# Patient Record
Sex: MALE | Race: WHITE | NOT HISPANIC OR LATINO | Employment: OTHER | URBAN - METROPOLITAN AREA
[De-identification: names, ages, dates, MRNs, and addresses within clinical notes are randomized per-mention and may not be internally consistent; named-entity substitution may affect disease eponyms.]

---

## 2017-01-15 ENCOUNTER — HOSPITAL ENCOUNTER (EMERGENCY)
Facility: HOSPITAL | Age: 82
Discharge: HOME | End: 2017-01-15
Attending: EMERGENCY MEDICINE
Payer: MEDICARE

## 2017-01-15 VITALS
WEIGHT: 190 LBS | DIASTOLIC BLOOD PRESSURE: 83 MMHG | TEMPERATURE: 97 F | SYSTOLIC BLOOD PRESSURE: 150 MMHG | HEART RATE: 64 BPM | BODY MASS INDEX: 28.89 KG/M2 | RESPIRATION RATE: 18 BRPM

## 2017-01-15 DIAGNOSIS — R52 PAIN: ICD-10-CM

## 2017-01-15 DIAGNOSIS — M19.031 OSTEOARTHRITIS OF RIGHT WRIST, UNSPECIFIED OSTEOARTHRITIS TYPE: Primary | ICD-10-CM

## 2017-01-15 DIAGNOSIS — S63.501A WRIST SPRAIN, RIGHT, INITIAL ENCOUNTER: ICD-10-CM

## 2017-01-15 PROCEDURE — 63600175 PHARM REV CODE 636 W HCPCS: Performed by: EMERGENCY MEDICINE

## 2017-01-15 PROCEDURE — 99283 EMERGENCY DEPT VISIT LOW MDM: CPT | Mod: 25

## 2017-01-15 PROCEDURE — 25000003 PHARM REV CODE 250: Performed by: EMERGENCY MEDICINE

## 2017-01-15 PROCEDURE — 96372 THER/PROPH/DIAG INJ SC/IM: CPT

## 2017-01-15 RX ORDER — TRAMADOL HYDROCHLORIDE AND ACETAMINOPHEN 37.5; 325 MG/1; MG/1
1 TABLET, FILM COATED ORAL EVERY 4 HOURS PRN
COMMUNITY
End: 2017-01-31

## 2017-01-15 RX ORDER — ONDANSETRON 2 MG/ML
4 INJECTION INTRAMUSCULAR; INTRAVENOUS
Status: COMPLETED | OUTPATIENT
Start: 2017-01-15 | End: 2017-01-15

## 2017-01-15 RX ORDER — CYCLOBENZAPRINE HCL 10 MG
10 TABLET ORAL
Status: COMPLETED | OUTPATIENT
Start: 2017-01-15 | End: 2017-01-15

## 2017-01-15 RX ORDER — HYDROMORPHONE HYDROCHLORIDE 1 MG/ML
0.5 INJECTION, SOLUTION INTRAMUSCULAR; INTRAVENOUS; SUBCUTANEOUS
Status: COMPLETED | OUTPATIENT
Start: 2017-01-15 | End: 2017-01-15

## 2017-01-15 RX ORDER — CYCLOBENZAPRINE HCL 10 MG
10 TABLET ORAL 3 TIMES DAILY PRN
Qty: 15 TABLET | Refills: 0 | Status: SHIPPED | OUTPATIENT
Start: 2017-01-15 | End: 2017-01-20

## 2017-01-15 RX ADMIN — ONDANSETRON 4 MG: 2 INJECTION INTRAMUSCULAR; INTRAVENOUS at 10:01

## 2017-01-15 RX ADMIN — CYCLOBENZAPRINE HYDROCHLORIDE 10 MG: 10 TABLET, FILM COATED ORAL at 10:01

## 2017-01-15 RX ADMIN — HYDROMORPHONE HYDROCHLORIDE 0.5 MG: 1 INJECTION, SOLUTION INTRAMUSCULAR; INTRAVENOUS; SUBCUTANEOUS at 10:01

## 2017-01-15 NOTE — ED AVS SNAPSHOT
[facility]  [address] [facility]  [place] [address]               [facility] Lacho Farooq   1/15/2017 10:12 PM   ED    Description:  Male : 1935   Department:  [facility]           Your Care was Coordinated By:     Provider Role From To    Mike Euceda MD Attending Provider 01/15/17 7952 --      Reason for Visit     Wrist Pain           Diagnoses this Visit        Comments    Osteoarthritis of right wrist, unspecified osteoarthritis type    -  Primary     Pain         Wrist sprain, right, initial encounter           ED Disposition     ED Disposition Condition Comment    Discharge             To Do List           Follow-up Information     Schedule an appointment as soon as possible for a visit with Dylan Adam MD.    Specialty:  Family Medicine    Contact information:    [address]  [place] [address]  [phone]         These Medications        Disp Refills Start End    cyclobenzaprine (FLEXERIL) 10 MG tablet 15 tablet 0 1/15/2017 2017    Take 1 tablet (10 mg total) by mouth 3 (three) times daily as needed for Muscle spasms. - Oral    Pharmacy: [facility] [address] [place] [address] #: [phone]         [facility]     [facility] Care [facility] -  Assistance  Registered nurses in the [facility] provide clinical advisement, health education, appointment booking, and other advisory services.  Call for this free service at [phone].             Medications           Message regarding Medications     Verify the changes and/or additions to your medication regime listed below are the same as discussed with your clinician today.  If any of these changes or additions are incorrect, please notify your healthcare provider.        START taking these NEW medications        Refills    cyclobenzaprine (FLEXERIL) 10 MG tablet 0    Sig: Take 1 tablet (10 mg total) by mouth 3 (three) times  daily as needed for Muscle spasms.    Class: Print    Route: Oral      These medications were administered today        Dose Freq    cyclobenzaprine tablet 10 mg 10 mg ED 1 Time    Sig: Take 1 tablet (10 mg total) by mouth ED 1 Time.    Class: Normal    Route: Oral           Verify that the below list of medications is an accurate representation of the medications you are currently taking.  If none reported, the list may be blank. If incorrect, please contact your healthcare provider. Carry this list with you in case of emergency.           Current Medications     aspirin (ECOTRIN) 81 MG EC tablet Take 81 mg by mouth once daily.    atorvastatin (LIPITOR) 10 MG tablet Take 10 mg by mouth every evening.    carvedilol (COREG) 25 MG tablet Take 25 mg by mouth 2 (two) times daily with meals.     ezetimibe (ZETIA) 10 mg tablet Take 10 mg by mouth once daily.    ferrous sulfate 325 mg (65 mg iron) Tab tablet Take 1 tablet (325 mg total) by mouth 2 (two) times daily.    iron sucrose (VENOFER) 100 mg iron/5 mL injection Inject 5 mLs (100 mg total) into the vein every 7 days.    lisinopril 10 MG tablet Take 10 mg by mouth once daily.    nitroGLYCERIN (NITROSTAT) 0.4 MG SL tablet Place 0.4 mg under the tongue every 5 (five) minutes as needed for Chest pain.    omeprazole (PRILOSEC) 40 MG capsule Take 40 mg by mouth once daily.    tramadol-acetaminophen 37.5-325 mg (ULTRACET) 37.5-325 mg Tab Take 1 tablet by mouth every 4 (four) hours as needed for Pain.    cyclobenzaprine (FLEXERIL) 10 MG tablet Take 1 tablet (10 mg total) by mouth 3 (three) times daily as needed for Muscle spasms.           Clinical Reference Information           Your Vitals Were     BP Pulse Temp Resp Weight BMI    174/81 (BP Location: Left arm, Patient Position: Sitting) 68 98 °F (36.7 °C) (Oral) 18 86.2 kg (190 lb) 28.89 kg/m2      Allergies as of 1/15/2017        Reactions    Bactrim [Sulfamethoxazole-trimethoprim] Rash    Codeine Rash      Immunizations  Administered on Date of Encounter - 1/15/2017     None      ED Micro, Lab, POCT     None      ED Imaging Orders     Start Ordered       Status Ordering Provider    01/15/17 2218 01/15/17 2218  X-Ray Wrist Complete Right  1 time imaging      In process         Discharge Instructions         What is Arthritis?  Arthritis is a disease that affects the joints (the parts where bones meet and move). It can affect any joint in your body. There are many types of arthritis, including osteoarthritis and rheumatoid arthrtitis. If your symptoms are mild, medications may be enough to reduce pain and swelling. For more severe arthritis, surgery may be needed to improve the condition of the joint or replace the joint entirely.                  What causes arthritis?  Cartilage is a smooth substance that protects the ends of your bones and provides cushioning. When you have arthritis, this cartilage breaks down and can no longer protect your bones. The bones rub against each other, causing pain and swelling. Over time, bone spurs (small pieces of rough or splintered bone) may develop, and the joint's range of motion can become limited.  Symptoms  Some of the more common symptoms of arthritis include:  · Joint pain and stiffness. Pain and stiffness get worse with long periods of rest or using a joint too long or too hard.  · Joints that have lost normal shape and motion.  · Tender, inflamed joints. They may look red and feel warm.  · Grinding or popping noise with joint movement.   · Feeling tired all the time.  Reducing symptoms  Following a healthy lifestyle by losing weight and exercising can help reduce symptoms of osteoarthritis. Medicines can be very helpful for arthritis.     © 6926-5371 [employer]. [address] [place] [address], [place] [address]. All rights reserved. This information is not intended as a substitute for professional medical care. Always follow your healthcare professional's  instructions.          Muscle Strain in the Extremities  A muscle strain is a stretching and tearing of muscle fibers. This causes pain, especially when you move that muscle. There may also be some swelling and bruising.  Home care  · Keep the hurt area raised to reduce pain and swelling. This is especially important during the first 48 hours.  · Apply an ice pack over the injured area for 15 to 20 minutes every 3 to 6 hours. You should do this for the first 24 to 48 hours. You can make an ice pack by filling a plastic bag that seals at the top with ice cubes and then wrapping it with a thin towel. Be careful not to injure your skin with the ice treatments. Ice should never be applied directly to skin. Continue the use of ice packs for relief of pain and swelling as needed. After 48 hours, apply heat (warm shower or warm bath) for 15 to 20 minutes several times a day, or alternate ice and heat.  · You may use over-the-counter pain medicine to control pain, unless another medicine was prescribed. If you have chronic liver or kidney disease or ever had a stomach ulcer or GI bleeding, talk with your healthcare provider before using these medicines.  · For leg strains: If crutches have been recommended, dont put full weight on the hurt leg until you can do so without pain. You can return to sports when you are able to hop and run on the injured leg without pain.  Follow-up care  Follow up with your healthcare provider, or as advised.  When to seek medical advice  Call your healthcare provider right away if any of these occur:  · The toes of the injured leg become swollen, cold, blue, numb, or tingly  · Pain or swelling increases  © 5554-5795 [employer]. [address] [place] [address], [place] [address]. All rights reserved. This information is not intended as a substitute for professional medical care. Always follow your healthcare professional's instructions.          Your Scheduled Appointments     Jan 31,  2017  9:00 AM CST   Established Patient Visit with Mitchel Fernandez MD   [facility] ([facility]    [address] [facility] [address] [phone]              Leticias[facility] Sign-Up     Activating your [facility] account is as easy as 1-2-3!     1) Visit [email or web address], select Sign Up Now, enter this activation code and your date of birth, then select Next.  [email or web address]  Expires: 3/1/2017 10:48 PM      2) Create a username and password to use when you visit [facility] in the future and select a security question in case you lose your password and select Next.    3) Enter your e-mail address and click Sign Up!    Additional Information  If you have questions, please e-mail [email or web address] or call [phone] to talk to our [facility] staff. Remember, [facility] is NOT to be used for urgent needs. For medical emergencies, dial 911.         Smoking Cessation     If you would like to quit smoking:   You may be eligible for free services if you are a [place] resident and started smoking cigarettes before September 1, 1988.  Call the Smoking Cessation Trust (SCT) toll free at (333) 763-0812 or (481) 410-4448.   Call 5-591-QUIT-NOW if you do not meet the above criteria.             [facility] complies with applicable Federal civil rights laws and does not discriminate on the basis of race, color, national origin, age, disability, or sex.        Language Assistance Services     ATTENTION: Language assistance services are available, free of charge. Please call [phone].      ATENCIÓN: Si habla español, tiene a albarran disposición servicios gratuitos de asistencia lingüística. Llame al [phone].     CHÚ Ý: N?u b?n nói Ti?ng Vi?t, có các d?ch v? h? tr? ngôn ng? mi?n phí dành cho b?n. G?i s? [phone].

## 2017-01-16 NOTE — ED TRIAGE NOTES
"pt c/o right wrist pain, denies trauma. Pt reports onset x a "couple days, but really bad tonight."  "

## 2017-01-16 NOTE — ED PROVIDER NOTES
"Encounter Date: 1/15/2017       History     Chief Complaint   Patient presents with    Wrist Pain     pt c/o right wrist pain, denies trauma. Pt reports onset x a "couple days, but really bad tonight."     Review of patient's allergies indicates:   Allergen Reactions    Bactrim [sulfamethoxazole-trimethoprim] Rash    Codeine Rash     Patient is a 81 y.o. male presenting with the following complaint: wrist injury. The history is provided by the patient.   Wrist Injury    The incident occurred several days ago. The incident occurred at home. There was no injury mechanism. The pain is present in the right wrist. The quality of the pain is described as aching. The pain is at a severity of 3/10. The pain has been fluctuating since the incident. Pertinent negatives include no fever and no malaise/fatigue. He reports no foreign bodies present. The symptoms are aggravated by movement. He has tried nothing for the symptoms.     Past Medical History   Diagnosis Date    Anemia     Coronary artery disease     Hyperlipemia     Hypertension      No past medical history pertinent negatives.  Past Surgical History   Procedure Laterality Date    Cardiac surgery      Hernia repair      Carpal tunnel release       History reviewed. No pertinent family history.  Social History   Substance Use Topics    Smoking status: Former Smoker    Smokeless tobacco: Never Used    Alcohol use Yes      Comment: rarely     Review of Systems   Constitutional: Negative for fever and malaise/fatigue.   HENT: Negative for ear discharge, ear pain and sore throat.    Eyes: Negative for pain, discharge, redness and itching.   Respiratory: Negative for cough, shortness of breath, wheezing and stridor.    Cardiovascular: Negative for chest pain, palpitations and leg swelling.   Gastrointestinal: Negative for abdominal pain, nausea and vomiting.   Genitourinary: Negative for enuresis and flank pain.   Musculoskeletal: Positive for joint swelling. " Negative for back pain, gait problem, neck pain and neck stiffness.   Skin: Negative for color change, pallor, rash and wound.   Neurological: Negative for tremors, syncope, speech difficulty and weakness.       Physical Exam   Initial Vitals   BP Pulse Resp Temp SpO2   01/15/17 2210 01/15/17 2210 01/15/17 2210 01/15/17 2210 --   174/81 68 18 98 °F (36.7 °C)      Physical Exam    Nursing note and vitals reviewed.  Constitutional: He appears well-developed and well-nourished. He is not diaphoretic. No distress.   HENT:   Head: Normocephalic and atraumatic.   Mouth/Throat: No oropharyngeal exudate.   Eyes: Conjunctivae and EOM are normal. Pupils are equal, round, and reactive to light. Right eye exhibits no discharge. Left eye exhibits no discharge. No scleral icterus.   Neck: Normal range of motion. Neck supple.   Cardiovascular: Normal rate, regular rhythm and normal heart sounds. Exam reveals no friction rub.    Pulmonary/Chest: Breath sounds normal. No stridor. No respiratory distress. He has no wheezes. He has no rhonchi. He has no rales.   Abdominal: Bowel sounds are normal. He exhibits no distension. There is no tenderness. There is no rebound and no guarding.   Musculoskeletal: Normal range of motion. He exhibits edema and tenderness.        Arms:  Neurological: He is alert and oriented to person, place, and time. He has normal strength and normal reflexes. No sensory deficit.   Skin: No rash noted.         ED Course   Procedures  Labs Reviewed - No data to display                            ED Course     Clinical Impression:   The primary encounter diagnosis was Osteoarthritis of right wrist, unspecified osteoarthritis type. Diagnoses of Pain and Wrist sprain, right, initial encounter were also pertinent to this visit.    Disposition:   Disposition: Discharged  Condition: Stable       Mike Euceda MD  01/15/17 2198

## 2017-01-16 NOTE — DISCHARGE INSTRUCTIONS
What is Arthritis?  Arthritis is a disease that affects the joints (the parts where bones meet and move). It can affect any joint in your body. There are many types of arthritis, including osteoarthritis and rheumatoid arthrtitis. If your symptoms are mild, medications may be enough to reduce pain and swelling. For more severe arthritis, surgery may be needed to improve the condition of the joint or replace the joint entirely.                  What causes arthritis?  Cartilage is a smooth substance that protects the ends of your bones and provides cushioning. When you have arthritis, this cartilage breaks down and can no longer protect your bones. The bones rub against each other, causing pain and swelling. Over time, bone spurs (small pieces of rough or splintered bone) may develop, and the joint's range of motion can become limited.  Symptoms  Some of the more common symptoms of arthritis include:  · Joint pain and stiffness. Pain and stiffness get worse with long periods of rest or using a joint too long or too hard.  · Joints that have lost normal shape and motion.  · Tender, inflamed joints. They may look red and feel warm.  · Grinding or popping noise with joint movement.   · Feeling tired all the time.  Reducing symptoms  Following a healthy lifestyle by losing weight and exercising can help reduce symptoms of osteoarthritis. Medicines can be very helpful for arthritis.     © 2334-7677 The "[employer]. [address] [place] [address], [place] [address]. All rights reserved. This information is not intended as a substitute for professional medical care. Always follow your healthcare professional's instructions.          Muscle Strain in the Extremities  A muscle strain is a stretching and tearing of muscle fibers. This causes pain, especially when you move that muscle. There may also be some swelling and bruising.  Home care  · Keep the hurt area raised to reduce pain and swelling. This is especially  important during the first 48 hours.  · Apply an ice pack over the injured area for 15 to 20 minutes every 3 to 6 hours. You should do this for the first 24 to 48 hours. You can make an ice pack by filling a plastic bag that seals at the top with ice cubes and then wrapping it with a thin towel. Be careful not to injure your skin with the ice treatments. Ice should never be applied directly to skin. Continue the use of ice packs for relief of pain and swelling as needed. After 48 hours, apply heat (warm shower or warm bath) for 15 to 20 minutes several times a day, or alternate ice and heat.  · You may use over-the-counter pain medicine to control pain, unless another medicine was prescribed. If you have chronic liver or kidney disease or ever had a stomach ulcer or GI bleeding, talk with your healthcare provider before using these medicines.  · For leg strains: If crutches have been recommended, dont put full weight on the hurt leg until you can do so without pain. You can return to sports when you are able to hop and run on the injured leg without pain.  Follow-up care  Follow up with your healthcare provider, or as advised.  When to seek medical advice  Call your healthcare provider right away if any of these occur:  · The toes of the injured leg become swollen, cold, blue, numb, or tingly  · Pain or swelling increases  © 6840-7963 [employer]. [address] [place] [address], [place] [address]. All rights reserved. This information is not intended as a substitute for professional medical care. Always follow your healthcare professional's instructions.

## 2017-01-31 ENCOUNTER — OFFICE VISIT (OUTPATIENT)
Dept: NEUROLOGY | Facility: CLINIC | Age: 82
End: 2017-01-31
Payer: MEDICARE

## 2017-01-31 VITALS
SYSTOLIC BLOOD PRESSURE: 108 MMHG | HEIGHT: 68 IN | DIASTOLIC BLOOD PRESSURE: 62 MMHG | HEART RATE: 60 BPM | WEIGHT: 192 LBS | RESPIRATION RATE: 18 BRPM | BODY MASS INDEX: 29.1 KG/M2

## 2017-01-31 DIAGNOSIS — I65.23 BILATERAL CAROTID ARTERY STENOSIS: ICD-10-CM

## 2017-01-31 DIAGNOSIS — I69.90 LATE EFFECTS OF CVA (CEREBROVASCULAR ACCIDENT): Primary | ICD-10-CM

## 2017-01-31 DIAGNOSIS — I67.1 CEREBRAL ANEURYSM: ICD-10-CM

## 2017-01-31 DIAGNOSIS — I48.0 PAROXYSMAL ATRIAL FIBRILLATION: ICD-10-CM

## 2017-01-31 DIAGNOSIS — I73.9 PVD (PERIPHERAL VASCULAR DISEASE) WITH CLAUDICATION: ICD-10-CM

## 2017-01-31 PROCEDURE — 99999 PR PBB SHADOW E&M-EST. PATIENT-LVL III: CPT | Mod: PBBFAC,,, | Performed by: PSYCHIATRY & NEUROLOGY

## 2017-01-31 PROCEDURE — 99213 OFFICE O/P EST LOW 20 MIN: CPT | Mod: PBBFAC | Performed by: PSYCHIATRY & NEUROLOGY

## 2017-01-31 PROCEDURE — 99214 OFFICE O/P EST MOD 30 MIN: CPT | Mod: S$PBB,,, | Performed by: PSYCHIATRY & NEUROLOGY

## 2017-01-31 RX ORDER — DABIGATRAN ETEXILATE 150 MG/1
150 CAPSULE ORAL 2 TIMES DAILY
COMMUNITY
End: 2018-01-26 | Stop reason: ALTCHOICE

## 2017-01-31 RX ORDER — LISINOPRIL AND HYDROCHLOROTHIAZIDE 10; 12.5 MG/1; MG/1
1 TABLET ORAL DAILY
COMMUNITY
End: 2018-01-26 | Stop reason: DRUGHIGH

## 2017-01-31 NOTE — PROGRESS NOTES
HPI:Lacho Farooq is a 81 y.o. male with 2 spells of vague dizziness last month. 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  Since the last visit, the patient was found to have PAD and was treated for anemia with transfusion.   No intervention was done.  NO further leg pain and no dizziness.  Patient had seen vascular surgery re: Carotid stenosis and PAD. He is considering CEA  He is currently getting TED with Dr Walter for cervical pain which helps greatly      Review of Systems   Constitutional: Negative for fever.   HENT: Negative for nosebleeds.    Eyes: Negative for double vision.   Respiratory: Negative for hemoptysis.    Cardiovascular: Negative for leg swelling.   Gastrointestinal: Negative for blood in stool.   Genitourinary: Negative for hematuria.   Musculoskeletal: Negative for falls.   Skin: Negative for rash.   Neurological: Negative for tremors, sensory change and focal weakness.   Psychiatric/Behavioral: Negative for memory loss.     Exam:  Gen Appearance, well developed/nourished in no apparent distress  CV: 2+ distal pulses with no edema or swelling  Neuro:  MS: Awake, alert, Sustains attention. Oriented to date fully. Recent details are intact as he recalls events of recent football games/ able to tell me recent medial details without difficulty/remote memory is intact. He shows me his watch that he uses for his medication reminder Language is full to spontaneous speech/comprehension. Fund of Knowledge is full. His insight and judgement are excellent, he is  goal directed  CN: Optic discs are flat with normal vasculature, PERRL, Extraoccular movements and visual fields are full. Left eye lid mildly drooping (he states known cosmetic) Normal facial strength,Tongue and Palate are midline and  strong. Shoulder Shrug  symmetric and strong.  Motor: Normal bulk, tone, no abnormal movements.  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      2016: Carotid US less than 60% stenosis, TTE normal EF, Holter: no afib    6/2016 INES: The aortic root is normal in size.  Grade 2 atheroma disease was detected consistent with extensive intimal thickening.  Maximal plaque thickness was 3 mm.      Labs: LDL 37 and fasting glucose below 110      9/2016CTA head and neck :1.  No CT evidence of acute intracranial abnormality.  2.  Atrophy and small vessel ischemic changes of the periventricular white matter.  3.  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  4.  Atherosclerotic change as noted above with greater than 70 percent narrowing involving the internal carotid arteries bilaterally greater on the right than the left.    10/2016 CTA legs: Significant calcified atheromatous disease involving the aorta and its major branch vessels as well as the bilateral lower extremity vessels.  There is diminution of flow in the bilateral anterior and posterior tibial arteries and peroneal arteries, left greater than right, with occlusion of the left posterior tibial artery in the lower calf with reconstitution in the foot.    Bilateral lower extremity subcutaneous edema.    Small bilateral pleural effusions.    Left renal cyst.      Assessment: Lacho Farooq is a 81 y.o. male with 2 spells of vague dizziness in 2016. MRI showed  acute CVA punctate in the left cerebellum  MRA shows questionable  3.2 mm aneurysm  arising from the right M1 segment and questionable small aneurysms measuring 1-2 mm from the left paraclinoid carotid artery.    I recommend:  1. Rescan ? aneurysmvs focal angulation  at right M1 and left para clinoid carotid segments with  CTA head and neck in 1-2 years. Benefits of pradaxa outweigh any risk of bleeding here. These lesions are small and would not require intervention at this point otherwise. This will also be used to continue to monitor his greater than 70% carotid disease if he does not have CEA. (also monitored by Dr Begum by US- currently asymptomatic and seeing vascular surgery)  2. INES showed   Grade 2 atheroma disease   3. Pradaxa to continue for afib and stroke prevention- patient reports that he could have forgotten some doses prior to event in 2016-- more compliant now  4. PAD noted by CTA legs and treated conservatatively. Patient has a history of cervical radicular pain which is currently treated with  epidural steroid injections.   5. The patient is known to me from 2010 for confusion associated with hypotension, prostatitis (for which he is still being treated) and acute renal failure thought to represent delirium.  He was discharged from my care after that point when symptoms resolved. Will monitor for any consistent memory loss long term. He uses CPAP for central apneal.       RTC in 6 months

## 2017-01-31 NOTE — MR AVS SNAPSHOT
[facility]  [address] [facility] [address]  Phone: [phone]  Fax: [phone]                  St Lacho Farooq   2017 9:00 AM   Office Visit    Description:  Male : 1935   Provider:  Mitchel Fernandez MD   Department:  [facility]           Reason for Visit     Neurologic Problem           Diagnoses this Visit        Comments    Late effects of CVA (cerebrovascular accident)    -  Primary     Paroxysmal atrial fibrillation         Cerebral aneurysm         PVD (peripheral vascular disease) with claudication         Bilateral carotid artery stenosis                To Do List           Goals (5 Years of Data)     None      Follow-Up and Disposition     Return in about 6 months (around 2017).      [facility] On Call     [facility] On Call Nurse Care Line -  Assistance  Registered nurses in the [facility] On Call Center provide clinical advisement, health education, appointment booking, and other advisory services.  Call for this free service at [phone].             Medications           Message regarding Medications     Verify the changes and/or additions to your medication regime listed below are the same as discussed with your clinician today.  If any of these changes or additions are incorrect, please notify your healthcare provider.        STOP taking these medications     tramadol-acetaminophen 37.5-325 mg (ULTRACET) 37.5-325 mg Tab Take 1 tablet by mouth every 4 (four) hours as needed for Pain.    lisinopril 10 MG tablet Take 10 mg by mouth once daily.    ezetimibe (ZETIA) 10 mg tablet Take 10 mg by mouth once daily.    iron sucrose (VENOFER) 100 mg iron/5 mL injection Inject 5 mLs (100 mg total) into the vein every 7 days.    ferrous sulfate 325 mg (65 mg iron) Tab tablet Take 1 tablet (325 mg total) by mouth 2 (two) times daily.           Verify that the below list of medications is an accurate representation of the medications  "you are currently taking.  If none reported, the list may be blank. If incorrect, please contact your healthcare provider. Carry this list with you in case of emergency.           Current Medications     aspirin (ECOTRIN) 81 MG EC tablet Take 81 mg by mouth once daily.    atorvastatin (LIPITOR) 10 MG tablet Take 10 mg by mouth every evening.    carvedilol (COREG) 25 MG tablet Take 25 mg by mouth 2 (two) times daily with meals.     COQ10, UBIQUINOL, ORAL Take 1 tablet by mouth once daily.    dabigatran etexilate (PRADAXA) 150 mg Cap Take 150 mg by mouth 2 (two) times daily. "Do NOT break, chew, or open capsules."    lisinopril-hydrochlorothiazide (PRINZIDE,ZESTORETIC) 10-12.5 mg per tablet Take 1 tablet by mouth once daily.    nitroGLYCERIN (NITROSTAT) 0.4 MG SL tablet Place 0.4 mg under the tongue every 5 (five) minutes as needed for Chest pain.    omeprazole (PRILOSEC) 40 MG capsule Take 40 mg by mouth once daily.           Clinical Reference Information           Vital Signs - Last Recorded  Most recent update: 1/31/2017  9:05 AM by Sheri Anthony MA    BP Pulse Resp Ht Wt BMI    108/62 (BP Location: Left arm, Patient Position: Sitting, BP Method: Manual) 60 18 5' 8" (1.727 m) 87.1 kg (192 lb 0.3 oz) 29.2 kg/m2      Blood Pressure          Most Recent Value    BP  108/62      Allergies as of 1/31/2017     Bactrim [Sulfamethoxazole-trimethoprim]    Codeine      Immunizations Administered on Date of Encounter - 1/31/2017     None      "

## 2017-02-23 ENCOUNTER — OFFICE VISIT (OUTPATIENT)
Dept: FAMILY MEDICINE | Facility: CLINIC | Age: 82
End: 2017-02-23
Payer: MEDICARE

## 2017-02-23 VITALS
HEIGHT: 67 IN | HEART RATE: 72 BPM | SYSTOLIC BLOOD PRESSURE: 128 MMHG | BODY MASS INDEX: 30.07 KG/M2 | DIASTOLIC BLOOD PRESSURE: 78 MMHG | WEIGHT: 191.56 LBS

## 2017-02-23 DIAGNOSIS — R10.30 INGUINAL PAIN, UNSPECIFIED LATERALITY: Primary | ICD-10-CM

## 2017-02-23 PROCEDURE — 81000 URINALYSIS NONAUTO W/SCOPE: CPT | Mod: PBBFAC | Performed by: FAMILY MEDICINE

## 2017-02-23 PROCEDURE — 99212 OFFICE O/P EST SF 10 MIN: CPT | Mod: PBBFAC | Performed by: FAMILY MEDICINE

## 2017-02-23 PROCEDURE — 81001 URINALYSIS AUTO W/SCOPE: CPT | Mod: PBBFAC | Performed by: FAMILY MEDICINE

## 2017-02-23 PROCEDURE — 99999 PR PBB SHADOW E&M-EST. PATIENT-LVL II: CPT | Mod: PBBFAC,,, | Performed by: FAMILY MEDICINE

## 2017-02-23 PROCEDURE — 99213 OFFICE O/P EST LOW 20 MIN: CPT | Mod: S$PBB,,, | Performed by: FAMILY MEDICINE

## 2017-02-23 RX ORDER — DOXYCYCLINE 100 MG/1
100 CAPSULE ORAL 2 TIMES DAILY
Qty: 28 CAPSULE | Refills: 0 | Status: SHIPPED | OUTPATIENT
Start: 2017-02-23 | End: 2018-01-26

## 2017-02-23 NOTE — MR AVS SNAPSHOT
[facility]  [address]  [facility] [address]  Phone: [phone]  Fax: [phone]                  St Lacho Farooq   2017 4:30 PM   Office Visit    Description:  Male : 1935   Provider:  Ehsan West MD   Department:  [facility]           Reason for Visit     Urinary Tract Infection           Diagnoses this Visit        Comments    Inguinal pain, unspecified laterality    -  Primary            To Do List           Future Appointments        Provider Department Dept Phone    3/9/2017 2:45 PM Ehsan West MD [facility] [phone]    2017 9:30 AM Mitchel Fernandez MD [facility] [phone]      Goals (5 Years of Data)     None       These Medications        Disp Refills Start End    doxycycline (MONODOX) 100 MG capsule 28 capsule 0 2017     Take 1 capsule (100 mg total) by mouth 2 (two) times daily. - Oral    Pharmacy: [facility] [address] [place] [address] #: [phone]         [facility] On Call     [facility] On Call Nurse Care Line -  Assistance  Registered nurses in the [facility] On Call Center provide clinical advisement, health education, appointment booking, and other advisory services.  Call for this free service at [phone].             Medications           Message regarding Medications     Verify the changes and/or additions to your medication regime listed below are the same as discussed with your clinician today.  If any of these changes or additions are incorrect, please notify your healthcare provider.        START taking these NEW medications        Refills    doxycycline (MONODOX) 100 MG capsule 0    Sig: Take 1 capsule (100 mg total) by mouth 2 (two) times daily.    Class: Normal    Route: Oral           Verify that the below list of medications is an accurate representation of the medications you are currently taking.  If none reported, the list  "may be blank. If incorrect, please contact your healthcare provider. Carry this list with you in case of emergency.           Current Medications     aspirin (ECOTRIN) 81 MG EC tablet Take 81 mg by mouth once daily.    atorvastatin (LIPITOR) 10 MG tablet Take 10 mg by mouth every evening.    carvedilol (COREG) 25 MG tablet Take 25 mg by mouth 2 (two) times daily with meals.     COQ10, UBIQUINOL, ORAL Take 1 tablet by mouth once daily.    dabigatran etexilate (PRADAXA) 150 mg Cap Take 150 mg by mouth 2 (two) times daily. "Do NOT break, chew, or open capsules."    lisinopril-hydrochlorothiazide (PRINZIDE,ZESTORETIC) 10-12.5 mg per tablet Take 1 tablet by mouth once daily.    nitroGLYCERIN (NITROSTAT) 0.4 MG SL tablet Place 0.4 mg under the tongue every 5 (five) minutes as needed for Chest pain.    omeprazole (PRILOSEC) 40 MG capsule Take 40 mg by mouth once daily.    doxycycline (MONODOX) 100 MG capsule Take 1 capsule (100 mg total) by mouth 2 (two) times daily.           Clinical Reference Information           Your Vitals Were     BP Pulse Height Weight BMI    128/78 (BP Location: Left arm, Patient Position: Sitting, BP Method: Manual) 72 5' 7" (1.702 m) 86.9 kg (191 lb 9.3 oz) 30.01 kg/m2      Blood Pressure          Most Recent Value    BP  128/78      Allergies as of 2/23/2017     Bactrim [Sulfamethoxazole-trimethoprim]    Codeine      Immunizations Administered on Date of Encounter - 2/23/2017     None      Orders Placed During Today's Visit      Normal Orders This Visit    POCT urinalysis, dipstick or tablet reag     POCT URINE SEDIMENT EXAM       Language Assistance Services     ATTENTION: Language assistance services are available, free of charge. Please call [phone].      ATENCIÓN: Si habla jignesh, tiene a albarran disposición servicios gratuitos de asistencia lingüística. Llame al [phone].     CHÚ Ý: N?u b?n nói Ti?ng Vi?t, có các d?ch v? h? tr? ngôn ng? mi?n phí dành cho b?n. G?i s? " [phone].         [facility] complies with applicable Federal civil rights laws and does not discriminate on the basis of race, color, national origin, age, disability, or sex.

## 2017-02-23 NOTE — PROGRESS NOTES
Subjective:       Patient ID: St Lacho Farooq is a 82 y.o. male.    Chief Complaint: Urinary Tract Infection    Pt is a 82 y.o. male who presents for evaluation and management of   Encounter Diagnosis   Name Primary?    Inguinal pain, unspecified laterality Yes   .noticed while fishing today. Bilateral pelvic and inguinal pain. He says he felt this way when he had a prostate infection 5 years ago     Doing well on current meds. Denies any side effects. Prevention is up to date.  Review of Systems   Constitutional: Negative for chills and fever.   Genitourinary: Positive for testicular pain. Negative for dysuria, penile pain and penile swelling.       Objective:      Physical Exam   Constitutional: He is oriented to person, place, and time. He appears well-developed and well-nourished.   HENT:   Head: Normocephalic and atraumatic.   Right Ear: External ear normal.   Left Ear: External ear normal.   Nose: Nose normal.   Mouth/Throat: Oropharynx is clear and moist.   Eyes: Conjunctivae and EOM are normal. Pupils are equal, round, and reactive to light. Right eye exhibits no discharge. Left eye exhibits no discharge. No scleral icterus.   Neck: Normal range of motion. Neck supple. No JVD present. No tracheal deviation present. No thyromegaly present.   Cardiovascular: Normal rate, regular rhythm, normal heart sounds and intact distal pulses.    No murmur heard.  Pulmonary/Chest: Effort normal and breath sounds normal. No respiratory distress. He has no wheezes. He has no rales. He exhibits no tenderness.   Abdominal: Soft. Bowel sounds are normal. He exhibits no distension and no mass. There is no tenderness. There is no rebound and no guarding.   Genitourinary:   Genitourinary Comments: Right sided varicocele   Left inguinal TTP around vas differens, no epididymal TTP  No palp hernia on left or right   Prostate 20g, nontender    Musculoskeletal: Normal range of motion.   Lymphadenopathy:     He has no cervical  adenopathy.   Neurological: He is alert and oriented to person, place, and time. He has normal reflexes. He displays normal reflexes. No cranial nerve deficit. He exhibits normal muscle tone. Coordination normal.   Skin: Skin is warm and dry.   Psychiatric: He has a normal mood and affect. His behavior is normal. Judgment and thought content normal.       Assessment:       1. Inguinal pain, unspecified laterality        Plan:   St Monk was seen today for urinary tract infection.    Diagnoses and all orders for this visit:    Inguinal pain, unspecified laterality  -     POCT urinalysis, dipstick or tablet reag  -     POCT URINE SEDIMENT EXAM    Other orders  -     doxycycline (MONODOX) 100 MG capsule; Take 1 capsule (100 mg total) by mouth 2 (two) times daily.    exam is not straightfoward as to cause of pain but I am treating for epididymitis   RTC 2 weeks   No Follow-up on file.

## 2017-03-01 LAB
BACTERIA SPEC CULT: NORMAL
BILIRUB SERPL-MCNC: NORMAL MG/DL
BLOOD URINE, POC: NORMAL
CASTS: NORMAL
COLOR, POC UA: NORMAL
CRYSTALS: NORMAL
GLUCOSE UR QL STRIP: NORMAL
KETONES UR QL STRIP: NORMAL
LEUKOCYTE ESTERASE URINE, POC: NORMAL
NITRITE, POC UA: NORMAL
PH, POC UA: 5
PROTEIN, POC: NORMAL
RBC CELLS COUNTED: NORMAL
SPECIFIC GRAVITY, POC UA: 1.02
UROBILINOGEN, POC UA: NORMAL
WHITE BLOOD CELLS: NORMAL

## 2017-03-09 ENCOUNTER — OFFICE VISIT (OUTPATIENT)
Dept: FAMILY MEDICINE | Facility: CLINIC | Age: 82
End: 2017-03-09
Payer: MEDICARE

## 2017-03-09 VITALS
DIASTOLIC BLOOD PRESSURE: 72 MMHG | SYSTOLIC BLOOD PRESSURE: 132 MMHG | WEIGHT: 192.63 LBS | HEIGHT: 67 IN | BODY MASS INDEX: 30.24 KG/M2 | HEART RATE: 80 BPM

## 2017-03-09 DIAGNOSIS — N45.1 EPIDIDYMITIS: Primary | ICD-10-CM

## 2017-03-09 PROCEDURE — 99999 PR PBB SHADOW E&M-EST. PATIENT-LVL II: CPT | Mod: PBBFAC,,, | Performed by: FAMILY MEDICINE

## 2017-03-09 PROCEDURE — 1160F RVW MEDS BY RX/DR IN RCRD: CPT | Performed by: FAMILY MEDICINE

## 2017-03-09 PROCEDURE — 3078F DIAST BP <80 MM HG: CPT | Performed by: FAMILY MEDICINE

## 2017-03-09 PROCEDURE — 1157F ADVNC CARE PLAN IN RCRD: CPT | Performed by: FAMILY MEDICINE

## 2017-03-09 PROCEDURE — 1159F MED LIST DOCD IN RCRD: CPT | Performed by: FAMILY MEDICINE

## 2017-03-09 PROCEDURE — 99212 OFFICE O/P EST SF 10 MIN: CPT | Mod: PBBFAC | Performed by: FAMILY MEDICINE

## 2017-03-09 PROCEDURE — 99213 OFFICE O/P EST LOW 20 MIN: CPT | Mod: S$PBB | Performed by: FAMILY MEDICINE

## 2017-03-09 PROCEDURE — 3075F SYST BP GE 130 - 139MM HG: CPT | Performed by: FAMILY MEDICINE

## 2017-03-09 NOTE — PROGRESS NOTES
Subjective:       Patient ID: St Lacho Farooq is a 82 y.o. male.    Chief Complaint: Follow-up    Pt is a 82 y.o. male who presents for evaluation and management of   Encounter Diagnosis   Name Primary?    Epididymitis Yes   .pain resolved     Doing well on current meds. Denies any side effects. Prevention is up to date.  Review of Systems   Constitutional: Negative for fever.   Genitourinary: Negative for dysuria and testicular pain.       Objective:      Physical Exam   Constitutional: He is oriented to person, place, and time. He appears well-developed and well-nourished.   HENT:   Head: Normocephalic and atraumatic.   Right Ear: External ear normal.   Left Ear: External ear normal.   Nose: Nose normal.   Mouth/Throat: Oropharynx is clear and moist.   Eyes: Conjunctivae and EOM are normal. Pupils are equal, round, and reactive to light. Right eye exhibits no discharge. Left eye exhibits no discharge. No scleral icterus.   Neck: Normal range of motion. Neck supple. No JVD present. No tracheal deviation present. No thyromegaly present.   Cardiovascular: Normal rate, regular rhythm, normal heart sounds and intact distal pulses.    No murmur heard.  Pulmonary/Chest: Effort normal and breath sounds normal. No respiratory distress. He has no wheezes. He has no rales. He exhibits no tenderness.   Abdominal: Soft. Bowel sounds are normal. He exhibits no distension and no mass. There is no tenderness. There is no rebound and no guarding.   Genitourinary:   Genitourinary Comments: Right sided varicocele   Left inguinal resolved TTP around vas differens, no epididymal TTP  No palp hernia on left or right   Prostate 20g, nontender    Musculoskeletal: Normal range of motion.   Lymphadenopathy:     He has no cervical adenopathy.   Neurological: He is alert and oriented to person, place, and time. He has normal reflexes. He displays normal reflexes. No cranial nerve deficit. He exhibits normal muscle tone. Coordination  normal.   Skin: Skin is warm and dry.   Psychiatric: He has a normal mood and affect. His behavior is normal. Judgment and thought content normal.       Assessment:       1. Epididymitis        Plan:   St Monk was seen today for follow-up.    Diagnoses and all orders for this visit:    Epididymitis    resolved     No Follow-up on file.

## 2017-06-19 DIAGNOSIS — J44.1 CHRONIC OBSTRUCTIVE PULMONARY DISEASE WITH ACUTE EXACERBATION: ICD-10-CM

## 2017-06-19 RX ORDER — UMECLIDINIUM BROMIDE AND VILANTEROL TRIFENATATE 62.5; 25 UG/1; UG/1
POWDER RESPIRATORY (INHALATION)
Qty: 60 EACH | Refills: 0 | Status: SHIPPED | OUTPATIENT
Start: 2017-06-19 | End: 2017-08-14 | Stop reason: SDUPTHER

## 2017-08-11 ENCOUNTER — OFFICE VISIT (OUTPATIENT)
Dept: NEUROLOGY | Facility: CLINIC | Age: 82
End: 2017-08-11
Payer: MEDICARE

## 2017-08-11 VITALS
RESPIRATION RATE: 18 BRPM | BODY MASS INDEX: 29.54 KG/M2 | HEIGHT: 68 IN | SYSTOLIC BLOOD PRESSURE: 120 MMHG | HEART RATE: 58 BPM | WEIGHT: 194.88 LBS | DIASTOLIC BLOOD PRESSURE: 58 MMHG

## 2017-08-11 DIAGNOSIS — I67.1 CEREBRAL ANEURYSM: ICD-10-CM

## 2017-08-11 DIAGNOSIS — I69.90 LATE EFFECTS OF CVA (CEREBROVASCULAR ACCIDENT): Primary | ICD-10-CM

## 2017-08-11 DIAGNOSIS — I48.0 PAROXYSMAL ATRIAL FIBRILLATION: ICD-10-CM

## 2017-08-11 PROCEDURE — 99999 PR STA SHADOW: CPT | Mod: PBBFAC,,, | Performed by: PSYCHIATRY & NEUROLOGY

## 2017-08-11 PROCEDURE — 99999 PR PBB SHADOW E&M-EST. PATIENT-LVL III: CPT | Mod: PBBFAC,,, | Performed by: PSYCHIATRY & NEUROLOGY

## 2017-08-11 PROCEDURE — 99213 OFFICE O/P EST LOW 20 MIN: CPT | Mod: PBBFAC | Performed by: PSYCHIATRY & NEUROLOGY

## 2017-08-11 PROCEDURE — 99214 OFFICE O/P EST MOD 30 MIN: CPT | Mod: S$PBB | Performed by: PSYCHIATRY & NEUROLOGY

## 2017-08-11 RX ORDER — FUROSEMIDE 40 MG/1
40 TABLET ORAL DAILY
COMMUNITY
End: 2018-01-26 | Stop reason: DRUGHIGH

## 2017-08-11 NOTE — PROGRESS NOTES
HPI:Lacho Farooq is a 81 y.o. male with 2 spells of vague dizziness in 2016. 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  Since the last visit, the patient he did not have CEA.   Dr Begum continues to monitor his carotids (done recently) and patient is pending a stress test with this provider.   He is compliant with Pradaxa.  He continues to see pain doc for neck pain- TED helps with his pain management provider.   No memory complaints    Review of Systems   Constitutional: Negative for fever.   HENT: Negative for nosebleeds.    Eyes: Negative for double vision.   Respiratory: Negative for hemoptysis.    Cardiovascular: Negative for leg swelling.   Gastrointestinal: Negative for blood in stool.   Genitourinary: Negative for hematuria.   Musculoskeletal: Negative for falls.   Skin: Negative for rash.   Neurological: Negative for seizures.   Psychiatric/Behavioral: Negative for memory loss.     Exam:  Gen Appearance, well developed/nourished in no apparent distress  CV: 2+ distal pulses with no edema or swelling  Neuro:  MS: Awake, alert, Sustains attention. Oriented to date fully. Recent details are intact as he recalls events of recent football games/ able to tell me recent medial details without difficulty/remote memory is intact. He shows me his watch that he uses for his medication reminder Language is full to spontaneous speech/comprehension. Fund of Knowledge is full. His insight and judgement are excellent, he is  goal directed  CN: Optic discs are flat with normal vasculature, PERRL, Extraoccular movements and visual fields are full. Left eye lid mildly drooping (he states known cosmetic) Normal facial strength,Tongue and Palate are midline and  strong. Shoulder Shrug symmetric and strong.  Motor: Normal bulk, tone, no abnormal  movements.  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      2016: Carotid US less than 60% stenosis, TTE normal EF, Holter: no afib    6/2016 INES: The aortic root is normal in size.  Grade 2 atheroma disease was detected consistent with extensive intimal thickening.  Maximal plaque thickness was 3 mm.      Labs: LDL 37 and fasting glucose below 110      9/2016CTA head and neck :1.  No CT evidence of acute intracranial abnormality.  2.  Atrophy and small vessel ischemic changes of the periventricular white matter.  3.  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  4.  Atherosclerotic change as noted above with greater than 70 percent narrowing involving the internal carotid arteries bilaterally greater on the right than the left.    10/2016 CTA legs: Significant calcified atheromatous disease involving the aorta and its major branch vessels as well as the bilateral lower extremity vessels.  There is diminution of flow in the bilateral anterior and posterior tibial arteries and peroneal arteries, left greater than right, with occlusion of the left posterior tibial artery in the lower calf with reconstitution in the foot.    Bilateral lower extremity subcutaneous edema.    Small bilateral pleural effusions.    Left renal cyst.    Assessment: Lacho Fraooq is a 81 y.o. male with 2 spells of vague dizziness in 2016. MRI showed  acute CVA punctate in the left cerebellum  MRA shows questionable  3.2 mm aneurysm arising from the right M1 segment and questionable small  aneurysms measuring 1-2 mm from the left paraclinoid carotid artery.    I recommend:  1. Plan to Rescan ? aneurysmvs focal angulation  at right M1 and left para clinoid carotid segments with  CTA head and neck in 1-2 years (at the next visit). Benefits of pradaxa outweigh any risk of bleeding in his case. These lesions are small and would not require intervention at this point otherwise. This will also be used to continue to monitor his greater than 70% carotid disease (also monitored by Dr Begum by US- currently asymptomatic and consider CEA)  2. INES showed   Grade 2 atheroma disease   3. Pradaxa to continue for afib and stroke prevention- patient reports that he could have forgotten some doses prior to event in 2016-- more compliant now  4. PAD noted by CTA legs and treated conservatatively. Patient has a history of cervical radicular pain which is currently treated with  epidural steroid injections.   5. The patient is known to me from 2010 for confusion associated with hypotension, prostatitis (for which he is still being treated) and acute renal failure thought to represent delirium which resolved. Will monitor for any consistent memory loss long term. He uses CPAP for central apneal.     RTC in 6 months

## 2017-08-14 ENCOUNTER — TELEPHONE (OUTPATIENT)
Dept: FAMILY MEDICINE | Facility: CLINIC | Age: 82
End: 2017-08-14

## 2017-08-14 DIAGNOSIS — J44.1 CHRONIC OBSTRUCTIVE PULMONARY DISEASE WITH ACUTE EXACERBATION: ICD-10-CM

## 2017-08-14 RX ORDER — UMECLIDINIUM BROMIDE AND VILANTEROL TRIFENATATE 62.5; 25 UG/1; UG/1
POWDER RESPIRATORY (INHALATION)
Qty: 60 EACH | Refills: 11 | Status: SHIPPED | OUTPATIENT
Start: 2017-08-14 | End: 2018-02-09

## 2017-08-14 NOTE — TELEPHONE ENCOUNTER
----- Message from Mable Lay sent at 2017 11:41 AM CDT -----  Contact: Self  St Lacho Farooq  MRN: [id number]  : 1935  PCP: Dylan Adam  Home Phone      [phone]  Work Phone      Not on file.  Mobile          [phone]    MESSAGE: Needs RX ANORO ELLIPTA 62.5-25 mcg/actuation DsDv refilled    Pharmacy:  [facility] in [place]    Phone:  [phone]

## 2018-01-26 ENCOUNTER — OFFICE VISIT (OUTPATIENT)
Dept: NEUROLOGY | Facility: CLINIC | Age: 83
End: 2018-01-26
Payer: MEDICARE

## 2018-01-26 ENCOUNTER — HOSPITAL ENCOUNTER (OUTPATIENT)
Dept: RADIOLOGY | Facility: HOSPITAL | Age: 83
Discharge: HOME | End: 2018-01-26
Attending: PSYCHIATRY & NEUROLOGY
Payer: MEDICARE

## 2018-01-26 VITALS
DIASTOLIC BLOOD PRESSURE: 56 MMHG | HEIGHT: 67 IN | WEIGHT: 202.38 LBS | RESPIRATION RATE: 16 BRPM | BODY MASS INDEX: 31.76 KG/M2 | SYSTOLIC BLOOD PRESSURE: 102 MMHG | HEART RATE: 60 BPM

## 2018-01-26 DIAGNOSIS — I67.1 CEREBRAL ANEURYSM: ICD-10-CM

## 2018-01-26 DIAGNOSIS — R42 VERTIGO: Primary | ICD-10-CM

## 2018-01-26 DIAGNOSIS — I65.23 BILATERAL CAROTID ARTERY STENOSIS: ICD-10-CM

## 2018-01-26 DIAGNOSIS — I48.0 PAROXYSMAL ATRIAL FIBRILLATION: ICD-10-CM

## 2018-01-26 DIAGNOSIS — R42 VERTIGO: ICD-10-CM

## 2018-01-26 PROCEDURE — 99215 OFFICE O/P EST HI 40 MIN: CPT | Mod: S$PBB | Performed by: PSYCHIATRY & NEUROLOGY

## 2018-01-26 PROCEDURE — 70553 MRI BRAIN STEM W/O & W/DYE: CPT | Mod: TC

## 2018-01-26 PROCEDURE — 99999 PR PBB SHADOW E&M-EST. PATIENT-LVL III: CPT | Mod: PBBFAC,,, | Performed by: PSYCHIATRY & NEUROLOGY

## 2018-01-26 PROCEDURE — A9585 GADOBUTROL INJECTION: HCPCS | Performed by: PSYCHIATRY & NEUROLOGY

## 2018-01-26 PROCEDURE — 99213 OFFICE O/P EST LOW 20 MIN: CPT | Mod: PBBFAC,25 | Performed by: PSYCHIATRY & NEUROLOGY

## 2018-01-26 PROCEDURE — 99999 PR STA SHADOW: CPT | Mod: PBBFAC,,, | Performed by: PSYCHIATRY & NEUROLOGY

## 2018-01-26 PROCEDURE — 25500020 PHARM REV CODE 255: Performed by: PSYCHIATRY & NEUROLOGY

## 2018-01-26 PROCEDURE — 70553 MRI BRAIN STEM W/O & W/DYE: CPT | Mod: 26,,, | Performed by: RADIOLOGY

## 2018-01-26 RX ORDER — FUROSEMIDE 20 MG/1
20 TABLET ORAL 2 TIMES DAILY
Status: ON HOLD | COMMUNITY
End: 2019-03-19 | Stop reason: HOSPADM

## 2018-01-26 RX ORDER — AMLODIPINE BESYLATE 2.5 MG/1
2.5 TABLET ORAL DAILY
COMMUNITY
End: 2018-02-09

## 2018-01-26 RX ORDER — LISINOPRIL AND HYDROCHLOROTHIAZIDE 12.5; 2 MG/1; MG/1
1 TABLET ORAL DAILY
Status: ON HOLD | COMMUNITY
End: 2019-04-18 | Stop reason: HOSPADM

## 2018-01-26 RX ORDER — ISOSORBIDE MONONITRATE 60 MG/1
60 TABLET, EXTENDED RELEASE ORAL DAILY
COMMUNITY
End: 2019-02-11

## 2018-01-26 RX ORDER — GADOBUTROL 604.72 MG/ML
9 INJECTION INTRAVENOUS
Status: COMPLETED | OUTPATIENT
Start: 2018-01-26 | End: 2018-01-26

## 2018-01-26 RX ORDER — DABIGATRAN ETEXILATE 150 MG/1
150 CAPSULE ORAL 2 TIMES DAILY
Status: ON HOLD | COMMUNITY
End: 2019-05-13 | Stop reason: HOSPADM

## 2018-01-26 RX ADMIN — GADOBUTROL 9 ML: 604.72 INJECTION INTRAVENOUS at 01:01

## 2018-01-26 NOTE — PROGRESS NOTES
HPI:Lacho Farooq is a 81 y.o. male with 2 spells of vague dizziness in 2016. 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  Patient presents prior to his scheduled appointment with complaint of dizziness. This started at 3:00 this am.  Daughter tried to convince him to go the ER but he felt he could come here instead. He felt of balance with symptoms  BP was elevated during these symptoms  Patient felt room spinning with looking which is 50 % better with this.   Compliance with Pradaxa is excellent.  Had to use meclizine for symptoms years ago, but no relief.   Last US by cardiology of carotids was improved.       Review of Systems   Constitutional: Negative for fever.   HENT: Positive for hearing loss. Negative for nosebleeds and tinnitus.         Chronic hearing loss   Eyes: Negative for double vision.   Respiratory: Negative for hemoptysis.    Cardiovascular: Positive for leg swelling.        Chronic leg swelling   Gastrointestinal: Negative for blood in stool.   Genitourinary: Negative for hematuria.   Musculoskeletal: Negative for falls.   Skin: Negative for rash.   Neurological: Positive for dizziness. Negative for sensory change, speech change and focal weakness.   Psychiatric/Behavioral: Negative for memory loss.     Exam:  Gen Appearance, well developed/nourished in no apparent distress  CV: 2+ distal pulses with no edema or swelling  Neuro:  MS: Awake, alert, Sustains attention. Oriented to date fully. Recent details are normal/ able to tell me recent medial details without difficulty/remote memory is intact. He shows me his watch that he uses for his medication reminder Language is full to spontaneous speech/comprehension. Fund of Knowledge is full. His insight and judgement are excellent, he is  goal directed  CN: Optic discs are  flat with normal vasculature, PERRL, Extraoccular movements and visual fields are full. Left eye lid mildly drooping (he states known cosmetic) Normal facial strength,Tongue and Palate are midline and  strong. Shoulder Shrug symmetric and strong.  Motor: Normal bulk, tone, no abnormal movements.  5/5 bilateral UE and LE strength and 1+ reflexes  Sensory: Romberg negative and intact to vibration and temp   Cerebellar: Finger-nose, heal to nose, Rapid alternating movements intact  Gait: Normal stance, no ataxia    Imaging: MRI brain 5/2016: acute CVA punctate in the left cerebellum  MRA showed Suspected 3.2 mm aneurysm arising from the right M1 segment and questionable small aneurysms measuring 1-2 mm from the left paraclinoid carotid artery.      2016: Carotid US less than 60% stenosis, TTE normal EF, Holter: no afib    6/2016 INES: The aortic root is normal in size.  Grade 2 atheroma disease was detected consistent with extensive intimal thickening.  Maximal plaque thickness was 3 mm.      Labs: LDL 37 and fasting glucose below 110      9/2016CTA head and neck :1.  No CT evidence of acute intracranial abnormality.  2.  Atrophy and small vessel ischemic changes of the periventricular white matter.  3.  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  4.  Atherosclerotic change as noted above with greater than 70 percent narrowing involving the internal carotid arteries bilaterally greater on the right than the left.    10/2016 CTA legs: Significant calcified atheromatous disease involving the aorta and its major branch vessels as well as the bilateral lower extremity vessels.  There is diminution of flow in the bilateral anterior and posterior tibial arteries and peroneal arteries, left greater than right, with occlusion of the left posterior tibial artery in the lower calf with reconstitution in the foot.    Bilateral lower extremity subcutaneous  edema.    Small bilateral pleural effusions.    Left renal cyst.    Assessment: Lacho Farooq is a 83 y.o. male with 2 spells of vague dizziness in 2016. 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  Now with one day of dizziness    I recommend:  1. MRI brain today to be sure no new stroke. Otherwise, this may be positional vertigo. Antivert has not helped him in the past. Check creatine today. Consider PT if not improved and if no stroke by MRI.   2. CTA head and neck in the next few days  to  Rescan ? Aneurysm vs focal angulation  at right M1 and left para clinoid carotid segments with  CTA head and neck as well.  Benefits of pradaxa outweigh any risk of bleeding in his case. These lesions are small and would not require intervention at this point otherwise. This will also be used to continue to monitor his greater than 70% carotid disease (also monitored by Dr Begum by US- currently asymptomatic and consider CEA)  3. INES showed   Grade 2 atheroma disease prior   3. Pradaxa to continue for afib and stroke prevention- patient reports that he could have forgotten some doses prior to event in 2016-- fully compliant now  4. PAD noted by CTA legs and treated conservatatively. Patient has a history of cervical radicular pain which is currently treated with  epidural steroid injections.   5. The patient is known to me from 2010 for confusion associated with hypotension, prostatitis (for which he is still being treated) and acute renal failure thought to represent delirium which resolved. Will monitor for any consistent memory loss long term. He uses CPAP for central apnea- compliance urged. Memory is stable per daughter    RTC as scheduled next month

## 2018-01-29 ENCOUNTER — HOSPITAL ENCOUNTER (OUTPATIENT)
Dept: RADIOLOGY | Facility: HOSPITAL | Age: 83
Discharge: HOME | End: 2018-01-29
Attending: PSYCHIATRY & NEUROLOGY
Payer: MEDICARE

## 2018-01-29 DIAGNOSIS — I65.23 BILATERAL CAROTID ARTERY STENOSIS: ICD-10-CM

## 2018-01-29 PROCEDURE — 70498 CT ANGIOGRAPHY NECK: CPT | Mod: TC

## 2018-01-29 PROCEDURE — 25500020 PHARM REV CODE 255: Performed by: PSYCHIATRY & NEUROLOGY

## 2018-01-29 PROCEDURE — 70496 CT ANGIOGRAPHY HEAD: CPT | Mod: 26,,, | Performed by: RADIOLOGY

## 2018-01-29 PROCEDURE — 70496 CT ANGIOGRAPHY HEAD: CPT | Mod: TC

## 2018-01-29 PROCEDURE — 70498 CT ANGIOGRAPHY NECK: CPT | Mod: 26,,, | Performed by: RADIOLOGY

## 2018-01-29 RX ADMIN — IOHEXOL 100 ML: 350 INJECTION, SOLUTION INTRAVENOUS at 02:01

## 2018-02-09 ENCOUNTER — APPOINTMENT (OUTPATIENT)
Dept: RADIOLOGY | Facility: CLINIC | Age: 83
End: 2018-02-09
Attending: FAMILY MEDICINE
Payer: MEDICARE

## 2018-02-09 ENCOUNTER — OFFICE VISIT (OUTPATIENT)
Dept: FAMILY MEDICINE | Facility: CLINIC | Age: 83
End: 2018-02-09
Payer: MEDICARE

## 2018-02-09 VITALS
HEIGHT: 67 IN | BODY MASS INDEX: 31.86 KG/M2 | HEART RATE: 70 BPM | SYSTOLIC BLOOD PRESSURE: 140 MMHG | WEIGHT: 203 LBS | OXYGEN SATURATION: 97 % | RESPIRATION RATE: 18 BRPM | DIASTOLIC BLOOD PRESSURE: 62 MMHG

## 2018-02-09 DIAGNOSIS — M16.12 PRIMARY OSTEOARTHRITIS OF LEFT HIP: ICD-10-CM

## 2018-02-09 DIAGNOSIS — G47.33 OSA (OBSTRUCTIVE SLEEP APNEA): Primary | ICD-10-CM

## 2018-02-09 DIAGNOSIS — I73.9 PVD (PERIPHERAL VASCULAR DISEASE) WITH CLAUDICATION: ICD-10-CM

## 2018-02-09 DIAGNOSIS — J43.9 PULMONARY EMPHYSEMA, UNSPECIFIED EMPHYSEMA TYPE: ICD-10-CM

## 2018-02-09 DIAGNOSIS — E78.5 HYPERLIPIDEMIA, UNSPECIFIED HYPERLIPIDEMIA TYPE: ICD-10-CM

## 2018-02-09 DIAGNOSIS — J44.1 CHRONIC OBSTRUCTIVE PULMONARY DISEASE WITH ACUTE EXACERBATION: ICD-10-CM

## 2018-02-09 PROCEDURE — 73521 X-RAY EXAM HIPS BI 2 VIEWS: CPT | Mod: 26,,, | Performed by: RADIOLOGY

## 2018-02-09 PROCEDURE — 99999 PR PBB SHADOW E&M-EST. PATIENT-LVL III: CPT | Mod: PBBFAC,,, | Performed by: FAMILY MEDICINE

## 2018-02-09 PROCEDURE — 99213 OFFICE O/P EST LOW 20 MIN: CPT | Mod: PBBFAC | Performed by: FAMILY MEDICINE

## 2018-02-09 PROCEDURE — 99214 OFFICE O/P EST MOD 30 MIN: CPT | Mod: S$PBB | Performed by: FAMILY MEDICINE

## 2018-02-09 PROCEDURE — 99999 PR STA SHADOW: CPT | Mod: PBBFAC,,, | Performed by: FAMILY MEDICINE

## 2018-02-09 PROCEDURE — 73521 X-RAY EXAM HIPS BI 2 VIEWS: CPT | Mod: TC,PO

## 2018-02-09 RX ORDER — NAPROXEN 500 MG/1
500 TABLET ORAL 2 TIMES DAILY WITH MEALS
Qty: 60 TABLET | Refills: 1 | Status: ON HOLD | OUTPATIENT
Start: 2018-02-09 | End: 2019-04-16 | Stop reason: CLARIF

## 2018-02-09 NOTE — PROGRESS NOTES
Subjective:       Patient ID: St Lacho Farooq is a 83 y.o. male.    Chief Complaint: pulled muscle in groin area    Pt is a 83 y.o. male who presents for check up for Chronic obstructive pulmonary disease with acute exacerbation  Nas (obstructive sleep apnea)  (primary encounter diagnosis)  Pvd (peripheral vascular disease) with claudication  Hyperlipidemia, unspecified hyperlipidemia type  Pulmonary emphysema, unspecified emphysema type. Doing well on current meds. Denies any side effects. Prevention is up to date.      Review of Systems   Constitutional: Negative for appetite change.   HENT: Negative for congestion, ear pain, sneezing and sore throat.    Eyes: Negative for redness and visual disturbance.   Respiratory: Negative for cough, chest tightness and stridor.    Cardiovascular: Negative for chest pain.   Gastrointestinal: Negative for abdominal pain, blood in stool, diarrhea, nausea and vomiting.   Genitourinary: Negative for difficulty urinating, dysuria and hematuria.        L groin tender to exam   Musculoskeletal: Positive for back pain. Negative for arthralgias, joint swelling, myalgias and neck pain.        Strained L groin 2 days lifting 50#s   Skin: Negative for rash.   Neurological: Negative for dizziness.   Psychiatric/Behavioral: Negative for agitation. The patient is not nervous/anxious.        Objective:      Physical Exam   Constitutional: He is oriented to person, place, and time. He appears well-developed and well-nourished.   HENT:   Head: Normocephalic.   Eyes: Pupils are equal, round, and reactive to light.   Neck: Normal range of motion. Neck supple. No thyromegaly present.   Cardiovascular: Normal rate and regular rhythm.  Exam reveals no friction rub.    No murmur heard.  Pulmonary/Chest: Effort normal. No respiratory distress. He has no wheezes.   Abdominal: There is no tenderness. There is no rebound and no guarding.   Musculoskeletal: He exhibits tenderness. He exhibits no  edema.   Lymphadenopathy:     He has no cervical adenopathy.   Neurological: He is alert and oriented to person, place, and time. He has normal reflexes. No cranial nerve deficit.   Skin: Skin is warm and dry.   Psychiatric: He has a normal mood and affect. Judgment and thought content normal.       Assessment:       1. MALATHI (obstructive sleep apnea)    2. Chronic obstructive pulmonary disease with acute exacerbation    3. PVD (peripheral vascular disease) with claudication    4. Hyperlipidemia, unspecified hyperlipidemia type    5. Pulmonary emphysema, unspecified emphysema type    6. Primary osteoarthritis of left hip        Plan:   St Monk was seen today for pulled muscle in groin area.    Diagnoses and all orders for this visit:    MALATHI (obstructive sleep apnea)    Chronic obstructive pulmonary disease with acute exacerbation  -     umeclidinium-vilanterol (ANORO ELLIPTA) 62.5-25 mcg/actuation DsDv; Inhale 1 puff into the lungs once. Controller    PVD (peripheral vascular disease) with claudication    Hyperlipidemia, unspecified hyperlipidemia type    Pulmonary emphysema, unspecified emphysema type    Primary osteoarthritis of left hip  -     X-Ray Hips Bilateral 2 View Incl AP Pelvis; Future    Other orders  -     naproxen (NAPROSYN) 500 MG tablet; Take 1 tablet (500 mg total) by mouth 2 (two) times daily with meals.

## 2018-02-20 ENCOUNTER — OFFICE VISIT (OUTPATIENT)
Dept: NEUROLOGY | Facility: CLINIC | Age: 83
End: 2018-02-20
Payer: MEDICARE

## 2018-02-20 VITALS
RESPIRATION RATE: 18 BRPM | SYSTOLIC BLOOD PRESSURE: 134 MMHG | WEIGHT: 199.75 LBS | BODY MASS INDEX: 31.35 KG/M2 | DIASTOLIC BLOOD PRESSURE: 60 MMHG | HEIGHT: 67 IN | HEART RATE: 62 BPM

## 2018-02-20 DIAGNOSIS — I67.1 CEREBRAL ANEURYSM: ICD-10-CM

## 2018-02-20 DIAGNOSIS — I48.0 PAROXYSMAL ATRIAL FIBRILLATION: Primary | ICD-10-CM

## 2018-02-20 DIAGNOSIS — I65.23 BILATERAL CAROTID ARTERY STENOSIS: ICD-10-CM

## 2018-02-20 PROCEDURE — 99999 PR PBB SHADOW E&M-EST. PATIENT-LVL III: CPT | Mod: PBBFAC,,, | Performed by: PSYCHIATRY & NEUROLOGY

## 2018-02-20 PROCEDURE — 99214 OFFICE O/P EST MOD 30 MIN: CPT | Mod: S$PBB | Performed by: PSYCHIATRY & NEUROLOGY

## 2018-02-20 PROCEDURE — 99213 OFFICE O/P EST LOW 20 MIN: CPT | Mod: PBBFAC | Performed by: PSYCHIATRY & NEUROLOGY

## 2018-02-20 PROCEDURE — 99999 PR STA SHADOW: CPT | Mod: PBBFAC,,, | Performed by: PSYCHIATRY & NEUROLOGY

## 2018-02-20 NOTE — PROGRESS NOTES
HPI:Lacho Farooq is a 83 y.o. male with 2 spells of vague dizziness in 2016. 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  Now with one day of dizziness in 1/2018  Dizziness stopped about 24 hours after the MRI.  Patient has not had any new stroke like symptoms.  He saw PCP for groin muscle strain with heavy lifting and pain is greatly improved    Review of Systems   Constitutional: Negative for fever.   HENT: Positive for hearing loss. Negative for nosebleeds and tinnitus.         Chronic hearing loss   Eyes: Negative for double vision.   Respiratory: Negative for hemoptysis.    Cardiovascular: Positive for leg swelling.        Chronic leg swelling   Gastrointestinal: Negative for blood in stool.   Genitourinary: Negative for hematuria.   Musculoskeletal: Negative for falls.   Skin: Negative for rash.   Neurological: Negative for dizziness, sensory change, speech change, focal weakness and headaches.   Psychiatric/Behavioral: The patient does not have insomnia.      Exam:  Gen Appearance, well developed/nourished in no apparent distress  CV: 2+ distal pulses with no edema or swelling  Neuro:  MS: Awake, alert, Sustains attention. Recent details /remote memory is intact.Language is full to spontaneous speech/comprehension. Fund of Knowledge is full. His insight and judgement are excellent, he is  goal directed  CN: Optic discs are flat with normal vasculature, PERRL, Extraoccular movements and visual fields are full. Left eye lid mildly drooping (he states known cosmetic) Normal facial strength,Tongue and Palate are midline and  strong. Shoulder Shrug symmetric and strong.  Motor: Normal bulk, tone, no abnormal movements.  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      2016: Carotid US less than 60% stenosis, TTE normal EF, Holter: no afib    6/2016 INES: The aortic root is normal in size.  Grade 2 atheroma disease was detected consistent with extensive intimal thickening.  Maximal plaque thickness was 3 mm.      Labs: LDL 37 and fasting glucose below 110        10/2016 CTA legs: Significant calcified atheromatous disease involving the aorta and its major branch vessels as well as the bilateral lower extremity vessels.  There is diminution of flow in the bilateral anterior and posterior tibial arteries and peroneal arteries, left greater than right, with occlusion of the left posterior tibial artery in the lower calf with reconstitution in the foot.    Bilateral lower extremity subcutaneous edema.    Small bilateral pleural effusions.    Left renal cyst.    MRI brain 1/2018:  Age-appropriate generalized volume loss with scattered foci of T2/FLAIR signal abnormality within the supratentorial white matter and darren while nonspecific suggestive for moderate degree of  chronic microvascular ischemic change.    No evidence for acute infarction or enhancing lesion.    1/2018 CTA head: Age-appropriate generalized cerebral volume loss with moderate chronic microvascular ischemic change.  No evidence for acute intracranial hemorrhage or new parenchymal hypoattenuation to suggest an acute infarction.  No abnormal enhancement.    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    Suspected infundibulum measuring 2 mm involving the left paraclinoid ICA rather than an aneurysm.    Calcifications of the intracranial and excretory vasculature with  resulting in luminal narrowing of approximately 60-70 % of the right proximal internal carotid artery.      Assessment: Lacho Farooq is a 83 y.o. male with 2 spells of vague dizziness in 2016. 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  Then with one day of dizziness in 1/2018    I recommend:  1.Dizziness is resolved.   2.  MRi brain negative for CVA for 1/2018 dizziness. CTA head and neck showed up to 70% right carotid disease (improved and also monitored by Dr Begum) and stable right M1 segment and ? Left paraclinoid  aneurysm.    Benefits of pradaxa outweigh any risk of bleeding in his case. These lesions are small and would not require intervention at this point otherwise- would monitor with CTA yearly at this point.  3. INES showed   Grade 2 atheroma disease prior   4. Pradaxa to continue for afib and stroke prevention- patient reports that he could have forgotten some doses prior to event in 2016-- fully compliant now  5. PAD noted by CTA legs and treated conservatatively. Patient has a history of cervical radicular pain which is currently treated with  epidural steroid injections.   6. The patient is known to me from 2010 for confusion associated with hypotension, prostatitis (for which he is still being treated) and acute renal failure thought to represent delirium which resolved. Will monitor for any consistent memory loss long term. He uses CPAP for central apnea- compliance urged. Memory is stable per daughter    RTC 1 year

## 2018-03-07 ENCOUNTER — TELEPHONE (OUTPATIENT)
Dept: FAMILY MEDICINE | Facility: CLINIC | Age: 83
End: 2018-03-07

## 2018-03-07 NOTE — TELEPHONE ENCOUNTER
----- Message from Gill Oakley MA sent at 3/7/2018  8:48 AM CST -----  Contact: Self  St Lacho Farooq  MRN: [id number]  : 1935  PCP: Dylan Adam  Home Phone      [phone]  Work Phone      Not on file.  Mobile          [phone]      MESSAGE: Patient feels he needs to be seen today for his prostate. Please call and advise  Phone: [phone]

## 2018-03-07 NOTE — TELEPHONE ENCOUNTER
Contacted pt states he noticed a difference in his urine stream and is having pain in lower abdomen. Denied fever. Made pt an apt to see Dr. Adam tomorrow at 1:30. Advised if pt feels like he needs to be seen today to go to the ER or Urgent Care. Pt verbalized understanding.

## 2018-03-08 ENCOUNTER — OFFICE VISIT (OUTPATIENT)
Dept: FAMILY MEDICINE | Facility: CLINIC | Age: 83
End: 2018-03-08
Payer: MEDICARE

## 2018-03-08 VITALS
HEART RATE: 70 BPM | RESPIRATION RATE: 20 BRPM | SYSTOLIC BLOOD PRESSURE: 110 MMHG | DIASTOLIC BLOOD PRESSURE: 70 MMHG | WEIGHT: 200.38 LBS | HEIGHT: 67 IN | BODY MASS INDEX: 31.45 KG/M2

## 2018-03-08 DIAGNOSIS — N41.9 PROSTATITIS, UNSPECIFIED PROSTATITIS TYPE: Primary | ICD-10-CM

## 2018-03-08 PROCEDURE — 99999 PR PBB SHADOW E&M-EST. PATIENT-LVL III: CPT | Mod: PBBFAC,,, | Performed by: FAMILY MEDICINE

## 2018-03-08 PROCEDURE — 99213 OFFICE O/P EST LOW 20 MIN: CPT | Mod: PBBFAC | Performed by: FAMILY MEDICINE

## 2018-03-08 PROCEDURE — 99999 PR STA SHADOW: CPT | Mod: PBBFAC,,, | Performed by: FAMILY MEDICINE

## 2018-03-08 PROCEDURE — 99214 OFFICE O/P EST MOD 30 MIN: CPT | Mod: S$PBB | Performed by: FAMILY MEDICINE

## 2018-03-08 RX ORDER — DOXYCYCLINE HYCLATE 100 MG
TABLET ORAL
Qty: 20 TABLET | Refills: 0 | Status: SHIPPED | OUTPATIENT
Start: 2018-03-08 | End: 2019-02-11

## 2018-03-08 RX ORDER — MUPIROCIN 20 MG/G
OINTMENT TOPICAL 2 TIMES DAILY
Qty: 22 G | Refills: 2 | Status: SHIPPED | OUTPATIENT
Start: 2018-03-08 | End: 2018-03-18

## 2018-03-08 NOTE — PROGRESS NOTES
Subjective:       Patient ID: St Lacho Farooq is a 83 y.o. male.    Chief Complaint: Abdominal Pain    Pt is a 83 y.o. male who presents for check up for lower abdominal pains. Doing well on current meds. Denies any side effects. Prevention is up to date.Pt is a 83 y.o. male who presents for check up for No diagnosis found.. Doing well on current meds. Denies any side effects. Prevention is up to date.      Abdominal Pain   Pertinent negatives include no arthralgias, diarrhea, dysuria, hematuria, myalgias, nausea or vomiting.     Review of Systems   Constitutional: Negative for appetite change.   HENT: Negative for congestion, ear pain, sneezing and sore throat.    Eyes: Negative for redness and visual disturbance.   Respiratory: Negative for cough, chest tightness and stridor.    Cardiovascular: Negative for chest pain.   Gastrointestinal: Positive for abdominal pain. Negative for blood in stool, diarrhea, nausea and vomiting.   Genitourinary: Negative for difficulty urinating, dysuria and hematuria.        Suprapubic soreness   Musculoskeletal: Negative for arthralgias, back pain, joint swelling, myalgias and neck pain.   Skin: Negative for rash.   Psychiatric/Behavioral: Negative for agitation. The patient is not nervous/anxious.        Objective:      Physical Exam   Constitutional: He is oriented to person, place, and time. He appears well-developed and well-nourished.   HENT:   Head: Normocephalic.   Eyes: Pupils are equal, round, and reactive to light.   Neck: Normal range of motion. Neck supple. No thyromegaly present.   Cardiovascular: Normal rate and regular rhythm.  Exam reveals no friction rub.    No murmur heard.  Pulmonary/Chest: Effort normal. No respiratory distress. He has no wheezes.   Abdominal: There is no tenderness. There is no rebound and no guarding.   Genitourinary:   Genitourinary Comments: 35 gm smooth  Tr boggy   Musculoskeletal: Normal range of motion. He exhibits no edema or  tenderness.   Lymphadenopathy:     He has no cervical adenopathy.   Neurological: He is alert and oriented to person, place, and time. He has normal reflexes. No cranial nerve deficit.   Skin: Skin is warm and dry.   Psychiatric: He has a normal mood and affect. Judgment and thought content normal.       Assessment:       1. Prostatitis, unspecified prostatitis type        Plan:   St Monk was seen today for abdominal pain.    Diagnoses and all orders for this visit:    Prostatitis, unspecified prostatitis type  -     doxycycline (VIBRA-TABS) 100 MG tablet; On e po bi d for 5 days then 1 daily

## 2018-03-09 ENCOUNTER — TELEPHONE (OUTPATIENT)
Dept: FAMILY MEDICINE | Facility: CLINIC | Age: 83
End: 2018-03-09

## 2018-03-09 NOTE — TELEPHONE ENCOUNTER
----- Message from Eli Alexis sent at 3/9/2018 10:03 AM CST -----  Contact: Self  St Lacho Farooq  MRN: [id number]  : 1935  PCP: Dylan Adam  Home Phone      [phone]  Work Phone      Not on file.  Mobile          [phone]      MESSAGE:   Patient was seen yesterday and has question about MUPIROCIN 2 % Topical (Top) 2 times daily     [place] [phone]

## 2018-03-29 ENCOUNTER — OFFICE VISIT (OUTPATIENT)
Dept: FAMILY MEDICINE | Facility: CLINIC | Age: 83
End: 2018-03-29
Payer: MEDICARE

## 2018-03-29 ENCOUNTER — TELEPHONE (OUTPATIENT)
Dept: FAMILY MEDICINE | Facility: CLINIC | Age: 83
End: 2018-03-29

## 2018-03-29 VITALS
HEART RATE: 62 BPM | RESPIRATION RATE: 18 BRPM | SYSTOLIC BLOOD PRESSURE: 108 MMHG | DIASTOLIC BLOOD PRESSURE: 62 MMHG | HEIGHT: 67 IN | WEIGHT: 201.75 LBS | BODY MASS INDEX: 31.66 KG/M2

## 2018-03-29 DIAGNOSIS — I25.10 ASCVD (ARTERIOSCLEROTIC CARDIOVASCULAR DISEASE): Primary | ICD-10-CM

## 2018-03-29 DIAGNOSIS — N41.0 ACUTE PROSTATITIS: ICD-10-CM

## 2018-03-29 DIAGNOSIS — G47.33 OSA (OBSTRUCTIVE SLEEP APNEA): ICD-10-CM

## 2018-03-29 PROCEDURE — 99999 PR STA SHADOW: CPT | Mod: PBBFAC,,, | Performed by: FAMILY MEDICINE

## 2018-03-29 PROCEDURE — 99213 OFFICE O/P EST LOW 20 MIN: CPT | Mod: S$PBB | Performed by: FAMILY MEDICINE

## 2018-03-29 PROCEDURE — 99213 OFFICE O/P EST LOW 20 MIN: CPT | Mod: PBBFAC | Performed by: FAMILY MEDICINE

## 2018-03-29 PROCEDURE — 99999 PR PBB SHADOW E&M-EST. PATIENT-LVL III: CPT | Mod: PBBFAC,,, | Performed by: FAMILY MEDICINE

## 2018-03-29 NOTE — PROGRESS NOTES
Subjective:       Patient ID: St Lacho Farooq is a 83 y.o. male.    Chief Complaint: Follow-up    Pt is a 83 y.o. male who presents for check up for Ascvd (arteriosclerotic cardiovascular disease)  (primary encounter diagnosis)  Nas (obstructive sleep apnea)  Acute prostatitis. Doing well on current meds. Denies any side effects.  Recheck on prostatitis, which is doing well.      Review of Systems   Constitutional: Negative for appetite change.   HENT: Negative for congestion, ear pain, sneezing and sore throat.    Eyes: Negative for redness and visual disturbance.   Respiratory: Negative for cough, chest tightness and stridor.    Cardiovascular: Negative for chest pain.   Gastrointestinal: Negative for abdominal pain, blood in stool, diarrhea, nausea and vomiting.   Genitourinary: Negative for difficulty urinating, dysuria and hematuria.        Nocturia  X3   Musculoskeletal: Negative for arthralgias, back pain, joint swelling, myalgias and neck pain.   Skin: Negative for rash.   Neurological: Negative for dizziness.   Psychiatric/Behavioral: Negative for agitation. The patient is not nervous/anxious.        Objective:      Physical Exam   Constitutional: He is oriented to person, place, and time. He appears well-developed and well-nourished.   HENT:   Head: Normocephalic.   Eyes: Pupils are equal, round, and reactive to light.   Neck: Normal range of motion. Neck supple. No thyromegaly present.   Cardiovascular: Normal rate and regular rhythm.  Exam reveals no friction rub.    Murmur heard.  3/6 sys m   Pulmonary/Chest: Effort normal. No respiratory distress. He has no wheezes.   Abdominal: There is no tenderness. There is no rebound and no guarding.   Musculoskeletal: Normal range of motion. He exhibits no edema or tenderness.   Lymphadenopathy:     He has no cervical adenopathy.   Neurological: He is alert and oriented to person, place, and time. He has normal reflexes. No cranial nerve deficit.   Skin:  Skin is warm and dry.   Psychiatric: He has a normal mood and affect. Judgment and thought content normal.       Assessment:       1. ASCVD (arteriosclerotic cardiovascular disease)    2. MALATHI (obstructive sleep apnea)    3. Acute prostatitis        Plan:   St Monk was seen today for follow-up.    Diagnoses and all orders for this visit:    ASCVD (arteriosclerotic cardiovascular disease)    MALATHI (obstructive sleep apnea)    Acute prostatitis

## 2018-03-29 NOTE — TELEPHONE ENCOUNTER
----- Message from Mable Lay sent at 3/29/2018 11:53 AM CDT -----  Contact: Self  Clau Farooq  MRN: [id number]  : 1935  PCP: John Saeed  Home Phone      [phone]  Work Phone      Not on file.  Mobile          [phone]    MESSAGE:   Needs RX Silver Sulfadiazine called in for a rash.   This is something he had previously, but not since 2016.      Pharmacy:  [facility] in [place]    Phone: [phone]

## 2019-02-11 ENCOUNTER — HOSPITAL ENCOUNTER (EMERGENCY)
Facility: HOSPITAL | Age: 84
Discharge: HOME | End: 2019-02-12
Attending: FAMILY MEDICINE
Payer: MEDICARE

## 2019-02-11 ENCOUNTER — TELEPHONE (OUTPATIENT)
Dept: FAMILY MEDICINE | Facility: CLINIC | Age: 84
End: 2019-02-11

## 2019-02-11 DIAGNOSIS — D64.9 ANEMIA, UNSPECIFIED TYPE: Primary | ICD-10-CM

## 2019-02-11 DIAGNOSIS — R07.9 CHEST PAIN: ICD-10-CM

## 2019-02-11 DIAGNOSIS — R19.5 POSITIVE OCCULT STOOL BLOOD TEST: ICD-10-CM

## 2019-02-11 PROCEDURE — 99285 EMERGENCY DEPT VISIT HI MDM: CPT | Mod: 25

## 2019-02-11 PROCEDURE — 93010 ELECTROCARDIOGRAM REPORT: CPT | Mod: ,,, | Performed by: INTERNAL MEDICINE

## 2019-02-11 PROCEDURE — 93005 ELECTROCARDIOGRAM TRACING: CPT

## 2019-02-11 PROCEDURE — 93010 EKG 12-LEAD: ICD-10-PCS | Mod: ,,, | Performed by: INTERNAL MEDICINE

## 2019-02-11 NOTE — TELEPHONE ENCOUNTER
----- Message from Gill Pearce sent at 2019  2:42 PM CST -----  Contact: Clementina - daughter  St Lacho Farooq  MRN: [id number]  : 1935  PCP: Dylan Adam  Home Phone      [phone]  Work Phone      Not on file.  Mobile          [phone]      MESSAGE:    Daughter wanted to speak to the nurse, confirmed we received a fax from , she would like the nurse to call her back,please do not call the pt. He will not answer.   #they think he needs blood#    [phone] (clementina)

## 2019-02-12 VITALS
SYSTOLIC BLOOD PRESSURE: 134 MMHG | DIASTOLIC BLOOD PRESSURE: 64 MMHG | HEART RATE: 80 BPM | RESPIRATION RATE: 25 BRPM | WEIGHT: 195 LBS | HEIGHT: 67 IN | BODY MASS INDEX: 30.61 KG/M2 | OXYGEN SATURATION: 97 %

## 2019-02-12 LAB
ABO + RH BLD: NORMAL
ALBUMIN SERPL BCP-MCNC: 3.9 G/DL
ALP SERPL-CCNC: 55 U/L
ALT SERPL W/O P-5'-P-CCNC: 8 U/L
ANION GAP SERPL CALC-SCNC: 9 MMOL/L
AST SERPL-CCNC: 12 U/L
BASOPHILS # BLD AUTO: 0.05 K/UL
BASOPHILS NFR BLD: 0.8 %
BILIRUB SERPL-MCNC: 0.7 MG/DL
BLD GP AB SCN CELLS X3 SERPL QL: NORMAL
BNP SERPL-MCNC: 189 PG/ML
BUN SERPL-MCNC: 30 MG/DL
CALCIUM SERPL-MCNC: 10.2 MG/DL
CHLORIDE SERPL-SCNC: 106 MMOL/L
CO2 SERPL-SCNC: 24 MMOL/L
CREAT SERPL-MCNC: 1.5 MG/DL
DIFFERENTIAL METHOD: ABNORMAL
EOSINOPHIL # BLD AUTO: 0.2 K/UL
EOSINOPHIL NFR BLD: 2.7 %
ERYTHROCYTE [DISTWIDTH] IN BLOOD BY AUTOMATED COUNT: 18.1 %
EST. GFR  (AFRICAN AMERICAN): 49 ML/MIN/1.73 M^2
EST. GFR  (NON AFRICAN AMERICAN): 43 ML/MIN/1.73 M^2
GLUCOSE SERPL-MCNC: 118 MG/DL
HCT VFR BLD AUTO: 25 %
HGB BLD-MCNC: 7.2 G/DL
LYMPHOCYTES # BLD AUTO: 1 K/UL
LYMPHOCYTES NFR BLD: 15.9 %
MCH RBC QN AUTO: 21.4 PG
MCHC RBC AUTO-ENTMCNC: 28.8 G/DL
MCV RBC AUTO: 74 FL
MONOCYTES # BLD AUTO: 0.6 K/UL
MONOCYTES NFR BLD: 9.8 %
NEUTROPHILS # BLD AUTO: 4.4 K/UL
NEUTROPHILS NFR BLD: 70.8 %
OB PNL STL: POSITIVE
PLATELET # BLD AUTO: 184 K/UL
PMV BLD AUTO: 8.8 FL
POTASSIUM SERPL-SCNC: 4 MMOL/L
PROT SERPL-MCNC: 6.3 G/DL
RBC # BLD AUTO: 3.37 M/UL
SODIUM SERPL-SCNC: 139 MMOL/L
TROPONIN I SERPL DL<=0.01 NG/ML-MCNC: 0.02 NG/ML
WBC # BLD AUTO: 6.22 K/UL

## 2019-02-12 PROCEDURE — 36415 COLL VENOUS BLD VENIPUNCTURE: CPT

## 2019-02-12 PROCEDURE — 82272 OCCULT BLD FECES 1-3 TESTS: CPT

## 2019-02-12 PROCEDURE — 85025 COMPLETE CBC W/AUTO DIFF WBC: CPT

## 2019-02-12 PROCEDURE — 86850 RBC ANTIBODY SCREEN: CPT

## 2019-02-12 PROCEDURE — 80053 COMPREHEN METABOLIC PANEL: CPT

## 2019-02-12 PROCEDURE — 84484 ASSAY OF TROPONIN QUANT: CPT

## 2019-02-12 PROCEDURE — 83880 ASSAY OF NATRIURETIC PEPTIDE: CPT

## 2019-02-12 PROCEDURE — 25000003 PHARM REV CODE 250: Performed by: FAMILY MEDICINE

## 2019-02-12 RX ORDER — ASPIRIN 325 MG
325 TABLET ORAL
Status: COMPLETED | OUTPATIENT
Start: 2019-02-12 | End: 2019-02-12

## 2019-02-12 RX ADMIN — ASPIRIN 325 MG: 325 TABLET, COATED ORAL at 12:02

## 2019-02-12 NOTE — ED NOTES
The patient is awake, alert and cooperative with a calm affect, patient is aware of environment, family at bedside. Airway is open and patent, respirations are spontaneous, normal respiratory effort and rate noted, skin warm and dry, full ROM in all extremities, appearance: no apparent distress noted, resting comfortably.  VSS, no change from previous assessment.  Bed in low, locked position, SR x2, HOB 30 degrees.  Pt able to change position independently. Will continue to monitor.

## 2019-02-12 NOTE — ED TRIAGE NOTES
84 y.o. male presents to ER ED 01/ED 01A   Chief Complaint   Patient presents with    Chest Pain   pt c/o intermittent chest pressure/ache that started tonight that is relieved by nitro. Pt reports he was suppose to have an angiogram tomorrow at [facility] but received a call this afternoon that his H&H was too low. No acute distress noted.

## 2019-02-12 NOTE — ED PROVIDER NOTES
Encounter Date: 2/11/2019       History     Chief Complaint   Patient presents with    Chest Pain     Patient is an 84-year-old male who presents to emergency department with complaints of chest pain which he describes as a pressure feeling.  He has had intermittently throughout today since around noon time.  Patient has a known cardiac history including valvular problems and is currently being evaluated by [facility] for possible interventions.  Patient states that the pressure is chest his substernal comes and goes however usually resolves with nitroglycerin.  This evening states that he ran out of his nitroglycerin with this latest episode of pain. Currently in the ER he is pain-free.  Patient is on Pradaxa.  Has a history of anemia and had similar problems that require transfusion.  It is noted patient had labs yesterday morning and demonstrated H&H of 7 and 21 according to the patient's family.  Patient does not describe any melena or blood in stool.  No bleeding gums he does have some few ecchymotic areas of his arms secondary to minor trauma and or vena puncture.          Review of patient's allergies indicates:   Allergen Reactions    Bactrim [sulfamethoxazole-trimethoprim] Rash    Codeine Rash     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    S/P 2-vessel coronary artery bypass      Past Surgical History:   Procedure Laterality Date    CARDIAC SURGERY      CARPAL TUNNEL RELEASE       CATARACT EXTRACTION, BILATERAL      CORONARY ARTERY BYPASS GRAFT  2004    HERNIA REPAIR      Inguinal & Ventral with MESH    TONSILLECTOMY      TRANSESOPHAGEAL ECHOCARDIOGRAM (INES) N/A 6/28/2016    Performed by Hugh Begum MD at [facility] OR     Family History   Problem Relation Age of Onset    Hypertension Mother     Heart failure Mother     Diabetes type II Mother     Hypertension Father     Atrial fibrillation Father     Cancer Father     Hypertension Sister     Atrial fibrillation Sister     COPD Sister     Pacemaker/defibrilator Sister     Diabetes type II Sister     Hypertension Brother     Pulmonary embolism Brother      Social History     Tobacco Use    Smoking status: Former Smoker     Last attempt to quit: 2004     Years since quitting: 15.1    Smokeless tobacco: Never Used    Tobacco comment: Smoked for 60 years   Substance Use Topics    Alcohol use: No     Frequency: Never    Drug use: No     Review of Systems   Constitutional: Negative for activity change, appetite change and fatigue.   HENT: Negative for congestion, sinus pressure and sneezing.    Respiratory: Positive for chest tightness. Negative for cough, choking and shortness of breath.    Cardiovascular: Positive for chest pain. Negative for palpitations and leg swelling.   Gastrointestinal: Negative for abdominal distention, abdominal pain, anal bleeding, blood in stool, constipation, diarrhea and rectal pain.   Genitourinary: Negative for dysuria.   Musculoskeletal: Negative for arthralgias and back pain.   Skin: Negative for color change, pallor and rash.   Neurological: Negative for dizziness, light-headedness and headaches.   All other systems reviewed and are negative.      Physical Exam     Initial Vitals [02/12/19 0005]   BP Pulse Resp Temp SpO2   137/64 78 -- -- 98 %      MAP       --         Physical Exam    Nursing note and vitals reviewed.  Constitutional: Vital signs are normal. He appears well-developed and  well-nourished. He is not diaphoretic. He is active and cooperative.  Non-toxic appearance. He has a sickly appearance. He does not appear ill. No distress.   HENT:   Head: Normocephalic and atraumatic.   Mouth/Throat: Uvula is midline, oropharynx is clear and moist and mucous membranes are normal.   Eyes: Conjunctivae are normal. Pupils are equal, round, and reactive to light.   Neck: Normal range of motion. Neck supple. No JVD present.   Cardiovascular: Intact distal pulses and normal pulses. An irregular rhythm present.   Occasional extrasystoles are present.    Murmur heard.   Decrescendo systolic murmur is present with a grade of 4/6.  Pulmonary/Chest: Effort normal and breath sounds normal. No accessory muscle usage. No apnea, no tachypnea and no bradypnea. No respiratory distress.   Abdominal: Soft. Bowel sounds are normal. There is no tenderness. There is no rigidity, no rebound, no guarding and no CVA tenderness.   Genitourinary: Rectal exam shows no external hemorrhoid, no internal hemorrhoid, no mass, no tenderness and anal tone normal. Prostate is enlarged.   Musculoskeletal: Normal range of motion.   Lymphadenopathy:     He has no cervical adenopathy.   Neurological: He is alert and oriented to person, place, and time. He has normal strength. GCS score is 15. GCS eye subscore is 4. GCS verbal subscore is 5. GCS motor subscore is 6.   Skin: Skin is warm and dry. Capillary refill takes less than 2 seconds.         ED Course   Procedures  Labs Reviewed   CBC W/ AUTO DIFFERENTIAL   COMPREHENSIVE METABOLIC PANEL   TROPONIN I   B-TYPE NATRIURETIC PEPTIDE   OCCULT BLOOD X 1, STOOL   TYPE & SCREEN     EKG Readings: (Independently Interpreted)   Initial Reading: No STEMI. Rhythm: Normal Sinus Rhythm. Heart Rate: 83. Ectopy: No Ectopy. ST Segment Depression: V6, V3, I and II. Axis: Normal. Q Waves: III, AVF and V1. Clinical Impression: QRS Widening and Non-specific ST Depression       Imaging Results           X-Ray Chest AP Portable (In process)               X-Rays:   Independently Interpreted Readings:   Chest X-Ray: No infiltrates. Cardiomegaly present.     Medical Decision Making:   Initial Assessment:   Initial assessment the chest pain likely angina, complicated by anemia  Differential Diagnosis:   Acute coronary syndrome, stable versus unstable angina.  Acute blood-loss anemia, GI bleed secondary to anticoagulant  Clinical Tests:   Lab Tests: Ordered and Reviewed  The following lab test(s) were unremarkable: CBC, CMP and Troponin  Radiological Study: Ordered and Reviewed  Medical Tests: Ordered and Reviewed  ED Management:  Patient remained pain-free in the emergency department his entire visit.  After workup was complete I discussed the results of the workup with patient and family.  I felt it was best that the patient be admitted and have blood transfusion.  At that point he can then consult with his cardiologist Dr. Begum tomorrow morning.  However, I discussed the patient presentation condition with hospitalist on-call , who was of the opinion that the patient had too many comorbid conditions that needed to be evaluated that this facility does not have the capability to fully evaluate.  The patient was advised of this and recommended that he be transferred to another facility for further evaluation and management.  The patient advised that ER knee has outpatient transfusion appointment for Wednesday.  He has follow-up appointment with Dr. Kahn and he chooses not to be transferred to the facility, he will go home.  I discussed the risks versus benefits and the patient verbalized understanding he advised that he will return for any new or worsening conditions, but he still wanted to go home.  Patient was discharged home and again advised to return for any new or worsening conditions.                      Clinical Impression:   The primary encounter diagnosis was Anemia, unspecified type. Diagnoses of  Chest pain and Positive occult stool blood test were also pertinent to this visit.                             Floyd Kathleen MD  02/12/19 0215

## 2019-02-12 NOTE — DISCHARGE INSTRUCTIONS
Go home and rest avoid any strenuous activity.  Take medications as prescribed follow-up Dr. Adam and Dr. Begum, keep apt for blood transfusion.  Do not hesitate to return emergency department for any new or worsening conditions.

## 2019-02-13 ENCOUNTER — INFUSION (OUTPATIENT)
Dept: INFUSION THERAPY | Facility: HOSPITAL | Age: 84
End: 2019-02-13
Attending: FAMILY MEDICINE
Payer: MEDICARE

## 2019-02-13 VITALS
HEIGHT: 67 IN | DIASTOLIC BLOOD PRESSURE: 57 MMHG | BODY MASS INDEX: 30.61 KG/M2 | SYSTOLIC BLOOD PRESSURE: 119 MMHG | WEIGHT: 195 LBS | RESPIRATION RATE: 18 BRPM | TEMPERATURE: 97 F | HEART RATE: 57 BPM

## 2019-02-13 DIAGNOSIS — D64.9 ANEMIA, UNSPECIFIED TYPE: Primary | ICD-10-CM

## 2019-02-13 LAB
BLD PROD TYP BPU: NORMAL
BLD PROD TYP BPU: NORMAL
BLOOD UNIT EXPIRATION DATE: NORMAL
BLOOD UNIT EXPIRATION DATE: NORMAL
BLOOD UNIT TYPE CODE: 5100
BLOOD UNIT TYPE CODE: 5100
BLOOD UNIT TYPE: NORMAL
BLOOD UNIT TYPE: NORMAL
CODING SYSTEM: NORMAL
CODING SYSTEM: NORMAL
DISPENSE STATUS: NORMAL
DISPENSE STATUS: NORMAL
TRANS ERYTHROCYTES VOL PATIENT: NORMAL ML
TRANS ERYTHROCYTES VOL PATIENT: NORMAL ML

## 2019-02-13 PROCEDURE — 25000003 PHARM REV CODE 250: Performed by: FAMILY MEDICINE

## 2019-02-13 PROCEDURE — 36430 TRANSFUSION BLD/BLD COMPNT: CPT

## 2019-02-13 PROCEDURE — 96374 THER/PROPH/DIAG INJ IV PUSH: CPT

## 2019-02-13 PROCEDURE — 86920 COMPATIBILITY TEST SPIN: CPT | Mod: 59

## 2019-02-13 PROCEDURE — P9021 RED BLOOD CELLS UNIT: HCPCS

## 2019-02-13 PROCEDURE — 63600175 PHARM REV CODE 636 W HCPCS: Performed by: FAMILY MEDICINE

## 2019-02-13 RX ORDER — FUROSEMIDE 10 MG/ML
20 INJECTION INTRAMUSCULAR; INTRAVENOUS ONCE
Status: COMPLETED | OUTPATIENT
Start: 2019-02-13 | End: 2019-02-13

## 2019-02-13 RX ORDER — HYDROCODONE BITARTRATE AND ACETAMINOPHEN 500; 5 MG/1; MG/1
TABLET ORAL ONCE
Status: COMPLETED | OUTPATIENT
Start: 2019-02-13 | End: 2019-02-13

## 2019-02-13 RX ADMIN — FUROSEMIDE 20 MG: 10 INJECTION, SOLUTION INTRAMUSCULAR; INTRAVENOUS at 12:02

## 2019-02-13 RX ADMIN — SODIUM CHLORIDE: 0.9 INJECTION, SOLUTION INTRAVENOUS at 11:02

## 2019-02-13 NOTE — DISCHARGE INSTRUCTIONS
Anemia, Type Not Specified (Adult)  Red blood cells carry oxygen to the tissues of your body. Anemia is a condition in which you have too few red blood cells. You need iron to make red blood cells. The most common cause of anemia is not having enough iron. This may be because of:  · Loss of blood. This can be caused by heavy menstrual periods. It can also be caused by bleeding from the stomach or intestines.  · Poor diet. You may not be eating enough foods that contain iron.  Other causes of anemia include certain vitamin deficiencies, chronic kidney disease, and certain other chronic illnesses.  Anemia makes you to feel tired and run down. When anemia becomes severe, your skin becomes pale. You may feel short of breath after physical activity. Other symptoms include:  · Headaches  · Dizziness  · Leg cramps with physical activity  · Drowsiness  Home care  Follow these guidelines when caring for yourself at home:  · Dont overexert yourself.  · Talk with your healthcare provider before traveling by air or traveling to high altitudes.  Follow-up care  Follow up with your healthcare provider, or as advised. You may need other blood tests to find out the exact cause of your anemia. If you had testing done today, it may take several days to get all of the results. You can follow up with your own healthcare provider to get the results.  When to seek medical advice  Call your healthcare provider right away if any of these occur:  · Shortness of breath or chest pain  · Dizziness or fainting gets worse  · Vomiting blood or passing red or black-colored stool  Date Last Reviewed: 2/25/2016  © 8207-7697 [employer]. [address] [place] [address], [place] [address]. All rights reserved. This information is not intended as a substitute for professional medical care. Always follow your healthcare professional's instructions.      OUTPATIENT BLOOD TRANSFUSION     The blood you have received has been matched for you as  carefully as possible, yet there remains the possibility of a delayed reaction to the blood or components.  Please notify your physician immediately if you experience any of the following symptoms after your transfusion (even days later):    1.   Lower back pain    2.   Urine appears reddish or orange    3.   Swelling of hands or feet    4.   Skin rash or hives (itching of skin)    5.   Shortness of breath    6.   Chills    7.   Rise in temperature of 2 degrees F or greater    Please remember that these symptoms may occur in your body normally (for example, if you already have shortness of breath) and are not NEW symptoms associated with the transfusion.    Please report ONLY NEW OCCURENCES.    Follow up with your doctor as scheduled.    Thank You

## 2019-02-14 ENCOUNTER — TELEPHONE (OUTPATIENT)
Dept: FAMILY MEDICINE | Facility: CLINIC | Age: 84
End: 2019-02-14

## 2019-02-18 ENCOUNTER — TELEPHONE (OUTPATIENT)
Dept: FAMILY MEDICINE | Facility: CLINIC | Age: 84
End: 2019-02-18

## 2019-02-18 NOTE — TELEPHONE ENCOUNTER
----- Message from Earl Domingo sent at 2019  8:25 AM CST -----  Contact: Sushma @ [facility] Justin Mercado  MRN: [id number]  : 1935  PCP: Dylan Adam  Home Phone      [phone]  Work Phone      Not on file.  Mobile          [phone]      MESSAGE: labs faxed last week for review by Dr Adam -- Low H&H  Was 7.3 -- 24.5 --- patient received transfusion -- last H&H 2/15/19  8.9 -- 28.5  -- patient was to follow up here & has cancelled appt     Call Sushma @ [phone]    PCP: Jair

## 2019-02-19 PROBLEM — I25.10 CAD (CORONARY ARTERY DISEASE): Status: ACTIVE | Noted: 2019-02-19

## 2019-02-19 PROBLEM — I35.0 AORTIC STENOSIS: Status: ACTIVE | Noted: 2019-02-19

## 2019-02-19 PROBLEM — I50.32 CHF (CONGESTIVE HEART FAILURE), NYHA CLASS II, CHRONIC, DIASTOLIC: Status: ACTIVE | Noted: 2019-02-19

## 2019-02-19 PROBLEM — Z95.1 HX OF CABG: Status: ACTIVE | Noted: 2019-02-19

## 2019-02-19 PROBLEM — R94.39 ABNORMAL MYOCARDIAL PERFUSION STUDY: Status: ACTIVE | Noted: 2019-02-19

## 2019-03-06 DIAGNOSIS — J44.1 CHRONIC OBSTRUCTIVE PULMONARY DISEASE WITH ACUTE EXACERBATION: ICD-10-CM

## 2019-03-08 RX ORDER — UMECLIDINIUM BROMIDE AND VILANTEROL TRIFENATATE 62.5; 25 UG/1; UG/1
POWDER RESPIRATORY (INHALATION)
Qty: 60 EACH | Refills: 0 | Status: SHIPPED | OUTPATIENT
Start: 2019-03-08

## 2019-03-09 ENCOUNTER — LAB VISIT (OUTPATIENT)
Dept: LAB | Facility: HOSPITAL | Age: 84
End: 2019-03-09
Attending: FAMILY MEDICINE
Payer: MEDICARE

## 2019-03-09 DIAGNOSIS — D64.9 ANEMIA, UNSPECIFIED TYPE: Primary | ICD-10-CM

## 2019-03-09 DIAGNOSIS — D64.9 ANEMIA, UNSPECIFIED TYPE: ICD-10-CM

## 2019-03-09 LAB
ABO + RH BLD: NORMAL
BLD GP AB SCN CELLS X3 SERPL QL: NORMAL

## 2019-03-09 PROCEDURE — 36415 COLL VENOUS BLD VENIPUNCTURE: CPT

## 2019-03-09 PROCEDURE — 86850 RBC ANTIBODY SCREEN: CPT

## 2019-03-11 ENCOUNTER — LAB VISIT (OUTPATIENT)
Dept: LAB | Facility: HOSPITAL | Age: 84
End: 2019-03-11
Attending: FAMILY MEDICINE
Payer: MEDICARE

## 2019-03-11 DIAGNOSIS — D64.9 ANEMIA, UNSPECIFIED TYPE: ICD-10-CM

## 2019-03-11 DIAGNOSIS — D64.9 ANEMIA, UNSPECIFIED TYPE: Primary | ICD-10-CM

## 2019-03-11 LAB
ABO + RH BLD: NORMAL
BLD GP AB SCN CELLS X3 SERPL QL: NORMAL

## 2019-03-11 PROCEDURE — P9021 RED BLOOD CELLS UNIT: HCPCS

## 2019-03-11 PROCEDURE — 36415 COLL VENOUS BLD VENIPUNCTURE: CPT

## 2019-03-11 PROCEDURE — 86920 COMPATIBILITY TEST SPIN: CPT

## 2019-03-11 PROCEDURE — 86901 BLOOD TYPING SEROLOGIC RH(D): CPT

## 2019-03-11 RX ORDER — HYDROCODONE BITARTRATE AND ACETAMINOPHEN 500; 5 MG/1; MG/1
TABLET ORAL ONCE
Status: CANCELLED | OUTPATIENT
Start: 2019-03-11 | End: 2019-03-11

## 2019-03-12 ENCOUNTER — HOSPITAL ENCOUNTER (EMERGENCY)
Facility: HOSPITAL | Age: 84
Discharge: HOME | End: 2019-03-12
Attending: SURGERY
Payer: MEDICARE

## 2019-03-12 VITALS
OXYGEN SATURATION: 96 % | HEART RATE: 72 BPM | DIASTOLIC BLOOD PRESSURE: 58 MMHG | TEMPERATURE: 98 F | WEIGHT: 195 LBS | BODY MASS INDEX: 30.54 KG/M2 | RESPIRATION RATE: 20 BRPM | SYSTOLIC BLOOD PRESSURE: 108 MMHG

## 2019-03-12 DIAGNOSIS — T14.90XA INJURY: ICD-10-CM

## 2019-03-12 DIAGNOSIS — S63.501A WRIST SPRAIN, RIGHT, INITIAL ENCOUNTER: Primary | ICD-10-CM

## 2019-03-12 PROCEDURE — 29125 APPL SHORT ARM SPLINT STATIC: CPT | Mod: RT

## 2019-03-12 PROCEDURE — 99284 EMERGENCY DEPT VISIT MOD MDM: CPT | Mod: 25

## 2019-03-12 NOTE — ED PROVIDER NOTES
Encounter Date: 3/12/2019       History     Chief Complaint   Patient presents with    Wrist Pain     right     Patient is 84-year-old white male who was sitting in porch swing and caught his right hand and wrist in a chain assembly and suffered an injury, causing some minimal skin avulsion.  He presents with pain in the right wrist at this time primarily.  Denies numbness and paresthesias distally in the hand.          Review of patient's allergies indicates:   Allergen Reactions    Bactrim [sulfamethoxazole-trimethoprim] Rash    Codeine Rash     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    S/P 2-vessel coronary artery bypass      Past Surgical History:   Procedure Laterality Date    CARPAL TUNNEL RELEASE      CATARACT EXTRACTION, BILATERAL      CORONARY ARTERY BYPASS GRAFT  2004    HERNIA REPAIR      Inguinal & Ventral with MESH    TONSILLECTOMY      TRANSESOPHAGEAL ECHOCARDIOGRAM (INES) N/A 6/28/2016    Performed by Hugh [facility] at [facility]     Family History   Problem Relation Age of Onset    Hypertension Mother     Heart failure Mother     Diabetes type II Mother     Hypertension Father     Atrial fibrillation Father     Cancer Father     Hypertension Sister     Atrial fibrillation Sister     COPD Sister     Pacemaker/defibrilator Sister     Diabetes type II Sister     Hypertension Brother     Pulmonary embolism Brother      Social History     Tobacco Use     Smoking status: Former Smoker     Last attempt to quit: 2004     Years since quitting: 15.2    Smokeless tobacco: Never Used    Tobacco comment: Smoked for 60 years   Substance Use Topics    Alcohol use: No     Frequency: Never    Drug use: No     Review of Systems   Constitutional: Negative for fever.   HENT: Negative for sore throat.    Respiratory: Negative for shortness of breath.    Cardiovascular: Negative for chest pain.   Gastrointestinal: Negative for nausea.   Genitourinary: Negative for dysuria.   Musculoskeletal: Positive for arthralgias and joint swelling. Negative for back pain.   Skin: Negative for rash.   Neurological: Negative for weakness.   Hematological: Does not bruise/bleed easily.       Physical Exam     Initial Vitals [03/12/19 1310]   BP Pulse Resp Temp SpO2   (!) 103/54 69 20 98 °F (36.7 °C) 96 %      MAP       --         Physical Exam    Nursing note and vitals reviewed.  Constitutional: He appears well-developed and well-nourished.   HENT:   Head: Normocephalic and atraumatic.   Eyes: EOM are normal. Pupils are equal, round, and reactive to light.   Neck: Normal range of motion. Neck supple.   Cardiovascular: Normal rate.   Pulmonary/Chest: Breath sounds normal.   Abdominal: Soft.   Musculoskeletal:   Minimal swelling involving the right wrist, pain with flexion of the wrist and a small area of skin abrasion on the dorsal aspect of the wrist.   Neurological: He is alert and oriented to person, place, and time.   Skin: Skin is warm and dry. Capillary refill takes less than 2 seconds.   Psychiatric: He has a normal mood and affect. Thought content normal.         ED Course   Procedures  Labs Reviewed - No data to display       Imaging Results    None         plain films of the right wrist and right hand showed no acute fractures or dislocations.  There is some widening of the scapholunate joint suggesting a possible ligamentous disruption, but this should not prove functionally  significant.                       Clinical Impression:       ICD-10-CM ICD-9-CM   1. Wrist sprain, right, initial encounter S63.501A 842.00   2. Injury T14.90XA 959.9         Disposition:   Disposition: Discharged  Condition: Stable                        Prince Akhtar Jr., MD  03/12/19 0376

## 2019-03-12 NOTE — ED TRIAGE NOTES
84 y.o. male presents to ER ED 02/ED 02A   Chief Complaint   Patient presents with    Wrist Pain     right   Pt reports his wrist got caught in chain when his swing broke, swelling and bruising noted to right wrist. No acute distress noted.

## 2019-03-13 ENCOUNTER — INFUSION (OUTPATIENT)
Dept: INFUSION THERAPY | Facility: HOSPITAL | Age: 84
End: 2019-03-13
Attending: FAMILY MEDICINE
Payer: MEDICARE

## 2019-03-13 VITALS
WEIGHT: 195 LBS | HEART RATE: 69 BPM | RESPIRATION RATE: 18 BRPM | SYSTOLIC BLOOD PRESSURE: 125 MMHG | BODY MASS INDEX: 30.54 KG/M2 | DIASTOLIC BLOOD PRESSURE: 65 MMHG | TEMPERATURE: 97 F

## 2019-03-13 DIAGNOSIS — D64.9 ANEMIA, UNSPECIFIED TYPE: ICD-10-CM

## 2019-03-13 PROCEDURE — 25000003 PHARM REV CODE 250: Performed by: FAMILY MEDICINE

## 2019-03-13 PROCEDURE — 36430 TRANSFUSION BLD/BLD COMPNT: CPT

## 2019-03-13 PROCEDURE — P9021 RED BLOOD CELLS UNIT: HCPCS

## 2019-03-13 RX ORDER — HYDROCODONE BITARTRATE AND ACETAMINOPHEN 500; 5 MG/1; MG/1
TABLET ORAL ONCE
Status: COMPLETED | OUTPATIENT
Start: 2019-03-13 | End: 2019-03-13

## 2019-03-13 RX ADMIN — SODIUM CHLORIDE: 0.9 INJECTION, SOLUTION INTRAVENOUS at 11:03

## 2019-03-13 NOTE — DISCHARGE INSTRUCTIONS
OUTPATIENT BLOOD TRANSFUSION     The blood you have received has been matched for you as carefully as possible, yet there remains the possibility of a delayed reaction to the blood or components.  Please notify your physician immediately if you experience any of the following symptoms after your transfusion (even days later):    1.   Lower back pain    2.   Urine appears reddish or orange    3.   Swelling of hands or feet    4.   Skin rash or hives (itching of skin)    5.   Shortness of breath    6.   Chills    7.   Rise in temperature of 2 degrees F or greater    Please remember that these symptoms may occur in your body normally (for example, if you already have shortness of breath) and are not NEW symptoms associated with the transfusion.    Please report ONLY NEW OCCURENCES.    Follow up with your doctor as scheduled.    Thank You

## 2019-03-18 PROBLEM — I50.32 CHRONIC DIASTOLIC HEART FAILURE, NYHA CLASS 2: Status: ACTIVE | Noted: 2019-03-18

## 2019-04-14 ENCOUNTER — HOSPITAL ENCOUNTER (OUTPATIENT)
Dept: RADIOLOGY | Facility: HOSPITAL | Age: 84
Discharge: HOME | End: 2019-04-14
Attending: SURGERY
Payer: MEDICARE

## 2019-04-14 ENCOUNTER — HOSPITAL ENCOUNTER (EMERGENCY)
Facility: HOSPITAL | Age: 84
Discharge: HOME | End: 2019-04-14
Payer: MEDICARE

## 2019-04-14 DIAGNOSIS — R06.02 SOB (SHORTNESS OF BREATH): ICD-10-CM

## 2019-04-14 DIAGNOSIS — R60.9 EDEMA: ICD-10-CM

## 2019-04-14 DIAGNOSIS — R41.0 CONFUSION: Primary | ICD-10-CM

## 2019-04-14 DIAGNOSIS — R41.82 ALTERED MENTAL STATUS: ICD-10-CM

## 2019-04-14 PROCEDURE — 84484 ASSAY OF TROPONIN QUANT: CPT

## 2019-04-14 PROCEDURE — 93010 EKG 12-LEAD: ICD-10-PCS | Mod: ,,, | Performed by: INTERNAL MEDICINE

## 2019-04-14 PROCEDURE — 83605 ASSAY OF LACTIC ACID: CPT

## 2019-04-14 PROCEDURE — 25000003 PHARM REV CODE 250: Performed by: SURGERY

## 2019-04-14 PROCEDURE — 80053 COMPREHEN METABOLIC PANEL: CPT

## 2019-04-14 PROCEDURE — 85025 COMPLETE CBC W/AUTO DIFF WBC: CPT

## 2019-04-14 PROCEDURE — 82550 ASSAY OF CK (CPK): CPT

## 2019-04-14 PROCEDURE — 36415 COLL VENOUS BLD VENIPUNCTURE: CPT

## 2019-04-14 PROCEDURE — 70450 CT HEAD/BRAIN W/O DYE: CPT | Mod: TC

## 2019-04-14 PROCEDURE — 83880 ASSAY OF NATRIURETIC PEPTIDE: CPT

## 2019-04-14 PROCEDURE — 99284 EMERGENCY DEPT VISIT MOD MDM: CPT | Mod: 25

## 2019-04-14 PROCEDURE — 93005 ELECTROCARDIOGRAM TRACING: CPT

## 2019-04-14 PROCEDURE — 81000 URINALYSIS NONAUTO W/SCOPE: CPT

## 2019-04-14 PROCEDURE — 71045 X-RAY EXAM CHEST 1 VIEW: CPT | Mod: TC

## 2019-04-14 PROCEDURE — 87040 BLOOD CULTURE FOR BACTERIA: CPT

## 2019-04-14 PROCEDURE — 93010 ELECTROCARDIOGRAM REPORT: CPT | Mod: ,,, | Performed by: INTERNAL MEDICINE

## 2019-04-14 PROCEDURE — 82553 CREATINE MB FRACTION: CPT

## 2019-04-14 RX ORDER — CLONIDINE HYDROCHLORIDE 0.1 MG/1
TABLET ORAL
Status: DISCONTINUED
Start: 2019-04-14 | End: 2019-04-16 | Stop reason: HOSPADM

## 2019-04-14 RX ORDER — CLONIDINE HYDROCHLORIDE 0.1 MG/1
0.2 TABLET ORAL ONCE
Status: COMPLETED | OUTPATIENT
Start: 2019-04-15 | End: 2019-04-14

## 2019-04-14 RX ADMIN — CLONIDINE HYDROCHLORIDE 0.2 MG: 0.1 TABLET ORAL at 01:04

## 2019-04-15 ENCOUNTER — OFFICE VISIT (OUTPATIENT)
Dept: NEUROLOGY | Facility: CLINIC | Age: 84
End: 2019-04-15
Payer: MEDICARE

## 2019-04-15 VITALS
SYSTOLIC BLOOD PRESSURE: 96 MMHG | WEIGHT: 197.06 LBS | DIASTOLIC BLOOD PRESSURE: 50 MMHG | HEIGHT: 62 IN | RESPIRATION RATE: 16 BRPM | BODY MASS INDEX: 36.26 KG/M2 | HEART RATE: 70 BPM

## 2019-04-15 DIAGNOSIS — R53.1 RIGHT SIDED WEAKNESS: Primary | ICD-10-CM

## 2019-04-15 DIAGNOSIS — I67.1 CEREBRAL ANEURYSM: ICD-10-CM

## 2019-04-15 DIAGNOSIS — I48.0 PAROXYSMAL ATRIAL FIBRILLATION: ICD-10-CM

## 2019-04-15 DIAGNOSIS — I65.23 BILATERAL CAROTID ARTERY STENOSIS: ICD-10-CM

## 2019-04-15 LAB
ALBUMIN SERPL BCP-MCNC: 3.7 G/DL (ref 3.5–5.2)
ALP SERPL-CCNC: 82 U/L (ref 55–135)
ALT SERPL W/O P-5'-P-CCNC: 9 U/L (ref 10–44)
ANION GAP SERPL CALC-SCNC: 8 MMOL/L (ref 8–16)
AST SERPL-CCNC: 10 U/L (ref 10–40)
BACTERIA #/AREA URNS HPF: NORMAL /HPF
BASOPHILS # BLD AUTO: 0.02 K/UL (ref 0–0.2)
BASOPHILS NFR BLD: 0.3 % (ref 0–1.9)
BILIRUB SERPL-MCNC: 0.7 MG/DL (ref 0.1–1)
BILIRUB UR QL STRIP: NEGATIVE
BNP SERPL-MCNC: 396 PG/ML (ref 0–99)
BUN SERPL-MCNC: 14 MG/DL (ref 8–23)
CALCIUM SERPL-MCNC: 10.4 MG/DL (ref 8.7–10.5)
CHLORIDE SERPL-SCNC: 112 MMOL/L (ref 95–110)
CK MB SERPL-MCNC: 2.3 NG/ML (ref 0.1–6.5)
CK MB SERPL-RTO: 4.1 % (ref 0–5)
CK SERPL-CCNC: 56 U/L (ref 20–200)
CLARITY UR: ABNORMAL
CO2 SERPL-SCNC: 25 MMOL/L (ref 23–29)
COLOR UR: YELLOW
CREAT SERPL-MCNC: 1 MG/DL (ref 0.5–1.4)
DIFFERENTIAL METHOD: ABNORMAL
EOSINOPHIL # BLD AUTO: 0.2 K/UL (ref 0–0.5)
EOSINOPHIL NFR BLD: 2.8 % (ref 0–8)
ERYTHROCYTE [DISTWIDTH] IN BLOOD BY AUTOMATED COUNT: 20.7 % (ref 11.5–14.5)
EST. GFR  (AFRICAN AMERICAN): >60 ML/MIN/1.73 M^2
EST. GFR  (NON AFRICAN AMERICAN): >60 ML/MIN/1.73 M^2
GLUCOSE SERPL-MCNC: 108 MG/DL (ref 70–110)
GLUCOSE UR QL STRIP: NEGATIVE
HCT VFR BLD AUTO: 33.8 % (ref 40–54)
HGB BLD-MCNC: 9.8 G/DL (ref 14–18)
HGB UR QL STRIP: ABNORMAL
HYALINE CASTS #/AREA URNS LPF: 0 /LPF
KETONES UR QL STRIP: NEGATIVE
LACTATE SERPL-SCNC: 1 MMOL/L (ref 0.5–2.2)
LEUKOCYTE ESTERASE UR QL STRIP: NEGATIVE
LYMPHOCYTES # BLD AUTO: 1.1 K/UL (ref 1–4.8)
LYMPHOCYTES NFR BLD: 17.4 % (ref 18–48)
MCH RBC QN AUTO: 22.7 PG (ref 27–31)
MCHC RBC AUTO-ENTMCNC: 29 G/DL (ref 32–36)
MCV RBC AUTO: 78 FL (ref 82–98)
MICROSCOPIC COMMENT: NORMAL
MONOCYTES # BLD AUTO: 0.7 K/UL (ref 0.3–1)
MONOCYTES NFR BLD: 11.4 % (ref 4–15)
NEUTROPHILS # BLD AUTO: 4.4 K/UL (ref 1.8–7.7)
NEUTROPHILS NFR BLD: 68.4 % (ref 38–73)
NITRITE UR QL STRIP: NEGATIVE
PH UR STRIP: 8 [PH] (ref 5–8)
PLATELET # BLD AUTO: 148 K/UL (ref 150–350)
PMV BLD AUTO: 9 FL (ref 9.2–12.9)
POTASSIUM SERPL-SCNC: 3.8 MMOL/L (ref 3.5–5.1)
PROT SERPL-MCNC: 6.3 G/DL (ref 6–8.4)
PROT UR QL STRIP: ABNORMAL
RBC # BLD AUTO: 4.32 M/UL (ref 4.6–6.2)
RBC #/AREA URNS HPF: 1 /HPF (ref 0–4)
SODIUM SERPL-SCNC: 145 MMOL/L (ref 136–145)
SP GR UR STRIP: 1.02 (ref 1–1.03)
TROPONIN I SERPL DL<=0.01 NG/ML-MCNC: 0.08 NG/ML (ref 0–0.03)
URN SPEC COLLECT METH UR: ABNORMAL
UROBILINOGEN UR STRIP-ACNC: NEGATIVE EU/DL
WBC # BLD AUTO: 6.51 K/UL (ref 3.9–12.7)
WBC #/AREA URNS HPF: 0 /HPF (ref 0–5)

## 2019-04-15 PROCEDURE — 3074F PR MOST RECENT SYSTOLIC BLOOD PRESSURE < 130 MM HG: ICD-10-PCS | Mod: CPTII,S$GLB,, | Performed by: PSYCHIATRY & NEUROLOGY

## 2019-04-15 PROCEDURE — 3078F PR MOST RECENT DIASTOLIC BLOOD PRESSURE < 80 MM HG: ICD-10-PCS | Mod: CPTII,S$GLB,, | Performed by: PSYCHIATRY & NEUROLOGY

## 2019-04-15 PROCEDURE — 1101F PR PT FALLS ASSESS DOC 0-1 FALLS W/OUT INJ PAST YR: ICD-10-PCS | Mod: CPTII,S$GLB,, | Performed by: PSYCHIATRY & NEUROLOGY

## 2019-04-15 PROCEDURE — 99214 OFFICE O/P EST MOD 30 MIN: CPT | Mod: S$GLB,,, | Performed by: PSYCHIATRY & NEUROLOGY

## 2019-04-15 PROCEDURE — 1101F PT FALLS ASSESS-DOCD LE1/YR: CPT | Mod: CPTII,S$GLB,, | Performed by: PSYCHIATRY & NEUROLOGY

## 2019-04-15 PROCEDURE — 99999 PR PBB SHADOW E&M-EST. PATIENT-LVL III: ICD-10-PCS | Mod: PBBFAC,,, | Performed by: PSYCHIATRY & NEUROLOGY

## 2019-04-15 PROCEDURE — 99214 PR OFFICE/OUTPT VISIT, EST, LEVL IV, 30-39 MIN: ICD-10-PCS | Mod: S$GLB,,, | Performed by: PSYCHIATRY & NEUROLOGY

## 2019-04-15 PROCEDURE — 99999 PR PBB SHADOW E&M-EST. PATIENT-LVL III: CPT | Mod: PBBFAC,,, | Performed by: PSYCHIATRY & NEUROLOGY

## 2019-04-15 PROCEDURE — 3078F DIAST BP <80 MM HG: CPT | Mod: CPTII,S$GLB,, | Performed by: PSYCHIATRY & NEUROLOGY

## 2019-04-15 PROCEDURE — 3074F SYST BP LT 130 MM HG: CPT | Mod: CPTII,S$GLB,, | Performed by: PSYCHIATRY & NEUROLOGY

## 2019-04-15 RX ORDER — FUROSEMIDE 20 MG/1
20 TABLET ORAL 2 TIMES DAILY
Status: ON HOLD | COMMUNITY
End: 2019-05-13 | Stop reason: SDUPTHER

## 2019-04-15 RX ORDER — AMLODIPINE BESYLATE 2.5 MG/1
2.5 TABLET ORAL DAILY
Status: ON HOLD | COMMUNITY
End: 2019-05-13 | Stop reason: HOSPADM

## 2019-04-15 NOTE — ED PROVIDER NOTES
[facility]                                                 Chief Complaint  84 y.o. male with Confusion    History of Present Illness  [facility] Farooq presents to the emergency room with confusion this week  Pt had a aortic valve repair, has had confusion since that procedure per daughter  Patient denies any complaint at this time but does appear to be mildly demented  Patient has no focal deficit, normal vision, he actually has no complaints on interview  Patient has shown peculiar behavior at the house, seeing people that are not there  Patient is not psychotic, not a danger to self or others, daughter with him at all times  Has had issues with anemia and chronic renal failure, consider metabolic source    The history is provided by the patient   device was not used during this ER visit    Past Medical History   -- Anemia    -- Aortic valve stenosis    -- Arthritis    -- Atrial fibrillation    -- Cancer    -- Carotid artery stenosis    -- Coronary artery disease    -- CVA (cerebral vascular accident)    -- Encounter for blood transfusion    -- Exercise-induced angina    -- Hyperkalemia    -- Hyperlipemia    -- Hypertension    -- Iron deficiency anemia    -- MI (myocardial infarction)    -- MALATHI (obstructive sleep apnea)    -- PAD (peripheral artery disease)    -- Prostatitis    -- Renal failure    -- S/P 2-vessel coronary artery bypass      Surgeries: Cataract, carpal tunnel, CABG, hernia, aortic valve, tonsillectomy, echocardiogram  Allergies: Bactrim and codeine    Review of Systems and Physical Exam      Review of Systems  -- Constitution - no fever, denies fatigue, no weakness, no chills  -- Eyes - no tearing or redness, no visual disturbance  -- Ear, Nose - no tinnitus or earache, no nasal congestion or discharge  -- Mouth,Throat - no sore throat, no toothache, normal voice, normal swallowing  -- Respiratory - denies cough and congestion, no shortness of breath,  no MEDINA  -- Cardiovascular - denies chest pain, no palpitations, denies claudication  -- Gastrointestinal - denies abdominal pain, nausea, vomiting, or diarrhea  -- Genitourinary - no dysuria, denies flank pain, no hematuria, no STD risk  -- Musculoskeletal - denies back pain, negative for trauma or injury  -- Neurological - confusion for last week after aortic valve repair  -- Skin - denies pallor, rash, or changes in skin. no hives or welts noted    Physical Exam  -- Nursing note and vitals reviewed  -- Constitutional: Appears well-developed and well-nourished  -- Head: Atraumatic. Normocephalic. No obvious abnormality  -- Eyes: Pupils are equal and reactive to light. Normal conjunctiva and lids  -- Cardiac: Normal rate, regular rhythm and normal heart sounds  -- Pulmonary: Normal respiratory effort, breath sounds clear to auscultation  -- Abdominal: Soft, no tenderness. Normal bowel sounds. Normal liver edge  -- Musculoskeletal: Normal range of motion, no effusions. Joints stable   -- Neurological: No focal deficits. Showed good interaction with staff  -- Vascular: Posterior tibial, dorsalis pedis and radial pulses 2+ bilaterally      Emergency Room Course      Treatment and Evaluation  -- The CT of the head performed in the ER today was negative for acute pathology  -- Chest x-ray showed no infiltrate and showed no acute pathology  -- The EKG findings today were without concerning findings from baseline  -- Electrolytes, CBC, troponin, BNP appear to be at baseline  -- Urinalysis performed during this ER visit showed no signs of infection  -- PO 0.2 mg Clonidine given in today in the ER    Medical Decision Making  -- Diagnosis management comments: 84 y.o. male with confusion this week  -- patient had any aortic valve repair with confusion afterwards, dementia?  -- patient had a negative head CT, stable baseline labs/stable creatinine  -- patient had a negative urinalysis, no fever, no obvious neuro deficit  noted  -- patient refuses to stay in the hospital for further evaluation at this time  -- I will contact the patient's neurologist and cardiologist via [employer] for appointment  -- daughter is a nurse; she will return with any concerning signs or symptoms    Diagnosis  -- The encounter diagnosis was Confusion.    Disposition and Plan  -- Disposition: home  -- Condition: stable  -- Follow-up: Patient to follow up with Neurology in 1-2 days.  -- I advised the patient that we have found no life threatening condition today  -- At this time, I believe the patient is clinically stable for discharge.   -- The patient acknowledges that close follow up with a MD is required   -- Patient agrees to comply with all instruction and direction given in the ER    This note is dictated on M*Modal word recognition program.  There are word recognition mistakes that are occasionally missed on review.         Binh Ozuna MD  04/15/19 0817

## 2019-04-15 NOTE — PROGRESS NOTES
"HPI:Lacho Farooq is a 83 y.o. male with 2 spells of vague dizziness in 2016. 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  Then with one day of dizziness in 1/2018    The patient has not seen me in over a year.  He has since had Aortic valve repair (3/2019)  His daughter, a nurse, texted me last night that she has brought the patient to the ER for confusion and hallucinations.  ER doc recommended MRI brain and admission, but  The patient refused. It was felt per daughter that the patient had the understanding to do so, and then she requested this appointment with me today for further evaluation.    2 nights ago he started at night by calling his son-in-law after hearing "pipes" with running water which has occurred twice and he also has ringing in the ear.  Daughter, here today, noted right facial droop and dropping things from the right hand. He had slurred speech and seemed to have trouble grabbing things.  He is back to himself today. It is uncertain how long he had the symptoms as he did not notice them.   Since the Valve repair, he has had medication changes and BP has been up to 200.   In all of this he has forgotten NOAC.   Grand daughter here today has noted his slurred speech- she is a speech therapist    CT head showed no acute changes per ER MD    Review of Systems   Constitutional: Negative for fever.   HENT: Positive for hearing loss. Negative for nosebleeds and tinnitus.         Chronic hearing loss   Eyes: Negative for double vision.   Respiratory: Negative for hemoptysis.    Cardiovascular: Positive for leg swelling.        Chronic leg swelling   Gastrointestinal: Negative for blood in stool.   Genitourinary: Negative for hematuria.   Musculoskeletal: Negative for falls.   Skin: Negative for rash.   Neurological: Negative for " dizziness, sensory change, speech change, focal weakness and headaches.   Psychiatric/Behavioral: The patient does not have insomnia.      Exam:  Gen Appearance, well developed/nourished in no apparent distress  CV: 2+ distal pulses with no edema or swelling  Neuro:  MS: Awake, alert, Sustains attention. But not oriented to exact day of week.  Recent details seem mildly impaired /remote memory is intact.Language is full to spontaneous speech/comprehension. Fund of Knowledge is full. His insight and judgement are excellent, he is  goal directed  CN: Optic discs are flat with normal vasculature, PERRL, Extraoccular movements and visual fields are full. Left eye lid mildly drooping (he states known cosmetic) and right lower facial droop is noted, mild.  Normal facial strength,Tongue and Palate are midline and  strong. Shoulder Shrug symmetric and strong.  Motor: Normal bulk, tone, no abnormal movements.  5/5 bilateral UE and LE strength  Except 4-/5 right tricepts and 1+ reflexes  Sensory: Romberg negative and intact to vibration and temp   Cerebellar: Finger-nose, heal to nose, Rapid alternating movements intact  Gait: Normal stance, no ataxia    Imaging: MRI brain 5/2016: acute CVA punctate in the left cerebellum  MRA showed Suspected 3.2 mm aneurysm arising from the right M1 segment and questionable small aneurysms measuring 1-2 mm from the left paraclinoid carotid artery.      2016: Carotid US less than 60% stenosis, TTE normal EF, Holter: no afib    6/2016 INES: The aortic root is normal in size.  Grade 2 atheroma disease was detected consistent with extensive intimal thickening.  Maximal plaque thickness was 3 mm.      Labs:   CMP 4/19 normal        10/2016 CTA legs: Significant calcified atheromatous disease involving the aorta and its major branch vessels as well as the bilateral lower extremity vessels.  There is diminution of flow in the bilateral anterior and posterior tibial arteries and peroneal arteries,  left greater than right, with occlusion of the left posterior tibial artery in the lower calf with reconstitution in the foot.    Bilateral lower extremity subcutaneous edema.    Small bilateral pleural effusions.    Left renal cyst.    MRI brain 1/2018:  Age-appropriate generalized volume loss with scattered foci of T2/FLAIR signal abnormality within the supratentorial white matter and darren while nonspecific suggestive for moderate degree of  chronic microvascular ischemic change.    No evidence for acute infarction or enhancing lesion.    1/2018 CTA head: Age-appropriate generalized cerebral volume loss with moderate chronic microvascular ischemic change.  No evidence for acute intracranial hemorrhage or new parenchymal hypoattenuation to suggest an acute infarction.  No abnormal enhancement.    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    Suspected infundibulum measuring 2 mm involving the left paraclinoid ICA rather than an aneurysm.    Calcifications of the intracranial and excretory vasculature with resulting in luminal narrowing of approximately 60-70 % of the right proximal internal carotid artery.    3/19/19 Echo: EF was 55%      Assessment: Lacho Farooq is a 83 y.o. male with 2 spells of vague dizziness in 2016. 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  Then with one day of dizziness in 1/2018  Now with slurred speech, confusion and right facial droop for at least 2 days. He refused MRI and admission in the ER yesterday.       I recommend:  1. Now had 2 days of  hallucinations, confusion, right facial droop and slurred speech in light of HTN from recent medication changes after an aortic valve repair last month  -Not a TPA candidate given presentation outside of TPA window  -MRI brain needed ASAP to evaluate for acute  CVA. Daughter has card on MRI compatibility information-  (he has not been compliant again fully with NOAC recently which could be the etiology)  -If negative, consider TIA or HTN urgency/emergency as cause.  Follow up with Demarcus/Yoko/Kel about BP if no CVA found (BP meds have been adjusted recently) as there is an  encephalopathy component to his complaint. Recent echo showed normal EF.   -Daughter was advised to fully supervise meds daily.   -Avoid driving for now  -To the ER for ANY worsening.   2. Prior Dizziness is resolved.   -MRi brain negative for CVA for 1/2018 dizziness. CTA head and neck prior showed up to 70% right carotid disease (improved and also monitored by Dr Begum) and stable right M1 segment and ? Left paraclinoid  aneurysm.    -Benefits of pradaxa outweigh any risk of bleeding in his case. These lesions are small and would not require intervention at this point otherwise- would monitor with CTA yearly at this point due now per orders.  3. INES showed   Grade 2 atheroma disease prior, recent echo showed normal EF.   4. Pradaxa to continue for afib and stroke prevention- patient reports that he could have forgotten some doses prior to event in 2016 and again more recently  5. PAD noted by CTA legs and treated conservatatively. Patient has a history of cervical radicular pain which is currently treated with  epidural steroid injections.   6. The patient is known to me from 2010 for confusion associated with hypotension, prostatitis (for which he is still being treated) and acute renal failure thought to represent delirium which resolved. Will monitor for any consistent memory loss long term     The patient/family  was instructed to dial 911 for any signs or symptoms of stroke such as sudden weakness, numbness, dizziness, speech, gait, or visual changes    RTC 6 weeks. Will discuss results with Daughter when MRI/CTA are done. May need PT/OT/ST consults

## 2019-04-16 ENCOUNTER — HOSPITAL ENCOUNTER (OUTPATIENT)
Dept: RADIOLOGY | Facility: HOSPITAL | Age: 84
Discharge: HOME | End: 2019-04-16
Attending: PSYCHIATRY & NEUROLOGY
Payer: MEDICARE

## 2019-04-16 ENCOUNTER — HOSPITAL ENCOUNTER (INPATIENT)
Facility: HOSPITAL | Age: 84
LOS: 2 days | Discharge: ANOTHER HOSPITAL | DRG: 871 | End: 2019-04-18
Attending: SURGERY | Admitting: INTERNAL MEDICINE
Payer: MEDICARE

## 2019-04-16 DIAGNOSIS — R50.9 FEVER: ICD-10-CM

## 2019-04-16 DIAGNOSIS — I63.9 CEREBROVASCULAR ACCIDENT (CVA), UNSPECIFIED MECHANISM: Primary | ICD-10-CM

## 2019-04-16 DIAGNOSIS — I48.91 ATRIAL FIBRILLATION: ICD-10-CM

## 2019-04-16 DIAGNOSIS — R53.1 RIGHT SIDED WEAKNESS: ICD-10-CM

## 2019-04-16 DIAGNOSIS — I67.1 CEREBRAL ANEURYSM: ICD-10-CM

## 2019-04-16 DIAGNOSIS — I65.23 BILATERAL CAROTID ARTERY STENOSIS: ICD-10-CM

## 2019-04-16 LAB
ALBUMIN SERPL BCP-MCNC: 3.6 G/DL (ref 3.5–5.2)
ALP SERPL-CCNC: 79 U/L (ref 55–135)
ALT SERPL W/O P-5'-P-CCNC: 8 U/L (ref 10–44)
ANION GAP SERPL CALC-SCNC: 10 MMOL/L (ref 8–16)
APTT BLDCRRT: 35.9 SEC (ref 21–32)
AST SERPL-CCNC: 11 U/L (ref 10–40)
BASOPHILS # BLD AUTO: 0.02 K/UL (ref 0–0.2)
BASOPHILS NFR BLD: 0.4 % (ref 0–1.9)
BILIRUB SERPL-MCNC: 1.3 MG/DL (ref 0.1–1)
BUN SERPL-MCNC: 24 MG/DL (ref 8–23)
CALCIUM SERPL-MCNC: 10 MG/DL (ref 8.7–10.5)
CHLORIDE SERPL-SCNC: 109 MMOL/L (ref 95–110)
CO2 SERPL-SCNC: 23 MMOL/L (ref 23–29)
CREAT SERPL-MCNC: 1.4 MG/DL (ref 0.5–1.4)
DIFFERENTIAL METHOD: ABNORMAL
EOSINOPHIL # BLD AUTO: 0.1 K/UL (ref 0–0.5)
EOSINOPHIL NFR BLD: 1.9 % (ref 0–8)
ERYTHROCYTE [DISTWIDTH] IN BLOOD BY AUTOMATED COUNT: 20.9 % (ref 11.5–14.5)
EST. GFR  (AFRICAN AMERICAN): 54 ML/MIN/1.73 M^2
EST. GFR  (NON AFRICAN AMERICAN): 46 ML/MIN/1.73 M^2
GLUCOSE SERPL-MCNC: 126 MG/DL (ref 70–110)
HCT VFR BLD AUTO: 32.4 % (ref 40–54)
HGB BLD-MCNC: 9.8 G/DL (ref 14–18)
INR PPP: 1.2 (ref 0.8–1.2)
LYMPHOCYTES # BLD AUTO: 0.6 K/UL (ref 1–4.8)
LYMPHOCYTES NFR BLD: 13.1 % (ref 18–48)
MCH RBC QN AUTO: 23.3 PG (ref 27–31)
MCHC RBC AUTO-ENTMCNC: 30.2 G/DL (ref 32–36)
MCV RBC AUTO: 77 FL (ref 82–98)
MONOCYTES # BLD AUTO: 0.5 K/UL (ref 0.3–1)
MONOCYTES NFR BLD: 11.2 % (ref 4–15)
NEUTROPHILS # BLD AUTO: 3.5 K/UL (ref 1.8–7.7)
NEUTROPHILS NFR BLD: 73.4 % (ref 38–73)
PLATELET # BLD AUTO: 129 K/UL (ref 150–350)
PMV BLD AUTO: 8.9 FL (ref 9.2–12.9)
POTASSIUM SERPL-SCNC: 3.7 MMOL/L (ref 3.5–5.1)
PROT SERPL-MCNC: 6.3 G/DL (ref 6–8.4)
PROTHROMBIN TIME: 12.1 SEC (ref 9–12.5)
RBC # BLD AUTO: 4.21 M/UL (ref 4.6–6.2)
SODIUM SERPL-SCNC: 142 MMOL/L (ref 136–145)
WBC # BLD AUTO: 4.75 K/UL (ref 3.9–12.7)

## 2019-04-16 PROCEDURE — 11000001 HC ACUTE MED/SURG PRIVATE ROOM

## 2019-04-16 PROCEDURE — 93010 ELECTROCARDIOGRAM REPORT: CPT | Mod: ,,, | Performed by: INTERNAL MEDICINE

## 2019-04-16 PROCEDURE — 70496 CTA HEAD AND NECK (XPD): ICD-10-PCS | Mod: 26,,, | Performed by: RADIOLOGY

## 2019-04-16 PROCEDURE — 70496 CT ANGIOGRAPHY HEAD: CPT | Mod: TC

## 2019-04-16 PROCEDURE — 70498 CT ANGIOGRAPHY NECK: CPT | Mod: 26,,, | Performed by: RADIOLOGY

## 2019-04-16 PROCEDURE — 80053 COMPREHEN METABOLIC PANEL: CPT

## 2019-04-16 PROCEDURE — 99285 EMERGENCY DEPT VISIT HI MDM: CPT | Mod: 25

## 2019-04-16 PROCEDURE — 25000003 PHARM REV CODE 250: Performed by: SURGERY

## 2019-04-16 PROCEDURE — 93010 EKG 12-LEAD: ICD-10-PCS | Mod: ,,, | Performed by: INTERNAL MEDICINE

## 2019-04-16 PROCEDURE — 85610 PROTHROMBIN TIME: CPT

## 2019-04-16 PROCEDURE — 85025 COMPLETE CBC W/AUTO DIFF WBC: CPT

## 2019-04-16 PROCEDURE — 85730 THROMBOPLASTIN TIME PARTIAL: CPT

## 2019-04-16 PROCEDURE — 25500020 PHARM REV CODE 255: Performed by: PSYCHIATRY & NEUROLOGY

## 2019-04-16 PROCEDURE — 70551 MRI BRAIN WITHOUT CONTRAST: ICD-10-PCS | Mod: 26,,, | Performed by: RADIOLOGY

## 2019-04-16 PROCEDURE — 94760 N-INVAS EAR/PLS OXIMETRY 1: CPT

## 2019-04-16 PROCEDURE — 70498 CTA HEAD AND NECK (XPD): ICD-10-PCS | Mod: 26,,, | Performed by: RADIOLOGY

## 2019-04-16 PROCEDURE — 70496 CT ANGIOGRAPHY HEAD: CPT | Mod: 26,,, | Performed by: RADIOLOGY

## 2019-04-16 PROCEDURE — 82746 ASSAY OF FOLIC ACID SERUM: CPT

## 2019-04-16 PROCEDURE — 25000003 PHARM REV CODE 250: Performed by: INTERNAL MEDICINE

## 2019-04-16 PROCEDURE — 70551 MRI BRAIN STEM W/O DYE: CPT | Mod: 26,,, | Performed by: RADIOLOGY

## 2019-04-16 PROCEDURE — 36415 COLL VENOUS BLD VENIPUNCTURE: CPT

## 2019-04-16 PROCEDURE — 93005 ELECTROCARDIOGRAM TRACING: CPT

## 2019-04-16 PROCEDURE — 82607 VITAMIN B-12: CPT

## 2019-04-16 PROCEDURE — 70551 MRI BRAIN STEM W/O DYE: CPT | Mod: TC

## 2019-04-16 RX ORDER — ENOXAPARIN SODIUM 100 MG/ML
30 INJECTION SUBCUTANEOUS EVERY 24 HOURS
Status: DISCONTINUED | OUTPATIENT
Start: 2019-04-16 | End: 2019-04-16

## 2019-04-16 RX ORDER — ASPIRIN 81 MG/1
81 TABLET ORAL DAILY
Status: DISCONTINUED | OUTPATIENT
Start: 2019-04-17 | End: 2019-04-18 | Stop reason: HOSPADM

## 2019-04-16 RX ORDER — DABIGATRAN ETEXILATE 75 MG/1
150 CAPSULE ORAL DAILY
Status: DISCONTINUED | OUTPATIENT
Start: 2019-04-17 | End: 2019-04-18 | Stop reason: HOSPADM

## 2019-04-16 RX ORDER — SODIUM CHLORIDE 9 MG/ML
1000 INJECTION, SOLUTION INTRAVENOUS
Status: COMPLETED | OUTPATIENT
Start: 2019-04-16 | End: 2019-04-16

## 2019-04-16 RX ORDER — SODIUM CHLORIDE 9 MG/ML
1000 INJECTION, SOLUTION INTRAVENOUS CONTINUOUS
Status: DISCONTINUED | OUTPATIENT
Start: 2019-04-16 | End: 2019-04-17

## 2019-04-16 RX ADMIN — SODIUM CHLORIDE 1000 ML: 0.9 INJECTION, SOLUTION INTRAVENOUS at 07:04

## 2019-04-16 RX ADMIN — SODIUM CHLORIDE 1000 ML: 0.9 INJECTION, SOLUTION INTRAVENOUS at 09:04

## 2019-04-16 RX ADMIN — IOHEXOL 100 ML: 350 INJECTION, SOLUTION INTRAVENOUS at 01:04

## 2019-04-16 NOTE — ED TRIAGE NOTES
Arrives per private vehicle with daughter who is a nurse. Daughter reports that patient has been calling family members with audible hallucinations from home and today while driving. Recent Aortic valve replacement surgery at [facility] with Dr Babcock on 3/18. Successful procedure. Saw Dr Begum for follow up and Lasix and Amlodipine were restarted due to hypertension. Patient took no meds today and is hypertensive on arrival. Currently AAOX4. Appears in no distress.

## 2019-04-16 NOTE — ED TRIAGE NOTES
84 y.o. male presents to ER ED 05/ED 05   Chief Complaint   Patient presents with    Abnormal Ct Scan   Pt sent to ER by Dr. Fernandez for abnormal CT results. No acute distress noted.

## 2019-04-16 NOTE — ED PROVIDER NOTES
Encounter Date: 4/16/2019       History     Chief Complaint   Patient presents with    Abnormal Ct Scan     Patient is 84-year-old white male with increasing confusion over the past several days.  He was seen 2 days ago in the ED, admission was recommended for further neurological workup but patient refused.  I was contacted by the patient's neurologist today who strongly recommended admission for further neurological workup and treatment of reported pneumonia.  Patient denies pain, does not report a productive cough.        Review of patient's allergies indicates:   Allergen Reactions    Bactrim [sulfamethoxazole-trimethoprim] Rash    Codeine Rash     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    S/P 2-vessel coronary artery bypass      Past Surgical History:   Procedure Laterality Date    CARPAL TUNNEL RELEASE      CATARACT EXTRACTION, BILATERAL      CORONARY ANGIOGRAPHY  2019    CORONARY ARTERY BYPASS GRAFT  2004    HERNIA REPAIR      Inguinal & Ventral with MESH    REPLACEMENT, AORTIC VALVE, PERCUTANEOUS, TRANSCATHETER Right 3/18/2019    [facility] CATH    REPLACEMENT, AORTIC VALVE, PERCUTANEOUS, TRANSCATHETER Right 3/18/2019    Performed by Ac [facility]man [facility] at [facility] CATH    TONSILLECTOMY      TRANSESOPHAGEAL ECHOCARDIOGRAM (INES) N/A 6/28/2016    [facility] at [facility] OR     Family History   Problem Relation Age of Onset     Hypertension Mother     Heart failure Mother     Diabetes type II Mother     Hypertension Father     Atrial fibrillation Father     Cancer Father     Hypertension Sister     Atrial fibrillation Sister     COPD Sister     Pacemaker/defibrilator Sister     Diabetes type II Sister     Hypertension Brother     Pulmonary embolism Brother      Social History     Tobacco Use    Smoking status: Former Smoker     Last attempt to quit: 2004     Years since quitting: 15.2    Smokeless tobacco: Never Used    Tobacco comment: Smoked for 60 years   Substance Use Topics    Alcohol use: No     Frequency: Never    Drug use: No     Review of Systems   Constitutional: Negative for fever.   HENT: Negative for sore throat.    Respiratory: Negative for shortness of breath.    Cardiovascular: Negative for chest pain.   Gastrointestinal: Negative for nausea.   Genitourinary: Negative for dysuria.   Musculoskeletal: Negative for back pain.   Skin: Negative for rash.   Neurological: Negative for weakness.   Hematological: Does not bruise/bleed easily.   Psychiatric/Behavioral: Positive for confusion.       Physical Exam     Initial Vitals   BP Pulse Resp Temp SpO2   -- -- -- -- --      MAP       --         Physical Exam    Nursing note and vitals reviewed.  Constitutional: He appears well-developed and well-nourished.   HENT:   Head: Normocephalic and atraumatic.   Nose: Nose normal.   Mouth/Throat: Oropharynx is clear and moist.   Eyes: EOM are normal. Pupils are equal, round, and reactive to light.   Neck: Normal range of motion. Neck supple.   Cardiovascular: Normal rate.   Pulmonary/Chest: Breath sounds normal.   Abdominal: Soft. Bowel sounds are normal.   Musculoskeletal: Normal range of motion.   Neurological: He is alert and oriented to person, place, and time.   Skin: Skin is warm and dry.   Psychiatric: He has a normal mood and affect. Thought content normal.         ED Course   Procedures  Labs Reviewed - No data  to display       Imaging Results    None         EKG shows normal sinus rhythm with no atrial fibrillation currently.  Chest x-ray shows no acute radiographic changes.  I have discussed admission with Hospital Medicine for further follow-up and management in conjunction with the neurology service.                       Clinical Impression:       ICD-10-CM ICD-9-CM   1. Cerebrovascular accident (CVA), unspecified mechanism I63.9 434.91   2. Atrial fibrillation I48.91 427.31         Disposition:   Disposition: Admitted  Condition: Stable                        Prince Akhtar Jr., MD  04/16/19 2547

## 2019-04-17 VITALS
RESPIRATION RATE: 21 BRPM | DIASTOLIC BLOOD PRESSURE: 79 MMHG | SYSTOLIC BLOOD PRESSURE: 161 MMHG | HEART RATE: 73 BPM | OXYGEN SATURATION: 96 % | TEMPERATURE: 97 F

## 2019-04-17 LAB
ALBUMIN SERPL BCP-MCNC: 3.3 G/DL (ref 3.5–5.2)
ALP SERPL-CCNC: 66 U/L (ref 55–135)
ALT SERPL W/O P-5'-P-CCNC: 8 U/L (ref 10–44)
ANION GAP SERPL CALC-SCNC: 10 MMOL/L (ref 8–16)
AST SERPL-CCNC: 12 U/L (ref 10–40)
BACTERIA #/AREA URNS HPF: NORMAL /HPF
BASOPHILS # BLD AUTO: 0.01 K/UL (ref 0–0.2)
BASOPHILS NFR BLD: 0.2 % (ref 0–1.9)
BILIRUB SERPL-MCNC: 1.3 MG/DL (ref 0.1–1)
BILIRUB UR QL STRIP: NEGATIVE
BUN SERPL-MCNC: 18 MG/DL (ref 8–23)
CALCIUM SERPL-MCNC: 9.5 MG/DL (ref 8.7–10.5)
CHLORIDE SERPL-SCNC: 110 MMOL/L (ref 95–110)
CLARITY UR: CLEAR
CO2 SERPL-SCNC: 22 MMOL/L (ref 23–29)
COLOR UR: YELLOW
COMPLEXED PSA SERPL-MCNC: 1.4 NG/ML (ref 0–4)
CREAT SERPL-MCNC: 1.1 MG/DL (ref 0.5–1.4)
DIFFERENTIAL METHOD: ABNORMAL
EOSINOPHIL # BLD AUTO: 0.1 K/UL (ref 0–0.5)
EOSINOPHIL NFR BLD: 1.1 % (ref 0–8)
ERYTHROCYTE [DISTWIDTH] IN BLOOD BY AUTOMATED COUNT: 20.6 % (ref 11.5–14.5)
EST. GFR  (AFRICAN AMERICAN): >60 ML/MIN/1.73 M^2
EST. GFR  (NON AFRICAN AMERICAN): >60 ML/MIN/1.73 M^2
FERRITIN SERPL-MCNC: 26 NG/ML (ref 20–300)
FOLATE SERPL-MCNC: 12 NG/ML (ref 4–24)
GLUCOSE SERPL-MCNC: 97 MG/DL (ref 70–110)
GLUCOSE UR QL STRIP: NEGATIVE
HCT VFR BLD AUTO: 32 % (ref 40–54)
HGB BLD-MCNC: 9.6 G/DL (ref 14–18)
HGB UR QL STRIP: ABNORMAL
HYALINE CASTS #/AREA URNS LPF: 0 /LPF
INFLUENZA A, MOLECULAR: NEGATIVE
INFLUENZA B, MOLECULAR: NEGATIVE
IRON SERPL-MCNC: 28 UG/DL (ref 45–160)
KETONES UR QL STRIP: ABNORMAL
LEUKOCYTE ESTERASE UR QL STRIP: NEGATIVE
LYMPHOCYTES # BLD AUTO: 0.4 K/UL (ref 1–4.8)
LYMPHOCYTES NFR BLD: 9.4 % (ref 18–48)
MCH RBC QN AUTO: 23.2 PG (ref 27–31)
MCHC RBC AUTO-ENTMCNC: 30 G/DL (ref 32–36)
MCV RBC AUTO: 77 FL (ref 82–98)
MICROSCOPIC COMMENT: NORMAL
MONOCYTES # BLD AUTO: 0.6 K/UL (ref 0.3–1)
MONOCYTES NFR BLD: 13.1 % (ref 4–15)
NEUTROPHILS # BLD AUTO: 3.5 K/UL (ref 1.8–7.7)
NEUTROPHILS NFR BLD: 76.2 % (ref 38–73)
NITRITE UR QL STRIP: NEGATIVE
PH UR STRIP: 7 [PH] (ref 5–8)
PLATELET # BLD AUTO: 118 K/UL (ref 150–350)
PMV BLD AUTO: 9.7 FL (ref 9.2–12.9)
POTASSIUM SERPL-SCNC: 3.7 MMOL/L (ref 3.5–5.1)
PROT SERPL-MCNC: 5.8 G/DL (ref 6–8.4)
PROT UR QL STRIP: ABNORMAL
RBC # BLD AUTO: 4.14 M/UL (ref 4.6–6.2)
RBC #/AREA URNS HPF: 3 /HPF (ref 0–4)
RETICS/RBC NFR AUTO: 1.5 % (ref 0.4–2)
SATURATED IRON: 7 % (ref 20–50)
SODIUM SERPL-SCNC: 142 MMOL/L (ref 136–145)
SP GR UR STRIP: 1.02 (ref 1–1.03)
SPECIMEN SOURCE: NORMAL
TOTAL IRON BINDING CAPACITY: 380 UG/DL (ref 250–450)
TRANSFERRIN SERPL-MCNC: 257 MG/DL (ref 200–375)
URN SPEC COLLECT METH UR: ABNORMAL
UROBILINOGEN UR STRIP-ACNC: 1 EU/DL
VIT B12 SERPL-MCNC: 184 PG/ML (ref 210–950)
WBC # BLD AUTO: 4.59 K/UL (ref 3.9–12.7)
WBC #/AREA URNS HPF: 0 /HPF (ref 0–5)

## 2019-04-17 PROCEDURE — 94799 UNLISTED PULMONARY SVC/PX: CPT

## 2019-04-17 PROCEDURE — 36415 COLL VENOUS BLD VENIPUNCTURE: CPT

## 2019-04-17 PROCEDURE — 83540 ASSAY OF IRON: CPT

## 2019-04-17 PROCEDURE — 25000003 PHARM REV CODE 250: Performed by: NURSE PRACTITIONER

## 2019-04-17 PROCEDURE — 81000 URINALYSIS NONAUTO W/SCOPE: CPT

## 2019-04-17 PROCEDURE — 87186 SC STD MICRODIL/AGAR DIL: CPT

## 2019-04-17 PROCEDURE — 80053 COMPREHEN METABOLIC PANEL: CPT

## 2019-04-17 PROCEDURE — 11000001 HC ACUTE MED/SURG PRIVATE ROOM

## 2019-04-17 PROCEDURE — 82728 ASSAY OF FERRITIN: CPT

## 2019-04-17 PROCEDURE — 87040 BLOOD CULTURE FOR BACTERIA: CPT | Mod: 59

## 2019-04-17 PROCEDURE — 92610 EVALUATE SWALLOWING FUNCTION: CPT

## 2019-04-17 PROCEDURE — 99232 SBSQ HOSP IP/OBS MODERATE 35: CPT | Mod: ,,, | Performed by: FAMILY MEDICINE

## 2019-04-17 PROCEDURE — 25000003 PHARM REV CODE 250: Performed by: SURGERY

## 2019-04-17 PROCEDURE — 25000003 PHARM REV CODE 250: Performed by: FAMILY MEDICINE

## 2019-04-17 PROCEDURE — 94761 N-INVAS EAR/PLS OXIMETRY MLT: CPT

## 2019-04-17 PROCEDURE — 63600175 PHARM REV CODE 636 W HCPCS: Performed by: INTERNAL MEDICINE

## 2019-04-17 PROCEDURE — 97165 OT EVAL LOW COMPLEX 30 MIN: CPT

## 2019-04-17 PROCEDURE — 85045 AUTOMATED RETICULOCYTE COUNT: CPT

## 2019-04-17 PROCEDURE — 84153 ASSAY OF PSA TOTAL: CPT

## 2019-04-17 PROCEDURE — 87502 INFLUENZA DNA AMP PROBE: CPT

## 2019-04-17 PROCEDURE — 99232 PR SUBSEQUENT HOSPITAL CARE,LEVL II: ICD-10-PCS | Mod: ,,, | Performed by: FAMILY MEDICINE

## 2019-04-17 PROCEDURE — 87077 CULTURE AEROBIC IDENTIFY: CPT

## 2019-04-17 PROCEDURE — 25000003 PHARM REV CODE 250: Performed by: INTERNAL MEDICINE

## 2019-04-17 PROCEDURE — 85025 COMPLETE CBC W/AUTO DIFF WBC: CPT

## 2019-04-17 PROCEDURE — 97162 PT EVAL MOD COMPLEX 30 MIN: CPT

## 2019-04-17 PROCEDURE — 97530 THERAPEUTIC ACTIVITIES: CPT

## 2019-04-17 RX ORDER — ATORVASTATIN CALCIUM 10 MG/1
10 TABLET, FILM COATED ORAL NIGHTLY
Status: DISCONTINUED | OUTPATIENT
Start: 2019-04-17 | End: 2019-04-18 | Stop reason: HOSPADM

## 2019-04-17 RX ORDER — LEVALBUTEROL 1.25 MG/.5ML
1.25 SOLUTION, CONCENTRATE RESPIRATORY (INHALATION) EVERY 8 HOURS PRN
Status: DISCONTINUED | OUTPATIENT
Start: 2019-04-17 | End: 2019-04-18 | Stop reason: HOSPADM

## 2019-04-17 RX ORDER — SODIUM CHLORIDE 9 MG/ML
INJECTION, SOLUTION INTRAVENOUS CONTINUOUS
Status: DISCONTINUED | OUTPATIENT
Start: 2019-04-17 | End: 2019-04-18 | Stop reason: HOSPADM

## 2019-04-17 RX ORDER — IBUPROFEN 600 MG/1
600 TABLET ORAL ONCE
Status: DISCONTINUED | OUTPATIENT
Start: 2019-04-17 | End: 2019-04-18

## 2019-04-17 RX ORDER — ACETAMINOPHEN 325 MG/1
650 TABLET ORAL EVERY 6 HOURS PRN
Status: DISCONTINUED | OUTPATIENT
Start: 2019-04-17 | End: 2019-04-18 | Stop reason: HOSPADM

## 2019-04-17 RX ADMIN — ACETAMINOPHEN 650 MG: 325 TABLET ORAL at 12:04

## 2019-04-17 RX ADMIN — ACETAMINOPHEN 650 MG: 325 TABLET ORAL at 06:04

## 2019-04-17 RX ADMIN — ASPIRIN 81 MG: 81 TABLET, COATED ORAL at 09:04

## 2019-04-17 RX ADMIN — DABIGATRAN ETEXILATE MESYLATE 150 MG: 75 CAPSULE ORAL at 09:04

## 2019-04-17 RX ADMIN — PIPERACILLIN AND TAZOBACTAM 4.5 G: 4; .5 INJECTION, POWDER, LYOPHILIZED, FOR SOLUTION INTRAVENOUS; PARENTERAL at 08:04

## 2019-04-17 RX ADMIN — SODIUM CHLORIDE: 0.9 INJECTION, SOLUTION INTRAVENOUS at 03:04

## 2019-04-17 RX ADMIN — ATORVASTATIN CALCIUM 10 MG: 10 TABLET, FILM COATED ORAL at 08:04

## 2019-04-17 NOTE — ASSESSMENT & PLAN NOTE
IVF held this am as breath sounds coarse.    4/14, creat was 1.4 on admission>>1.1 this am  Last EF 55% 3/2019  Lasix and renexa on hold

## 2019-04-17 NOTE — PT/OT/SLP EVAL
Physical Therapy Evaluation    Patient Name:  St Lacho Farooq   MRN:  [id number]    Recommendations:     Discharge Recommendations:  home with home health, home health PT   Discharge Equipment Recommendations: walker, rolling   Barriers to discharge: Inaccessible home and Decreased caregiver support    Assessment:     St Lacho Farooq is a 84 y.o. male admitted with a medical diagnosis of Cerebrovascular accident (CVA).  He presents with the following impairments/functional limitations:  weakness, impaired functional mobilty, impaired endurance, impaired self care skills, gait instability, impaired balance, impaired cognition, decreased lower extremity function, impaired coordination. Patient lives with wife at home and was Independent in all mobility, self care tasks and gait tasks at Community level. Patient was helping his wife (who needs medical attention) prior to hospital admission. Patient seen on a reclining chair with daughter at bedside. Noted patient with confusion and having difficulty following directions. Patient requires Moderate Assistance with Transfers sit<>stand and step transfers to chair. Ambulated patient using RW inside room X 30 feet with Maximum Assistance and cues for safety and directions. Unsteady gait with impaired  Steps sequencing and walker advancement. Patient will receive Skilled PT tx to increase safety and inc ability with mobility and gait tasks towards level of independence.    Rehab Prognosis: Fair; patient would benefit from acute skilled PT services to address these deficits and reach maximum level of function.    Recent Surgery: * No surgery found *      Plan:     During this hospitalization, patient to be seen daily to address the identified rehab impairments via gait training, therapeutic activities, therapeutic exercises and progress toward the following goals:    · Plan of Care Expires:  04/23/19    Subjective     Chief Complaint: Increase confusion and running  "fever  Patient/Family Comments/goals:" to move better, help himself better and go home with daughter."  Pain/Comfort:  · Pain Rating 1: 0/10  · Pain Rating Post-Intervention 1: 0/10    Patients cultural, spiritual, Orthodoxy conflicts given the current situation: no    Living Environment:  Patient lives with wife( who needs medical attention) in a SSH with 1 step to enter. Patient was independent in most mobility, gait tasks at community level with no assistive device and was helping wife's needs. Daughter(Mitzy) plans to bring home patient with wife to live with her in a SSH with 2 steps inside with no handrail at the living area.  Prior to admission, patients level of function was Independent in all self care tasks.  Equipment used at home: none.  DME owned (not currently used): none.  Upon discharge, patient will have assistance from daughters.    Objective:     Communicated with patient, daughters  prior to session.  Patient found up in chair with telemetry, peripheral IV  upon PT entry to room.    General Precautions: Standard, fall   Orthopedic Precautions:N/A   Braces: N/A     Exams:  · Cognitive Exam:  Patient is oriented to Person and confused with difficulty following directions.  · Gross Motor Coordination:  Decrease LE motor coordination and impaired step sequence during gait tasks.  · Skin Integrity/Edema:      · -       Skin integrity: Visible skin intact  · RLE ROM: WFL  · RLE Strength: WFL  · LLE ROM: WFL  · LLE Strength: WFL    Functional Mobility:  · Bed Mobility:     · Scooting: moderate assistance  · Supine to Sit: moderate assistance  · Sit to Supine: moderate assistance  · Transfers:     · Sit to Stand:  moderate assistance with rolling walker  · Bed to Chair: moderate assistance with  rolling walker  using  Step Transfer  · Gait: RW Ambulation x 30 feet with Maximum Assistance and cues for balance and safety. Exhibits unsteady gait with impaired step sequence and walker advancement, decrease "  center of gravity with body shifted posteriorly, dec stride length, dec floor clearance and dec weight shifting.  · Balance: Dynamic standing with Poor+ grade using RW      Therapeutic Activities and Exercises:   Completed Physical Therapy evaluation. Worked and educated patient with daughter on body sequence on transfers and gait tasks using RW to perform safe functional mobility.    AM-PAC 6 CLICK MOBILITY  Total Score:11     Patient left up in chair with all lines intact, call button in reach, nurse Edith notified and daughter present.    GOALS:   Multidisciplinary Problems     Physical Therapy Goals        Problem: Physical Therapy Goal    Goal Priority Disciplines Outcome Goal Variances Interventions   Physical Therapy Goal     PT, PT/OT      Description:  Goals to be met by: 7     Patient will increase functional independence with mobility by performin. Supine to sit with Supervision or Set-up Assistance  2. Sit to supine with Supervision or Set-up Assistance  3. Bed to chair transfer with Supervision or Set-up Assistancewith or without rolling walker using Step Transfer TECHNIQUE  4. Gait  x 200  feet with Supervision or Set-up Assistance with or without rolling walker  5. Lower extremity exercise program x10 reps per handout, with assistance as needed                    History:     Past Medical History:   Diagnosis Date    Anemia     Aortic valve stenosis     Arthritis     Atrial fibrillation     Cancer     Basal Cell Skin Cancer    Carotid artery stenosis     Coronary artery disease     CVA (cerebral vascular accident)     Encounter for blood transfusion     Exercise-induced angina     Hyperkalemia     at times    Hyperlipemia     Hypertension     Iron deficiency anemia     MI (myocardial infarction)     MALATHI (obstructive sleep apnea)     PAD (peripheral artery disease)     Prostatitis     Renal failure     MILD after MI    S/P 2-vessel coronary artery bypass        Past  Surgical History:   Procedure Laterality Date    CARPAL TUNNEL RELEASE      CATARACT EXTRACTION, BILATERAL      CORONARY ANGIOGRAPHY  2019    CORONARY ARTERY BYPASS GRAFT  2004    HERNIA REPAIR      Inguinal & Ventral with MESH    REPLACEMENT, AORTIC VALVE, PERCUTANEOUS, TRANSCATHETER Right 3/18/2019    [facility] CATH    REPLACEMENT, AORTIC VALVE, PERCUTANEOUS, TRANSCATHETER Right 3/18/2019    Performed by Ac [facility]man [facility] at [facility] CATH    TONSILLECTOMY      TRANSESOPHAGEAL ECHOCARDIOGRAM (INES) N/A 6/28/2016    [facility] at [facility] OR       Time Tracking:     PT Received On: 04/17/19  PT Start Time: 1225     PT Stop Time: 1300  PT Total Time (min): 35 min     Billable Minutes: Evaluation 15 and Therapeutic Activity 15      Keven Goldstein, PT  04/17/2019

## 2019-04-17 NOTE — PT/OT/SLP EVAL
Occupational Therapy   Evaluation    Name: St Lacho Farooq  MRN: [id number]  Admitting Diagnosis:  Cerebrovascular accident (CVA)      Recommendations:     Discharge Recommendations: home with home health, home health PT, home health OT  Discharge Equipment Recommendations:  walker, rolling  Barriers to discharge:  None    Assessment:     St Lacho Farooq is a 84 y.o. male with a medical diagnosis of Cerebrovascular accident (CVA).  He presents with generalized weakness specifically on the right side, with overall decreased in bilateral coordination. Pt disoriented and presenting with difficult with redirection. Requiring consistent verbal cuing. Performance deficits affecting function: weakness, impaired endurance, impaired self care skills, impaired functional mobilty, decreased coordination, impaired fine motor, decreased safety awareness, impaired balance, decreased lower extremity function, impaired coordination, decreased upper extremity function.      Rehab Prognosis: Fair; patient would benefit from acute skilled OT services to address these deficits and reach maximum level of function.       Plan:     Patient to be seen 5 x/week to address the above listed problems via self-care/home management, therapeutic activities, therapeutic exercises, neuromuscular re-education  · Plan of Care Expires: 04/23/19  · Plan of Care Reviewed with: patient, daughter, family    Subjective     Chief Complaint: Decreased strength to R side. Family reports increased lethargia and overall disorientation. Bilateral incoordination impacting safety awareness. Family reports patient is impulsive and frequently trying to stand up without walker. Decrease in dynamic and static balance.  Patient/Family Comments/goals: Daughter/family reports they want the patient to be well enough to go home with a home health. Family reports patient needs to have more strength and be more aware of his surroundings to decrease safety  concerns. Family wants pt to return as closely as possible to PLOF.    Occupational Profile:  Living Environment: living at home with wife. The d/c plan is for the pt to go to live with daughter.  Previous level of function: independent with all ADLs   Roles and Routines: father and . Resident was performing household activities and caregiver responsibilities for his wife who has Alzheimers.  Equipment Used at Home:  none  Assistance upon Discharge: Family reports that they want the pt to be as close to PLOF and independent as possible. Family also verbalizes understanding that upon discharge pt may still need some assistance and have some remaining limitation. Plan at d/c is to go home and live with daughter with home health assistance.    Pain/Comfort:  · Pain Rating 1: 0/10  · Pain Rating Post-Intervention 1: 0/10    Patients cultural, spiritual, Taoism conflicts given the current situation: no    Objective:     Communicated with: PT and nurse prior to session.  Patient found up in chair with telemetry, peripheral IV upon OT entry to room.    General Precautions: Standard, fall   Orthopedic Precautions:N/A   Braces: N/A     Occupational Performance:    Bed Mobility:    · NT at this time. Family reports pt sleeps in his recliner at home and while he has been in the hospital.    Functional Mobility/Transfers:  · Patient completed Sit <> Stand Transfer with minimum assistance  with  rolling walker and standard walker   · Patient completed Bed <> Chair Transfer using Stand Pivot technique with minimum assistance with rolling walker  · Functional Mobility: Functional mobility limited due to generalized weakness, more apparent on the right side. Lack of coordination and poor motor processing and decreased ability to sequence increasing fall risk. Poor pivot with R LE, decreasing ability to safely and appropriately perform stand pivot.    Activities of Daily Living:  · Grooming: maximal assistance due to  inability to maintain grooming utensils in R hand. Decrease depth preception and a noted visual negect of R side.  · Upper Body Dressing: maximal assistance Decrease coorindation and lack of bilateral integration limiting UE dressing. Decreased strength of R side limiting ADL performance  · Lower Body Dressing: maximal assistance Decreased strength and poor trunk control limiting LE dressing. Decreased balance with fxnal activities increasing safety concerns.  · Toileting: maximal assistance with transitional movements from sitting to standing to initiate the bathroom transfer. With toileting hygiene, pt with a decrease in strength and coorindation increasing fall risk. Lack of bilateral integration and apparent right sided neglect limiting toileting.    Cognitive/Visual Perceptual:  Visual/Perceptual:      -Impaired  R/L discrimination, visual field and motor planning praxis Pt with decreased awareness of R side. It was observed the patient had deficits with acknowledging and manipulating objects on the right side. Motor planning impaired, decreasing pts ability to appropriately position LE/UE and perform stand pivot transfer..    Physical Exam:  Balance:    -       Poor dynamic balance and in coordination. Poor + static balance, both impacting and increasing fall risk potential  Motor Planning:    -       Deficits wwith motor planning, decrease pts ability to perform appropriate and safe technique with stand pivot. Pt unable to perform transitional movements safely.  Upper Extremity Strength:    -       Right Upper Extremity: Deficits: 3+/5   Strength:    -       Right Upper Extremity: Deficits: Pt with decreased  strength as compared to the left side.  strength progressively got worse. Retested at the end of the evaluation and it was significantly more diminshed in comparison to the start.  Neurological:    -       Decreased ability to following multi step coommands. Decreased orientation with  occasional inaudible and confused speech. Noted right sided neglect.    AMPAC 6 Click ADL:  AMPAC Total Score: 13    Treatment & Education:  OT educated family and patient on importance of pushing off the chair when standing (instead of grabbing walker), as well as, reaching back for the chair before sitting to decrease fall risk.  Education:    Patient left up in chair with all lines intact    GOALS:   Multidisciplinary Problems     Occupational Therapy Goals        Problem: Occupational Therapy Goal    Goal Priority Disciplines Outcome Interventions   Occupational Therapy Goal     OT, PT/OT     Description:  Goals to be met by: 4/23/19     Patient will increase functional independence with ADLs by performing:    UE Dressing with [place].  LE Dressing with Modified [place].  Grooming while standing with [place].  Sitting at edge of bed x 5-7 minutes with [place].  Stand pivot transfers with Modified [place].  Increased functional strength to 4/5 for R sided weakness deficits to improve ADL performance and fxnal mobility..                      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    S/P 2-vessel coronary artery bypass        Past Surgical History:   Procedure Laterality Date    CARPAL TUNNEL RELEASE      CATARACT EXTRACTION, BILATERAL      CORONARY ANGIOGRAPHY  2019    CORONARY ARTERY BYPASS GRAFT  2004    HERNIA REPAIR      Inguinal & Ventral  with MESH    REPLACEMENT, AORTIC VALVE, PERCUTANEOUS, TRANSCATHETER Right 3/18/2019    Performed [facility]    REPLACEMENT, AORTIC VALVE, PERCUTANEOUS, TRANSCATHETER Right 3/18/2019    Performed by Ac Osman MD at [facility] CATH    TONSILLECTOMY      TRANSESOPHAGEAL ECHOCARDIOGRAM (INES) N/A 6/28/2016    [facility] Hugh [facility]um [facility] at [facility] OR       Time Tracking:     OT Date of Treatment: 04/17/19  OT Start Time: 1702  OT Stop Time: 1722  OT Total Time (min): 20 min    Billable Minutes:Evaluation low complexity evaluation for 20 minutes    Yazmin Frankel, OT  4/17/2019

## 2019-04-17 NOTE — PLAN OF CARE
04/17/19 0741   Discharge Assessment   Assessment Type Discharge Planning Assessment   Confirmed/corrected address and phone number on facesheet? Yes   Assessment information obtained from? Patient;Caregiver   Expected Length of Stay (days) 2   Communicated expected length of stay with patient/caregiver yes   Prior to hospitilization cognitive status: Alert/Oriented   Prior to hospitalization functional status: Independent   Current cognitive status: Alert/Oriented   Current Functional Status: Needs Assistance   Facility Arrived From: home   Lives With spouse   Able to Return to Prior Arrangements other (see comments)  (TBD)   Is patient able to care for self after discharge? Unable to determine at this time (comments)   Who are your caregiver(s) and their phone number(s)? Mitzy([facility]) [phone]   Patient's perception of discharge disposition home health;other (comments)  (home with family)   Readmission Within the Last 30 Days no previous admission in last 30 days   Patient currently being followed by outpatient case management? No   Patient currently receives any other outside agency services? No   Equipment Currently Used at Home none   Do you have any problems affording any of your prescribed medications? No   Is the patient taking medications as prescribed? yes   Does the patient have transportation home? Yes   Transportation Anticipated family or friend will provide   Dialysis Name and Scheduled days n/a   Does the patient receive services at the [facility]? No   Discharge Plan A Home with family   Discharge Plan B Home Health   DME Needed Upon Discharge    (TBD)   Patient/Family in Agreement with Plan yes         Patient admitted for CA. [place] [facility] [place] The weakness and slurred speech started during this past weekend. Before this acute episode, patient was independent and doing for himself at home. He lives at home with spouse, but does have  children and grandchildren that check on him daily. He has a great support system at home. Daughter, Mitzy, is the one who lives the closest out of all children, and the one who physically checks on patient daily. Mitzy is involved in patient's healthcare, with that approval of patient. Before this acute episode, patient was not using any equipment at home. PT, OT, and SLP will evaluate during admission to see of any deficits/needs for discharge. Mitzy has stated patient along with patient's spouse can stay with her upon discharge. Patient educated on diagnosis for this admission, and patient verbalizes understanding. Discharge planning brochure and educational handout of what signs and symptoms to look for after discharge, and how to manage health at home, given to patient and family. Patient and family are encouraged to call with any questions or help the would need. Contact information given. CM will continue to follow patient throughout the transitions of care, and assist with any discharge needs.

## 2019-04-17 NOTE — ASSESSMENT & PLAN NOTE
Looks stable has been between 7-10 hgb in past 2 years  Last 2 days stable at 9/32  Looks iron def, will order studies- last ferritin low in 2016

## 2019-04-17 NOTE — PLAN OF CARE
04/17/19 0740   Advance Directives (For Healthcare)   Advance Directive  (If Adv Dir status is received, view document under Code in header or Chart Review Media tab) Advance Directive currently in Epic.         DNR status is correct.

## 2019-04-17 NOTE — HPI
83 yo male patient presented to ER last night with c/o increasing confusion over the past several days.  He was seen 2 days ago in the ED, admission was recommended for further neurological workup but patient refused.  Saw Dr Fernandez yesterday for f/u and she strongly recommended admission for further neurological workup. CTA done concerning for sub acute CVA. Dr Fernandez did review with primary as well as family. She rec admission, permissive HTN, no TPA as s/s started Sunday. Therapy consults ordered.Speech has seen him and cleared him for reg diet/chopped meats and thin liquids.      Question of reported pneumonitis on CT.  Patient denies pain, does not report a productive cough. Daughter at bedside report that he did cough over night. CXR does not show acute process. No fever . NO elevated WBC    He has a primary hx of anemia, AVS, arthritis, a fib, CAD s/p bipass, CVA, HLD, HTN, MALATHI, PAD, and MI.

## 2019-04-17 NOTE — NURSING
Neuro checks WNL. No deficits noted. Assisted pt to restroom using walker and daughters stand by assistance. Pt ambulated well to bathroom; had minor balance inconsistency when standing still in bathroom. Assisted pt to bedside chair. Call bell placed within reach. Family will remain with pt.

## 2019-04-17 NOTE — ASSESSMENT & PLAN NOTE
Neuro consulted yesterday   -Telemetry  -Neurochecks  -NO TPA as he presented initially on Sunday  -Physical Therapy and OT Consult  -Speech Therapy Consult including  Swallow Evaluation-keep NPO until ST consult  -Hydrate gently with NS 75cc/hr  -Check fasting lipids and glucose in the am  -Don't over-treat blood pressure/ allow permissive HTN to 220/120 but ok to treat rate with Afib if needed.   -Continue his current dose home meds otherwise including Asprin, Pradaxa and statin  -DVT prophylaxis per nursing protocol  -Further work up of pneumonitis per primary team, consider a chest xray.   I will not be available to see this patient until Thursday. Team and family aware.

## 2019-04-17 NOTE — H&P
[facility] Medicine  History & Physical    Patient Name: St Lacho Farooq  MRN: [id number]  Admission Date: 4/16/2019  Attending Physician: Ciera Aiken MD   Primary Care Provider: Dylan Adam MD         Patient information was obtained from patient and ER records.     Subjective:     Principal Problem:Cerebrovascular accident (CVA)    Chief Complaint:   Chief Complaint   Patient presents with    Abnormal Ct Scan        HPI: 85 yo male patient presented to ER last night with c/o increasing confusion over the past several days.  He was seen 2 days ago in the ED, admission was recommended for further neurological workup but patient refused.   Saw Dr Fernandez yesterday for f/u and she strongly recommended admission for further neurological workup. CTA done concerning for sub acute CVA. Dr Fernandez did review with primary as well as family. She rec admission, permissive HTN, no TPA as s/s started Sunday. Therapy consults ordered.Speech has seen him and cleared him for reg diet/chopped meats and thin liquids.      Question of reported pneumonitis on CT.  Patient denies pain, does not report a productive cough. Daughter at bedside report that he did cough over night. CXR does not show acute process. No fever . NO elevated WBC    He has a primary hx of anemia, AVS, arthritis, a fib, CAD s/p bipass, CVA, HLD, HTN, MALATHI, PAD, and MI.       Past Medical History:   Diagnosis Date    Anemia     Aortic valve stenosis     Arthritis     Atrial fibrillation     Cancer     Basal Cell Skin Cancer    Carotid artery stenosis     Coronary artery disease     CVA (cerebral vascular accident)     Encounter for blood transfusion     Exercise-induced angina     Hyperkalemia     at times    Hyperlipemia     Hypertension     Iron deficiency anemia     MI (myocardial infarction) 2004    MALATHI (obstructive sleep apnea)     PAD (peripheral artery disease)     Prostatitis      "Renal failure     MILD after MI    S/P 2-vessel coronary artery bypass        Past Surgical History:   Procedure Laterality Date    CARPAL TUNNEL RELEASE      CATARACT EXTRACTION, BILATERAL      CORONARY ANGIOGRAPHY  2019    CORONARY ARTERY BYPASS GRAFT  2004    HERNIA REPAIR      Inguinal & Ventral with MESH    REPLACEMENT, AORTIC VALVE, PERCUTANEOUS, TRANSCATHETER Right 3/18/2019    Performed by Mani Babcock MD at [facility] CATH    REPLACEMENT, AORTIC VALVE, PERCUTANEOUS, TRANSCATHETER Right 3/18/2019    Performed by Ac Osman MD at [facility] CATH    TONSILLECTOMY      TRANSESOPHAGEAL ECHOCARDIOGRAM (INES) N/A 6/28/2016    Performed by Hugh Begum MD at [facility] OR       Review of patient's allergies indicates:   Allergen Reactions    Bactrim [sulfamethoxazole-trimethoprim] Rash    Codeine Rash       Current Facility-Administered Medications on File Prior to Encounter   Medication    [COMPLETED] iohexol (OMNIPAQUE 350) injection 100 mL     Current Outpatient Medications on File Prior to Encounter   Medication Sig    amLODIPine (NORVASC) 2.5 MG tablet Take 2.5 mg by mouth once daily.    ANORO ELLIPTA 62.5-25 mcg/actuation DsDv INHALE 1 PUFF INTO LUNGS ONCE DAILY    aspirin (ECOTRIN) 81 MG EC tablet Take 81 mg by mouth once daily.    atorvastatin (LIPITOR) 10 MG tablet Take 10 mg by mouth every evening.    carvedilol (COREG) 12.5 MG tablet Take 25 mg by mouth 2 (two) times daily with meals.     COQ10, UBIQUINOL, ORAL Take 1 tablet by mouth once daily.    dabigatran etexilate (PRADAXA) 150 mg Cap Take 150 mg by mouth 2 (two) times daily. "Do NOT break, chew, or open capsules."    furosemide (LASIX) 20 MG tablet Take 20 mg by mouth 2 (two) times daily.    lisinopril-hydrochlorothiazide (PRINZIDE,ZESTORETIC) 20-12.5 mg per tablet Take 1 tablet by mouth once daily.     nitroGLYCERIN (NITROSTAT) 0.4 MG SL tablet Place 0.4 mg under the tongue every 5 (five) minutes as needed for Chest pain.    " omeprazole (PRILOSEC) 40 MG capsule Take 40 mg by mouth once daily.    ranolazine (RANEXA) 500 MG Tb12 Take 500 mg by mouth 2 (two) times daily.     Family History     Problem Relation (Age of Onset)    Atrial fibrillation Father, Sister    COPD Sister    Cancer Father    Diabetes type II Mother, Sister    Heart failure Mother    Hypertension Mother, Father, Sister, Brother    Pacemaker/defibrilator Sister    Pulmonary embolism Brother        Tobacco Use    Smoking status: Former Smoker     Last attempt to quit: 2004     Years since quitting: 15.3    Smokeless tobacco: Never Used    Tobacco comment: Smoked for 60 years   Substance and Sexual Activity    Alcohol use: No     Frequency: Never    Drug use: No    Sexual activity: Yes     Partners: Female     Review of Systems   Constitutional: Positive for activity change. Negative for fatigue and fever.   HENT: Negative for congestion, sinus pressure, sinus pain, sore throat and trouble swallowing.    Eyes: Positive for discharge. Negative for pain, redness and itching.   Respiratory: Positive for cough. Negative for shortness of breath and wheezing.    Cardiovascular: Negative for chest pain, palpitations and leg swelling.   Gastrointestinal: Negative for abdominal pain, blood in stool, constipation, diarrhea, nausea and vomiting.   Genitourinary: Negative for difficulty urinating, dysuria and frequency.        Gets up multiple times a night to urinate   Musculoskeletal: Positive for back pain (chronic). Negative for arthralgias, joint swelling, myalgias and neck pain.   Skin: Negative for rash and wound.   Neurological: Positive for dizziness, speech difficulty and light-headedness. Negative for syncope and weakness.   Psychiatric/Behavioral: Positive for confusion and hallucinations (hearing).     Objective:     Vital Signs (Most Recent):  Temp: 98.5 °F (36.9 °C) (04/17/19 0702)  Pulse: 81 (04/17/19 0702)  Resp: 17 (04/17/19 0702)  BP: (!) 176/79 (04/17/19  0702)  SpO2: 95 % (04/17/19 0725) Vital Signs (24h Range):  Temp:  [98.1 °F (36.7 °C)-99 °F (37.2 °C)] 98.5 °F (36.9 °C)  Pulse:  [72-87] 81  Resp:  [16-25] 17  SpO2:  [92 %-99 %] 95 %  BP: (138-183)/() 176/79     Weight: 89.4 kg (197 lb)  Body mass index is 30.85 kg/m².    Physical Exam   Constitutional: He is oriented to person, place, and time. He appears well-developed and well-nourished.   HENT:   Head: Normocephalic and atraumatic.   Right Ear: External ear normal.   Left Ear: External ear normal.   Nose: Nose normal.   Mouth/Throat: Oropharynx is clear and moist.   Eyes: Pupils are equal, round, and reactive to light. Conjunctivae and EOM are normal. Right eye exhibits no discharge. Left eye exhibits no discharge. No scleral icterus.   Neck: Normal range of motion. Neck supple. No JVD present. No tracheal deviation present. No thyromegaly present.   Cardiovascular: Normal rate, regular rhythm, normal heart sounds and intact distal pulses.   No murmur heard.  Pulmonary/Chest: Effort normal and breath sounds normal. No respiratory distress. He has no wheezes. He has no rales. He exhibits no tenderness.   Abdominal: Soft. Bowel sounds are normal. He exhibits no distension and no mass. There is no tenderness. There is no rebound and no guarding.   Musculoskeletal: Normal range of motion.   Lymphadenopathy:     He has no cervical adenopathy.   Neurological: He is alert and oriented to person, place, and time. He has normal reflexes. He displays normal reflexes. No cranial nerve deficit. He exhibits normal muscle tone. Coordination normal.   Skin: Skin is warm and dry.   Psychiatric: He has a normal mood and affect. His behavior is normal. Judgment and thought content normal.         CRANIAL NERVES     CN III, IV, VI   Pupils are equal, round, and reactive to light.  Extraocular motions are normal.        Significant Labs:   CBC:   Recent Labs   Lab 04/16/19  1826 04/17/19  0517   WBC 4.75 4.59   HGB 9.8*  9.6*   HCT 32.4* 32.0*   * 118*     CMP:   Recent Labs   Lab 19  1826 19  0517    142   K 3.7 3.7    110   CO2 23 22*   * 97   BUN 24* 18   CREATININE 1.4 1.1   CALCIUM 10.0 9.5   PROT 6.3 5.8*   ALBUMIN 3.6 3.3*   BILITOT 1.3* 1.3*   ALKPHOS 79 66   AST 11 12   ALT 8* 8*   ANIONGAP 10 10   EGFRNONAA 46* >60     Coagulation:   Recent Labs   Lab 19  1826   INR 1.2   APTT 35.9*     Recent Labs   Lab 19  1325   CPK 56   CPKMB 2.3   TROPONINI 0.084*   MB 4.1     BNP  Recent Labs   Lab 19  1325   *      lactic acid 1.0     blood culture no growth    Urinalysis hazy with 1+ protein and trace occult. 1RBC    Significant Imagin/14 CT head Generalized cerebral atrophy with evidence of deep white matter ischemic changes.  There is evidence of old lacune involving the right caudate lobe, right basal ganglia, and in the region the right caudothalamic groove as manifested by small areas of low-density change.     CXR Poor inspiratory effort allowing widening of the the cardiomediastinal silhouette and crowding of vessels.  There is evidence of upward distribution of flow suggestive of mild central vascular congestion changes.  No florid pulmonary edema is identified.     MRI brain   1. No new stroke hemorrhage or subdural fluid or other acute change.  2. Extensive white matter basal ganglia abnormality suspicious for remote microvascular ischemic change     CTA head and neck   1. Focal tiny areas of acute subacute under 2 weeks age areas of deep subcortical white matter ischemia and left posterior frontal parietal, seen on previous MRI scan review, with 1 area showing focal enhancement on exam.  2. Significant segmental stenosis distal left vertebral, less prominent stenosis contralateral side.  50% stenosis right internal carotid, 60% stenosis left subclavian.  3. 70% stenosis right M3 segment with few additional for distal segmental  stenosis.  4. Tiny aneurysmal deformities and M1 segment and distal internal carotid arteries discussed above.  5. Incidental patchy pneumonitis dependent aspect right upper lobe superimposed on central lobular emphysematous change.  Follow-up chest x-ray and/or CT scan chest suggested for further evaluation as clinically indicated.    4/16 CXR   No acute cardiopulmonary disease.    Assessment/Plan:     * Cerebrovascular accident (CVA)  Neuro consulted yesterday   -Telemetry  -Neurochecks  -NO TPA as he presented initially on Sunday  -Physical Therapy and OT Consult  -Speech Therapy Consult including  Swallow Evaluation-keep NPO until ST consult  -Hydrate gently with NS 75cc/hr  -Check fasting lipids and glucose in the am  -Don't over-treat blood pressure/ allow permissive HTN to 220/120 but ok to treat rate with Afib if needed.   -Continue his current dose home meds otherwise including Asprin, Pradaxa and statin  -DVT prophylaxis per nursing protocol  -Further work up of pneumonitis per primary team, consider a chest xray.   I will not be available to see this patient until Thursday. Team and family aware.      CHF (congestive heart failure), NYHA class II, chronic, diastolic  IVF held this am as breath sounds coarse.    4/14, creat was 1.4 on admission>>1.1 this am  Last EF 55% 3/2019  Lasix and renexa on hold    Anemia  Looks stable has been between 7-10 hgb in past 2 years  Last 2 days stable at 9/32  Looks iron def, will order studies- last ferritin low in 2016      Essential hypertension  Holding coreg, norvasc and lisinopril/hctz  Permissive HTN for now      ASCVD (arteriosclerotic cardiovascular disease)  Has asa, lipitor and pradaxa ordered home routine, if cleared with speech today will resume      Emphysema/COPD  anoro at home  Has cough today  Will start nebs         VTE Risk Mitigation (From admission, onward)        Ordered     dabigatran etexilate capsule 150 mg  Daily      04/16/19 2044              Ehsan West MD  Department of Hospital Medicine   [facility]

## 2019-04-17 NOTE — NURSING
Report received from Alban. Pt sitting in bed with daughter at bedside. Bed alarm set, yellow  socks on; call bell within reach. IV fluids infusing to peripheral line. Telemetry in use. Reminded pt and family to call for needs.

## 2019-04-17 NOTE — PLAN OF CARE
Problem: Adult Inpatient Plan of Care  Goal: Plan of Care Review  Outcome: Ongoing (interventions implemented as appropriate)  Pt admitted with CVA. Neurology consulted. Neuro checks every 4 hours. PT, OT, and ST consulted. Patient able to ambulate with walker and assist.. Deficits more pronounced when blood pressure normalized. Pt on NS @ 75cc/hr to keep blood pressure elevated ( permissive per Neurology). Sinus rhythm on telemetry. Oxygen sats mid 90s; occasional cough. Atelectasis on CXR; IS at bedside. Incontinence at times. Pt febrile today; blood cultures drawn, UA clear, CXR - acute, PSA in process. History anemia; H&H stable. Call bell within reach. Pt remains free from falls or injury today. Reviewed plan of care with pt and family; state agreement.

## 2019-04-17 NOTE — ED NOTES
Dr Ozuna notified of elevated BP, instructed to give 0.1 mg Clonidine per VO. Clonidine 0.1mg given earlier, 0.1 mg held with previous administration due to decrease in BP

## 2019-04-17 NOTE — PLAN OF CARE
Problem: Adult Inpatient Plan of Care  Goal: Plan of Care Review  Outcome: Ongoing (interventions implemented as appropriate)  Patient arrived from ED to 304. Report received from SILVESTRE Sevilla. Patient stable.Some HTN noted Dr. Aiken aware. Otherwise VS stable. Neuro checks intact, some right hand weakness noted. IV fluids running as ordered. PT/OT/ST ordered. Patient remaining NPO until assessment. Neuro consult. Family at bedside with no questions or concerns. Patient able to use pee jug with 2 person assist. Plan of care reviewed with patient and family and agreed upon

## 2019-04-17 NOTE — PT/OT/SLP EVAL
Speech Language Pathology Evaluation  Bedside Swallow    Patient Name:  St Lacho Farooq   MRN:  [id number]  Admitting Diagnosis: Cerebrovascular accident (CVA)    Recommendations:                 General Recommendations: Speech language evaluation and continued follow up for diet tolerance  Diet recommendations: Solid Diet Level: Regular with chopped meat, Liquid Diet Level: Thin   Aspiration Precautions: Alternating bites/sips, Meds whole 1 at a time and Small bites/sips   General Precautions: Standard    Communication strategies:  none    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    S/P 2-vessel coronary artery bypass        Past Surgical History:   Procedure Laterality Date    CARPAL TUNNEL RELEASE      CATARACT EXTRACTION, BILATERAL      CORONARY ANGIOGRAPHY  2019    CORONARY ARTERY BYPASS GRAFT  2004    HERNIA REPAIR      Inguinal & Ventral with MESH    REPLACEMENT, AORTIC VALVE, PERCUTANEOUS, TRANSCATHETER Right 3/18/2019    [facility]    REPLACEMENT, AORTIC VALVE, PERCUTANEOUS, TRANSCATHETER Right 3/18/2019    [facility]    TONSILLECTOMY      TRANSESOPHAGEAL ECHOCARDIOGRAM (INES) N/A 6/28/2016    Performed by Hugh Begum MD at [facility] OR       Social History: Patient lives with his spouse in their home. Pt has multiple children who regularly visit his home to check on him.     Prior  "Intubation HX:  n/a    Modified Barium Swallow: n/a    Chest X-Rays: 4/16/19- Lungs are clear. Cardiac silhouette remains enlarged. Prior CABG. Calcified aortic knob. No effusion or pneumothorax.    Prior diet: NPO since admit    Occupation/hobbies/homemaking: Pt enjoys spending time with his grandchildren    Subjective     Pt found sitting up in bedside chair with family present upon SLP entry into room. Per family reports, pt has never experienced s/s of dysphagia in the past but was noted to have some right-sided drooling. Pt's daughter Mitzy, who is a nurse, stated pt was experiencing left sided facial drooping when right-sided anterior spillage occurred during medication administration. Family additionally noted that pt's speech seemed "muffled" since onset of CVA with additional complaints of word finding and cognitive linguistic deficits. Pt's granddaughter, who is an SLP, stated pt was seen using incorrect names for family members and circumlocuting; no paraphasias were observed but deficits were noted to be more significant in the evening, which family attributes to fatgiue. Pt was an active participant throughout all PO trials but did occasionally require repetition/rewording of single and multi-step directions.      Patient goals: to go home     Pain/Comfort:  Pain Rating 1: 0/10    Objective:     Oral Musculature Evaluation  · Oral Musculature: WFL  · Dentition: upper and lower dentures and uses dentures to eat- pt's dentures were seen to be coming loose but were effective in masticating solids  · Secretion Management: (per daughter's report, pt had some drooling during medication administration yesterday but no deficits with secretion maangement observed during eval)  · Mandibular Strength and Mobility: WFL  · Oral Labial Strength and Mobility: WFL  · Lingual Strength and Mobility: WFL  · Velar Elevation: WFL  · Volitional Cough: (productive)  · Volitional Swallow: (strong with palpable laryngeal " elevation)    Bedside Swallow Eval:   Consistencies Assessed:  · Thin liquids - pt-fed via single and consecutive cup and straw sips  · Puree - pt-fed via tsp of pudding  · Solids - pt-fed via self-regulated bites of yareli cracker     Oral Phase:   · Mild oral residue was observed following multiple bites of yareli crackers consecutively; cued liquid wash was effective in clearing residue   · Slightly increased mastication times during solids- family reported that pt often requires extended time to chew meats    Pharyngeal Phase:   · No overt clinical signs/symptoms of aspiration  · No overt clinical signs/symptoms of pharyngeal dysphagia    Compensatory Strategies  · Liquid wash    Treatment: Recommendations regarding diet consistencies were reviewed with pt and family with all verbalizing understanding. Pt's family continued to express concerns over pt's cognitive declines and decision to include speech and language evaluation in the future was made. All findings of evaluation was discussed with medical team.     Assessment:     St Lacho Farooq is a 84 y.o. male who presents with a functional swallow for PO intake. Due to pt's increased mastication time during solids, recommend a regular diet with chopped meats and thin liquids with safe swallow strategies including medications whole one at a time, small bites/sips, and alternating bites/sips. Additionally recommend further speech and language evaluation due to observations of decreased ability to follow commands and reported changes in speech production. SLP to continue to follow up with pt for ongoing dynamic assessment and continued diet tolerance.      Goals:   Multidisciplinary Problems     SLP Goals        Problem: SLP Goal    Goal Priority Disciplines Outcome   SLP Goal     SLP    Description:    Ling Term Goals:  1. Pt will tolerate least restrictive PO diet with no s/s of aspiration in order to maintain adequate hydration and nutrition  2. Pt will  undergo assessment of speech and language in order to identify presence/severity of speech, language, and/or cognitive linguistic deficits                    Plan:     · Patient to be seen:  5 x/week   · Plan of Care expires:  05/01/19  · Plan of Care reviewed with:  patient, family   · SLP Follow-Up:  Yes       Discharge recommendations:  (TDB based on outcomes of speech and language evaluation)   Barriers to Discharge:  Level of Skilled Assistance Needed - pt continues to require skilled medical personnel for managmeent of medical diagnoses    Time Tracking:     SLP Treatment Date:   04/17/19  Speech Start Time:  0827  Speech Stop Time:  0838     Speech Total Time (min):  11 min    Billable Minutes: Eval Swallow and Oral Function - 11 minutes       STEPHANIE Lopez-SLP  04/17/2019

## 2019-04-17 NOTE — ED NOTES
Received verbal report from SILVESTRE Anderson.  Pt in ED room ED 05/ED 05 lying in stretcher, HOB 30 degrees. Stretcher is in low, locked position, side rails up x2.  Pt previously connected to continuous cardiac monitor, continuous pulse ox, and automatic BP cuff; alarms set and turned on for monitor, pulse ox and IV pump. The patient is awake, alert and cooperative with a calm affect, patient is aware of environment. Airway is open and patent, respirations are spontaneous, normal respiratory effort and rate noted, skin warm and dry, full ROM in all extremities, appearance: NAD noted, resting comfortably.Call bell within reach of pt, pt instructed on use, pt verbalizes understanding of call bell use. Hourly rounding explained and white board updated.  Plan of care: family to bedside, observe and reassure, position of comfort, respirations even and unlabored, patient offers no complaints at this time, awaiting additional orders, will continue to monitor

## 2019-04-17 NOTE — SUBJECTIVE & OBJECTIVE
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    S/P 2-vessel coronary artery bypass        Past Surgical History:   Procedure Laterality Date    CARPAL TUNNEL RELEASE      CATARACT EXTRACTION, BILATERAL      CORONARY ANGIOGRAPHY  2019    CORONARY ARTERY BYPASS GRAFT  2004    HERNIA REPAIR      Inguinal & Ventral with MESH    REPLACEMENT, AORTIC VALVE, PERCUTANEOUS, TRANSCATHETER Right 3/18/2019    [facility]    REPLACEMENT, AORTIC VALVE, PERCUTANEOUS, TRANSCATHETER Right 3/18/2019    [facility]    TONSILLECTOMY      TRANSESOPHAGEAL ECHOCARDIOGRAM (INES) N/A 6/28/2016    Performed by Hugh Begum MD at [facility] OR       Review of patient's allergies indicates:   Allergen Reactions    Bactrim [sulfamethoxazole-trimethoprim] Rash    Codeine Rash       Current Facility-Administered Medications on File Prior to Encounter   Medication    [COMPLETED] iohexol (OMNIPAQUE 350) injection 100 mL     Current Outpatient Medications on File Prior to Encounter   Medication Sig    amLODIPine (NORVASC) 2.5 MG tablet Take 2.5 mg by mouth once daily.    ANORO ELLIPTA 62.5-25 mcg/actuation DsDv INHALE 1 PUFF INTO LUNGS ONCE DAILY    aspirin (ECOTRIN) 81 MG EC tablet Take 81 mg by mouth once daily.    atorvastatin (LIPITOR) 10 MG tablet Take 10 mg by mouth every evening.    carvedilol (COREG) 12.5 MG tablet Take 25 mg by mouth 2  "(two) times daily with meals.     COQ10, UBIQUINOL, ORAL Take 1 tablet by mouth once daily.    dabigatran etexilate (PRADAXA) 150 mg Cap Take 150 mg by mouth 2 (two) times daily. "Do NOT break, chew, or open capsules."    furosemide (LASIX) 20 MG tablet Take 20 mg by mouth 2 (two) times daily.    lisinopril-hydrochlorothiazide (PRINZIDE,ZESTORETIC) 20-12.5 mg per tablet Take 1 tablet by mouth once daily.     nitroGLYCERIN (NITROSTAT) 0.4 MG SL tablet Place 0.4 mg under the tongue every 5 (five) minutes as needed for Chest pain.    omeprazole (PRILOSEC) 40 MG capsule Take 40 mg by mouth once daily.    ranolazine (RANEXA) 500 MG Tb12 Take 500 mg by mouth 2 (two) times daily.     Family History     Problem Relation (Age of Onset)    Atrial fibrillation Father, Sister    COPD Sister    Cancer Father    Diabetes type II Mother, Sister    Heart failure Mother    Hypertension Mother, Father, Sister, Brother    Pacemaker/defibrilator Sister    Pulmonary embolism Brother        Tobacco Use    Smoking status: Former Smoker     Last attempt to quit: 2004     Years since quitting: 15.3    Smokeless tobacco: Never Used    Tobacco comment: Smoked for 60 years   Substance and Sexual Activity    Alcohol use: No     Frequency: Never    Drug use: No    Sexual activity: Yes     Partners: Female     Review of Systems   Constitutional: Positive for activity change. Negative for fatigue and fever.   HENT: Negative for congestion, sinus pressure, sinus pain, sore throat and trouble swallowing.    Eyes: Positive for discharge. Negative for pain, redness and itching.   Respiratory: Positive for cough. Negative for shortness of breath and wheezing.    Cardiovascular: Negative for chest pain, palpitations and leg swelling.   Gastrointestinal: Negative for abdominal pain, blood in stool, constipation, diarrhea, nausea and vomiting.   Genitourinary: Negative for difficulty urinating, dysuria and frequency.        Gets up multiple " times a night to urinate   Musculoskeletal: Positive for back pain (chronic). Negative for arthralgias, joint swelling, myalgias and neck pain.   Skin: Negative for rash and wound.   Neurological: Positive for dizziness, speech difficulty and light-headedness. Negative for syncope and weakness.   Psychiatric/Behavioral: Positive for confusion and hallucinations (hearing).     Objective:     Vital Signs (Most Recent):  Temp: 98.5 °F (36.9 °C) (04/17/19 0702)  Pulse: 81 (04/17/19 0702)  Resp: 17 (04/17/19 0702)  BP: (!) 176/79 (04/17/19 0702)  SpO2: 95 % (04/17/19 0725) Vital Signs (24h Range):  Temp:  [98.1 °F (36.7 °C)-99 °F (37.2 °C)] 98.5 °F (36.9 °C)  Pulse:  [72-87] 81  Resp:  [16-25] 17  SpO2:  [92 %-99 %] 95 %  BP: (138-183)/() 176/79     Weight: 89.4 kg (197 lb)  Body mass index is 30.85 kg/m².    Physical Exam   Constitutional: He is oriented to person, place, and time. He appears well-developed and well-nourished.   HENT:   Head: Normocephalic and atraumatic.   Right Ear: External ear normal.   Left Ear: External ear normal.   Nose: Nose normal.   Mouth/Throat: Oropharynx is clear and moist.   Eyes: Pupils are equal, round, and reactive to light. Conjunctivae and EOM are normal. Right eye exhibits no discharge. Left eye exhibits no discharge. No scleral icterus.   Neck: Normal range of motion. Neck supple. No JVD present. No tracheal deviation present. No thyromegaly present.   Cardiovascular: Normal rate, regular rhythm, normal heart sounds and intact distal pulses.   No murmur heard.  Pulmonary/Chest: Effort normal and breath sounds normal. No respiratory distress. He has no wheezes. He has no rales. He exhibits no tenderness.   Abdominal: Soft. Bowel sounds are normal. He exhibits no distension and no mass. There is no tenderness. There is no rebound and no guarding.   Musculoskeletal: Normal range of motion.   Lymphadenopathy:     He has no cervical adenopathy.   Neurological: He is alert and  oriented to person, place, and time. He has normal reflexes. He displays normal reflexes. No cranial nerve deficit. He exhibits normal muscle tone. Coordination normal.   Skin: Skin is warm and dry.   Psychiatric: He has a normal mood and affect. His behavior is normal. Judgment and thought content normal.         CRANIAL NERVES     CN III, IV, VI   Pupils are equal, round, and reactive to light.  Extraocular motions are normal.        Significant Labs:   CBC:   Recent Labs   Lab 19  0517   WBC 4.75 4.59   HGB 9.8* 9.6*   HCT 32.4* 32.0*   * 118*     CMP:   Recent Labs   Lab 19  0517    142   K 3.7 3.7    110   CO2 23 22*   * 97   BUN 24* 18   CREATININE 1.4 1.1   CALCIUM 10.0 9.5   PROT 6.3 5.8*   ALBUMIN 3.6 3.3*   BILITOT 1.3* 1.3*   ALKPHOS 79 66   AST 11 12   ALT 8* 8*   ANIONGAP 10 10   EGFRNONAA 46* >60     Coagulation:   Recent Labs   Lab 19  182   INR 1.2   APTT 35.9*     Recent Labs   Lab 19  1325   CPK 56   CPKMB 2.3   TROPONINI 0.084*   MB 4.1     BNP  Recent Labs   Lab 19  1325   *      lactic acid 1.0     blood culture no growth    Urinalysis hazy with 1+ protein and trace occult. 1RBC    Significant Imagin/14 CT head Generalized cerebral atrophy with evidence of deep white matter ischemic changes.  There is evidence of old lacune involving the right caudate lobe, right basal ganglia, and in the region the right caudothalamic groove as manifested by small areas of low-density change.     CXR Poor inspiratory effort allowing widening of the the cardiomediastinal silhouette and crowding of vessels.  There is evidence of upward distribution of flow suggestive of mild central vascular congestion changes.  No florid pulmonary edema is identified.     MRI brain   1. No new stroke hemorrhage or subdural fluid or other acute change.  2. Extensive white matter basal ganglia abnormality  suspicious for remote microvascular ischemic change    4/16 CTA head and neck   1. Focal tiny areas of acute subacute under 2 weeks age areas of deep subcortical white matter ischemia and left posterior frontal parietal, seen on previous MRI scan review, with 1 area showing focal enhancement on exam.  2. Significant segmental stenosis distal left vertebral, less prominent stenosis contralateral side.  50% stenosis right internal carotid, 60% stenosis left subclavian.  3. 70% stenosis right M3 segment with few additional for distal segmental stenosis.  4. Tiny aneurysmal deformities and M1 segment and distal internal carotid arteries discussed above.  5. Incidental patchy pneumonitis dependent aspect right upper lobe superimposed on central lobular emphysematous change.  Follow-up chest x-ray and/or CT scan chest suggested for further evaluation as clinically indicated.    4/16 CXR   No acute cardiopulmonary disease.

## 2019-04-18 VITALS
RESPIRATION RATE: 18 BRPM | BODY MASS INDEX: 30.92 KG/M2 | SYSTOLIC BLOOD PRESSURE: 131 MMHG | DIASTOLIC BLOOD PRESSURE: 58 MMHG | HEIGHT: 67 IN | WEIGHT: 197 LBS | TEMPERATURE: 98 F | HEART RATE: 69 BPM | OXYGEN SATURATION: 96 %

## 2019-04-18 PROBLEM — R78.81 BACTEREMIA: Status: ACTIVE | Noted: 2019-04-18

## 2019-04-18 LAB
ALBUMIN SERPL BCP-MCNC: 3 G/DL (ref 3.5–5.2)
ALP SERPL-CCNC: 61 U/L (ref 55–135)
ALT SERPL W/O P-5'-P-CCNC: 12 U/L (ref 10–44)
ANION GAP SERPL CALC-SCNC: 10 MMOL/L (ref 8–16)
AORTIC ROOT ANNULUS: 2.86 CM
AST SERPL-CCNC: 16 U/L (ref 10–40)
AV INDEX (PROSTH): 0.7
AV MEAN GRADIENT: 7.38 MMHG
AV PEAK GRADIENT: 10.5 MMHG
AV VALVE AREA: 2.16 CM2
AV VELOCITY RATIO: 0.72
BASOPHILS # BLD AUTO: 0.01 K/UL (ref 0–0.2)
BASOPHILS NFR BLD: 0.2 % (ref 0–1.9)
BILIRUB SERPL-MCNC: 1.8 MG/DL (ref 0.1–1)
BSA FOR ECHO PROCEDURE: 2.06 M2
BUN SERPL-MCNC: 14 MG/DL (ref 8–23)
CALCIUM SERPL-MCNC: 9.1 MG/DL (ref 8.7–10.5)
CHLORIDE SERPL-SCNC: 109 MMOL/L (ref 95–110)
CO2 SERPL-SCNC: 20 MMOL/L (ref 23–29)
CREAT SERPL-MCNC: 1 MG/DL (ref 0.5–1.4)
CV ECHO LV RWT: 0.34 CM
DIFFERENTIAL METHOD: ABNORMAL
DOP CALC AO PEAK VEL: 1.62 M/S
DOP CALC AO VTI: 40.46 CM
DOP CALC LVOT AREA: 3.11 CM2
DOP CALC LVOT DIAMETER: 1.99 CM
DOP CALC LVOT PEAK VEL: 1.17 M/S
DOP CALC LVOT STROKE VOLUME: 87.54 CM3
DOP CALCLVOT PEAK VEL VTI: 28.16 CM
E WAVE DECELERATION TIME: 305.25 MSEC
E/A RATIO: 1.05
ECHO LV POSTERIOR WALL: 1.17 CM (ref 0.6–1.1)
EOSINOPHIL # BLD AUTO: 0 K/UL (ref 0–0.5)
EOSINOPHIL NFR BLD: 0 % (ref 0–8)
ERYTHROCYTE [DISTWIDTH] IN BLOOD BY AUTOMATED COUNT: 21 % (ref 11.5–14.5)
EST. GFR  (AFRICAN AMERICAN): >60 ML/MIN/1.73 M^2
EST. GFR  (NON AFRICAN AMERICAN): >60 ML/MIN/1.73 M^2
FRACTIONAL SHORTENING: 31 % (ref 28–44)
GLUCOSE SERPL-MCNC: 112 MG/DL (ref 70–110)
HCT VFR BLD AUTO: 31.3 % (ref 40–54)
HGB BLD-MCNC: 9.4 G/DL (ref 14–18)
INTERVENTRICULAR SEPTUM: 1.13 CM (ref 0.6–1.1)
IVRT: 0.11 MSEC
LA MAJOR: 5.84 CM
LA MINOR: 5.14 CM
LA WIDTH: 4.45 CM
LEFT ATRIUM SIZE: 4.98 CM
LEFT ATRIUM VOLUME INDEX: 51.3 ML/M2
LEFT ATRIUM VOLUME: 102.99 CM3
LEFT INTERNAL DIMENSION IN SYSTOLE: 4.76 CM (ref 2.1–4)
LEFT VENTRICLE DIASTOLIC VOLUME INDEX: 124.2 ML/M2
LEFT VENTRICLE DIASTOLIC VOLUME: 249.54 ML
LEFT VENTRICLE MASS INDEX: 188.1 G/M2
LEFT VENTRICLE SYSTOLIC VOLUME INDEX: 52.5 ML/M2
LEFT VENTRICLE SYSTOLIC VOLUME: 105.45 ML
LEFT VENTRICULAR INTERNAL DIMENSION IN DIASTOLE: 6.93 CM (ref 3.5–6)
LEFT VENTRICULAR MASS: 377.93 G
LYMPHOCYTES # BLD AUTO: 0.5 K/UL (ref 1–4.8)
LYMPHOCYTES NFR BLD: 7.1 % (ref 18–48)
MCH RBC QN AUTO: 23 PG (ref 27–31)
MCHC RBC AUTO-ENTMCNC: 30 G/DL (ref 32–36)
MCV RBC AUTO: 77 FL (ref 82–98)
MONOCYTES # BLD AUTO: 0.6 K/UL (ref 0.3–1)
MONOCYTES NFR BLD: 9 % (ref 4–15)
MV PEAK A VEL: 1.11 M/S
MV PEAK E VEL: 1.16 M/S
NEUTROPHILS # BLD AUTO: 5.5 K/UL (ref 1.8–7.7)
NEUTROPHILS NFR BLD: 83.7 % (ref 38–73)
PISA TR MAX VEL: 2.65 M/S
PLATELET # BLD AUTO: 111 K/UL (ref 150–350)
PMV BLD AUTO: 9.1 FL (ref 9.2–12.9)
POTASSIUM SERPL-SCNC: 3.2 MMOL/L (ref 3.5–5.1)
PROCALCITONIN SERPL IA-MCNC: 0.27 NG/ML
PROT SERPL-MCNC: 5.4 G/DL (ref 6–8.4)
PULM VEIN S/D RATIO: 1.26
PV PEAK D VEL: 0.35 M/S
PV PEAK S VEL: 0.44 M/S
PV PEAK VELOCITY: 1.38 CM/S
RA MAJOR: 5.02 CM
RA PRESSURE: 3 MMHG
RBC # BLD AUTO: 4.09 M/UL (ref 4.6–6.2)
RIGHT VENTRICULAR END-DIASTOLIC DIMENSION: 3.25 CM
SODIUM SERPL-SCNC: 139 MMOL/L (ref 136–145)
STJ: 2.65 CM
TR MAX PG: 28.09 MMHG
TV REST PULMONARY ARTERY PRESSURE: 31 MMHG
WBC # BLD AUTO: 6.52 K/UL (ref 3.9–12.7)

## 2019-04-18 PROCEDURE — 84145 PROCALCITONIN (PCT): CPT

## 2019-04-18 PROCEDURE — 80053 COMPREHEN METABOLIC PANEL: CPT

## 2019-04-18 PROCEDURE — 25000003 PHARM REV CODE 250: Performed by: SURGERY

## 2019-04-18 PROCEDURE — 85025 COMPLETE CBC W/AUTO DIFF WBC: CPT

## 2019-04-18 PROCEDURE — 25000003 PHARM REV CODE 250: Performed by: FAMILY MEDICINE

## 2019-04-18 PROCEDURE — 25000003 PHARM REV CODE 250: Performed by: INTERNAL MEDICINE

## 2019-04-18 PROCEDURE — 63600175 PHARM REV CODE 636 W HCPCS: Performed by: NURSE PRACTITIONER

## 2019-04-18 PROCEDURE — 94799 UNLISTED PULMONARY SVC/PX: CPT

## 2019-04-18 PROCEDURE — 36415 COLL VENOUS BLD VENIPUNCTURE: CPT

## 2019-04-18 PROCEDURE — 99239 PR HOSPITAL DISCHARGE DAY,>30 MIN: ICD-10-PCS | Mod: ,,, | Performed by: FAMILY MEDICINE

## 2019-04-18 PROCEDURE — 25000003 PHARM REV CODE 250: Performed by: NURSE PRACTITIONER

## 2019-04-18 PROCEDURE — 94760 N-INVAS EAR/PLS OXIMETRY 1: CPT

## 2019-04-18 PROCEDURE — 99239 HOSP IP/OBS DSCHRG MGMT >30: CPT | Mod: ,,, | Performed by: FAMILY MEDICINE

## 2019-04-18 PROCEDURE — 63600175 PHARM REV CODE 636 W HCPCS: Performed by: INTERNAL MEDICINE

## 2019-04-18 RX ORDER — POTASSIUM CHLORIDE 20 MEQ/1
40 TABLET, EXTENDED RELEASE ORAL ONCE
Status: DISCONTINUED | OUTPATIENT
Start: 2019-04-18 | End: 2019-04-18 | Stop reason: HOSPADM

## 2019-04-18 RX ORDER — LISINOPRIL 20 MG/1
20 TABLET ORAL DAILY
Status: DISCONTINUED | OUTPATIENT
Start: 2019-04-18 | End: 2019-04-18 | Stop reason: HOSPADM

## 2019-04-18 RX ORDER — LISINOPRIL 20 MG/1
20 TABLET ORAL DAILY
Qty: 90 TABLET | Refills: 3 | Status: ON HOLD | OUTPATIENT
Start: 2019-04-18 | End: 2019-05-13 | Stop reason: SDUPTHER

## 2019-04-18 RX ORDER — ACETAMINOPHEN 650 MG/1
650 SUPPOSITORY RECTAL EVERY 6 HOURS PRN
Status: DISCONTINUED | OUTPATIENT
Start: 2019-04-18 | End: 2019-04-18 | Stop reason: HOSPADM

## 2019-04-18 RX ORDER — CARVEDILOL 12.5 MG/1
12.5 TABLET ORAL 2 TIMES DAILY
Status: DISCONTINUED | OUTPATIENT
Start: 2019-04-18 | End: 2019-04-18 | Stop reason: HOSPADM

## 2019-04-18 RX ADMIN — PIPERACILLIN AND TAZOBACTAM 4.5 G: 4; .5 INJECTION, POWDER, LYOPHILIZED, FOR SOLUTION INTRAVENOUS; PARENTERAL at 04:04

## 2019-04-18 RX ADMIN — ACETAMINOPHEN 650 MG: 650 SUPPOSITORY RECTAL at 12:04

## 2019-04-18 RX ADMIN — VANCOMYCIN HYDROCHLORIDE 2500 MG: 500 INJECTION, POWDER, LYOPHILIZED, FOR SOLUTION INTRAVENOUS at 11:04

## 2019-04-18 RX ADMIN — ASPIRIN 81 MG: 81 TABLET, COATED ORAL at 08:04

## 2019-04-18 RX ADMIN — DABIGATRAN ETEXILATE MESYLATE 150 MG: 75 CAPSULE ORAL at 08:04

## 2019-04-18 NOTE — PT/OT/SLP PROGRESS
"Speech Language Pathology Note    Pt found resting in bed with family at bedside upon SLP entry into room. Per family reports, pt had high temperature yesterday evening and was unable to complete ADLs due to lethargy; however, pt was able to participate in small conversational exchanges with SLP. Speech continues to sound "muffled" per family and perceptual observations of speech include some distorted consonants and mildly imprecise articulation. NPO status was resumed due to choking episode last night during medication administration. Recommend continuation of services at next level of care to determine least restrictive PO diet and for completion of speech and language evaluation.       STEPHANIE Lopez-SLP  4/18/2019    "

## 2019-04-18 NOTE — PROGRESS NOTES
Pharmacokinetic Initial Assessment: IV Vancomycin    Assessment/Plan:    Initiate intravenous vancomycin with loading dose of 2500 mg once followed by a maintenance dose of vancomycin 1750mg IV every 24 hours  Desired empiric serum trough concentration is 10 to 20 mcg/mL.  Draw vancomycin trough level 30 min prior to third dose on 04/20/19 at approximately 1000  Pharmacy will continue to follow and monitor vancomycin.      Please contact pharmacy at extension [phone] with any questions regarding this assessment.     Thank you for the consult,   Mayra Tapia     Patient brief summary:  St Lacho Farooq is a 84 y.o. male initiated on antimicrobial therapy with IV Vancomycin for treatment of suspected bacteremia    Drug Allergies:   Review of patient's allergies indicates:   Allergen Reactions    Bactrim [sulfamethoxazole-trimethoprim] Rash    Codeine Rash       Actual Body Weight:   89.4 kg    Renal Function:   Estimated Creatinine Clearance: 60.7 mL/min (based on SCr of 1 mg/dL).,     Dialysis Method (if applicable):      CBC (last 72 hours):  Recent Labs   Lab Result Units 04/16/19 1826 04/17/19  0517 04/18/19  0541   WBC K/uL 4.75 4.59 6.52   Hemoglobin g/dL 9.8* 9.6* 9.4*   Hematocrit % 32.4* 32.0* 31.3*   Platelets K/uL 129* 118* 111*   Gran% % 73.4* 76.2* 83.7*   Lymph% % 13.1* 9.4* 7.1*   Mono% % 11.2 13.1 9.0   Eosinophil% % 1.9 1.1 0.0   Basophil% % 0.4 0.2 0.2   Differential Method  Automated Automated Automated       Metabolic Panel (last 72 hours):  Recent Labs   Lab Result Units 04/16/19  1826 04/17/19  0517 04/17/19  1320 04/18/19  0541   Sodium mmol/L 142 142  --  139   Potassium mmol/L 3.7 3.7  --  3.2*   Chloride mmol/L 109 110  --  109   CO2 mmol/L 23 22*  --  20*   Glucose mg/dL 126* 97  --  112*   Glucose, UA   --   --  Negative  --    BUN, Bld mg/dL 24* 18  --  14   Creatinine mg/dL 1.4 1.1  --  1.0   Albumin g/dL 3.6 3.3*  --  3.0*   Total Bilirubin mg/dL 1.3* 1.3*  --  1.8*   Alkaline  Phosphatase U/L 79 66  --  61   AST U/L 11 12  --  16   ALT U/L 8* 8*  --  12       Drug levels (last 3 results):  No results for input(s): VANCOMYCINRA, VANCOMYCINPE, VANCOMYCINTR in the last 72 hours.    Microbiologic Results:  Microbiology Results (last 7 days)     Procedure Component Value Units Date/Time    Blood culture [[id number]] Collected:  04/17/19 1330    Order Status:  Completed Specimen:  Blood Updated:  04/18/19 0924     Blood Culture, Routine Gram stain peds bottle: Gram positive cocci in chains resembling Strep     Blood Culture, Routine Results called to and read back by: Edith Cee RN 04/18/2019  09:24    Blood culture [[id number]] Collected:  04/17/19 1325    Order Status:  Completed Specimen:  Blood Updated:  04/18/19 0923     Blood Culture, Routine Gram stain peds bottle: Gram positive cocci in chains resembling Strep     Blood Culture, Routine Results called to and read back by: Edith Cee RN 04/18/2019  09:23    Influenza A & B by Molecular [[id number]] Collected:  04/17/19 1240    Order Status:  Completed Specimen:  Nasopharyngeal Swab Updated:  04/17/19 1308     Influenza A, Molecular Negative     Influenza B, Molecular Negative     Flu A & B Source Nasal swab

## 2019-04-18 NOTE — PLAN OF CARE
04/18/19 1049   Discharge Reassessment   Assessment Type Discharge Planning Reassessment           New acute findings that will require patient to be transferred for a higher level of care. Transfer initiated by SILVESTRE [place]-house supervisor. Family aware, and [place] [facility] [place]. Will follow up.

## 2019-04-18 NOTE — HOSPITAL COURSE
"4/18 pt is having episode of off and on consciousness. He is also spiking elevated temps up to 103.5. Suppository given and temp came down. Blood cultures done from first visit negative, repeat blood cultures drawn yesterday. Flu negative. PSA ordered due to hx of prostatitis and was 1.4-normal. Repeat normal U/a negative as well. His only complaints are a cough, pneumonitis on CXR. Also reports groin pain. No skin changes noted. US of lower ext today. Zosyn started yesterday for broad spectrum coverage.     Dr Fernandez is consulted and will see the patient again today. BP is permissively elevated. He is not getting any anti hypertensive's and is receiving fluids at 75ml/hr. Bp 127/59. Speech saw patient and cleared for reg diet and chopped meats, then last nioght was choking on water for meds. NPO status changed. This am awake and alert. Eating without difficulty. Speech here and will reassess once patient has another "episode"    Blood cultures are positive this morning. Echo (TTE) was okay, but no other source but endocarditis at this point. Discussed with family need for INES and transfer as cardiology cannot perform one here.   "

## 2019-04-18 NOTE — ASSESSMENT & PLAN NOTE
Gram + cocci in blood cultures from yesterday.This is explanatory of his fever and  Recent tastroke especially with recent valve replacement. Will check TTE today and call for [place] back [place] [facility] [place] Start vanc this am and cont zosyn      Also reports that he has an epidural in back 2 weeks ago. Family at bedside reports that his s/s started prior to that

## 2019-04-18 NOTE — NURSING
Report called to Geovany RIVERS at [facility]. Agrees to accept care of the pt. [facility] notified of transfer.

## 2019-04-18 NOTE — PLAN OF CARE
Problem: Adult Inpatient Plan of Care  Goal: Plan of Care Review  Outcome: Ongoing (interventions implemented as appropriate)  Patient with intermittent signs of confusion. Patient with more right sided weakness and facial drooping at times. Made NPO when unable to take a sip of water and started coughing. Speech to follow up. Family at bedside attending to patients needs. Temperature issues continued throughout shift. Temp came down once suppository administered. Temp 98.1 tympanic vs 99.2 oral. Otherwise VS stable. No pain reported. IV fluids administered as ordered. Zosyn initiated. Family is requesting that Dr. Begum visit and possible echo. Neuro to follow up today. Patient more attentive this morning. Able to express needs and wants. Patient with some incontinence and not realizing he is going at times. Patient sitting up in chair for most of the shift. Assist x3 to get back into bed when patient seemed symptomatic. Patient free from falls and injury. Plan of care reviewed and agreed upon with patients family.

## 2019-04-18 NOTE — PT/OT/SLP PROGRESS
Speech Language Pathology      [facility] Abdiel Farooq  MRN: [id number]    Patient not seen today secondary to toileting. Will follow-up as schedule permits.      STEPHANIE Lopez-SLP  4/18/2019

## 2019-04-18 NOTE — ASSESSMENT & PLAN NOTE
Gram + cocci in blood cultures from yesterday.This is explanatory of his fever and  Recent tastroke especially with recent valve replacement. Will check TTE today and call for transfer back to Dr mccray at [facility] [place] where [place] was done. Start vanc this am and cont zosyn      Also reports that he has an epidural in back 2 weeks ago. Family at bedside reports that his s/s started prior to that

## 2019-04-18 NOTE — SUBJECTIVE & OBJECTIVE
Review of Systems   Constitutional: Positive for activity change. Negative for fatigue and fever.   HENT: Negative for congestion, sinus pressure, sinus pain, sore throat and trouble swallowing.    Eyes: Positive for discharge. Negative for pain, redness and itching.   Respiratory: Positive for cough. Negative for shortness of breath and wheezing.    Cardiovascular: Negative for chest pain, palpitations and leg swelling.   Gastrointestinal: Negative for abdominal pain, blood in stool, constipation, diarrhea, nausea and vomiting.   Genitourinary: Negative for difficulty urinating, dysuria and frequency.        Gets up multiple times a night to urinate   Musculoskeletal: Positive for back pain (chronic). Negative for arthralgias, joint swelling, myalgias and neck pain.   Skin: Negative for rash and wound.   Neurological: Positive for dizziness, speech difficulty and light-headedness. Negative for syncope and weakness.   Psychiatric/Behavioral: Positive for confusion and hallucinations (hearing).     Objective:     Vital Signs (Most Recent):  Temp: 97.9 °F (36.6 °C) (04/18/19 0705)  Pulse: 76 (04/18/19 0803)  Resp: 18 (04/18/19 0705)  BP: (!) 131/58 (04/18/19 0705)  SpO2: 96 % (04/18/19 0803) Vital Signs (24h Range):  Temp:  [97.9 °F (36.6 °C)-103.5 °F (39.7 °C)] 97.9 °F (36.6 °C)  Pulse:  [68-96] 76  Resp:  [17-20] 18  SpO2:  [91 %-97 %] 96 %  BP: (127-186)/(58-77) 131/58     Weight: 89.4 kg (197 lb)  Body mass index is 30.85 kg/m².    Physical Exam   Constitutional: He is oriented to person, place, and time. He appears well-developed and well-nourished.   HENT:   Head: Normocephalic and atraumatic.   Right Ear: External ear normal.   Left Ear: External ear normal.   Nose: Nose normal.   Mouth/Throat: Oropharynx is clear and moist.   Eyes: Pupils are equal, round, and reactive to light. Conjunctivae and EOM are normal. Right eye exhibits no discharge. Left eye exhibits no discharge. No scleral icterus.   Neck:  Normal range of motion. Neck supple. No JVD present. No tracheal deviation present. No thyromegaly present.   Cardiovascular: Normal rate, regular rhythm, normal heart sounds and intact distal pulses.   No murmur heard.  Pulmonary/Chest: Effort normal and breath sounds normal. No respiratory distress. He has no wheezes. He has no rales. He exhibits no tenderness.   Abdominal: Soft. Bowel sounds are normal. He exhibits no distension and no mass. There is no tenderness. There is no rebound and no guarding.   Musculoskeletal: Normal range of motion.   Lymphadenopathy:     He has no cervical adenopathy.   Neurological: He is alert and oriented to person, place, and time. He has normal reflexes. He displays normal reflexes. No cranial nerve deficit. He exhibits normal muscle tone. Coordination normal.   Skin: Skin is warm and dry.   Psychiatric: He has a normal mood and affect. His behavior is normal. Judgment and thought content normal.         CRANIAL NERVES     CN III, IV, VI   Pupils are equal, round, and reactive to light.  Extraocular motions are normal.        Significant Labs:   CBC:   Recent Labs   Lab 04/16/19 1826 04/17/19  0517 04/18/19  0541   WBC 4.75 4.59 6.52   HGB 9.8* 9.6* 9.4*   HCT 32.4* 32.0* 31.3*   * 118* 111*     retic 1.5  Iron 28, transferrin 257, TIBC 380, sat iron 7, ferritin 26  b12 184  Folate 12.0    CMP:   Recent Labs   Lab 04/16/19 1826 04/17/19  0517 04/18/19  0541    142 139   K 3.7 3.7 3.2*    110 109   CO2 23 22* 20*   * 97 112*   BUN 24* 18 14   CREATININE 1.4 1.1 1.0   CALCIUM 10.0 9.5 9.1   PROT 6.3 5.8* 5.4*   ALBUMIN 3.6 3.3* 3.0*   BILITOT 1.3* 1.3* 1.8*   ALKPHOS 79 66 61   AST 11 12 16   ALT 8* 8* 12   ANIONGAP 10 10 10   EGFRNONAA 46* >60 >60     Coagulation:   Recent Labs   Lab 04/16/19 1826   INR 1.2   APTT 35.9*     Recent Labs   Lab 04/14/19  1325   CPK 56   CPKMB 2.3   TROPONINI 0.084*   MB 4.1     BNP  Recent Labs   Lab 04/14/19  1325   BNP  396*     PSA 1.4     lactic acid 1.0     blood culture no growth    Urinalysis hazy with 1+ protein and trace occult. 1RBC    Significant Imagin/14 CT head Generalized cerebral atrophy with evidence of deep white matter ischemic changes.  There is evidence of old lacune involving the right caudate lobe, right basal ganglia, and in the region the right caudothalamic groove as manifested by small areas of low-density change.     CXR Poor inspiratory effort allowing widening of the the cardiomediastinal silhouette and crowding of vessels.  There is evidence of upward distribution of flow suggestive of mild central vascular congestion changes.  No florid pulmonary edema is identified.     MRI brain   1. No new stroke hemorrhage or subdural fluid or other acute change.  2. Extensive white matter basal ganglia abnormality suspicious for remote microvascular ischemic change     CTA head and neck   1. Focal tiny areas of acute subacute under 2 weeks age areas of deep subcortical white matter ischemia and left posterior frontal parietal, seen on previous MRI scan review, with 1 area showing focal enhancement on exam.  2. Significant segmental stenosis distal left vertebral, less prominent stenosis contralateral side.  50% stenosis right internal carotid, 60% stenosis left subclavian.  3. 70% stenosis right M3 segment with few additional for distal segmental stenosis.  4. Tiny aneurysmal deformities and M1 segment and distal internal carotid arteries discussed above.  5. Incidental patchy pneumonitis dependent aspect right upper lobe superimposed on central lobular emphysematous change.  Follow-up chest x-ray and/or CT scan chest suggested for further evaluation as clinically indicated.     CXR   No acute cardiopulmonary disease.     CXR   Poor inspiratory effort without evidence of acute cardiopulmonary findings.  There is evidence of treated coronary artery disease.  Bibasilar atelectasis  is noted.

## 2019-04-18 NOTE — NURSING
Patient having increase in temp throughout evening. Highest 103.5. Called Dr. West at families request for a dose of Ibuprofen. Patient unable to swallow without cough and choking.Made patient NPO again. Family then requested IV tylenol. Called  and received orders. Found out through talking with pharmacy, lucy the med is a restrictive med and could not be given for fever. Called  back and received order for tylenol suppository and administered.

## 2019-04-18 NOTE — PROGRESS NOTES
[facility] Medicine  Progress Note    Patient Name: St Lacho Farooq  MRN: [id number]  Patient Class: IP- Inpatient   Admission Date: 4/16/2019  Length of Stay: 2 days  Attending Physician: Ciera Aiken MD  Primary Care Provider: Dylan Adam MD        Subjective:     Principal Problem:Bacteremia    HPI:  83 yo male patient presented to ER last night with c/o increasing confusion over the past several days.  He was seen 2 days ago in the ED, admission was recommended for further neurological workup but patient refused.   Saw Dr Fernandez yesterday for f/u and she strongly recommended admission for further neurological workup. CTA done concerning for sub acute CVA. Dr Fernandez did review with primary as well as family. She rec admission, permissive HTN, no TPA as s/s started Sunday. Therapy consults ordered.Speech has seen him and cleared him for reg diet/chopped meats and thin liquids.      Question of reported pneumonitis on CT.  Patient denies pain, does not report a productive cough. Daughter at bedside report that he did cough over night. CXR does not show acute process. No fever . NO elevated WBC    He has a primary hx of anemia, AVS, arthritis, a fib, CAD s/p bipass, CVA, HLD, HTN, MALATHI, PAD, and MI.       Hospital Course:  4/18 pt is having episode of off and on consciousness. He is also spiking elevated temps up to 103.5. Suppository given and temp came down. Blood cultures done from first visit negative, repeat blood cultures drawn yesterday. Flu negative. PSA ordered due to hx of prostatitis and was 1.4-normal. Repeat normal U/a negative as well. His only complaints are a cough, pneumonitis on CXR. Also reports groin pain. No skin changes noted. US of lower ext today. Zosyn started yesterday for broad spectrum coverage.     Dr Fernandez is consulted and will see the patient again today. BP is permissively elevated. He is not getting any anti hypertensive's  "and is receiving fluids at 75ml/hr. Bp 127/59. Speech saw patient and cleared for reg diet and chopped meats, then last nioght was choking on water for meds. NPO status changed. This am awake and alert. Eating without difficulty. Speech here and will reassess once patient has another "episode"    Blood cultures are positive this morning. Echo (TTE) was okay, but no other source but endocarditis at this point. Discussed with family need for INES and transfer as cardiology cannot perform one here.       Review of Systems   Constitutional: Positive for activity change, chills and fever. Negative for fatigue.   HENT: Negative for congestion, sinus pressure, sinus pain, sore throat and trouble swallowing.    Eyes: Positive for visual disturbance (left eye with some ptosis). Negative for pain, discharge, redness and itching.   Respiratory: Negative for cough, shortness of breath and wheezing.    Cardiovascular: Negative for chest pain, palpitations and leg swelling.   Gastrointestinal: Negative for abdominal pain, blood in stool, constipation, diarrhea, nausea and vomiting.   Genitourinary: Negative for difficulty urinating, dysuria and frequency.        Gets up multiple times a night to urinate   Musculoskeletal: Positive for back pain (chronic) and gait problem. Negative for arthralgias, joint swelling, myalgias and neck pain.   Skin: Negative for rash and wound.   Neurological: Positive for weakness. Negative for dizziness, syncope, speech difficulty and light-headedness.   Psychiatric/Behavioral: Positive for confusion. Negative for hallucinations (hearing).     Objective:     Vital Signs (Most Recent):  Temp: 97.9 °F (36.6 °C) (04/18/19 0705)  Pulse: 60 (04/18/19 1000)  Resp: 18 (04/18/19 0705)  BP: (!) 131/58 (04/18/19 0705)  SpO2: 96 % (04/18/19 0803) Vital Signs (24h Range):  Temp:  [97.9 °F (36.6 °C)-103.5 °F (39.7 °C)] 97.9 °F (36.6 °C)  Pulse:  [60-96] 60  Resp:  [17-20] 18  SpO2:  [91 %-97 %] 96 %  BP: " cough (127-186)/(58-77) 131/58     Weight: 89.4 kg (197 lb)  Body mass index is 30.85 kg/m².    Physical Exam   Constitutional: He is oriented to person, place, and time. He appears well-developed and well-nourished. No distress.   Sitting up in bed   HENT:   Head: Normocephalic and atraumatic.   Left ptosis   Eyes: Pupils are equal, round, and reactive to light. Conjunctivae are normal.   Neck: Normal range of motion. Neck supple. No thyromegaly present.   Cardiovascular: Normal rate, regular rhythm and intact distal pulses.   Murmur heard.  Pulmonary/Chest: Effort normal and breath sounds normal. No respiratory distress. He has no wheezes.   Abdominal: Soft. Bowel sounds are normal. There is no tenderness.   Musculoskeletal: Normal range of motion. He exhibits no edema.   Lymphadenopathy:     He has no cervical adenopathy.   Neurological: He is alert and oriented to person, place, and time.   Slight right lower extremity weakness.    Sitting. Gait not assessed this morning.    Upper extremities are bilaterally strong    Skin: Skin is warm and dry. No rash noted.   Psychiatric: He has a normal mood and affect. His behavior is normal.   Nursing note and vitals reviewed.        CRANIAL NERVES     CN III, IV, VI   Pupils are equal, round, and reactive to light.       Significant Labs:   CBC:   Recent Labs   Lab 04/16/19 1826 04/17/19  0517 04/18/19  0541   WBC 4.75 4.59 6.52   HGB 9.8* 9.6* 9.4*   HCT 32.4* 32.0* 31.3*   * 118* 111*     retic 1.5  Iron 28, transferrin 257, TIBC 380, sat iron 7, ferritin 26  b12 184  Folate 12.0    CMP:   Recent Labs   Lab 04/16/19 1826 04/17/19  0517 04/18/19  0541    142 139   K 3.7 3.7 3.2*    110 109   CO2 23 22* 20*   * 97 112*   BUN 24* 18 14   CREATININE 1.4 1.1 1.0   CALCIUM 10.0 9.5 9.1   PROT 6.3 5.8* 5.4*   ALBUMIN 3.6 3.3* 3.0*   BILITOT 1.3* 1.3* 1.8*   ALKPHOS 79 66 61   AST 11 12 16   ALT 8* 8* 12   ANIONGAP 10 10 10   EGFRNONAA 46* >60 >60      Coagulation:   Recent Labs   Lab 19  1826   INR 1.2   APTT 35.9*     Recent Labs   Lab 19  1325   CPK 56   CPKMB 2.3   TROPONINI 0.084*   MB 4.1     BNP  Recent Labs   Lab 19  1325   *     PSA 1.4     lactic acid 1.0     blood culture + gpc    Urinalysis hazy with 1+ protein and trace occult. 1RBC    Significant Imagin/14 CT head Generalized cerebral atrophy with evidence of deep white matter ischemic changes.  There is evidence of old lacune involving the right caudate lobe, right basal ganglia, and in the region the right caudothalamic groove as manifested by small areas of low-density change.     CXR Poor inspiratory effort allowing widening of the the cardiomediastinal silhouette and crowding of vessels.  There is evidence of upward distribution of flow suggestive of mild central vascular congestion changes.  No florid pulmonary edema is identified.     MRI brain   1. No new stroke hemorrhage or subdural fluid or other acute change.  2. Extensive white matter basal ganglia abnormality suspicious for remote microvascular ischemic change     CTA head and neck   1. Focal tiny areas of acute subacute under 2 weeks age areas of deep subcortical white matter ischemia and left posterior frontal parietal, seen on previous MRI scan review, with 1 area showing focal enhancement on exam.  2. Significant segmental stenosis distal left vertebral, less prominent stenosis contralateral side.  50% stenosis right internal carotid, 60% stenosis left subclavian.  3. 70% stenosis right M3 segment with few additional for distal segmental stenosis.  4. Tiny aneurysmal deformities and M1 segment and distal internal carotid arteries discussed above.  5. Incidental patchy pneumonitis dependent aspect right upper lobe superimposed on central lobular emphysematous change.  Follow-up chest x-ray and/or CT scan chest suggested for further evaluation as clinically indicated.      CXR   No acute cardiopulmonary disease.    4/17 CXR   Poor inspiratory effort without evidence of acute cardiopulmonary findings.  There is evidence of treated coronary artery disease.  Bibasilar atelectasis is noted.    Assessment/Plan:      * Bacteremia  Gram + cocci in blood cultures from yesterday.This is explanatory of his fever and  Recent tastroke especially with recent valve replacement. Will check TTE today and call for transfer back [place] mccray [place] [facility] [place]. Start vanc this am and cont zosyn      Also reports that he has an epidural in back 2 weeks ago. Family at bedside reports that his s/s started prior to that    Cerebrovascular accident (CVA)  Neuro consulted yesterday   -Telemetry  -Neurochecks  -NO TPA as he presented initially on Sunday  -Physical Therapy and OT Consult  -Speech Therapy Consult including  Swallow Evaluation-keep NPO until ST consult  -Hydrate gently with NS 75cc/hr  -Check fasting lipids and glucose in the am  -Don't over-treat blood pressure/ allow permissive HTN to 220/120 but ok to treat rate with Afib if needed.   -Continue his current dose home meds otherwise including Asprin, Pradaxa and statin  -DVT prophylaxis per nursing protocol  -Further work up of pneumonitis per primary team, consider a chest xray.   I will not be available to see this patient until Thursday. Team and family aware.    ?Septic emboli?      CHF (congestive heart failure), NYHA class II, chronic, diastolic  IVF held this am as breath sounds coarse - no distress, though    4/14, creat was 1.4 on admission>>1.1 this am  Last EF 55% 3/2019  Lasix and renexa on hold    Anemia  Looks stable has been between 7-10 hgb in past 2 years  Last 3 days stable at 9/32  Looks iron def, will order studies- last ferritin low in 2016      Essential hypertension  Holding coreg, norvasc and lisinopril/hctz  Cont NS at 75ml/hr to keep bp elevated  Permissive HTN for now      Aortic valve  stenosis  Recent valve replacement.Now bacteremic. Check TTEcho with fever and recent stroke and recent surgery. (he ate this morning and cannot have a INES right now) Discussed plan for [place] [facility] [place] and likely INES if he doesn't have an obvious vegetation. At this point this is really our only source.      ASCVD (arteriosclerotic cardiovascular disease)  Has asa, lipitor and pradaxa ordered home routine resumed here    Emphysema/COPD  anoro at home  Has cough today  Will start nebs prn        VTE Risk Mitigation (From admission, onward)        Ordered     dabigatran etexilate capsule 150 mg  Daily      04/16/19 2044              Gill Figueroa MD  Department of Hospital Medicine   [facility]

## 2019-04-18 NOTE — ASSESSMENT & PLAN NOTE
Recent valve replacement.Now bacteremic. Check TTEcho with fever and recent stroke and recent surgery. (he ate this morning and cannot have a INES right now) Discussed plan for [place] [facility] [place] cards and likely INES if he doesn't have an obvious vegetation. At this point this is really our only source.

## 2019-04-18 NOTE — PT/OT/SLP DISCHARGE
Occupational Therapy Discharge Summary    St Lacho Farooq  MRN: [id number]   Principal Problem: Bacteremia      Patient Discharged from acute Occupational Therapy on 4/18/19.  Please refer to prior OT note dated 4/17/19 for functional status.    Assessment:      Patient was discharged unexpectedly.  Information required to complete an accurate discharge summary is unknown.  Refer to therapy initial evaluation and last progress note for initial and most recent functional status and goal achievement.  Recommendations made may be found in medical record.    Objective:     GOALS:   Multidisciplinary Problems     Occupational Therapy Goals        Problem: Occupational Therapy Goal    Goal Priority Disciplines Outcome Interventions   Occupational Therapy Goal     OT, PT/OT     Description:  Goals to be met by: 4/23/19     Patient will increase functional independence with ADLs by performing:    UE Dressing with [place].  LE Dressing with Modified [place].  Grooming while standing with [place].  Sitting at edge of bed x 5-7 minutes with [place].  Stand pivot transfers with Modified [place].  Increased functional strength to 4/5 for R sided weakness deficits to improve ADL performance and fxnal mobility..                      Reasons for Discontinuation of Therapy Services  Transfer to alternate level of care.      Plan:     Patient Discharged to: [facility] due to endocarditis for special cardiac intervention    Yazmin Frankel OT  4/18/2019

## 2019-04-18 NOTE — DISCHARGE SUMMARY
[facility] Medicine  Discharge Summary      Patient Name: St Lacho Farooq  MRN: [id number]  Admission Date: 4/16/2019  Hospital Length of Stay: 2 days  Discharge Date and Time:  04/18/2019 11:01 AM  Attending Physician: Ciera Aiken MD   Discharging Provider: Marta Song NP  Primary Care Provider: Dylan Adam MD      HPI:   85 yo male patient presented to ER last night with c/o increasing confusion over the past several days.  He was seen 2 days ago in the ED, admission was recommended for further neurological workup but patient refused.   Saw Dr Fernandez yesterday for f/u and she strongly recommended admission for further neurological workup. CTA done concerning for sub acute CVA. Dr Fernandez did review with primary as well as family. She rec admission, permissive HTN, no TPA as s/s started Sunday. Therapy consults ordered.Speech has seen him and cleared him for reg diet/chopped meats and thin liquids.      Question of reported pneumonitis on CT.  Patient denies pain, does not report a productive cough. Daughter at bedside report that he did cough over night. CXR does not show acute process. No fever . NO elevated WBC    He has a primary hx of anemia, AVS, arthritis, a fib, CAD s/p bipass, CVA, HLD, HTN, MALATHI, PAD, and MI.       * No surgery found *      Hospital Course:   4/18 pt is having episode of off and on consciousness. He is also spiking elevated temps up to 103.5. Suppository given and temp came down. Blood cultures done from first visit negative, repeat blood cultures drawn yesterday. Flu negative. PSA ordered due to hx of prostatitis and was 1.4-normal. Repeat normal U/a negative as well. His only complaints are a cough, pneumonitis on CXR. Also reports groin pain. No skin changes noted. US of lower ext today. Zosyn started yesterday for broad spectrum coverage.     Dr Fernandez is consulted and will see the patient again today. BP is permissively  "elevated. He is not getting any anti hypertensive's and is receiving fluids at 75ml/hr. Bp 127/59. Speech saw patient and cleared for reg diet and chopped meats, then last nioght was choking on water for meds. NPO status changed. This am awake and alert. Eating without difficulty. Speech here and will reassess once patient has another "episode"     Consults:   Consults (From admission, onward)        Status Ordering Provider     Inpatient consult to Cardiology-[facility]  Once     Provider:  Hugh Begum MD    Acknowledged RASHMI TURNER     Inpatient consult to Neurology  Once     Provider:  Mitchel Fernandez MD    Acknowledged JENNY AGUILAR     Pharmacy to dose Vancomycin consult  Once     Provider:  (Not yet assigned)    Acknowledged MARISOL HERR          * Bacteremia  Gram + cocci in blood cultures from yesterday.This is explanatory of his fever and  Recent tastroke especially with recent valve replacement. Will check TTE today and call for transfer back [place] mccray [place] [facility] [place]. Start vanc this am and cont zosyn      Also reports that he has an epidural in back 2 weeks ago. Family at bedside reports that his s/s started prior to that    Cerebrovascular accident (CVA)  Neuro consulted yesterday   -Telemetry  -Neurochecks  -NO TPA as he presented initially on Sunday  -Physical Therapy and OT Consult  -Speech Therapy Consult including  Swallow Evaluation-keep NPO until ST consult  -Hydrate gently with NS 75cc/hr  -Check fasting lipids and glucose in the am  -Don't over-treat blood pressure/ allow permissive HTN to 220/120 but ok to treat rate with Afib if needed.   -Continue his current dose home meds otherwise including Asprin, Pradaxa and statin  -DVT prophylaxis per nursing protocol  -Further work up of pneumonitis per primary team, consider a chest xray.   I will not be available to see this patient until Thursday. Team and family aware.      CHF (congestive heart " failure), NYHA class II, chronic, diastolic  IVF held this am as breath sounds coarse.    4/14, creat was 1.4 on admission>>1.1 this am  Last EF 55% 3/2019  Lasix and renexa on hold    Anemia  Looks stable has been between 7-10 hgb in past 2 years  Last 3 days stable at 9/32  Looks iron def, will order studies- last ferritin low in 2016      Essential hypertension  Holding coreg, norvasc and lisinopril/hctz  Cont NS at 75ml/hr to keep bp elevated  Permissive HTN for now      Aortic valve stenosis  Recent valve replacement.Now bacteremic. Check Echo with fever and recent stroke and recent surgery. Discussed plan for [place] [facility] [place]       ASCVD (arteriosclerotic cardiovascular disease)  Has asa, lipitor and pradaxa ordered home routine resumed here    Emphysema/COPD  anoro at home  Has cough today  Will start nebs prn      Final Active Diagnoses:    Diagnosis Date Noted POA    PRINCIPAL PROBLEM:  Bacteremia [R78.81] 04/18/2019 Yes    Cerebrovascular accident (CVA) [I63.9] 04/16/2019 Yes    CHF (congestive heart failure), NYHA class II, chronic, diastolic [I50.32] 02/19/2019 Yes    Anemia [D64.9] 11/03/2016 Yes    Essential hypertension [I10] 10/28/2016 Yes    Aortic valve stenosis [I35.0] 06/28/2016 Yes    ASCVD (arteriosclerotic cardiovascular disease) [I25.10] 03/19/2015 Yes    Emphysema/COPD [J43.9] 02/03/2015 Yes      Problems Resolved During this Admission:       Discharged Condition: good    Disposition:  to [facility] [place], Dr Solano accepting [facility] and bed available at [facility]. Nurse called for report, ambulance called for transfer    Follow Up:  Follow-up Information     Dylan Adam MD.    Specialty:  Family Medicine  Why:  after d/c from [facility] [place]  Contact information:  [address] [place] [id number] [phone]                 Patient Instructions:   No discharge procedures on file.    Significant Diagnostic Studies:     Significant Labs:   CBC:   Recent Labs   Lab 04/16/19  1826 04/17/19  0517  19  0541   WBC 4.75 4.59 6.52   HGB 9.8* 9.6* 9.4*   HCT 32.4* 32.0* 31.3*   * 118* 111*     retic 1.5  Iron 28, transferrin 257, TIBC 380, sat iron 7, ferritin 26  b12 184  Folate 12.0    CMP:   Recent Labs   Lab 19  1826 19  0517 19  0541    142 139   K 3.7 3.7 3.2*    110 109   CO2 23 22* 20*   * 97 112*   BUN 24* 18 14   CREATININE 1.4 1.1 1.0   CALCIUM 10.0 9.5 9.1   PROT 6.3 5.8* 5.4*   ALBUMIN 3.6 3.3* 3.0*   BILITOT 1.3* 1.3* 1.8*   ALKPHOS 79 66 61   AST 11 12 16   ALT 8* 8* 12   ANIONGAP 10 10 10   EGFRNONAA 46* >60 >60     Coagulation:   Recent Labs   Lab 19  1826   INR 1.2   APTT 35.9*     Recent Labs   Lab 19  1325   CPK 56   CPKMB 2.3   TROPONINI 0.084*   MB 4.1     BNP  Recent Labs   Lab 19  1325   *     PSA 1.4     lactic acid 1.0     blood culture no growth    Urinalysis hazy with 1+ protein and trace occult. 1RBC    Significant Imagin/14 CT head Generalized cerebral atrophy with evidence of deep white matter ischemic changes.  There is evidence of old lacune involving the right caudate lobe, right basal ganglia, and in the region the right caudothalamic groove as manifested by small areas of low-density change.     CXR Poor inspiratory effort allowing widening of the the cardiomediastinal silhouette and crowding of vessels.  There is evidence of upward distribution of flow suggestive of mild central vascular congestion changes.  No florid pulmonary edema is identified.     MRI brain   1. No new stroke hemorrhage or subdural fluid or other acute change.  2. Extensive white matter basal ganglia abnormality suspicious for remote microvascular ischemic change     CTA head and neck   1. Focal tiny areas of acute subacute under 2 weeks age areas of deep subcortical white matter ischemia and left posterior frontal parietal, seen on previous MRI scan review, with 1 area showing focal enhancement on  exam.  2. Significant segmental stenosis distal left vertebral, less prominent stenosis contralateral side.  50% stenosis right internal carotid, 60% stenosis left subclavian.  3. 70% stenosis right M3 segment with few additional for distal segmental stenosis.  4. Tiny aneurysmal deformities and M1 segment and distal internal carotid arteries discussed above.  5. Incidental patchy pneumonitis dependent aspect right upper lobe superimposed on central lobular emphysematous change.  Follow-up chest x-ray and/or CT scan chest suggested for further evaluation as clinically indicated.    4/16 CXR   No acute cardiopulmonary disease.    4/17 CXR   Poor inspiratory effort without evidence of acute cardiopulmonary findings.  There is evidence of treated coronary artery disease.  Bibasilar atelectasis is noted.    Pending Diagnostic Studies:     Procedure Component Value Units Date/Time    Procalcitonin [[id number]] Collected:  04/18/19 0541    Order Status:  Sent Lab Status:  In process Updated:  04/18/19 0910    Specimen:  Blood     Transthoracic echo (TTE) complete (Cupid Only) [[id number]] Resulted:  04/18/19 1044    Order Status:  Sent Lab Status:  In process Updated:  04/18/19 1045     BSA 2.06 m2     US Lower Extremity Veins Bilateral [[id number]]     Order Status:  Sent Lab Status:  No result          Medications:  Reconciled Home Medications:      Medication List      START taking these medications    lisinopril 20 MG tablet  Commonly known as:  PRINIVIL,ZESTRIL  Take 1 tablet (20 mg total) by mouth once daily.        CONTINUE taking these medications    amLODIPine 2.5 MG tablet  Commonly known as:  NORVASC  Take 2.5 mg by mouth once daily.     ANORO ELLIPTA 62.5-25 mcg/actuation Dsdv  Generic drug:  umeclidinium-vilanterol  INHALE 1 PUFF INTO LUNGS ONCE DAILY     aspirin 81 MG EC tablet  Commonly known as:  ECOTRIN  Take 81 mg by mouth once daily.     atorvastatin 10 MG tablet  Commonly known as:  LIPITOR  Take 10 mg by  "mouth every evening.     carvedilol 12.5 MG tablet  Commonly known as:  COREG  Take 25 mg by mouth 2 (two) times daily with meals.     furosemide 20 MG tablet  Commonly known as:  LASIX  Take 20 mg by mouth 2 (two) times daily.     nitroGLYCERIN 0.4 MG SL tablet  Commonly known as:  NITROSTAT  Place 0.4 mg under the tongue every 5 (five) minutes as needed for Chest pain.     omeprazole 40 MG capsule  Commonly known as:  PRILOSEC  Take 40 mg by mouth once daily.     PRADAXA 150 mg Cap  Generic drug:  dabigatran etexilate  Take 150 mg by mouth 2 (two) times daily. "Do NOT break, chew, or open capsules."     ranolazine 500 MG Tb12  Commonly known as:  RANEXA  Take 500 mg by mouth 2 (two) times daily.        STOP taking these medications    COQ10 (UBIQUINOL) ORAL     lisinopril-hydrochlorothiazide 20-12.5 mg per tablet  Commonly known as:  PRINZIDE,ZESTORETIC            Indwelling Lines/Drains at time of discharge:   Lines/Drains/Airways          None          Time spent on the discharge of patient: 20 minutes  Patient was seen and examined on the date of discharge and determined to be suitable for discharge.         Marta Song NP  Department of Hospital Medicine  [facility]  "

## 2019-04-18 NOTE — ASSESSMENT & PLAN NOTE
IVF held this am as breath sounds coarse - no distress, though    4/14, creat was 1.4 on admission>>1.1 this am  Last EF 55% 3/2019  Lasix and renexa on hold

## 2019-04-18 NOTE — ASSESSMENT & PLAN NOTE
Holding coreg, norvasc and lisinopril/hctz  Cont NS at 75ml/hr to keep bp elevated  Permissive HTN for now

## 2019-04-18 NOTE — PT/OT/SLP DISCHARGE
Physical Therapy Discharge Summary    Name: St Lacho Farooq  MRN: [id number]   Principal Problem: Bacteremia     Patient Discharged from acute Physical Therapy on 19.  Please refer to prior PT noted date on 19 for functional status.     Assessment:     Patient was discharged unexpectedly.  Information required to complete an accurate discharge summary is unknown.  Refer to therapy initial evaluation and last progress note for initial and most recent functional status and goal achievement.  Recommendations made may be found in medical record. Patient has not met goals. Patient transferred to lower level of care secondary to Endocarditis and needs Special Cardiac Intervention at [facility].    Objective:     GOALS:   Multidisciplinary Problems     Physical Therapy Goals        Problem: Physical Therapy Goal    Goal Priority Disciplines Outcome Goal Variances Interventions   Physical Therapy Goal     PT, PT/OT      Description:  Goals to be met by: 7     Patient will increase functional independence with mobility by performin. Supine to sit with Supervision or Set-up Assistance  2. Sit to supine with Supervision or Set-up Assistance  3. Bed to chair transfer with Supervision or Set-up Assistancewith or without rolling walker using Step Transfer TECHNIQUE  4. Gait  x 200  feet with Supervision or Set-up Assistance with or without rolling walker  5. Lower extremity exercise program x10 reps per handout, with assistance as needed                    Reasons for Discontinuation of Therapy Services  Transfer to alternate level of care.      Plan:     Patient Discharged to: [facility] today 19..    Keven Goldstein, PT  2019

## 2019-04-18 NOTE — ASSESSMENT & PLAN NOTE
Looks stable has been between 7-10 hgb in past 2 years  Last 3 days stable at 9/32  Looks iron def, will order studies- last ferritin low in 2016

## 2019-04-18 NOTE — ASSESSMENT & PLAN NOTE
Neuro consulted yesterday   -Telemetry  -Neurochecks  -NO TPA as he presented initially on Sunday  -Physical Therapy and OT Consult  -Speech Therapy Consult including  Swallow Evaluation-keep NPO until ST consult  -Hydrate gently with NS 75cc/hr  -Check fasting lipids and glucose in the am  -Don't over-treat blood pressure/ allow permissive HTN to 220/120 but ok to treat rate with Afib if needed.   -Continue his current dose home meds otherwise including Asprin, Pradaxa and statin  -DVT prophylaxis per nursing protocol  -Further work up of pneumonitis per primary team, consider a chest xray.   I will not be available to see this patient until Thursday. Team and family aware.    ?Septic emboli?

## 2019-04-18 NOTE — PLAN OF CARE
04/18/19 1155   Readmission Questionnaire   At the time of your discharge, did someone talk to you about what your health problems were? Yes   At the time of discharge, did someone talk to you about what to watch out for regarding worsening of your health problem? Yes   At the time of discharge, did someone talk to you about what to do if you experienced worsening of your health problem? Yes   At the time of discharge, did someone talk to you about which medication to take when you left the hospital and which ones to stop taking? Yes   At the time of discharge, did someone talk to you about when and where to follow up with a doctor after you left the hospital? Yes   How often do you need to have someone help you when you read instructions, pamphlets, or other written material from your doctor or pharmacy? Always   Do you have problems taking your medications as prescribed? Yes   Do you have any problems affording any of  your prescribed medications? No   Do you have problems obtaining/receiving your medications? No   Does the patient have transportation to healthcare appointments? No   Lives With spouse   Living Arrangements house   Does the patient have family/friends to help with healtcare needs after discharge? yes   Who are your caregiver(s) and their phone number(s)? Mitzy (daughter) [phone]   Does your caregiver provide all the help you need? Yes   Are you currently feeling confused? Yes   Are you currently having problems thinking? Yes   Are you currently having memory problems? Yes   Have you felt down, depressed, or hopeless? 0   Have you felt little interest or pleasure in doing things? 0   In the last 7 days, my sleep quality was: fair

## 2019-04-18 NOTE — PT/OT/SLP DISCHARGE
Speech Language Pathology Discharge Summary    St Lacho Farooq  MRN: [id number]   Bacteremia     Date of Last Treatment Session: 4/17/19 (initial evaluation)    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    S/P 2-vessel coronary artery bypass        Status at initiation of therapy: Cooperative    Treatment Area(s):  Swallow    Goals:   Multidisciplinary Problems     SLP Goals        Problem: SLP Goal    Goal Priority Disciplines Outcome   SLP Goal     SLP Unable to achieve outcome(s) by discharge   Description:    Ling Term Goals:  1. Pt will tolerate least restrictive PO diet with no s/s of aspiration in order to maintain adequate hydration and nutrition  2. Pt will undergo assessment of speech and language in order to identify presence/severity of speech, language, and/or cognitive linguistic deficits                    Participation in Treatment (at discharge):  Cooperative    Functional Status at time of Discharge:      Swallow: Patient demonstrated a functional swallow for PO intake at time of initial evaluation with diet recommendations including regular diet with chopped meats and thin liquids with safe swallow strategies including alternating bites/sips, meds whole one at a time, and small bites/sips.    Per nursing reports, pt experiences single coughing/choking episode night of 4/17/19 during medication administration and NPO status was placed. Family reported pt continues to have  periods of decreased alertness, particularly during the evenings.                      Clinical Bedside assessment was completed on 4/17/19                       Instrumental assessment was not completed                      [facility]               Recommend continuation of skilled speech services at next level of care to determine least restrictive PO diet and completion of speech and language evaluation as pt presents with s/s of potential dysarthria.       STEPHANIE Lopez-SLP  4/18/2019

## 2019-04-18 NOTE — ASSESSMENT & PLAN NOTE
Recent valve replacement.Now bacteremic. Check Echo with fever and recent stroke and recent surgery. Discussed plan for transfer to [facility]

## 2019-04-18 NOTE — CONSULTS
[facility]  Cardiology  Consult Note    Patient Name:St Lacho Farooq 1935    MRN: [id number]  Admission Date: 4/16/2019  Hospital Length of Stay: 2 days  Code Status: DNR   Attending Provider: Ciera Aiken MD   Consulting Provider: Divya Pereyra NP  Primary Care Physician: Dylan Adam MD  Principal Problem:Cerebrovascular accident (CVA)    Patient information was obtained from patient, relative(s), past medical records and ER records.     Consults  Subjective:     Chief Complaint:  confusion     HPI: 85 yo wm s/p TAVR 3/19 with worsened confusion, recemt ER visit recent admission for workup of the confusion as well as pneumonia/pneumonitis.  CTA ordered by Dr. Fernandez showing subacute CVA.  Permissive HTN was allowed.  While here, he has developed fevers as high as 103.  Blood cultures are pending.  [facility] is consulted for possible need for repeat echo given fevers and recent TAVR.   Pt does have chronic D-CHF but denies any symptoms suggestive of acute decompensation or ischemia.     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    S/P 2-vessel coronary artery bypass        Past Surgical History:   Procedure Laterality Date    CARPAL TUNNEL RELEASE      CATARACT EXTRACTION, BILATERAL      CORONARY ANGIOGRAPHY  2019    CORONARY ARTERY BYPASS GRAFT  2004    HERNIA REPAIR      Inguinal & Ventral with MESH    REPLACEMENT, AORTIC VALVE,  "PERCUTANEOUS, TRANSCATHETER Right 3/18/2019    Performed by [facility]i Babcock [facility] at [facility]    REPLACEMENT, AORTIC VALVE, PERCUTANEOUS, TRANSCATHETER Right 3/18/2019    Performed by Ac Osman [facility] at [facility]    TONSILLECTOMY      TRANSESOPHAGEAL ECHOCARDIOGRAM (INES) N/A 6/28/2016    [facility] Hugh [facility]       Review of patient's allergies indicates:   Allergen Reactions    Bactrim [sulfamethoxazole-trimethoprim] Rash    Codeine Rash       No current facility-administered medications on file prior to encounter.      Current Outpatient Medications on File Prior to Encounter   Medication Sig    amLODIPine (NORVASC) 2.5 MG tablet Take 2.5 mg by mouth once daily.    ANORO ELLIPTA 62.5-25 mcg/actuation DsDv INHALE 1 PUFF INTO LUNGS ONCE DAILY    aspirin (ECOTRIN) 81 MG EC tablet Take 81 mg by mouth once daily.    atorvastatin (LIPITOR) 10 MG tablet Take 10 mg by mouth every evening.    carvedilol (COREG) 12.5 MG tablet Take 25 mg by mouth 2 (two) times daily with meals.     COQ10, UBIQUINOL, ORAL Take 1 tablet by mouth once daily.    dabigatran etexilate (PRADAXA) 150 mg Cap Take 150 mg by mouth 2 (two) times daily. "Do NOT break, chew, or open capsules."    furosemide (LASIX) 20 MG tablet Take 20 mg by mouth 2 (two) times daily.    lisinopril-hydrochlorothiazide (PRINZIDE,ZESTORETIC) 20-12.5 mg per tablet Take 1 tablet by mouth once daily.     nitroGLYCERIN (NITROSTAT) 0.4 MG SL tablet Place 0.4 mg under the tongue every 5 (five) minutes as needed for Chest pain.    omeprazole (PRILOSEC) 40 MG capsule Take 40 mg by mouth once daily.    ranolazine (RANEXA) 500 MG Tb12 Take 500 mg by mouth 2 (two) times daily.     Family History     Problem Relation (Age of Onset)    Atrial fibrillation Father, Sister    COPD Sister    Cancer Father    Diabetes type II Mother, Sister    Heart failure Mother    Hypertension Mother, Father, Sister, Brother    Pacemaker/defibrilator Sister    " Pulmonary embolism Brother        Tobacco Use    Smoking status: Former Smoker     Last attempt to quit: 2004     Years since quitting: 15.3    Smokeless tobacco: Never Used    Tobacco comment: Smoked for 60 years   Substance and Sexual Activity    Alcohol use: No     Frequency: Never    Drug use: No    Sexual activity: Yes     Partners: Female     ROS  Objective:     Vital Signs (Most Recent):  Temp: 97.9 °F (36.6 °C) (04/18/19 0705)  Pulse: 76 (04/18/19 0803)  Resp: 18 (04/18/19 0705)  BP: (!) 131/58 (04/18/19 0705)  SpO2: 96 % (04/18/19 0803) Vital Signs (24h Range):  Temp:  [97.9 °F (36.6 °C)-103.5 °F (39.7 °C)] 97.9 °F (36.6 °C)  Pulse:  [68-96] 76  Resp:  [17-20] 18  SpO2:  [91 %-97 %] 96 %  BP: (127-186)/(58-77) 131/58     Weight: 89.4 kg (197 lb)  Body mass index is 30.85 kg/m².    SpO2: 96 %  O2 Device (Oxygen Therapy): room air      Intake/Output Summary (Last 24 hours) at 4/18/2019 0855  Last data filed at 4/17/2019 1700  Gross per 24 hour   Intake 240 ml   Output 100 ml   Net 140 ml       Lines/Drains/Airways     Peripheral Intravenous Line                 Peripheral IV - Single Lumen 04/14/19 1324 18 G Left Antecubital 3 days         Peripheral IV - Single Lumen 04/16/19 1819 18 G Left Forearm 1 day                Physical Exam    Significant Labs:   BMP:   Recent Labs   Lab 04/16/19 1826 04/17/19  0517 04/18/19  0541   * 97 112*    142 139   K 3.7 3.7 3.2*    110 109   CO2 23 22* 20*   BUN 24* 18 14   CREATININE 1.4 1.1 1.0   CALCIUM 10.0 9.5 9.1   , CMP   Recent Labs   Lab 04/16/19 1826 04/17/19  0517 04/18/19  0541    142 139   K 3.7 3.7 3.2*    110 109   CO2 23 22* 20*   * 97 112*   BUN 24* 18 14   CREATININE 1.4 1.1 1.0   CALCIUM 10.0 9.5 9.1   PROT 6.3 5.8* 5.4*   ALBUMIN 3.6 3.3* 3.0*   BILITOT 1.3* 1.3* 1.8*   ALKPHOS 79 66 61   AST 11 12 16   ALT 8* 8* 12   ANIONGAP 10 10 10   ESTGFRAFRICA 54* >60 >60   EGFRNONAA 46* >60 >60   , CBC   Recent Labs    Lab 04/16/19  1826 04/17/19  0517 04/18/19  0541   WBC 4.75 4.59 6.52   HGB 9.8* 9.6* 9.4*   HCT 32.4* 32.0* 31.3*   * 118* 111*    and Troponin No results for input(s): TROPONINI in the last 48 hours.    Significant Imaging: CT scan: see above  Assessment and Plan:     Confusion--subacute CVA by CT, MRI no new by MRI  Fever--blood cultures pending, WBC wnl  PAF--currently NSR   Chronic diastolic CHF  Anemia--stable  HTN  ASCVD    Plan:   Continue ASA/atorvastatin/pradaxa  Resume coreg/lisinopril/amlodipine  If blood cx (+) will consider transfer for INES  Evidence of possible pneumonia on CT however may account for fever    Active Diagnoses:    Diagnosis Date Noted POA    PRINCIPAL PROBLEM:  Cerebrovascular accident (CVA) [I63.9] 04/16/2019 Yes    CHF (congestive heart failure), NYHA class II, chronic, diastolic [I50.32] 02/19/2019 Yes    Anemia [D64.9] 11/03/2016 Yes    Essential hypertension [I10] 10/28/2016 Yes    ASCVD (arteriosclerotic cardiovascular disease) [I25.10] 03/19/2015 Yes    Emphysema/COPD [J43.9] 02/03/2015 Yes      Problems Resolved During this Admission:       VTE Risk Mitigation (From admission, onward)        Ordered     dabigatran etexilate capsule 150 mg  Daily      04/16/19 2044          Thank you for your consult. I will follow-up with patient. Please contact us if you have any additional questions.    Divya Pereyra NP  Cardiology   [facility]    I attest that I have personally seen and examined this patient. I have reviewed and discussed the management in detail as outlined above.

## 2019-04-18 NOTE — SUBJECTIVE & OBJECTIVE
Review of Systems   Constitutional: Positive for activity change, chills and fever. Negative for fatigue.   HENT: Negative for congestion, sinus pressure, sinus pain, sore throat and trouble swallowing.    Eyes: Positive for visual disturbance (left eye with some ptosis). Negative for pain, discharge, redness and itching.   Respiratory: Negative for cough, shortness of breath and wheezing.    Cardiovascular: Negative for chest pain, palpitations and leg swelling.   Gastrointestinal: Negative for abdominal pain, blood in stool, constipation, diarrhea, nausea and vomiting.   Genitourinary: Negative for difficulty urinating, dysuria and frequency.        Gets up multiple times a night to urinate   Musculoskeletal: Positive for back pain (chronic) and gait problem. Negative for arthralgias, joint swelling, myalgias and neck pain.   Skin: Negative for rash and wound.   Neurological: Positive for weakness. Negative for dizziness, syncope, speech difficulty and light-headedness.   Psychiatric/Behavioral: Positive for confusion. Negative for hallucinations (hearing).     Objective:     Vital Signs (Most Recent):  Temp: 97.9 °F (36.6 °C) (04/18/19 0705)  Pulse: 60 (04/18/19 1000)  Resp: 18 (04/18/19 0705)  BP: (!) 131/58 (04/18/19 0705)  SpO2: 96 % (04/18/19 0803) Vital Signs (24h Range):  Temp:  [97.9 °F (36.6 °C)-103.5 °F (39.7 °C)] 97.9 °F (36.6 °C)  Pulse:  [60-96] 60  Resp:  [17-20] 18  SpO2:  [91 %-97 %] 96 %  BP: (127-186)/(58-77) 131/58     Weight: 89.4 kg (197 lb)  Body mass index is 30.85 kg/m².    Physical Exam   Constitutional: He is oriented to person, place, and time. He appears well-developed and well-nourished. No distress.   Sitting up in bed   HENT:   Head: Normocephalic and atraumatic.   Left ptosis   Eyes: Pupils are equal, round, and reactive to light. Conjunctivae are normal.   Neck: Normal range of motion. Neck supple. No thyromegaly present.   Cardiovascular: Normal rate, regular rhythm and intact  distal pulses.   Murmur heard.  Pulmonary/Chest: Effort normal and breath sounds normal. No respiratory distress. He has no wheezes.   Abdominal: Soft. Bowel sounds are normal. There is no tenderness.   Musculoskeletal: Normal range of motion. He exhibits no edema.   Lymphadenopathy:     He has no cervical adenopathy.   Neurological: He is alert and oriented to person, place, and time.   Slight right lower extremity weakness.    Sitting. Gait not assessed this morning.    Upper extremities are bilaterally strong    Skin: Skin is warm and dry. No rash noted.   Psychiatric: He has a normal mood and affect. His behavior is normal.   Nursing note and vitals reviewed.        CRANIAL NERVES     CN III, IV, VI   Pupils are equal, round, and reactive to light.       Significant Labs:   CBC:   Recent Labs   Lab 19  0517 19  0541   WBC 4.75 4.59 6.52   HGB 9.8* 9.6* 9.4*   HCT 32.4* 32.0* 31.3*   * 118* 111*     retic 1.5  Iron 28, transferrin 257, TIBC 380, sat iron 7, ferritin 26  b12 184  Folate 12.0    CMP:   Recent Labs   Lab 19  0517 19  0541    142 139   K 3.7 3.7 3.2*    110 109   CO2 23 22* 20*   * 97 112*   BUN 24* 18 14   CREATININE 1.4 1.1 1.0   CALCIUM 10.0 9.5 9.1   PROT 6.3 5.8* 5.4*   ALBUMIN 3.6 3.3* 3.0*   BILITOT 1.3* 1.3* 1.8*   ALKPHOS 79 66 61   AST 11 12 16   ALT 8* 8* 12   ANIONGAP 10 10 10   EGFRNONAA 46* >60 >60     Coagulation:   Recent Labs   Lab 19   INR 1.2   APTT 35.9*     Recent Labs   Lab 19  1325   CPK 56   CPKMB 2.3   TROPONINI 0.084*   MB 4.1     BNP  Recent Labs   Lab 19  1325   *     PSA 1.4     lactic acid 1.0     blood culture + gpc    Urinalysis hazy with 1+ protein and trace occult. 1RBC    Significant Imagin/14 CT head Generalized cerebral atrophy with evidence of deep white matter ischemic changes.  There is evidence of old lacune involving the right caudate  lobe, right basal ganglia, and in the region the right caudothalamic groove as manifested by small areas of low-density change.    4/14 CXR Poor inspiratory effort allowing widening of the the cardiomediastinal silhouette and crowding of vessels.  There is evidence of upward distribution of flow suggestive of mild central vascular congestion changes.  No florid pulmonary edema is identified.    4/16 MRI brain   1. No new stroke hemorrhage or subdural fluid or other acute change.  2. Extensive white matter basal ganglia abnormality suspicious for remote microvascular ischemic change    4/16 CTA head and neck   1. Focal tiny areas of acute subacute under 2 weeks age areas of deep subcortical white matter ischemia and left posterior frontal parietal, seen on previous MRI scan review, with 1 area showing focal enhancement on exam.  2. Significant segmental stenosis distal left vertebral, less prominent stenosis contralateral side.  50% stenosis right internal carotid, 60% stenosis left subclavian.  3. 70% stenosis right M3 segment with few additional for distal segmental stenosis.  4. Tiny aneurysmal deformities and M1 segment and distal internal carotid arteries discussed above.  5. Incidental patchy pneumonitis dependent aspect right upper lobe superimposed on central lobular emphysematous change.  Follow-up chest x-ray and/or CT scan chest suggested for further evaluation as clinically indicated.    4/16 CXR   No acute cardiopulmonary disease.    4/17 CXR   Poor inspiratory effort without evidence of acute cardiopulmonary findings.  There is evidence of treated coronary artery disease.  Bibasilar atelectasis is noted.

## 2019-04-19 PROBLEM — I51.3 ATRIAL THROMBUS: Status: ACTIVE | Noted: 2019-04-19

## 2019-04-20 PROBLEM — I47.29 NSVT (NONSUSTAINED VENTRICULAR TACHYCARDIA): Status: ACTIVE | Noted: 2019-04-20

## 2019-04-20 LAB
BACTERIA BLD CULT: NORMAL

## 2019-04-21 PROBLEM — R41.0 CONFUSION: Status: ACTIVE | Noted: 2019-04-21

## 2019-04-22 NOTE — PHYSICIAN QUERY
PT Name: St Lacho Farooq  MR #: [id number]     Physician Query Form - Diagnosis Clarification    CDS: Brea Monson RN  Contact information: [email or web address]  This form is a permanent document in the medical record.     Query Date: April 22, 2019  By submitting this query, we are merely seeking further clarification of documentation.  Please utilize your independent clinical judgment when addressing the question(s) below.     The medical record contains the following:    Findings Supporting Clinical Information Location in Medical Record   Pneumonitis   Question of reported pneumonitis on CT.  Patient denies pain, does not report a productive cough. Daughter at bedside report that he did cough over night. CXR does not show acute process. No fever . NO elevated WBC  Respiratory: Positive for cough. Negative for shortness of breath and wheezing.  4/14 CXR Poor inspiratory effort allowing widening of the the cardiomediastinal silhouette and crowding of vessels.  There is evidence of upward distribution of flow suggestive of mild central vascular congestion changes.  No florid pulmonary edema is identified.  4/16 CTA head and neck  Incidental patchy pneumonitis dependent aspect right upper lobe superimposed on central lobular emphysematous change.  Follow-up chest x-ray and/or CT scan chest suggested for further evaluation as clinically indicated  4/16 CXR   No acute cardiopulmonary disease    Emphysema/COPD  anoro at home  Has cough today  Will start nebs         4/18 pt is having episode of off and on consciousness. He is also spiking elevated temps up to 103.5. Suppository given and temp came down. Blood cultures done from first visit negative, repeat blood cultures drawn yesterday.   His only complaints are a cough, pneumonitis on CXR.   Zosyn started yesterday for broad spectrum coverage.    Blood cultures are positive this morning. Echo (TTE) was okay, but no other source but endocarditis at this point.  Discussed with family need for INES and transfer as cardiology cannot perform one here.    * Bacteremia  Gram + cocci in blood cultures from yesterday. This is explanatory of his fever and  Recent tastroke especially with recent valve replacement. Will check TTE today and call for transfer back to Dr mccray at [facility] where recent surgery was done. Start vanc this am and cont zosyn   H&P 4/17                                                  HM PN 4/18     Please clarify if, for this admission, the pneumonitis diagnosis has been:    [ * ] Ruled In   [  ] Ruled In, Now Resolved   [  ] Ruled Out   [  ] Other/Clarification of findings (please specify):   [  ] Clinically insignificant     [  ] Clinically undetermined     Please document in your progress notes daily for the duration of treatment, until resolved, and include in your discharge summary.                He was treated for pneumonitis with nebs

## 2019-04-23 ENCOUNTER — HOSPITAL ENCOUNTER (INPATIENT)
Facility: HOSPITAL | Age: 84
LOS: 23 days | Discharge: HOME HEALTH | DRG: 871 | End: 2019-05-16
Attending: INTERNAL MEDICINE | Admitting: INTERNAL MEDICINE
Payer: MEDICARE

## 2019-04-23 DIAGNOSIS — I51.3 ATRIAL THROMBUS: ICD-10-CM

## 2019-04-23 DIAGNOSIS — D50.9 IRON DEFICIENCY ANEMIA, UNSPECIFIED IRON DEFICIENCY ANEMIA TYPE: ICD-10-CM

## 2019-04-23 DIAGNOSIS — G93.40 ENCEPHALOPATHY: ICD-10-CM

## 2019-04-23 DIAGNOSIS — I63.10 CEREBROVASCULAR ACCIDENT (CVA) DUE TO EMBOLISM OF PRECEREBRAL ARTERY: ICD-10-CM

## 2019-04-23 DIAGNOSIS — R78.81 BACTEREMIA: ICD-10-CM

## 2019-04-23 DIAGNOSIS — I10 ESSENTIAL HYPERTENSION: ICD-10-CM

## 2019-04-23 PROBLEM — I33.0 ACUTE BACTERIAL ENDOCARDITIS: Status: ACTIVE | Noted: 2019-04-23

## 2019-04-23 PROCEDURE — 94761 N-INVAS EAR/PLS OXIMETRY MLT: CPT

## 2019-04-23 PROCEDURE — 11000004 HC SNF PRIVATE

## 2019-04-23 PROCEDURE — 63600175 PHARM REV CODE 636 W HCPCS: Performed by: INTERNAL MEDICINE

## 2019-04-23 PROCEDURE — 94799 UNLISTED PULMONARY SVC/PX: CPT

## 2019-04-23 PROCEDURE — 25000003 PHARM REV CODE 250: Performed by: INTERNAL MEDICINE

## 2019-04-23 PROCEDURE — A4216 STERILE WATER/SALINE, 10 ML: HCPCS | Performed by: INTERNAL MEDICINE

## 2019-04-23 RX ORDER — SODIUM CHLORIDE 0.9 % (FLUSH) 0.9 %
10 SYRINGE (ML) INJECTION
Status: DISCONTINUED | OUTPATIENT
Start: 2019-04-23 | End: 2019-04-23 | Stop reason: SDUPTHER

## 2019-04-23 RX ORDER — DABIGATRAN ETEXILATE 75 MG/1
150 CAPSULE ORAL 2 TIMES DAILY
Status: DISCONTINUED | OUTPATIENT
Start: 2019-04-23 | End: 2019-04-29

## 2019-04-23 RX ORDER — LISINOPRIL 20 MG/1
20 TABLET ORAL DAILY
Status: DISCONTINUED | OUTPATIENT
Start: 2019-04-24 | End: 2019-04-29

## 2019-04-23 RX ORDER — IPRATROPIUM BROMIDE AND ALBUTEROL SULFATE 2.5; .5 MG/3ML; MG/3ML
3 SOLUTION RESPIRATORY (INHALATION) EVERY 6 HOURS PRN
Status: DISCONTINUED | OUTPATIENT
Start: 2019-04-23 | End: 2019-05-16 | Stop reason: HOSPADM

## 2019-04-23 RX ORDER — AMLODIPINE BESYLATE 10 MG/1
10 TABLET ORAL DAILY
Status: DISCONTINUED | OUTPATIENT
Start: 2019-04-24 | End: 2019-04-30

## 2019-04-23 RX ORDER — ACETAMINOPHEN 325 MG/1
650 TABLET ORAL EVERY 8 HOURS PRN
Status: DISCONTINUED | OUTPATIENT
Start: 2019-04-23 | End: 2019-05-16 | Stop reason: HOSPADM

## 2019-04-23 RX ORDER — HYDROCHLOROTHIAZIDE 12.5 MG/1
12.5 TABLET ORAL DAILY
Status: DISCONTINUED | OUTPATIENT
Start: 2019-04-24 | End: 2019-04-30

## 2019-04-23 RX ORDER — ATORVASTATIN CALCIUM 10 MG/1
10 TABLET, FILM COATED ORAL NIGHTLY
Status: DISCONTINUED | OUTPATIENT
Start: 2019-04-23 | End: 2019-05-16 | Stop reason: HOSPADM

## 2019-04-23 RX ORDER — CARVEDILOL 25 MG/1
25 TABLET ORAL 2 TIMES DAILY WITH MEALS
Status: DISCONTINUED | OUTPATIENT
Start: 2019-04-23 | End: 2019-05-16 | Stop reason: HOSPADM

## 2019-04-23 RX ORDER — SODIUM CHLORIDE 0.9 % (FLUSH) 0.9 %
10 SYRINGE (ML) INJECTION EVERY 6 HOURS
Status: DISCONTINUED | OUTPATIENT
Start: 2019-04-23 | End: 2019-05-11

## 2019-04-23 RX ORDER — LISINOPRIL AND HYDROCHLOROTHIAZIDE 12.5; 2 MG/1; MG/1
1 TABLET ORAL DAILY
Status: DISCONTINUED | OUTPATIENT
Start: 2019-04-24 | End: 2019-04-23

## 2019-04-23 RX ORDER — FUROSEMIDE 20 MG/1
20 TABLET ORAL DAILY
Status: DISCONTINUED | OUTPATIENT
Start: 2019-04-24 | End: 2019-04-29

## 2019-04-23 RX ORDER — ONDANSETRON 2 MG/ML
4 INJECTION INTRAMUSCULAR; INTRAVENOUS EVERY 6 HOURS PRN
Status: DISCONTINUED | OUTPATIENT
Start: 2019-04-23 | End: 2019-04-23

## 2019-04-23 RX ORDER — PANTOPRAZOLE SODIUM 40 MG/1
40 TABLET, DELAYED RELEASE ORAL DAILY
Status: DISCONTINUED | OUTPATIENT
Start: 2019-04-24 | End: 2019-05-16 | Stop reason: HOSPADM

## 2019-04-23 RX ORDER — ASPIRIN 81 MG/1
81 TABLET ORAL DAILY
Status: DISCONTINUED | OUTPATIENT
Start: 2019-04-24 | End: 2019-05-16 | Stop reason: HOSPADM

## 2019-04-23 RX ORDER — ONDANSETRON 2 MG/ML
4 INJECTION INTRAMUSCULAR; INTRAVENOUS EVERY 6 HOURS PRN
Status: DISCONTINUED | OUTPATIENT
Start: 2019-04-23 | End: 2019-05-16 | Stop reason: HOSPADM

## 2019-04-23 RX ORDER — SODIUM CHLORIDE 0.9 % (FLUSH) 0.9 %
10 SYRINGE (ML) INJECTION
Status: DISCONTINUED | OUTPATIENT
Start: 2019-04-23 | End: 2019-05-11

## 2019-04-23 RX ADMIN — Medication 10 ML: at 10:04

## 2019-04-23 RX ADMIN — Medication 10 ML: at 09:04

## 2019-04-23 RX ADMIN — ATORVASTATIN CALCIUM 10 MG: 10 TABLET, FILM COATED ORAL at 09:04

## 2019-04-23 RX ADMIN — AMPICILLIN SODIUM 2 G: 2 INJECTION, POWDER, FOR SOLUTION INTRAMUSCULAR; INTRAVENOUS at 09:04

## 2019-04-23 RX ADMIN — DABIGATRAN ETEXILATE MESYLATE 150 MG: 75 CAPSULE ORAL at 09:04

## 2019-04-23 RX ADMIN — AMPICILLIN SODIUM 2 G: 2 INJECTION, POWDER, FOR SOLUTION INTRAMUSCULAR; INTRAVENOUS at 02:04

## 2019-04-23 RX ADMIN — CARVEDILOL 25 MG: 25 TABLET, FILM COATED ORAL at 05:04

## 2019-04-23 NOTE — PLAN OF CARE
04/23/19 1435   Advance Directives (For Healthcare)   Advance Directive  (If Adv Dir status is received, view document under Code in header or Chart Review Media tab) Advance Directive currently in Epic.

## 2019-04-23 NOTE — PLAN OF CARE
04/23/19 1435   Discharge Assessment   Assessment Type Discharge Planning Assessment   Confirmed/corrected address and phone number on facesheet? Yes   Assessment information obtained from? Patient   Expected Length of Stay (days) 37   Communicated expected length of stay with patient/caregiver yes   Prior to hospitilization cognitive status: Alert/Oriented   Prior to hospitalization functional status: Needs Assistance   Current cognitive status: Alert/Oriented   Current Functional Status: Needs Assistance   [place] [facility] [place] With spouse   Able to Return to Prior Arrangements yes   Is patient able to care for self after discharge? Unable to determine at this time (comments)   Who are your caregiver(s) and their phone number(s)? Mitzy (daughter) [phone]   Patient's perception of discharge disposition home health   Readmission Within the Last 30 Days planned readmission   If yes, most [place] [facility] [place]   Patient currently being followed by outpatient case management? No   Patient currently receives any other outside agency services? No   Equipment Currently Used at Home walker, rolling;shower chair   Do you have any problems affording any of your prescribed medications? No   Is the patient taking medications as prescribed? yes   Does the patient have transportation home? Yes   Transportation Anticipated family or friend will provide   Dialysis Name and Scheduled days n/a   Does the patient receive services at the [facility]? No   Discharge Plan A Home Health   Discharge Plan B Skilled Nursing Facility   DME Needed Upon Discharge  other (see comments)  (TBD)   Patient/Family in Agreement with Plan yes           Patient admitted to swing bed for 6 weeks IV antibiotic therapy. He is currently day 5/42 of Ampicillin. Patient lives at home with spouse, but has a great support system. Daughter, Mitzy, checks on patient daily and takes care of both patient and spouse. Post acute needs  will be determined closer to discharge, but patient does understand that he may go home with home health, and he is fine with that. Patient educated on diagnosis for this admission, and patient verbalizes understanding. Discharge planning brochure and educational handout of what signs and symptoms to look for after discharge, and how to manage health at home, given to patient and family. Patient and family are encouraged to call with any questions or help the would need. Contact information given. CM will continue to follow patient throughout the transitions of care, and assist with any discharge needs.

## 2019-04-23 NOTE — PLAN OF CARE
Problem: Adult Inpatient Plan of Care  Goal: Plan of Care Review  Outcome: Ongoing (interventions implemented as appropriate)  Educate patient/family/cargiver on the benefits of incentive spirometry as it will aid in preventing or reversing atelectasis and hypoxia.  Encourage frequent patient use while   awake to achieve a predetermined goal volume.

## 2019-04-24 PROCEDURE — 25000003 PHARM REV CODE 250: Performed by: INTERNAL MEDICINE

## 2019-04-24 PROCEDURE — A4216 STERILE WATER/SALINE, 10 ML: HCPCS | Performed by: INTERNAL MEDICINE

## 2019-04-24 PROCEDURE — 99222 1ST HOSP IP/OBS MODERATE 55: CPT | Mod: ,,, | Performed by: FAMILY MEDICINE

## 2019-04-24 PROCEDURE — 94761 N-INVAS EAR/PLS OXIMETRY MLT: CPT

## 2019-04-24 PROCEDURE — 99222 PR INITIAL HOSPITAL CARE,LEVL II: ICD-10-PCS | Mod: ,,, | Performed by: FAMILY MEDICINE

## 2019-04-24 PROCEDURE — 99900035 HC TECH TIME PER 15 MIN (STAT)

## 2019-04-24 PROCEDURE — 97167 OT EVAL HIGH COMPLEX 60 MIN: CPT

## 2019-04-24 PROCEDURE — 97530 THERAPEUTIC ACTIVITIES: CPT

## 2019-04-24 PROCEDURE — 63600175 PHARM REV CODE 636 W HCPCS: Performed by: INTERNAL MEDICINE

## 2019-04-24 PROCEDURE — 97161 PT EVAL LOW COMPLEX 20 MIN: CPT

## 2019-04-24 PROCEDURE — 11000004 HC SNF PRIVATE

## 2019-04-24 PROCEDURE — 94799 UNLISTED PULMONARY SVC/PX: CPT

## 2019-04-24 RX ADMIN — AMPICILLIN SODIUM 2 G: 2 INJECTION, POWDER, FOR SOLUTION INTRAMUSCULAR; INTRAVENOUS at 08:04

## 2019-04-24 RX ADMIN — AMLODIPINE BESYLATE 10 MG: 10 TABLET ORAL at 08:04

## 2019-04-24 RX ADMIN — DABIGATRAN ETEXILATE MESYLATE 150 MG: 75 CAPSULE ORAL at 09:04

## 2019-04-24 RX ADMIN — AMPICILLIN SODIUM 2 G: 2 INJECTION, POWDER, FOR SOLUTION INTRAMUSCULAR; INTRAVENOUS at 03:04

## 2019-04-24 RX ADMIN — LISINOPRIL 20 MG: 20 TABLET ORAL at 08:04

## 2019-04-24 RX ADMIN — Medication 10 ML: at 10:04

## 2019-04-24 RX ADMIN — Medication 10 ML: at 09:04

## 2019-04-24 RX ADMIN — DABIGATRAN ETEXILATE MESYLATE 150 MG: 75 CAPSULE ORAL at 08:04

## 2019-04-24 RX ADMIN — Medication 10 ML: at 03:04

## 2019-04-24 RX ADMIN — FUROSEMIDE 20 MG: 20 TABLET ORAL at 08:04

## 2019-04-24 RX ADMIN — Medication 10 ML: at 05:04

## 2019-04-24 RX ADMIN — PANTOPRAZOLE SODIUM 40 MG: 40 TABLET, DELAYED RELEASE ORAL at 08:04

## 2019-04-24 RX ADMIN — CARVEDILOL 25 MG: 25 TABLET, FILM COATED ORAL at 08:04

## 2019-04-24 RX ADMIN — ASPIRIN 81 MG: 81 TABLET, COATED ORAL at 08:04

## 2019-04-24 RX ADMIN — HYDROCHLOROTHIAZIDE 12.5 MG: 12.5 TABLET ORAL at 08:04

## 2019-04-24 RX ADMIN — CARVEDILOL 25 MG: 25 TABLET, FILM COATED ORAL at 05:04

## 2019-04-24 RX ADMIN — ATORVASTATIN CALCIUM 10 MG: 10 TABLET, FILM COATED ORAL at 09:04

## 2019-04-24 RX ADMIN — AMPICILLIN SODIUM 2 G: 2 INJECTION, POWDER, FOR SOLUTION INTRAMUSCULAR; INTRAVENOUS at 09:04

## 2019-04-24 RX ADMIN — Medication 10 ML: at 04:04

## 2019-04-24 NOTE — PLAN OF CARE
Problem: Adult Inpatient Plan of Care  Goal: Plan of Care Review  Patient up with walker assist. Patient alert/oriented this AM. Forgetful at times. OT report patient has some vision problems.  Telemetry. PICC line intact Right arm. Patient getting PT/OT for prev CVA. Neuro checks. IV ampicillin. Labs. Safety and comfort maintained.

## 2019-04-24 NOTE — PLAN OF CARE
Problem: Adult Inpatient Plan of Care  Goal: Plan of Care Review  Outcome: Ongoing (interventions implemented as appropriate)  Pt agrees with plan of care.  VS stable.  No complaints of pain noted.  IV antibiotics continued as ordered.  Right Picc line intact without redness.   Family at bedside.  Pt up in  most of night.  Will continue to monitor.

## 2019-04-24 NOTE — HPI
This 84 year old man with PMHX of CAD p CABG, [facility] 2/19; stable lesions, PAD, bilateral CVD, AAA,  PAF, HTN, hyperlipidemia, MALATHI, NYHA II chronic diastolic HF and non rheumatic critical AS with JAMAAL 0.8cm2 with preserved LVSFX EF55%,  per Echo underwent  + TAVR placed 3/18/19 per Dr. Babcock. Post op course was complicated by CVA ~ 2 weeks post TAVR; he presented to [place] 4/16 with altered mental status and fever where he was Dx with CVA and gram + bacteremia.   He was  transferred to [facility] 4/19  for cardiology and ID eval ant tx; CT at that time was unrevealing for an acute intracranial process  His MRI was abnormal.  His blood cultures have grown Enterococcus faecalis sensitve to all tested drugs,  Pt initially given vancomycin and then changed to ampicillin IV.  He has clinically improved.  His INES shows a septic atrial thrombus. The cRP is 49 and the ESR is 15.  Pt is no longer febrile and he and daughter feel he is at base line physically and mentally.Pt transferred to [place] yesterday for continued skilled care and prolonged IV ABX. Plan is 6 weeks of ABX; ampicillin 2gms IV q 6; day 1 with negative blood cultures was 4/19; so today is day 6/42  Requests DNR .

## 2019-04-24 NOTE — NURSING
Bedside report received from Melvina.  Family at bedside.  Vs stable.  Call bell in reach.  Picc line intact.  No signs of infection noted. Will continue to monitor.

## 2019-04-24 NOTE — ASSESSMENT & PLAN NOTE
Subacute CVA with focal deficits noted on 4/14, with significant improvement in symptoms   Per Dr. Fernandez's recommendations we will continue:  -Telemetry  -Neurochecks  -NO TPA as he presented initially on Sunday  -Physical Therapy and OT Consult, evaluation and treatment at [place] once patient is admitted to  SNF  -Completed permissive HTN and will need strict BP control.   -Continue his current dose home meds otherwise including Asprin, Pradaxa and statin

## 2019-04-24 NOTE — SUBJECTIVE & OBJECTIVE
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    S/P 2-vessel coronary artery bypass        Past Surgical History:   Procedure Laterality Date    CARPAL TUNNEL RELEASE      CATARACT EXTRACTION, BILATERAL      CORONARY ANGIOGRAPHY  2019    CORONARY ARTERY BYPASS GRAFT  2004    HERNIA REPAIR      Inguinal & Ventral with MESH    INSERTION, PICC N/A 4/22/2019    [facility] by [facility] at [facility] OR    REPLACEMENT, AORTIC VALVE, PERCUTANEOUS, TRANSCATHETER Right 3/18/2019    Performed by Mani Babcock MD at [facility] CATH    REPLACEMENT, AORTIC VALVE, PERCUTANEOUS, TRANSCATHETER Right 3/18/2019    Performed by Ac Osman MD at [facility] CATH    TONSILLECTOMY      TRANSESOPHAGEAL ECHOCARDIOGRAM (INES) N/A 6/28/2016    Performed by Hugh Begum MD at [facility] OR       Review of patient's allergies indicates:   Allergen Reactions    Bactrim [sulfamethoxazole-trimethoprim] Rash    Codeine Rash       Current Facility-Administered Medications on File Prior to Encounter   Medication    [DISCONTINUED] acetaminophen tablet 650 mg    [DISCONTINUED] albuterol-ipratropium 2.5 mg-0.5 mg/3 mL nebulizer solution 3 mL    [DISCONTINUED] amLODIPine tablet 10 mg    [DISCONTINUED] ampicillin 2 g in sodium chloride 0.9 % 100 mL IVPB (ready to mix system)    [DISCONTINUED] aspirin EC tablet 81 mg    [DISCONTINUED] atorvastatin tablet 10 mg    [DISCONTINUED] carvedilol tablet 25 mg     "[DISCONTINUED] dabigatran etexilate capsule 150 mg    [DISCONTINUED] furosemide tablet 20 mg    [DISCONTINUED] lisinopril-hydrochlorothiazide 20-12.5 mg per tablet 1 tablet    [DISCONTINUED] ondansetron injection 4 mg    [DISCONTINUED] ondansetron injection 4 mg    [DISCONTINUED] pantoprazole EC tablet 40 mg    [DISCONTINUED] sodium chloride 0.9% flush 10 mL    [DISCONTINUED] sodium chloride 0.9% flush 10 mL    [DISCONTINUED] sodium chloride 0.9% flush 10 mL     Current Outpatient Medications on File Prior to Encounter   Medication Sig    amLODIPine (NORVASC) 2.5 MG tablet Take 2.5 mg by mouth once daily.    ANORO ELLIPTA 62.5-25 mcg/actuation DsDv INHALE 1 PUFF INTO LUNGS ONCE DAILY    aspirin (ECOTRIN) 81 MG EC tablet Take 81 mg by mouth once daily.    atorvastatin (LIPITOR) 10 MG tablet Take 10 mg by mouth every evening.    carvedilol (COREG) 12.5 MG tablet Take 25 mg by mouth 2 (two) times daily with meals.     dabigatran etexilate (PRADAXA) 150 mg Cap Take 150 mg by mouth 2 (two) times daily. "Do NOT break, chew, or open capsules."    furosemide (LASIX) 20 MG tablet Take 20 mg by mouth 2 (two) times daily.    lisinopril (PRINIVIL,ZESTRIL) 20 MG tablet Take 1 tablet (20 mg total) by mouth once daily.    nitroGLYCERIN (NITROSTAT) 0.4 MG SL tablet Place 0.4 mg under the tongue every 5 (five) minutes as needed for Chest pain.    omeprazole (PRILOSEC) 40 MG capsule Take 40 mg by mouth once daily.    ranolazine (RANEXA) 500 MG Tb12 Take 500 mg by mouth 2 (two) times daily.     Family History     Problem Relation (Age of Onset)    Atrial fibrillation Father, Sister    COPD Sister    Cancer Father    Diabetes type II Mother, Sister    Heart failure Mother    Hypertension Mother, Father, Sister, Brother    Pacemaker/defibrilator Sister    Pulmonary embolism Brother        Tobacco Use    Smoking status: Former Smoker     Last attempt to quit: 2004     Years since quitting: 15.3    Smokeless tobacco: " Never Used    Tobacco comment: Smoked for 60 years   Substance and Sexual Activity    Alcohol use: No     Frequency: Never    Drug use: No    Sexual activity: Yes     Partners: Female     Review of Systems   Constitutional: Negative.    HENT: Negative for congestion, ear pain, sinus pressure, sneezing and sore throat.    Eyes: Negative.    Respiratory: Negative for cough, chest tightness, shortness of breath and wheezing.    Cardiovascular: Negative.  Negative for chest pain.   Gastrointestinal: Negative for abdominal pain, blood in stool, constipation, diarrhea, nausea and vomiting.   Genitourinary: Negative for difficulty urinating, dysuria and flank pain.   Musculoskeletal: Negative.  Negative for joint swelling.   Skin: Negative.    Neurological: Negative for dizziness, weakness and headaches.   Hematological: Negative.    Psychiatric/Behavioral: Negative.  Negative for behavioral problems and confusion.     Objective:     Vital Signs (Most Recent):  Temp: 97.6 °F (36.4 °C) (04/24/19 0722)  Pulse: 70 (04/24/19 0740)  Resp: 16 (04/24/19 0740)  BP: (!) 131/58 (04/24/19 0722)  SpO2: 97 % (04/24/19 0740) Vital Signs (24h Range):  Temp:  [97.1 °F (36.2 °C)-98.3 °F (36.8 °C)] 97.6 °F (36.4 °C)  Pulse:  [61-77] 70  Resp:  [16-20] 16  SpO2:  [94 %-97 %] 97 %  BP: (103-131)/(52-60) 131/58     Weight: 89.4 kg (197 lb)  Body mass index is 29.95 kg/m².    Physical Exam   Constitutional: He is oriented to person, place, and time. He appears well-developed and well-nourished.   HENT:   Head: Normocephalic and atraumatic.   Right Ear: Tympanic membrane, external ear and ear canal normal.   Left Ear: Tympanic membrane, external ear and ear canal normal.   Nose: Nose normal.   Mouth/Throat: Oropharynx is clear and moist.   Eyes: Pupils are equal, round, and reactive to light. Conjunctivae and EOM are normal.   Neck: Normal range of motion. Neck supple. No tracheal deviation present. No thyromegaly present.   Cardiovascular:  Normal rate, regular rhythm, intact distal pulses and normal pulses. Exam reveals no gallop and no friction rub.   Murmur heard.  Pulses:       Carotid pulses are on the right side with bruit, and on the left side with bruit.  Pulmonary/Chest: Effort normal and breath sounds normal.   Abdominal: Soft. Bowel sounds are normal. He exhibits no distension and no mass. There is no tenderness. There is no rebound and no guarding. Hernia confirmed negative in the right inguinal area and confirmed negative in the left inguinal area.   Musculoskeletal: Normal range of motion.   Neurological: He is alert and oriented to person, place, and time. He has normal reflexes.   Skin: Skin is warm and dry.   Psychiatric: He has a normal mood and affect. His behavior is normal. Judgment and thought content normal.         CRANIAL NERVES     CN III, IV, VI   Pupils are equal, round, and reactive to light.  Extraocular motions are normal.        Significant Labs:   A1C: No results for input(s): HGBA1C in the last 4320 hours.  Blood Culture: No results for input(s): LABBLOO in the last 48 hours.  CMP:   Recent Labs   Lab 04/23/19  0526      K 4.0      CO2 28      BUN 13   CREATININE 1.10   CALCIUM 9.7   PROT 6.4   ALBUMIN 3.6   BILITOT 0.8   ALKPHOS 64   AST 26   ALT 35   EGFRNONAA >60     Cardiac Markers: No results for input(s): CKMB, MYOGLOBIN, BNP, TROPISTAT in the last 48 hours.  Lactic Acid: No results for input(s): LACTATE in the last 48 hours.  Magnesium:   Recent Labs   Lab 04/23/19  0526   MG 2.2       Significant Imaging: I have reviewed all pertinent imaging results/findings within the past 24 hours.  I have reviewed and interpreted all pertinent imaging results/findings within the past 24 hours.

## 2019-04-24 NOTE — ASSESSMENT & PLAN NOTE
"S/p recent TAVR now with septic emboli and concern for "flicking emboli"   His blood cultures have grown Enterococcus faecalis sensitve to all tested drugs  ID recommending total of 6 weeks IV ABX; Ampicillin 2gms IV q 6 hr; day 1 with negative blood cultures was 4/19/19; so today is day 6/42      "

## 2019-04-24 NOTE — H&P
[facility] Medicine  History & Physical    Patient Name: St Lacho Farooq  MRN: [id number]  Admission Date: 4/23/2019  Attending Physician: John Saeed MD   Primary Care Provider: Dylan Adam MD         Patient information was obtained from patient, past medical records and ER records.     Subjective:     Principal Problem:Bacteremia    Chief Complaint: No chief complaint on file.       HPI: This 84 year old man with PMHX of CAD p CABG, [facility] 2/19; stable lesions, PAD, bilateral CVD, AAA,  PAF, HTN, hyperlipidemia, MALATHI, NYHA II chronic diastolic HF and non rheumatic critical AS with JAMAAL 0.8cm2 with preserved LVSFX EF55%,  per Echo underwent  + TAVR placed 3/18/19 per Dr. Babcock. Post op course was complicated by CVA ~ 2 weeks post TAVR; he presented to [facility] 4/16 with altered mental status and fever where he was Dx with CVA and gram + bacteremia.   He was  transferred to [facility] 4/19  for cardiology and ID eval ant tx; CT at that time was unrevealing for an acute intracranial process  His MRI was abnormal.  His blood cultures have grown Enterococcus faecalis sensitve to all tested drugs,  Pt initially given vancomycin and then changed to ampicillin IV.  He has clinically improved.  His INES shows a septic atrial thrombus. The cRP is 49 and the ESR is 15.  Pt is no longer febrile and he and daughter feel he is at base line physically and mentally.Pt transferred to [place] yesterday for continued skilled care and prolonged IV ABX. Plan is 6 weeks of ABX; ampicillin 2gms IV q 6; day 1 with negative blood cultures was 4/19; so today is day 6/42  Requests DNR     Past Medical History:   Diagnosis Date    Anemia     Aortic valve stenosis     Arthritis     Atrial fibrillation     Cancer     Basal Cell Skin Cancer    Carotid artery stenosis     Coronary artery disease     CVA (cerebral vascular accident)     Encounter for blood transfusion     Exercise-induced angina      Hyperkalemia     at times    Hyperlipemia     Hypertension     Iron deficiency anemia     MI (myocardial infarction) 2004    MALATHI (obstructive sleep apnea)     PAD (peripheral artery disease)     Prostatitis     Renal failure     MILD after MI    S/P 2-vessel coronary artery bypass        Past Surgical History:   Procedure Laterality Date    CARPAL TUNNEL RELEASE      CATARACT EXTRACTION, BILATERAL      CORONARY ANGIOGRAPHY  2019    CORONARY ARTERY BYPASS GRAFT  2004    HERNIA REPAIR      Inguinal & Ventral with MESH    INSERTION, PICC N/A 4/22/2019    Performed by [facility] OR    REPLACEMENT, AORTIC VALVE, PERCUTANEOUS, TRANSCATHETER Right 3/18/2019    Performed by Mani Babcock MD at [facility] CATH    REPLACEMENT, AORTIC VALVE, PERCUTANEOUS, TRANSCATHETER Right 3/18/2019    Performed by Ac Osman MD at [facility] CATH    TONSILLECTOMY      TRANSESOPHAGEAL ECHOCARDIOGRAM (INES) N/A 6/28/2016    Performed by Hugh Begum MD at [facility] OR       Review of patient's allergies indicates:   Allergen Reactions    Bactrim [sulfamethoxazole-trimethoprim] Rash    Codeine Rash       Current Facility-Administered Medications on File Prior to Encounter   Medication    [DISCONTINUED] acetaminophen tablet 650 mg    [DISCONTINUED] albuterol-ipratropium 2.5 mg-0.5 mg/3 mL nebulizer solution 3 mL    [DISCONTINUED] amLODIPine tablet 10 mg    [DISCONTINUED] ampicillin 2 g in sodium chloride 0.9 % 100 mL IVPB (ready to mix system)    [DISCONTINUED] aspirin EC tablet 81 mg    [DISCONTINUED] atorvastatin tablet 10 mg    [DISCONTINUED] carvedilol tablet 25 mg    [DISCONTINUED] dabigatran etexilate capsule 150 mg    [DISCONTINUED] furosemide tablet 20 mg    [DISCONTINUED] lisinopril-hydrochlorothiazide 20-12.5 mg per tablet 1 tablet    [DISCONTINUED] ondansetron injection 4 mg    [DISCONTINUED] ondansetron injection 4 mg    [DISCONTINUED] pantoprazole EC tablet 40 mg     "[DISCONTINUED] sodium chloride 0.9% flush 10 mL    [DISCONTINUED] sodium chloride 0.9% flush 10 mL    [DISCONTINUED] sodium chloride 0.9% flush 10 mL     Current Outpatient Medications on File Prior to Encounter   Medication Sig    amLODIPine (NORVASC) 2.5 MG tablet Take 2.5 mg by mouth once daily.    ANORO ELLIPTA 62.5-25 mcg/actuation DsDv INHALE 1 PUFF INTO LUNGS ONCE DAILY    aspirin (ECOTRIN) 81 MG EC tablet Take 81 mg by mouth once daily.    atorvastatin (LIPITOR) 10 MG tablet Take 10 mg by mouth every evening.    carvedilol (COREG) 12.5 MG tablet Take 25 mg by mouth 2 (two) times daily with meals.     dabigatran etexilate (PRADAXA) 150 mg Cap Take 150 mg by mouth 2 (two) times daily. "Do NOT break, chew, or open capsules."    furosemide (LASIX) 20 MG tablet Take 20 mg by mouth 2 (two) times daily.    lisinopril (PRINIVIL,ZESTRIL) 20 MG tablet Take 1 tablet (20 mg total) by mouth once daily.    nitroGLYCERIN (NITROSTAT) 0.4 MG SL tablet Place 0.4 mg under the tongue every 5 (five) minutes as needed for Chest pain.    omeprazole (PRILOSEC) 40 MG capsule Take 40 mg by mouth once daily.    ranolazine (RANEXA) 500 MG Tb12 Take 500 mg by mouth 2 (two) times daily.     Family History     Problem Relation (Age of Onset)    Atrial fibrillation Father, Sister    COPD Sister    Cancer Father    Diabetes type II Mother, Sister    Heart failure Mother    Hypertension Mother, Father, Sister, Brother    Pacemaker/defibrilator Sister    Pulmonary embolism Brother        Tobacco Use    Smoking status: Former Smoker     Last attempt to quit: 2004     Years since quitting: 15.3    Smokeless tobacco: Never Used    Tobacco comment: Smoked for 60 years   Substance and Sexual Activity    Alcohol use: No     Frequency: Never    Drug use: No    Sexual activity: Yes     Partners: Female     Review of Systems   Constitutional: Negative.    HENT: Negative for congestion, ear pain, sinus pressure, sneezing and sore " throat.    Eyes: Negative.    Respiratory: Negative for cough, chest tightness, shortness of breath and wheezing.    Cardiovascular: Negative.  Negative for chest pain.   Gastrointestinal: Negative for abdominal pain, blood in stool, constipation, diarrhea, nausea and vomiting.   Genitourinary: Negative for difficulty urinating, dysuria and flank pain.   Musculoskeletal: Negative.  Negative for joint swelling.   Skin: Negative.    Neurological: Negative for dizziness, weakness and headaches.   Hematological: Negative.    Psychiatric/Behavioral: Negative.  Negative for behavioral problems and confusion.     Objective:     Vital Signs (Most Recent):  Temp: 97.6 °F (36.4 °C) (04/24/19 0722)  Pulse: 70 (04/24/19 0740)  Resp: 16 (04/24/19 0740)  BP: (!) 131/58 (04/24/19 0722)  SpO2: 97 % (04/24/19 0740) Vital Signs (24h Range):  Temp:  [97.1 °F (36.2 °C)-98.3 °F (36.8 °C)] 97.6 °F (36.4 °C)  Pulse:  [61-77] 70  Resp:  [16-20] 16  SpO2:  [94 %-97 %] 97 %  BP: (103-131)/(52-60) 131/58     Weight: 89.4 kg (197 lb)  Body mass index is 29.95 kg/m².    Physical Exam   Constitutional: He is oriented to person, place, and time. He appears well-developed and well-nourished.   HENT:   Head: Normocephalic and atraumatic.   Right Ear: Tympanic membrane, external ear and ear canal normal.   Left Ear: Tympanic membrane, external ear and ear canal normal.   Nose: Nose normal.   Mouth/Throat: Oropharynx is clear and moist.   Eyes: Pupils are equal, round, and reactive to light. Conjunctivae and EOM are normal.   Neck: Normal range of motion. Neck supple. No tracheal deviation present. No thyromegaly present.   Cardiovascular: Normal rate, regular rhythm, intact distal pulses and normal pulses. Exam reveals no gallop and no friction rub.   Murmur heard.  Pulses:       Carotid pulses are on the right side with bruit, and on the left side with bruit.  Pulmonary/Chest: Effort normal and breath sounds normal.   Abdominal: Soft. Bowel sounds  "are normal. He exhibits no distension and no mass. There is no tenderness. There is no rebound and no guarding. Hernia confirmed negative in the right inguinal area and confirmed negative in the left inguinal area.   Musculoskeletal: Normal range of motion.   Neurological: He is alert and oriented to person, place, and time. He has normal reflexes.   Skin: Skin is warm and dry.   Psychiatric: He has a normal mood and affect. His behavior is normal. Judgment and thought content normal.         CRANIAL NERVES     CN III, IV, VI   Pupils are equal, round, and reactive to light.  Extraocular motions are normal.        Significant Labs:   A1C: No results for input(s): HGBA1C in the last 4320 hours.  Blood Culture: No results for input(s): LABBLOO in the last 48 hours.  CMP:   Recent Labs   Lab 04/23/19  0526      K 4.0      CO2 28      BUN 13   CREATININE 1.10   CALCIUM 9.7   PROT 6.4   ALBUMIN 3.6   BILITOT 0.8   ALKPHOS 64   AST 26   ALT 35   EGFRNONAA >60     Cardiac Markers: No results for input(s): CKMB, MYOGLOBIN, BNP, TROPISTAT in the last 48 hours.  Lactic Acid: No results for input(s): LACTATE in the last 48 hours.  Magnesium:   Recent Labs   Lab 04/23/19  0526   MG 2.2       Significant Imaging: I have reviewed all pertinent imaging results/findings within the past 24 hours.  I have reviewed and interpreted all pertinent imaging results/findings within the past 24 hours.    Assessment/Plan:     * Bacteremia  S/p recent TAVR now with septic emboli and concern for "flicking emboli"   His blood cultures have grown Enterococcus faecalis sensitve to all tested drugs  ID recommending total of 6 weeks IV ABX; Ampicillin 2gms IV q 6 hr; day 1 with negative blood cultures was 4/19/19; so today is day 6/42        Atrial thrombus  Large atrial thrombus found on INES   Continue Pradaxa  Will not consider failed therapy as patient has had lapses in therapy due to recent surgical interventions and " non-compliance   as discussed above there is concern for infected thrombus.   Per Infectious Disease will treat bacteremia and possible infected thrombus for 6 weeks.          Cerebrovascular accident (CVA)  Subacute CVA with focal deficits noted on 4/14, with significant improvement in symptoms   Per Dr. Fernandez's recommendations we will continue:  -Telemetry  -Neurochecks  -NO TPA as he presented initially on Sunday  -Physical Therapy and OT Consult, evaluation and treatment at [place] once patient is admitted to  SNF  -Completed permissive HTN and will need strict BP control.   -Continue his current dose home meds otherwise including Asprin, Pradaxa and statin             Hx of CABG    Asa, statin,     CHF (congestive heart failure), NYHA class II, chronic, diastolic  Continue Lisinopril, HCTZ, furosemide       Anemia  H/H stable without acute indications for transfusion   Continue to monitor              Essential hypertension  Continue amlodpine, Lisinopril, HCTZ, , coreg  lasix    Aortic valve stenosis    S/p TAVR       VTE Risk Mitigation (From admission, onward)        Ordered     dabigatran etexilate capsule 150 mg  2 times daily      04/23/19 1426             John Saeed MD  Department of Hospital Medicine   [facility]

## 2019-04-24 NOTE — CARE UPDATE
Patient was admitted from [facility] for  6 weeks of antibiotic therapy. Patient had a recent valve replacement and stroke. He has some generalized weakness.patient has been using walker. He enjoys visiting with friends and watching TV. I encouraged him to visit with friends in the lobby and his room and ask nursing staff if he is interested in participating in other activities.

## 2019-04-24 NOTE — NURSING
Bedside handoff completed with Deanna valentine RN. Patient sitting up in chair no distress. Son at bedside. Call bell in reach of patient. Will continue to monitor.

## 2019-04-24 NOTE — ASSESSMENT & PLAN NOTE
Large atrial thrombus found on INES   Continue Pradaxa  Will not consider failed therapy as patient has had lapses in therapy due to recent surgical interventions and non-compliance   as discussed above there is concern for infected thrombus.   Per Infectious Disease will treat bacteremia and possible infected thrombus for 6 weeks.

## 2019-04-24 NOTE — PROGRESS NOTES
Mr. Farooq had no questions or concerns about his medications.  We further discussed his medications, what they are for, how they are given, potential side effects, fall precautions and pain control.  He reports no pain or medication side effects at this time.     Luz Elena Daly, PharmD  [phone]

## 2019-04-24 NOTE — PT/OT/SLP EVAL
Occupational Therapy   Evaluation    Name: St Lacho Farooq  MRN: [id number]  Admitting Diagnosis:  Bacteremia      Recommendations:     Discharge Recommendations: home with home health, home health OT, home health PT  Discharge Equipment Recommendations:  cane, straight  Barriers to discharge:  None    Assessment:     St Lacho Farooq is a 84 y.o. male with a medical diagnosis of Bacteremia.  He presents with resolving right sided weakness, difficulty with memory, sequencing, peripheral vision. Performance deficits affecting function: impaired endurance, impaired self care skills, gait instability, visual deficits, decreased lower extremity function, weakness, decreased safety awareness, impaired cognition, impaired functional mobilty.      Per MD chart review:   HPI: This 84 year old man with PMHX of CAD p CABG, [facility] 2/19; stable lesions, PAD, bilateral CVD, AAA,  PAF, HTN, hyperlipidemia, MALATHI, NYHA II chronic diastolic HF and non rheumatic critical AS with JAMAAL 0.8cm2 with preserved LVSFX EF55%,  per Echo underwent  + TAVR placed 3/18/19 per Dr. Babcock. Post op course was complicated by CVA ~ 2 weeks post TAVR; he presented to [facility] 4/16 with altered mental status and fever where he was Dx with CVA and gram + bacteremia.   He was  [place] [facility] [place]  for cardiology and ID eval ant tx; CT at that time was unrevealing for an acute intracranial process  His MRI was abnormal.  His blood cultures have grown Enterococcus faecalis sensitve to all tested drugs,  Pt initially given vancomycin and then changed to ampicillin IV.  He has clinically improved.  His INES shows a septic atrial thrombus. The cRP is 49 and the ESR is 15.  Pt is no longer febrile and he and daughter feel he is at base line physically and mentally.Pt transferred to [facility] yesterday for continued skilled care and prolonged IV ABX. Plan is 6 weeks of ABX; ampicillin 2gms IV q 6; day 1 with negative blood cultures was 4/19; so today is day  6/42  Requests DNR     Results of cognitive/ visual testing :     MOCA ([place] Cognitive Assessment)  Normal range=25-30      Mild Impairment = 19-25    Significant Impairment needs further testing= 11-21    His score of 11/30 puts him in the category of significant impairment, needs further testing, this was reviewed with his daughter who is already aware of his possible worsening memory more in the afternoons and not safe currently to drive a car. He will be retested as he recovers using the short blessed pre driving cognitive screen, and the MICKEY cognitive test and the CPT medication management and WASH subtests (which correlate to an MICKEY level) to guide d/c planning , safety and caregiver education. He was given a dementia care handout to review with his daughter as he states his wife, who is a retired nurse, has beginning dementia.    Visuospatial/ Executive Functions  0  /5  He had difficulty alternating letters and numbers to sequence a map indicating that path finding for driving may be impaired  His cube was 3 vs 2 dimensional indicationg possible depth perception difficulties = fall risk  His [place] was a modified square, the numbers of the clock were put in the center, ignoring both the left and right periphery and he could not problem solve how to put the hands of the clock in the correct place indicating difficulty with peripheral vision which creates a possible fall risk, especially in low light situations or new environments  Naming  2 /3  Attention  2  /5   He had difficulty correctly hearing things (he does not have hearing aids) he was able to attend and hear better if looking directly at the speaker. He was not able to subtract by 7's , either in his head or with pen and paper  Language 2 /3  He does better if the speaker looks directly at him, he looks directly at the speaker and there are no other ambient sounds in the room= possible fall risk in busy / new environments and difficulty  "attending to many things from different directions when driving a car  Abstraction 2 /2  Delayed Recall/ Short Term Memory  0 /5  He was unable to remember any of the 5 words repeated earlier and did not benefit from category cues or multiple choice clues (which do not change the score)  Orientation   5 /6  He was oriented to date, month, year, day of the week, place, but not the correct city (said [place])        Rehab Prognosis: Fair; patient would benefit from acute skilled OT services to address these deficits and reach maximum level of function.       Plan:     Patient to be seen 5 x/week to address the above listed problems via self-care/home management, therapeutic exercises, therapeutic activities  · Plan of Care Expires: 06/10/19  · Plan of Care Reviewed with: patient, daughter, friend    Subjective     Chief Complaint: "my right foot drags, I have trouble tying my shoe, other than that, I don't think I have any trouble, my right arm works fine now"  Patient/Family Comments/goals: to go home with more services in place- so far, plan is for him to live with his daughter Mitzy- who works at [employer] and has 4 steps in. Not sure what the plan for his wife is (who has Altzeimers dz)    Occupational Profile:  Living Environment: lives with wife in [facility] with 1 step in  Wife is a retired nurse, He is a retired school principle/ superintendent, His daughter works at [employer].    Previous level of function: His daily routine (as confirmed by his friend in room who works at the  Coffee shop he attends 3x day)  3 am- wakes up- gets dressed  4 am- drives to coffee shop ( a few miles away) , helps staff make coffee and drinks coffee with them  6 am- his alarm goes off on his watch and he takes his morning pills  7 am - drives to the snack truck stop to order breakfast which he brings home to his wife and drinks coffee             with his ex son in law "Kwabena"  8 am- sits in the recliner to watch TV (politics/ sports) and " possibly takes a short nap  9 or 10 am- drives back to the truck stop for more coffee  Noon- eats lunch   NOTE: they have a cleaning lady who cleans, cooks and does laundry for them  3pm- back to the coffee shop for more coffee  6 pm- his alarm goes off on his watch to remind him to take evening medications  Watches TV until he decides to go to sleep.    Roles and Routines: impaired   Equipment Used at Home:  walker, rolling, shower chair  Assistance upon Discharge: family-      Pain/Comfort:  · Pain Rating 1: 0/10  · Pain Rating Post-Intervention 1: 0/10    Patients cultural, spiritual, Caodaism conflicts given the current situation:  na    Objective:     Communicated with: nursing, daughter prior to session.  Patient found up in chair with   upon OT entry to room.    General Precautions: Standard,     Orthopedic Precautions:  na  Braces:   na    Occupational Performance:    Bed Mobility:    · NT    Functional Mobility/Transfers:  · Patient completed Toilet Transfer Stand Pivot technique with contact guard assistance with  rolling walker  · Functional Mobility: drags his right foot, has peripheral vision difficulty and depth perception difficulty = fall risk,, did have a fall at [facility]    Activities of Daily Living:  · Feeding:  modified independence per speech therapy on chopped diet with thin liquids, his dentures are loose, at home he uses polident  · Grooming: supervision hands , face, mouth  · Bathing: NT  · Upper Body Dressing: supervision shirt  · Lower Body Dressing: minimum assistance shoes / socks, difficulty tying shoes  · Toileting: supervision using urinal and toilet    Cognitive/Visual Perceptual:  Cognitive/Psychosocial Skills:     -       Follows Commands/attention:Follows one-step commands- difficulty with sequencing in new environments or with ambient sounds/ or in new environments = fall risk  Visual/Perceptual:      -Impaired  Difficulty with peripheral vision- able to  follow an object in all 4 quadrants and correctly count fingers      AMPAC 6 Click ADL:  AMPAC Total Score: 22    Treatment & Education:  eval &  self care & therex  Education:    Patient left up in chair with all lines intact and call button in reach    GOALS:   Multidisciplinary Problems     Occupational Therapy Goals        Problem: Occupational Therapy Goal    Goal Priority Disciplines Outcome Interventions   Occupational Therapy Goal     OT, PT/OT     Description:  Goals to be met by:6/10/2019     Patient will increase functional independence with ADLs by performing:    LE Dressing with Modified [place].  Grooming while standing at sink with Modified [place].  Toileting from toilet with Modified [place] for hygiene and clothing management.   Bathing from  shower chair/bench with Modified [place].  Upper extremity exercise program x10 reps per handout, with assistance as needed  Patient will consistently 3/3 trials at different times of the day demonstrate ability to be a) oriented x 4 (using watch if needed for time of day)  b) use RN call light appropriately before getting up c) complete a >10 piece puzzle d) complete a word search page with 90% accuracy d) add and subtract 2 digit numbers 5/5 tries with 90% accuracy e) color by number from coloring book attending to periphery of the page as well as center of the page without errors. E) fill his medbox with the 13 pills he says he takes daily, twice per day with 100% accuracy f) fill out his medication list with WHAT he takes, WHY he takes it, WHEN he takes it, HOW and HOW often he takes it , and possible side effects of each medication  Complete the Short Blessed pre -  Driving cognitive screen without any errors.                      History:     Past Medical History:   Diagnosis Date    Anemia     Aortic valve stenosis     Arthritis     Atrial fibrillation     Cancer     Basal Cell Skin Cancer    Carotid artery stenosis      Coronary artery disease     CVA (cerebral vascular accident)     Encounter for blood transfusion     Exercise-induced angina     Hyperkalemia     at times    Hyperlipemia     Hypertension     Iron deficiency anemia     MI (myocardial infarction) 2004    MALATHI (obstructive sleep apnea)     PAD (peripheral artery disease)     Prostatitis     Renal failure     MILD after MI    S/P 2-vessel coronary artery bypass        Past Surgical History:   Procedure Laterality Date    CARPAL TUNNEL RELEASE      CATARACT EXTRACTION, BILATERAL      CORONARY ANGIOGRAPHY  2019    CORONARY ARTERY BYPASS GRAFT  2004    HERNIA REPAIR      Inguinal & Ventral with MESH    INSERTION, PICC N/A 4/22/2019    Performed by [facility] Diagnostic Provider at [facility] OR    REPLACEMENT, AORTIC VALVE, PERCUTANEOUS, TRANSCATHETER Right 3/18/2019    Performed by Mani Babcock MD at [facility] CATH    REPLACEMENT, AORTIC VALVE, PERCUTANEOUS, TRANSCATHETER Right 3/18/2019    Performed by Ac Osman MD at [facility] CATH    TONSILLECTOMY      TRANSESOPHAGEAL ECHOCARDIOGRAM (INES) N/A 6/28/2016    Performed by Hugh Begum MD at [facility] OR       Time Tracking:     OT Date of Treatment: 04/24/19  OT Start Time: 1330  OT Stop Time: 1430  OT Total Time (min): 60 min    Billable Minutes:Evaluation 30  Self Care/Home Management 15  Therapeutic Exercise 15    Zarina Gonzalez, OT  4/24/2019

## 2019-04-24 NOTE — PT/OT/SLP EVAL
"Physical Therapy Evaluation    Patient Name:  St Lacho Farooq   MRN:  [id number]    Recommendations:     Discharge Recommendations:  home health PT, home with home health   Discharge Equipment Recommendations: cane, straight   Barriers to discharge: Inaccessible home    Assessment:     St Lacho Farooq is a 84 y.o. male admitted with a medical diagnosis of Bacteremia.  He presents with the following impairments/functional limitations:  gait instability, impaired self care skills, decreased lower extremity function, impaired functional mobilty, weakness, impaired endurance. Patient seen up at bedside chair with a friend present. Patient able to follow simple directions. Performed transfers and gait tasks using RW with SBA. Noted unsteady gait with decreased foot/floor clearance greater on Right Foot(slightly drags) due to hx of CVA. Requires min Assistance in ascending /descending  4 stair steps using handrail. Patient had history of falls due to Right Foot tripped. Patient will benefit from Skilled PT tx to inc safety and increase independence with mobility and gait tasks.    Rehab Prognosis: Good; patient would benefit from acute skilled PT services to address these deficits and reach maximum level of function.    Recent Surgery: * No surgery found *      Plan:     During this hospitalization, patient to be seen daily to address the identified rehab impairments via gait training, therapeutic activities, therapeutic exercises and progress toward the following goals:    · Plan of Care Expires:       Subjective     Chief Complaint:"Right Foot drag and unsteady in walking."  Patient/Family Comments/goals:" to regain strength, walk better and manage stair steps".  Pain/Comfort:  · Pain Rating 1: 0/10    Patients cultural, spiritual, Evangelical conflicts given the current situation: no    Living Environment:  Patient was [facility] in [facility] and  used to take care of wife(diagnosed with Alzheimer's Disease). Patient " was able to drive his car at the community. Patient is going to live with his daughter(Mitzy) in a H with 3 steps and no handrails to enter.  Prior to admission, patients level of function was Independent with gait tasks with no AD, Independent with mobility and self care tasks.  Equipment used at home: walker, rolling, shower chair.  DME owned (not currently used): rolling walker and shower chair.  Upon discharge, patient will have assistance from daughters..    Objective:     Communicated with patient and daughter/friend prior to session.  Patient found up in chair with telemetry, PICC line  upon PT entry to room.    General Precautions: Standard, fall   Orthopedic Precautions:N/A   Braces: N/A     Exams:  · Cognitive Exam:  Patient is oriented to Person, Place and Time  · Gross Motor Coordination:  WFL  · Skin Integrity/Edema:      · -       Skin integrity: Visible skin intact  · RLE ROM: WFL  · RLE Strength: WFL  · LLE ROM: WFL  · LLE Strength: WFL    Functional Mobility:  · Bed Mobility:     · Rolling Left:  supervision  · Rolling Right: supervision  · Scooting: supervision  · Supine to Sit: supervision  · Sit to Supine: supervision  · Transfers:     · Sit to Stand:  stand by assistance with rolling walker  · Bed to Chair: stand by assistance with  rolling walker  using  Step Transfer  · Gait: RW Ambulation x 150 feet with SBA. Exhibits decrease foot/floor clearance greater on Right Foot (R  foot Drag), unsteady gait with trunk lateral sway and  fear of falling.  · Balance: Dynamic standing balance with Fair+ grade with no assistive device.  · Stairs:  Pt ascended/descended 4 steps stair(s) with No Assistive Device with bilateral handrails with Minimal Assistance.       Therapeutic Activities and Exercises:   Completed Physical Therapy evaluation. Educated patient with safety measures and requires assistive device such as Rolling Walker or Straight cane for safe gait tasks. Implemented gait trng using RW vs  no assistive device. Patient will benefit in using Straight Cane for Advance Gait Functions. Trained patient with stairs steps management.    AM-PAC 6 CLICK MOBILITY  Total Score:18     Patient left up in chair with all lines intact, call button in reach, nurse Daya notified and friend present.    GOALS:   Multidisciplinary Problems     Physical Therapy Goals        Problem: Physical Therapy Goal    Goal Priority Disciplines Outcome Goal Variances Interventions   Physical Therapy Goal     PT, PT/OT      Description:  Goals to be met by: 7     Patient will increase functional independence with mobility by performin. Supine to sit with Independent  2. Sit to supine with Independent  3. Bed to chair transfer with Modified Independent with or without R W or straight Cane  using Step Transfer TECHNIQUE  4. Gait  x 300  feet with Modified Independent with or without straight cane or RW  5. Lower extremity exercise program x10 reps per handout, with assistance as needed.  6. Able to ascend/descend 12 stair steps using handrail/Straight  Cane with supervision.                    History:     Past Medical History:   Diagnosis Date    Anemia     Aortic valve stenosis     Arthritis     Atrial fibrillation     Cancer     Basal Cell Skin Cancer    Carotid artery stenosis     Coronary artery disease     CVA (cerebral vascular accident)     Encounter for blood transfusion     Exercise-induced angina     Hyperkalemia     at times    Hyperlipemia     Hypertension     Iron deficiency anemia     MI (myocardial infarction)     MALATHI (obstructive sleep apnea)     PAD (peripheral artery disease)     Prostatitis     Renal failure     MILD after MI    S/P 2-vessel coronary artery bypass        Past Surgical History:   Procedure Laterality Date    CARPAL TUNNEL RELEASE      CATARACT EXTRACTION, BILATERAL      CORONARY ANGIOGRAPHY  2019    CORONARY ARTERY BYPASS GRAFT  2004    HERNIA REPAIR      Inguinal &  Ventral with MESH    INSERTION, PICC N/A 4/22/2019    Performed by [facility] Provider at [facility] OR    REPLACEMENT, AORTIC VALVE, PERCUTANEOUS, TRANSCATHETER Right 3/18/2019    Performed by Mani Babcock MD at [facility] CATH    REPLACEMENT, AORTIC VALVE, PERCUTANEOUS, TRANSCATHETER Right 3/18/2019    Performed by Ac Osman MD at [facility] CATH    TONSILLECTOMY      TRANSESOPHAGEAL ECHOCARDIOGRAM (INES) N/A 6/28/2016    Performed by Hugh Begum MD at [facility]       Time Tracking:     PT Received On: 04/24/19  PT Start Time: 1230     PT Stop Time: 1318  PT Total Time (min): 48 min     Billable Minutes: Evaluation 15 and Therapeutic Activity 30      Keven Goldstein, PT  04/24/2019

## 2019-04-25 PROBLEM — N39.44 NOCTURNAL ENURESIS: Status: ACTIVE | Noted: 2019-04-25

## 2019-04-25 LAB
ALBUMIN SERPL BCP-MCNC: 3.3 G/DL (ref 3.5–5.2)
ALP SERPL-CCNC: 62 U/L (ref 55–135)
ALT SERPL W/O P-5'-P-CCNC: 13 U/L (ref 10–44)
ANION GAP SERPL CALC-SCNC: 12 MMOL/L (ref 8–16)
AST SERPL-CCNC: 13 U/L (ref 10–40)
BASOPHILS # BLD AUTO: 0.03 K/UL (ref 0–0.2)
BASOPHILS NFR BLD: 0.4 % (ref 0–1.9)
BILIRUB SERPL-MCNC: 1 MG/DL (ref 0.1–1)
BILIRUB UR QL STRIP: NEGATIVE
BUN SERPL-MCNC: 15 MG/DL (ref 8–23)
CALCIUM SERPL-MCNC: 10.3 MG/DL (ref 8.7–10.5)
CHLORIDE SERPL-SCNC: 108 MMOL/L (ref 95–110)
CLARITY UR: CLEAR
CO2 SERPL-SCNC: 24 MMOL/L (ref 23–29)
COLOR UR: YELLOW
COMPLEXED PSA SERPL-MCNC: 1.4 NG/ML (ref 0–4)
CREAT SERPL-MCNC: 1.3 MG/DL (ref 0.5–1.4)
DIFFERENTIAL METHOD: ABNORMAL
EOSINOPHIL # BLD AUTO: 0.1 K/UL (ref 0–0.5)
EOSINOPHIL NFR BLD: 1.9 % (ref 0–8)
ERYTHROCYTE [DISTWIDTH] IN BLOOD BY AUTOMATED COUNT: 20.6 % (ref 11.5–14.5)
EST. GFR  (AFRICAN AMERICAN): 59 ML/MIN/1.73 M^2
EST. GFR  (NON AFRICAN AMERICAN): 51 ML/MIN/1.73 M^2
GLUCOSE SERPL-MCNC: 115 MG/DL (ref 70–110)
GLUCOSE UR QL STRIP: NEGATIVE
HCT VFR BLD AUTO: 32 % (ref 40–54)
HGB BLD-MCNC: 9.6 G/DL (ref 14–18)
HGB UR QL STRIP: NEGATIVE
KETONES UR QL STRIP: NEGATIVE
LEUKOCYTE ESTERASE UR QL STRIP: NEGATIVE
LYMPHOCYTES # BLD AUTO: 1.1 K/UL (ref 1–4.8)
LYMPHOCYTES NFR BLD: 14.8 % (ref 18–48)
MCH RBC QN AUTO: 23.4 PG (ref 27–31)
MCHC RBC AUTO-ENTMCNC: 30 G/DL (ref 32–36)
MCV RBC AUTO: 78 FL (ref 82–98)
MONOCYTES # BLD AUTO: 0.7 K/UL (ref 0.3–1)
MONOCYTES NFR BLD: 9.8 % (ref 4–15)
NEUTROPHILS # BLD AUTO: 5.4 K/UL (ref 1.8–7.7)
NEUTROPHILS NFR BLD: 73.1 % (ref 38–73)
NITRITE UR QL STRIP: NEGATIVE
PH UR STRIP: 6 [PH] (ref 5–8)
PLATELET # BLD AUTO: 224 K/UL (ref 150–350)
PMV BLD AUTO: 9.1 FL (ref 9.2–12.9)
POTASSIUM SERPL-SCNC: 3.9 MMOL/L (ref 3.5–5.1)
PROT SERPL-MCNC: 6.3 G/DL (ref 6–8.4)
PROT UR QL STRIP: NEGATIVE
RBC # BLD AUTO: 4.1 M/UL (ref 4.6–6.2)
SODIUM SERPL-SCNC: 144 MMOL/L (ref 136–145)
SP GR UR STRIP: 1.01 (ref 1–1.03)
URN SPEC COLLECT METH UR: NORMAL
UROBILINOGEN UR STRIP-ACNC: NEGATIVE EU/DL
WBC # BLD AUTO: 7.32 K/UL (ref 3.9–12.7)

## 2019-04-25 PROCEDURE — 97530 THERAPEUTIC ACTIVITIES: CPT

## 2019-04-25 PROCEDURE — 85025 COMPLETE CBC W/AUTO DIFF WBC: CPT

## 2019-04-25 PROCEDURE — 84153 ASSAY OF PSA TOTAL: CPT

## 2019-04-25 PROCEDURE — 97116 GAIT TRAINING THERAPY: CPT

## 2019-04-25 PROCEDURE — 63600175 PHARM REV CODE 636 W HCPCS: Performed by: FAMILY MEDICINE

## 2019-04-25 PROCEDURE — 63600175 PHARM REV CODE 636 W HCPCS: Performed by: INTERNAL MEDICINE

## 2019-04-25 PROCEDURE — 25000003 PHARM REV CODE 250: Performed by: INTERNAL MEDICINE

## 2019-04-25 PROCEDURE — 36415 COLL VENOUS BLD VENIPUNCTURE: CPT

## 2019-04-25 PROCEDURE — A4216 STERILE WATER/SALINE, 10 ML: HCPCS | Performed by: INTERNAL MEDICINE

## 2019-04-25 PROCEDURE — 94799 UNLISTED PULMONARY SVC/PX: CPT

## 2019-04-25 PROCEDURE — 11000004 HC SNF PRIVATE

## 2019-04-25 PROCEDURE — 99900035 HC TECH TIME PER 15 MIN (STAT)

## 2019-04-25 PROCEDURE — 81003 URINALYSIS AUTO W/O SCOPE: CPT

## 2019-04-25 PROCEDURE — 80053 COMPREHEN METABOLIC PANEL: CPT

## 2019-04-25 RX ORDER — HEPARIN 100 UNIT/ML
5 SYRINGE INTRAVENOUS
Status: DISCONTINUED | OUTPATIENT
Start: 2019-04-25 | End: 2019-05-16 | Stop reason: HOSPADM

## 2019-04-25 RX ADMIN — ASPIRIN 81 MG: 81 TABLET, COATED ORAL at 08:04

## 2019-04-25 RX ADMIN — ATORVASTATIN CALCIUM 10 MG: 10 TABLET, FILM COATED ORAL at 09:04

## 2019-04-25 RX ADMIN — ALTEPLASE 2 MG: 2.2 INJECTION, POWDER, LYOPHILIZED, FOR SOLUTION INTRAVENOUS at 03:04

## 2019-04-25 RX ADMIN — Medication 10 ML: at 10:04

## 2019-04-25 RX ADMIN — AMPICILLIN SODIUM 2 G: 2 INJECTION, POWDER, FOR SOLUTION INTRAMUSCULAR; INTRAVENOUS at 04:04

## 2019-04-25 RX ADMIN — Medication 10 ML: at 05:04

## 2019-04-25 RX ADMIN — FUROSEMIDE 20 MG: 20 TABLET ORAL at 08:04

## 2019-04-25 RX ADMIN — HYDROCHLOROTHIAZIDE 12.5 MG: 12.5 TABLET ORAL at 08:04

## 2019-04-25 RX ADMIN — PANTOPRAZOLE SODIUM 40 MG: 40 TABLET, DELAYED RELEASE ORAL at 08:04

## 2019-04-25 RX ADMIN — Medication 10 ML: at 04:04

## 2019-04-25 RX ADMIN — CARVEDILOL 25 MG: 25 TABLET, FILM COATED ORAL at 08:04

## 2019-04-25 RX ADMIN — DABIGATRAN ETEXILATE MESYLATE 150 MG: 75 CAPSULE ORAL at 09:04

## 2019-04-25 RX ADMIN — AMPICILLIN SODIUM 2 G: 2 INJECTION, POWDER, FOR SOLUTION INTRAMUSCULAR; INTRAVENOUS at 08:04

## 2019-04-25 RX ADMIN — AMLODIPINE BESYLATE 10 MG: 10 TABLET ORAL at 08:04

## 2019-04-25 RX ADMIN — LISINOPRIL 20 MG: 20 TABLET ORAL at 08:04

## 2019-04-25 RX ADMIN — Medication 10 ML: at 09:04

## 2019-04-25 RX ADMIN — Medication 10 ML: at 11:04

## 2019-04-25 RX ADMIN — HEPARIN 500 UNITS: 100 SYRINGE at 06:04

## 2019-04-25 RX ADMIN — DABIGATRAN ETEXILATE MESYLATE 150 MG: 75 CAPSULE ORAL at 08:04

## 2019-04-25 RX ADMIN — AMPICILLIN SODIUM 2 G: 2 INJECTION, POWDER, FOR SOLUTION INTRAMUSCULAR; INTRAVENOUS at 03:04

## 2019-04-25 RX ADMIN — AMPICILLIN SODIUM 2 G: 2 INJECTION, POWDER, FOR SOLUTION INTRAMUSCULAR; INTRAVENOUS at 09:04

## 2019-04-25 RX ADMIN — CARVEDILOL 25 MG: 25 TABLET, FILM COATED ORAL at 05:04

## 2019-04-25 NOTE — ASSESSMENT & PLAN NOTE
Check U.A , PSA, bladder scan; bladder scan 89ml   Still with incontinence issues   Gets HCTz 12.5 and lasix 20mg po daily in am   Consider trial of flomax

## 2019-04-25 NOTE — PLAN OF CARE
04/25/19 1047   Discharge Reassessment   Assessment Type Discharge Planning Reassessment         Patient will remain for continued treatment today. MD orders labs today and will review once resulted. patient sitting in room and watching TV. No needs or concerns voiced at this time. CM will continue to follow and assist as needed.

## 2019-04-25 NOTE — PROGRESS NOTES
Staff Handoff  Bedside report per SILVESTRE Huerta. No distress noted. Room air. Tele normal sinus. Awake, alert, oriented. Son at bedside. Patient denies pain. Up in chair. Walker at bedside. Call cerrato in reach. Encouraged to call for assistance.     Resident Handoff

## 2019-04-25 NOTE — HOSPITAL COURSE
4/25/19 SKILLED NURSING for prolonged ABX   Pt sitting up in chair watching TV; reports feeling fine but did have urinary incontinence and noting frequency   No fever, WBC normal   Day 7/42 ABX for bacteremia/septic emboli     4/26/19 SKILLED NURSING for prolonged ABX for bacteremia, septic thrombus; day 8/42  NAD Overnight, still with night time urinary incontinence; will try trial of flomax   PSA 1.4, repeat U/A ok; bladder scan ok     4/28 pt on SKILLED bed for prolonged abx day 10/42 for bacteremia from septic emboli. Ampicillin which cultures show sensitivity to. He also had a low grade temp last night. With the temp he had confusion and right sided weakness. Ct head done with no changes. Neuro was consulted for this am. He has this same type episode last visit with temp and mental status changes. This am he is back to normal. To note his H/H was lower and also creat bumped to 2.1.     4/29 pt on SKILL. Has some neuro changes on Sunday evening. CT without new strokes. MRI ordered per Dr shaw yesterday. Shows 3 new strokes. Facial droop this am . Neuro/cards and vascular surgery discussed pt case. He needs the anticoagulation with afib and thrombus in atrium. Will cont pradaxa, no heparin. Also remains on IV abx for 6 weeks. Day 12/42. No fever. No elevated WBC.     Creat was elevated yesterday 2.1. Lasix was stopped and 500ml IVF given. Creat better today 1.5. Will hold the hctz today as well as stopped the lisinopril and reduce amlodipine to have him a little higher with pressure due to acute strokes. Use LAUREL for swelling of lower extremity   R facial drop still apparent     5/2 pt doing well. he is sitting in day the day room. No complaints./ H.H stable. K a little low. Remains on pradaxa full dose now as kidneys have returned to baseline. Day 15/42 on ampicillin.     5/3 he is now on day 16/42 on enterococcus sensitive bacteremia to ampicillin. He is also on pradaxa full strength for atrial thrombus.  "Occult positive but h/h stable. Risk of discontinuing NOAC vs continuing discussed repetitively and family agrees to cont. Watching h/h daily.     Having episodes of mental status changes. Neuro following. Ct done again yesterday no acute changes. Keppra started for consideration of sz given the description of his events. No EEG available here, family does not wish for transfer. Otherwise all bp meds have been held to allow for permissive HTN. Bp still only 124/58 this am.    5/6/18 he is now on day 19/42 on enterococcus sensitive bacteremia to ampicillin. 5/2 BC NGTD x 4d  He is also on pradaxa full strength for atrial thrombus. Occult positive but h/h stable. NOAC  Risks/benefits reviewed  Keppra started for possible seizure activity with mental status changes( No EEG available here) . Pt is much improved with RX   H&H 8.4/28.9 , afebrile, WBC 6.7- VSS  Up in chair, "feeling good"    5/8/19 day 21/42 on enterococcus sensitive bacteremia to ampicillin. 5/2 BC NGTD x 5d  He is also on pradaxa full strength for atrial thrombus. Occult positive but h/h stable. NOAC  Risks/benefits reviewed  Keppra started for possible seizure activity with mental status changes( No EEG available here) . Pt is much improved with RX   H&H 8.4/28.9 , afebrile, WBC 5.23- VSS  Pt very alert and Oriented x 3; doing well with PT     5/10/19 Day 23/42 IV ampicillin for enterococcus sensitive bacteremia 5/2/19 BC Negative, Continue Pradaxa,    much improved with Keppra Rx; however this am he had a fall; fell backwards while urinating; he states he slipped; denies dizziness   Diuretics resumed per cards 2 days ago, also getting flomax; will check orthostatics   + recent c/o neck pain; has chronic neck pain  and gets epidural injections ; given IM celestone yesterday. Notes neck is better today.   Noting LE edema; discussed LE compression hose   BP sitting 161/67, Standing 121/56 ; so some orthostatic hypotension.   Discussed no tramadol today. " "He "feels good".     5/13 he is doing well. Has not had any more "spells". AAOx3. Day 26/42 of  IV ampicillin for enterococcus sensitive bacteremia 5/2/19 BC Negative, Continuing Pradaxa. H/H remains stable. Legs still swollen with LAUREL hose. Lasix was increased per Dr Begum today. Kidney function stable. Also pressure has been staying up. 150-160. Cards also added back lisinopril. He does well with increased BP.    We discussed the possibility of repeating INES to check on clot. He result would give info on if we need to change NOAC as he has only been on pradaxa and coumadin. The concern is that he has had multiple strokes and developed this clot on the pradaxa (although did hold a couple days for valve repair)     5/16 he is doing well, Plan is d/c today and complete 2 more weeks of iv abx. On day 29/42 of  IV ampicillin for enterococcus sensitive bacteremia 5/2/19 BC Negative, pradaxa changed to eliquis (more cost effective) and H/H stable.     Also on keppra for sz precautions. Checking level today.     Doing well with PT/OT  "

## 2019-04-25 NOTE — SUBJECTIVE & OBJECTIVE
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    S/P 2-vessel coronary artery bypass        Past Surgical History:   Procedure Laterality Date    CARPAL TUNNEL RELEASE      CATARACT EXTRACTION, BILATERAL      CORONARY ANGIOGRAPHY  2019    CORONARY ARTERY BYPASS GRAFT  2004    HERNIA REPAIR      Inguinal & Ventral with MESH    INSERTION, PICC N/A 4/22/2019    Performed by [facility] Provider at [facility] OR    REPLACEMENT, AORTIC VALVE, PERCUTANEOUS, TRANSCATHETER Right 3/18/2019    Performed by Mani Babcock MD at [facility] CATH    REPLACEMENT, AORTIC VALVE, PERCUTANEOUS, TRANSCATHETER Right 3/18/2019    Performed by Ac Osman MD at [facility] CATH    TONSILLECTOMY      Transesophageal echo (INES) intra-procedure log documentation N/A 4/18/2019    Performed by Tam Carrasco MD at [facility] CATH    TRANSESOPHAGEAL ECHOCARDIOGRAM (INES) N/A 6/28/2016    Performed by Hugh Begum MD at [facility] OR       Review of patient's allergies indicates:   Allergen Reactions    Bactrim [sulfamethoxazole-trimethoprim] Rash    Codeine Rash       No current facility-administered medications on file prior to encounter.      Current Outpatient Medications on File Prior to Encounter   Medication Sig    amLODIPine (NORVASC) 2.5 MG tablet Take 2.5 mg by mouth once daily.    ANORO ELLIPTA 62.5-25 mcg/actuation DsDv INHALE 1 PUFF INTO LUNGS ONCE DAILY    aspirin (ECOTRIN) 81 MG EC tablet Take 81 mg by mouth  "once daily.    atorvastatin (LIPITOR) 10 MG tablet Take 10 mg by mouth every evening.    carvedilol (COREG) 12.5 MG tablet Take 25 mg by mouth 2 (two) times daily with meals.     dabigatran etexilate (PRADAXA) 150 mg Cap Take 150 mg by mouth 2 (two) times daily. "Do NOT break, chew, or open capsules."    furosemide (LASIX) 20 MG tablet Take 20 mg by mouth 2 (two) times daily.    lisinopril (PRINIVIL,ZESTRIL) 20 MG tablet Take 1 tablet (20 mg total) by mouth once daily.    nitroGLYCERIN (NITROSTAT) 0.4 MG SL tablet Place 0.4 mg under the tongue every 5 (five) minutes as needed for Chest pain.    omeprazole (PRILOSEC) 40 MG capsule Take 40 mg by mouth once daily.    ranolazine (RANEXA) 500 MG Tb12 Take 500 mg by mouth 2 (two) times daily.     Family History     Problem Relation (Age of Onset)    Atrial fibrillation Father, Sister    COPD Sister    Cancer Father    Diabetes type II Mother, Sister    Heart failure Mother    Hypertension Mother, Father, Sister, Brother    Pacemaker/defibrilator Sister    Pulmonary embolism Brother        Tobacco Use    Smoking status: Former Smoker     Last attempt to quit: 2004     Years since quitting: 15.3    Smokeless tobacco: Never Used    Tobacco comment: Smoked for 60 years   Substance and Sexual Activity    Alcohol use: No     Frequency: Never    Drug use: No    Sexual activity: Yes     Partners: Female     Review of Systems   Constitutional: Negative.    HENT: Negative for congestion, ear pain, sinus pressure, sneezing and sore throat.    Eyes: Negative.    Respiratory: Negative for cough, chest tightness, shortness of breath and wheezing.    Cardiovascular: Negative.  Negative for chest pain.   Gastrointestinal: Negative for abdominal pain, blood in stool, constipation, diarrhea, nausea and vomiting.   Genitourinary: Negative for difficulty urinating, dysuria and flank pain.        Incontinence at night   Musculoskeletal: Negative.  Negative for joint swelling. "   Skin: Negative.    Neurological: Negative for dizziness, weakness and headaches.   Hematological: Negative.    Psychiatric/Behavioral: Negative.  Negative for behavioral problems and confusion.     Objective:     Vital Signs (Most Recent):  Temp: 97.9 °F (36.6 °C) (04/26/19 0715)  Pulse: 73 (04/26/19 0749)  Resp: 14 (04/26/19 0749)  BP: 130/61 (04/26/19 0715)  SpO2: 98 % (04/26/19 0749) Vital Signs (24h Range):  Temp:  [97.4 °F (36.3 °C)-99.2 °F (37.3 °C)] 97.9 °F (36.6 °C)  Pulse:  [62-83] 73  Resp:  [14-18] 14  SpO2:  [92 %-98 %] 98 %  BP: (108-132)/(51-61) 130/61     Weight: 89.4 kg (197 lb)  Body mass index is 29.95 kg/m².    Physical Exam   Constitutional: He is oriented to person, place, and time. He appears well-developed and well-nourished.   HENT:   Head: Normocephalic and atraumatic.   Right Ear: Tympanic membrane, external ear and ear canal normal.   Left Ear: Tympanic membrane, external ear and ear canal normal.   Mouth/Throat: Oropharynx is clear and moist.   Eyes: Pupils are equal, round, and reactive to light. Conjunctivae and EOM are normal.   Neck: Normal range of motion. Neck supple. No tracheal deviation present.   Cardiovascular: Normal rate, regular rhythm, intact distal pulses and normal pulses. Exam reveals no gallop and no friction rub.   Murmur heard.  Pulses:       Carotid pulses are on the right side with bruit, and on the left side with bruit.  Pulmonary/Chest: Effort normal and breath sounds normal.   Abdominal: Soft. Bowel sounds are normal. He exhibits no distension. There is no tenderness. Hernia confirmed negative in the right inguinal area and confirmed negative in the left inguinal area.   Musculoskeletal: Normal range of motion.   Neurological: He is alert and oriented to person, place, and time. He has normal reflexes.   Skin: Skin is warm and dry.   Psychiatric: He has a normal mood and affect. His behavior is normal. Judgment and thought content normal.   Nursing note and  vitals reviewed.        CRANIAL NERVES     CN III, IV, VI   Pupils are equal, round, and reactive to light.  Extraocular motions are normal.        Significant Labs:   A1C: No results for input(s): HGBA1C in the last 4320 hours.  Blood Culture: No results for input(s): LABBLOO in the last 48 hours.  CMP:   Recent Labs   Lab 04/25/19  0831      K 3.9      CO2 24   *   BUN 15   CREATININE 1.3   CALCIUM 10.3   PROT 6.3   ALBUMIN 3.3*   BILITOT 1.0   ALKPHOS 62   AST 13   ALT 13   ANIONGAP 12   EGFRNONAA 51*     PSA 1.4,   Recent Labs   Lab 04/25/19  0831   WBC 7.32   RBC 4.10*   HGB 9.6*   HCT 32.0*      MCV 78*   MCH 23.4*   MCHC 30.0*       Significant Imaging: I have reviewed all pertinent imaging results/findings within the past 24 hours.  I have reviewed and interpreted all pertinent imaging results/findings within the past 24 hours.

## 2019-04-25 NOTE — PT/OT/SLP PROGRESS
"Occupational Therapy   Treatment    Name: St Lacho Farooq  MRN: [id number]  Admitting Diagnosis:  Bacteremia       Recommendations:     Discharge Recommendations: home with home health, home health OT, home health PT  Discharge Equipment Recommendations:  cane, straight  Barriers to discharge:  None    Assessment:     St Lacoh Farooq is a 84 y.o. male with a medical diagnosis of Bacteremia.  He presents with sleepiness in the afternoons, when he puts earphones on to watch a movie , he falls asleep.. I wonder if he was napping more often than he was aware of during the day which was causing him to wake up at 3 am. Performance deficits affecting function are impaired endurance, impaired self care skills, gait instability, visual deficits, decreased lower extremity function, weakness, decreased safety awareness, impaired cognition, impaired functional mobilty.     Rehab Prognosis:  Fair; patient would benefit from acute skilled OT services to address these deficits and reach maximum level of function.       Plan:     Patient to be seen 5 x/week to address the above listed problems via self-care/home management, therapeutic exercises, therapeutic activities  · Plan of Care Expires: 06/10/19  · Plan of Care Reviewed with: patient, daughter, friend    Subjective     Pain/Comfort:  · Pain Rating 1: 0/10  · Pain Rating Post-Intervention 1: 0/10    Objective:     Communicated with: nursing, son (in room with patient)  prior to session.  Patient found up in chair with PICC line, peripheral IV upon OT entry to room.    General Precautions: Standard, fall   Orthopedic Precautions:N/A   Braces: N/A     Occupational Performance:     Bed Mobility:    · NT    Functional Mobility/Transfers:  · NT    Activities of Daily Living:  · NT      [facility] 6 Click ADL: 22    Treatment & Education:  Had him  several activities to keep himself mentally engaged and awake during the day.. Son says "he wont do coloring", son said he " "might do word searches so chose a word search book, large print. Soon says 'he does not play cards" but therapist asked him if he can maybe set the cards up by number , color, or suit and have the patient match, sort the cards to keep him occupied and awake during the day for better sleep hygiene at night.    Patient left up in chair with all lines intact and call button in reachEducation:      GOALS:   Multidisciplinary Problems     Occupational Therapy Goals        Problem: Occupational Therapy Goal    Goal Priority Disciplines Outcome Interventions   Occupational Therapy Goal     OT, PT/OT     Description:  Goals to be met by:6/10/2019     Patient will increase functional independence with ADLs by performing:    LE Dressing with Modified [place].  Grooming while standing at sink with Modified [place].  Toileting from toilet with Modified [place] for hygiene and clothing management.   Bathing from  shower chair/bench with Modified [place].  Upper extremity exercise program x10 reps per handout, with assistance as needed  Patient will consistently 3/3 trials at different times of the day demonstrate ability to be a) oriented x 4 (using watch if needed for time of day)  b) use RN call light appropriately before getting up c) complete a >10 piece puzzle d) complete a word search page with 90% accuracy d) add and subtract 2 digit numbers 5/5 tries with 90% accuracy e) color by number from coloring book attending to periphery of the page as well as center of the page without errors. E) fill his medbox with the 13 pills he says he takes daily, twice per day with 100% accuracy f) fill out his medication list with WHAT he takes, WHY he takes it, WHEN he takes it, HOW and HOW often he takes it , and possible side effects of each medication  Complete the [employer] pre -  Driving cognitive screen without any errors.                      Time Tracking:     OT Date of Treatment: 04/25/19  OT Start Time: " 1500  OT Stop Time: 1530  OT Total Time (min): 30 min    Billable Minutes:Therapeutic Activity 15    Zarina Gonzalez OT  4/25/2019

## 2019-04-25 NOTE — NURSING
Bedside report received from Daya.  VS stable.  No complaints of pain noted.  Call bell in reach.  Will continue to monitor.  Family at bedside.  Right PICC line intact.  No signs of infection noted.

## 2019-04-25 NOTE — PT/OT/SLP PROGRESS
Physical Therapy Treatment    Patient Name:  St Lacho Farooq   MRN:  [id number]    Recommendations:     Discharge Recommendations:  home health PT, home with home health, home health OT   Discharge Equipment Recommendations: cane, straight   Barriers to discharge: Inaccessible home    Assessment:     St Lacho Farooq is a 84 y.o. male admitted with a medical diagnosis of Bacteremia.  He presents with the following impairments/functional limitations:  impaired endurance, gait instability, impaired self care skills, impaired functional mobilty, weakness, decreased lower extremity function, impaired cognition, decreased safety awareness . Patient seen at bedside chair with a new Straight Cane on the side. Physical Therapist adjusted patient's new straight cane to proper height. Educated and trained patient the proper use of Straight Cane during Gait trng including ascending/descending stair steps.    Rehab Prognosis: Good; patient would benefit from acute skilled PT services to address these deficits and reach maximum level of function.    Recent Surgery: * No surgery found *      Plan:     During this hospitalization, patient to be seen daily to address the identified rehab impairments via gait training, therapeutic exercises, therapeutic activities and progress toward the following goals:    · Plan of Care Expires:  04/30/19    Subjective     Chief Complaint: unsteady gait  Patient/Family Comments/goals: to walk better using a straight cane and walking up and down steps.  Pain/Comfort:  · Pain Rating 1: 0/10  · Pain Rating Post-Intervention 1: 0/10      Objective:     Communicated with patient and daughter prior to session.  Patient found up in chair with peripheral IV, PICC line upon PT entry to room.     General Precautions: Standard, fall   Orthopedic Precautions:N/A   Braces:       Functional Mobility:  · Bed Mobility:     · Rolling Left:  supervision  · Rolling Right: supervision  · Scooting:  supervision  · Supine to Sit: supervision  · Sit to Supine: supervision  · Transfers:     · Sit to Stand:  stand by assistance with straight cane  · Bed to Chair: stand by assistance with  straight cane  using  Step Transfer  · Gait: Straight Gait trng x 200 feet with SBA and cues for proper step sequence and st cane advancement.  · Balance: Dynamic standing with Fair grade using Straight Cane.  · Stairs:  Pt ascended/descended 12 steps stair(s) with Straight Cane with right handrail with Contact Guard Assistance.       AM-PAC 6 CLICK MOBILITY  Turning over in bed (including adjusting bedclothes, sheets and blankets)?: 3  Sitting down on and standing up from a chair with arms (e.g., wheelchair, bedside commode, etc.): 3  Moving from lying on back to sitting on the side of the bed?: 3  Moving to and from a bed to a chair (including a wheelchair)?: 3  Need to walk in hospital room?: 3  Climbing 3-5 steps with a railing?: 3  Basic Mobility Total Score: 18       Therapeutic Activities and Exercises:   Worked and provided patient with Home Exercise Program - Bilateral LE strengthening an coordination ex  X 10 reps on each such as Tip Toes, LAQ alternately, Marching at Sitting and Standing, Mini Squats, and arm push ups on the chair. Gait trng performed using straight cane Contact Guard Assistance on level surface at the hallway x 200 fett and on  12 Stair Steps using Right Handrail. No sign of distress and no sign of fatigue. Exhibits slight trunk lateral sway ,  reciprocal gait and slight inc foot/foor clearance.    Patient left up in chair with all lines intact, call button in reach, nurse notified and son present..    GOALS:   Multidisciplinary Problems     Physical Therapy Goals        Problem: Physical Therapy Goal    Goal Priority Disciplines Outcome Goal Variances Interventions   Physical Therapy Goal     PT, PT/OT Ongoing (interventions implemented as appropriate)     Description:  Goals to be met by: 7      Patient will increase functional independence with mobility by performin. Supine to sit with Independent  2. Sit to supine with Independent  3. Bed to chair transfer with Modified Independent with or without R W or straight Cane  using Step Transfer TECHNIQUE  4. Gait  x 300  feet with Modified Independent with or without straight cane or RW  5. Lower extremity exercise program x10 reps per handout, with assistance as needed.  6. Able to ascend/descend 12 stair steps using handrail/Straight  Cane with supervision.                    Time Tracking:     PT Received On: 19  PT Start Time: 1300     PT Stop Time: 1318  PT Total Time (min): 48 min     Billable Minutes: Gait Training 15 and Therapeutic Activity 15    Treatment Type: Treatment  PT/PTA: PT           Keven Goldstein, PT  2019

## 2019-04-25 NOTE — ASSESSMENT & PLAN NOTE
Subacute CVA with focal deficits noted on 4/14, with significant improvement in symptoms   MRI did not demonstrate acute findings but neuro reviewed and Dx with new CVA;   Was being tx with Pradaxa when he had acute stroke but Will not consider failed therapy as patient has had lapses in therapy due to recent surgical interventions and non-compliance  Per Dr. Fernandez's recommendations we will continue:  -Telemetry  -Neurochecks  -NO TPA as he presented initially on Sunday  -Physical Therapy and OT Consult, evaluation and treatment at [facility] once patient is admitted to  SNF  -Completed permissive HTN and will need strict BP control.   -Continue his current dose home meds otherwise including Asprin, Pradaxa and statin

## 2019-04-25 NOTE — ASSESSMENT & PLAN NOTE
"S/p recent TAVR now with septic emboli and concern for "flicking emboli"   His blood cultures have grown Enterococcus faecalis sensitve to all tested drugs  ID recommending total of 6 weeks IV ABX; Ampicillin 2gms IV q 6 hr; day 1 with negative blood cultures was 4/19/19; so today is day 6/42  Day 7/42 4/26/19 day 8/42      "

## 2019-04-25 NOTE — PLAN OF CARE
Problem: Adult Inpatient Plan of Care  Goal: Plan of Care Review  Outcome: Ongoing (interventions implemented as appropriate)  Pt and Family agree with plan of care.  VS stable.  IV antibiotics continued as ordered.  Pt incontinent at times. Incontinent care provided as needed.  Pt up in chair most of shift.  PT confused at times.  Pt son at bedside.  Right Picc line intact and no signs of infection noted.  Call bell in reach.  Pt remains sinus rhythm on monitor.  Will continue to monitor.

## 2019-04-25 NOTE — ASSESSMENT & PLAN NOTE
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 Galicia deemed septic emboli; still no INES reporting thrombus   Continue Pradaxa; family plans to monitor meds once home  Will not consider failed therapy as patient has had lapses in therapy due to recent surgical interventions and non-compliance   as discussed above there is concern for infected thrombus.   Per Infectious Disease will treat bacteremia and possible infected thrombus for 6 weeks.

## 2019-04-25 NOTE — PLAN OF CARE
Problem: Adult Inpatient Plan of Care  Goal: Plan of Care Review  Outcome: Ongoing (interventions implemented as appropriate)  Patient aware of plan of care. VS stable. Afebrile. Patient sat up in chair most of day visiting with family. SR with 1st degree AVB. IV antibiotic continued. Free from falls/injuries. No questions or concerns at this time. Agrees with plan of care.

## 2019-04-26 PROCEDURE — 97110 THERAPEUTIC EXERCISES: CPT

## 2019-04-26 PROCEDURE — 25000003 PHARM REV CODE 250: Performed by: NURSE PRACTITIONER

## 2019-04-26 PROCEDURE — A4216 STERILE WATER/SALINE, 10 ML: HCPCS | Performed by: INTERNAL MEDICINE

## 2019-04-26 PROCEDURE — 25000003 PHARM REV CODE 250: Performed by: INTERNAL MEDICINE

## 2019-04-26 PROCEDURE — 11000004 HC SNF PRIVATE

## 2019-04-26 PROCEDURE — 99900035 HC TECH TIME PER 15 MIN (STAT)

## 2019-04-26 PROCEDURE — 63600175 PHARM REV CODE 636 W HCPCS: Performed by: INTERNAL MEDICINE

## 2019-04-26 PROCEDURE — 99232 SBSQ HOSP IP/OBS MODERATE 35: CPT | Mod: ,,, | Performed by: INTERNAL MEDICINE

## 2019-04-26 PROCEDURE — 94761 N-INVAS EAR/PLS OXIMETRY MLT: CPT

## 2019-04-26 PROCEDURE — 97535 SELF CARE MNGMENT TRAINING: CPT

## 2019-04-26 PROCEDURE — 94799 UNLISTED PULMONARY SVC/PX: CPT

## 2019-04-26 PROCEDURE — 97530 THERAPEUTIC ACTIVITIES: CPT

## 2019-04-26 PROCEDURE — 99232 PR SUBSEQUENT HOSPITAL CARE,LEVL II: ICD-10-PCS | Mod: ,,, | Performed by: INTERNAL MEDICINE

## 2019-04-26 RX ORDER — TAMSULOSIN HYDROCHLORIDE 0.4 MG/1
0.4 CAPSULE ORAL DAILY
Status: DISCONTINUED | OUTPATIENT
Start: 2019-04-26 | End: 2019-05-16 | Stop reason: HOSPADM

## 2019-04-26 RX ADMIN — AMPICILLIN SODIUM 2 G: 2 INJECTION, POWDER, FOR SOLUTION INTRAMUSCULAR; INTRAVENOUS at 09:04

## 2019-04-26 RX ADMIN — ATORVASTATIN CALCIUM 10 MG: 10 TABLET, FILM COATED ORAL at 08:04

## 2019-04-26 RX ADMIN — AMPICILLIN SODIUM 2 G: 2 INJECTION, POWDER, FOR SOLUTION INTRAMUSCULAR; INTRAVENOUS at 04:04

## 2019-04-26 RX ADMIN — Medication 10 ML: at 08:04

## 2019-04-26 RX ADMIN — DABIGATRAN ETEXILATE MESYLATE 150 MG: 75 CAPSULE ORAL at 08:04

## 2019-04-26 RX ADMIN — PANTOPRAZOLE SODIUM 40 MG: 40 TABLET, DELAYED RELEASE ORAL at 08:04

## 2019-04-26 RX ADMIN — AMPICILLIN SODIUM 2 G: 2 INJECTION, POWDER, FOR SOLUTION INTRAMUSCULAR; INTRAVENOUS at 08:04

## 2019-04-26 RX ADMIN — Medication 10 ML: at 04:04

## 2019-04-26 RX ADMIN — AMLODIPINE BESYLATE 10 MG: 10 TABLET ORAL at 08:04

## 2019-04-26 RX ADMIN — LISINOPRIL 20 MG: 20 TABLET ORAL at 08:04

## 2019-04-26 RX ADMIN — FUROSEMIDE 20 MG: 20 TABLET ORAL at 08:04

## 2019-04-26 RX ADMIN — ASPIRIN 81 MG: 81 TABLET, COATED ORAL at 08:04

## 2019-04-26 RX ADMIN — CARVEDILOL 25 MG: 25 TABLET, FILM COATED ORAL at 04:04

## 2019-04-26 RX ADMIN — CARVEDILOL 25 MG: 25 TABLET, FILM COATED ORAL at 08:04

## 2019-04-26 RX ADMIN — HYDROCHLOROTHIAZIDE 12.5 MG: 12.5 TABLET ORAL at 08:04

## 2019-04-26 RX ADMIN — AMPICILLIN SODIUM 2 G: 2 INJECTION, POWDER, FOR SOLUTION INTRAMUSCULAR; INTRAVENOUS at 03:04

## 2019-04-26 RX ADMIN — TAMSULOSIN HYDROCHLORIDE 0.4 MG: 0.4 CAPSULE ORAL at 10:04

## 2019-04-26 RX ADMIN — Medication 10 ML: at 05:04

## 2019-04-26 RX ADMIN — Medication 10 ML: at 10:04

## 2019-04-26 NOTE — PT/OT/SLP PROGRESS
Occupational Therapy      Patient Name:  St Lacho Farooq   MRN:  [id number]    Patient not seen today secondary to just now eating lunch  . Will follow-up later as able.    Zarina Gonzalez OT  4/26/2019

## 2019-04-26 NOTE — PLAN OF CARE
04/26/19 1106   Discharge Reassessment   Assessment Type Discharge Planning Reassessment         Patient will remain for continued treatment today. He is working toward goals with PT and OT daily. No needs or concerns voiced at this time. CM will continue to follow and assist as needed.

## 2019-04-26 NOTE — PLAN OF CARE
Problem: Adult Inpatient Plan of Care  Goal: Plan of Care Review  Outcome: Ongoing (interventions implemented as appropriate)  Pt and family agrees with plan of care.  VS stable.  Pt up in chair.  IV antibiotics continued.  E sitter at bedside.  Pt confused at times.  Picc line intact.  Call bell in reach.  Will continue to monitor.

## 2019-04-26 NOTE — PT/OT/SLP PROGRESS
"Physical Therapy Treatment    Patient Name:  St Lacho Farooq   MRN:  [id number]    Recommendations:     Discharge Recommendations:  home with home health, home health PT, home health OT   Discharge Equipment Recommendations: cane, straight   Barriers to discharge: Inaccessible home    Assessment:     St Lacho Farooq is a 84 y.o. male admitted with a medical diagnosis of Bacteremia.  He presents with the following impairments/functional limitations:  impaired endurance, gait instability, impaired self care skills, impaired functional mobilty, weakness, decreased lower extremity function, decreased safety awareness, impaired cognition. Ambulated patient using Straight Cane to Therapy Room x 250 feet with Supervision. Noted slight unsteady gait with requires verbal and tactile cues to keep head up and inc right UE swing. Continued to require Contact Guard Assistance with verbal and tactile cues on ascending/descending 12 stair steps to correct proper step sequence and st cane advancement.    Rehab Prognosis: Good; patient would benefit from acute skilled PT services to address these deficits and reach maximum level of function.    Recent Surgery: * No surgery found *      Plan:     During this hospitalization, patient to be seen daily to address the identified rehab impairments via gait training, therapeutic activities, therapeutic exercises and progress toward the following goals:    · Plan of Care Expires:  04/30/19    Subjective     Chief Complaint: Patient stated,  I'm learning to move and walk better each day".  Patient/Family Comments/goals: "To walk better with my cane"  Pain/Comfort:  · Pain Rating 1: 0/10  · Pain Rating Post-Intervention 1: 0/10      Objective:     Communicated with patient  prior to session.  Patient found up in chair with peripheral IV, telemetry upon PT entry to room.     General Precautions: Standard, fall   Orthopedic Precautions:N/A   Braces: N/A     Functional " Mobility:  · Bed Mobility:     · Rolling Left:  independence  · Rolling Right: independence  · Scooting: independence  · Supine to Sit: independence  · Sit to Supine: independence  · Transfers:     · Sit to Stand:  supervision with straight cane  · Bed to Chair: supervision with  straight cane  using  Step Transfer  · Gait: Straight Cane Ambulation x 250 feet with Supervision. Reciprocal gait, slight thunk lateral sway, head looking down mostly, increased Right foot/floor clearance and dec Right UE swing.  · Balance: Dynamic Standing with Good- grade  · Stairs:  Pt ascended/descended 12 steps stair(s) with Straight Cane with right handrail with Contact Guard Assistance.       AM-PAC 6 CLICK MOBILITY  Turning over in bed (including adjusting bedclothes, sheets and blankets)?: 4  Sitting down on and standing up from a chair with arms (e.g., wheelchair, bedside commode, etc.): 3  Moving from lying on back to sitting on the side of the bed?: 4  Moving to and from a bed to a chair (including a wheelchair)?: 3  Need to walk in hospital room?: 3  Climbing 3-5 steps with a railing?: 3  Basic Mobility Total Score: 20       Therapeutic Activities and Exercises:   Worked on Home Exercise Program for Bilateral LE strengthenig and coordination ex x 10 resp on each such as Tip  Toes, LAQ alternately, Marching on place at sitting and standing position, Heel raise at standing, mini sqauts and arm push ups. Implemented gait trng using st cane and stairs mgt. No sign of distress providing patient with rest periods.    Patient left up in chair with all lines intact, call button in reach and nurse notified..    GOALS:   Multidisciplinary Problems     Physical Therapy Goals        Problem: Physical Therapy Goal    Goal Priority Disciplines Outcome Goal Variances Interventions   Physical Therapy Goal     PT, PT/OT Ongoing (interventions implemented as appropriate)     Description:  Goals to be met by: 7     Patient will increase  functional independence with mobility by performin. Supine to sit with Independent - met 19  2. Sit to supine with Independent - met 19  3. Bed to chair transfer with Modified Independent with or without R W or straight Cane  using Step Transfer TECHNIQUE  4. Gait  x 300  feet with Modified Independent with or without straight cane or RW  5. Lower extremity exercise program x10 reps per handout, with assistance as needed.  6. Able to ascend/descend 12 stair steps using handrail/Straight  Cane with supervision.                     Time Tracking:     PT Received On: 19  PT Start Time: 931     PT Stop Time: 1005  PT Total Time (min): 34 min     Billable Minutes: Therapeutic Activity 30 mins    Treatment Type: Treatment  PT/PTA: PT           Keven Goldstein, PT  2019

## 2019-04-26 NOTE — NURSING
Bedside report received from Hanny.  VS stable.  No complaints of pain noted.  Esitter placed at bedside.  Call bell in reach.  Right PICC line intact.  Family at beside.  Will continue to monitor.

## 2019-04-26 NOTE — PROGRESS NOTES
[facility] Medicine  Progress Note    Patient Name: St Lacho Farooq  MRN: [id number]  Patient Class: IP- Swing   Admission Date: 4/23/2019  Length of Stay: 3 days  Attending Physician: John Saeed MD  Primary Care Provider: Dylan Adam MD        Subjective:     Principal Problem:Bacteremia    HPI:  This 84 year old man with PMHX of CAD p CABG, [facility] 2/19; stable lesions, PAD, bilateral CVD, AAA,  PAF, HTN, hyperlipidemia, MALATHI, NYHA II chronic diastolic HF and non rheumatic critical AS with JAMAAL 0.8cm2 with preserved LVSFX EF55%,  per Echo underwent  + TAVR placed 3/18/19 per Dr. Babcock. Post op course was complicated by CVA ~ 2 weeks post TAVR; he presented to [facility] 4/16 with altered mental status and fever where he was Dx with CVA and gram + bacteremia.   He was  transferred to [facility] 4/19  for cardiology and ID eval ant tx; CT at that time was unrevealing for an acute intracranial process  His MRI was abnormal.  His blood cultures have grown Enterococcus faecalis sensitve to all tested drugs,  Pt initially given vancomycin and then changed to ampicillin IV.  He has clinically improved.  His INES shows a septic atrial thrombus. The cRP is 49 and the ESR is 15.  Pt is no longer febrile and he and daughter feel he is at base line physically and mentally.Pt transferred to [facility] yesterday for continued skilled care and prolonged IV ABX. Plan is 6 weeks of ABX; ampicillin 2gms IV q 6; day 1 with negative blood cultures was 4/19; so today is day 6/42  Requests DNR     Hospital Course:  4/25/19 SKILLED NURSING for prolonged ABX   Pt sitting up in chair watching TV; reports feeling fine but did have urinary incontinence and noting frequency   No fever, WBC normal   Day 7/42 ABX for bacteremia/septic emboli     4/26/19 SKILLED NURSING for prolonged ABX for bacteremia, septic thrombus; day 8/42  NAD Overnight, still with night time urinary incontinence; will try trial of flomax    PSA 1.4, repeat U/A ok; bladder scan ok     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    S/P 2-vessel coronary artery bypass        Past Surgical History:   Procedure Laterality Date    CARPAL TUNNEL RELEASE      CATARACT EXTRACTION, BILATERAL      CORONARY ANGIOGRAPHY  2019    CORONARY ARTERY BYPASS GRAFT  2004    HERNIA REPAIR      Inguinal & Ventral with MESH    INSERTION, PICC N/A 4/22/2019    Performed by [facility] Diagnostic Provider at [facility] OR    REPLACEMENT, AORTIC VALVE, PERCUTANEOUS, TRANSCATHETER Right 3/18/2019    Performed by Mani Babcock MD at [facility] CATH    REPLACEMENT, AORTIC VALVE, PERCUTANEOUS, TRANSCATHETER Right 3/18/2019    Performed by Ac Osman MD at [facility] CATH    TONSILLECTOMY      Transesophageal echo (INES) intra-procedure log documentation N/A 4/18/2019    Performed by Tam Carrasco MD at [facility] CATH    TRANSESOPHAGEAL ECHOCARDIOGRAM (INES) N/A 6/28/2016    Performed by Hugh Begum MD at [facility] OR       Review of patient's allergies indicates:   Allergen Reactions    Bactrim [sulfamethoxazole-trimethoprim] Rash    Codeine Rash       No current facility-administered medications on file prior to encounter.      Current Outpatient Medications on File Prior to Encounter   Medication Sig    amLODIPine (NORVASC) 2.5 MG tablet Take 2.5 mg by mouth once daily.    ANORO ELLIPTA 62.5-25 mcg/actuation DsDv INHALE 1 PUFF INTO LUNGS ONCE DAILY    aspirin  "(ECOTRIN) 81 MG EC tablet Take 81 mg by mouth once daily.    atorvastatin (LIPITOR) 10 MG tablet Take 10 mg by mouth every evening.    carvedilol (COREG) 12.5 MG tablet Take 25 mg by mouth 2 (two) times daily with meals.     dabigatran etexilate (PRADAXA) 150 mg Cap Take 150 mg by mouth 2 (two) times daily. "Do NOT break, chew, or open capsules."    furosemide (LASIX) 20 MG tablet Take 20 mg by mouth 2 (two) times daily.    lisinopril (PRINIVIL,ZESTRIL) 20 MG tablet Take 1 tablet (20 mg total) by mouth once daily.    nitroGLYCERIN (NITROSTAT) 0.4 MG SL tablet Place 0.4 mg under the tongue every 5 (five) minutes as needed for Chest pain.    omeprazole (PRILOSEC) 40 MG capsule Take 40 mg by mouth once daily.    ranolazine (RANEXA) 500 MG Tb12 Take 500 mg by mouth 2 (two) times daily.     Family History     Problem Relation (Age of Onset)    Atrial fibrillation Father, Sister    COPD Sister    Cancer Father    Diabetes type II Mother, Sister    Heart failure Mother    Hypertension Mother, Father, Sister, Brother    Pacemaker/defibrilator Sister    Pulmonary embolism Brother        Tobacco Use    Smoking status: Former Smoker     Last attempt to quit: 2004     Years since quitting: 15.3    Smokeless tobacco: Never Used    Tobacco comment: Smoked for 60 years   Substance and Sexual Activity    Alcohol use: No     Frequency: Never    Drug use: No    Sexual activity: Yes     Partners: Female     Review of Systems   Constitutional: Negative.    HENT: Negative for congestion, ear pain, sinus pressure, sneezing and sore throat.    Eyes: Negative.    Respiratory: Negative for cough, chest tightness, shortness of breath and wheezing.    Cardiovascular: Negative.  Negative for chest pain.   Gastrointestinal: Negative for abdominal pain, blood in stool, constipation, diarrhea, nausea and vomiting.   Genitourinary: Negative for difficulty urinating, dysuria and flank pain.        Incontinence at night "   Musculoskeletal: Negative.  Negative for joint swelling.   Skin: Negative.    Neurological: Negative for dizziness, weakness and headaches.   Hematological: Negative.    Psychiatric/Behavioral: Negative.  Negative for behavioral problems and confusion.     Objective:     Vital Signs (Most Recent):  Temp: 97.9 °F (36.6 °C) (04/26/19 0715)  Pulse: 73 (04/26/19 0749)  Resp: 14 (04/26/19 0749)  BP: 130/61 (04/26/19 0715)  SpO2: 98 % (04/26/19 0749) Vital Signs (24h Range):  Temp:  [97.4 °F (36.3 °C)-99.2 °F (37.3 °C)] 97.9 °F (36.6 °C)  Pulse:  [62-83] 73  Resp:  [14-18] 14  SpO2:  [92 %-98 %] 98 %  BP: (108-132)/(51-61) 130/61     Weight: 89.4 kg (197 lb)  Body mass index is 29.95 kg/m².    Physical Exam   Constitutional: He is oriented to person, place, and time. He appears well-developed and well-nourished.   HENT:   Head: Normocephalic and atraumatic.   Right Ear: Tympanic membrane, external ear and ear canal normal.   Left Ear: Tympanic membrane, external ear and ear canal normal.   Mouth/Throat: Oropharynx is clear and moist.   Eyes: Pupils are equal, round, and reactive to light. Conjunctivae and EOM are normal.   Neck: Normal range of motion. Neck supple. No tracheal deviation present.   Cardiovascular: Normal rate, regular rhythm, intact distal pulses and normal pulses. Exam reveals no gallop and no friction rub.   Murmur heard.  Pulses:       Carotid pulses are on the right side with bruit, and on the left side with bruit.  Pulmonary/Chest: Effort normal and breath sounds normal.   Abdominal: Soft. Bowel sounds are normal. He exhibits no distension. There is no tenderness. Hernia confirmed negative in the right inguinal area and confirmed negative in the left inguinal area.   Musculoskeletal: Normal range of motion.   Neurological: He is alert and oriented to person, place, and time. He has normal reflexes.   Skin: Skin is warm and dry.   Psychiatric: He has a normal mood and affect. His behavior is normal.  "Judgment and thought content normal.   Nursing note and vitals reviewed.        CRANIAL NERVES     CN III, IV, VI   Pupils are equal, round, and reactive to light.  Extraocular motions are normal.        Significant Labs:   A1C: No results for input(s): HGBA1C in the last 4320 hours.  Blood Culture: No results for input(s): LABBLOO in the last 48 hours.  CMP:   Recent Labs   Lab 04/25/19  0831      K 3.9      CO2 24   *   BUN 15   CREATININE 1.3   CALCIUM 10.3   PROT 6.3   ALBUMIN 3.3*   BILITOT 1.0   ALKPHOS 62   AST 13   ALT 13   ANIONGAP 12   EGFRNONAA 51*     PSA 1.4,   Recent Labs   Lab 04/25/19  0831   WBC 7.32   RBC 4.10*   HGB 9.6*   HCT 32.0*      MCV 78*   MCH 23.4*   MCHC 30.0*       Significant Imaging: I have reviewed all pertinent imaging results/findings within the past 24 hours.  I have reviewed and interpreted all pertinent imaging results/findings within the past 24 hours.    Assessment/Plan:      * Bacteremia  S/p recent TAVR now with septic emboli and concern for "flicking emboli"   His blood cultures have grown Enterococcus faecalis sensitve to all tested drugs  ID recommending total of 6 weeks IV ABX; Ampicillin 2gms IV q 6 hr; day 1 with negative blood cultures was 4/19/19; so today is day 6/42  Day 7/42 4/26/19 day 8/42        Nocturnal enuresis  Check U.A , PSA, bladder scan; bladder scan 89ml   Still with incontinence issues   Gets HCTz 12.5 and lasix 20mg po daily in am   Consider trial of flomax     Atrial thrombus  Large atrial thrombus found on INES ,Dr. Galicia deemed septic emboli; still no INES reporting thrombus   Continue Pradaxa; family plans to monitor meds once home  Will not consider failed therapy as patient has had lapses in therapy due to recent surgical interventions and non-compliance   as discussed above there is concern for infected thrombus.   Per Infectious Disease will treat bacteremia and possible infected thrombus for 6 " weeks.          Cerebrovascular accident (CVA)  Subacute CVA with focal deficits noted on 4/14, with significant improvement in symptoms   MRI did not demonstrate acute findings but neuro reviewed and Dx with new CVA;   Was being tx with Pradaxa when he had acute stroke but Will not consider failed therapy as patient has had lapses in therapy due to recent surgical interventions and non-compliance  Per Dr. Fernandez's recommendations we will continue:  -Telemetry  -Neurochecks  -NO TPA as he presented initially on Sunday  -Physical Therapy and OT Consult, evaluation and treatment at [place] once patient is admitted to  SNF  -Completed permissive HTN and will need strict BP control.   -Continue his current dose home meds otherwise including Asprin, Pradaxa and statin             Hx of CABG    Asa, statin,     CHF (congestive heart failure), NYHA class II, chronic, diastolic  Continue Lisinopril, HCTZ, furosemide       Anemia  H/H stable without acute indications for transfusion   Continue to monitor              Essential hypertension  Continue amlodpine, Lisinopril, HCTZ, , coreg  lasix    Aortic valve stenosis    S/p TAVR       VTE Risk Mitigation (From admission, onward)        Ordered     heparin, porcine (PF) 100 unit/mL injection flush 500 Units  As needed (PRN)      04/25/19 1408     dabigatran etexilate capsule 150 mg  2 times daily      04/23/19 1424              Laverne Shipman MD  Department of Hospital Medicine   [facility]

## 2019-04-26 NOTE — PLAN OF CARE
Problem: Adult Inpatient Plan of Care  Goal: Plan of Care Review  Outcome: Ongoing (interventions implemented as appropriate)  Pt admitted to swing bed for bacteremia. On ampicillin 2gm every 6 hours for 6 weeks. Afebrile. WBC negative. PICC line to right upper arm; 2 ports intact; flush easily with good blood return. Chlorhexidine bath complete.  PT and OT consulted. Ambulates well with walker, stand by assist. Vitals stable. Pt sat outside in court yard this afternoon. Family visits often. Call bell within reach. Reviewed plan of care with pt and son; state agreement.

## 2019-04-26 NOTE — PT/OT/SLP PROGRESS
Occupational Therapy   Treatment    Name: St Lacho Farooq  MRN: [id number]  Admitting Diagnosis:  Bacteremia       Recommendations:     Discharge Recommendations: home with home health, home health OT, home health PT  Discharge Equipment Recommendations:  cane, straight  Barriers to discharge:  None    Assessment:     St Lacho Farooq is a 84 y.o. male with a medical diagnosis of Bacteremia.  He presents with overall weakness, confusion. Performance deficits affecting function are impaired endurance, impaired self care skills, gait instability, visual deficits, decreased lower extremity function, weakness, decreased safety awareness, impaired cognition, impaired functional mobilty.     Rehab Prognosis:  Fair; patient would benefit from acute skilled OT services to address these deficits and reach maximum level of function.       Plan:     Patient to be seen 5 x/week to address the above listed problems via self-care/home management, therapeutic exercises, therapeutic activities  · Plan of Care Expires: 06/10/19  · Plan of Care Reviewed with: patient, daughter, friend    Subjective     Pain/Comfort:  · Pain Rating 1: 0/10  · Pain Rating Post-Intervention 1: 0/10    Objective:     Communicated with: nursing (also dtr in room who works here- family is taking turns being with him in the room to offer help/ safety) prior to session.  Patient found up in chair with peripheral IV, telemetry upon OT entry to room.    General Precautions: Standard, fall   Orthopedic Precautions:N/A   Braces: N/A     Occupational Performance:     Bed Mobility:    · NT    Functional Mobility/Transfers:  · Patient completed Sit <> Stand Transfer with supervision  with  rolling walker   · Patient completed Bed <> Chair Transfer using Stand Pivot technique with supervision with rolling walker  · Patient completed Toilet Transfer Stand Pivot technique with supervision with  rolling walker  · Functional Mobility: in/ out of the bathroom  "and able to stand at sink to wash margo without LOB and get on/ off toilet    Activities of Daily Living:  · Feeding:  independence limited appetite  · Grooming: supervision to stand at sink to wash hands  · Bathing: NT  · Upper Body Dressing: NT - has a street clothes shirt on  · Lower Body Dressing: minimum assistance pants , socks and shoes  · Toileting: minimum assistance note: he might have some difficulty with urinary incontinence and wiping thoughoghly, will benefit from frequent showering/ bed bathing and a barrier cream.      [facility] 6 Click ADL: 21    Treatment & Education:  Self care for toileting/ grooming followed by 20 reps against red theraband for bicep/ tricep/ diagonal PNF patterns for abdominal strength needed for balance and with red band around the foot "kicking like pushing the gas)./ Left theraband tied to the walker so he can exercise even when not in therapy. He was able to correctly use his cell phone to answer the phone and have a conversation.    Patient left up in chair with all lines intact and call button in reachEducation:   tele sitter in room for confusion which seems greater later in the day    GOALS:   Multidisciplinary Problems     Occupational Therapy Goals        Problem: Occupational Therapy Goal    Goal Priority Disciplines Outcome Interventions   Occupational Therapy Goal     OT, PT/OT     Description:  Goals to be met by:6/10/2019     Patient will increase functional independence with ADLs by performing:    LE Dressing with Modified [place].  Grooming while standing at sink with Modified [place].  Toileting from toilet with Modified [place] for hygiene and clothing management.   Bathing from  shower chair/bench with Modified [place].  Upper extremity exercise program x10 reps per handout, with assistance as needed  Patient will consistently 3/3 trials at different times of the day demonstrate ability to be a) oriented x 4 (using watch if needed for time of " day)  b) use RN call light appropriately before getting up c) complete a >10 piece puzzle d) complete a word search page with 90% accuracy d) add and subtract 2 digit numbers 5/5 tries with 90% accuracy e) color by number from coloring book attending to periphery of the page as well as center of the page without errors. E) fill his medbox with the 13 pills he says he takes daily, twice per day with 100% accuracy f) fill out his medication list with WHAT he takes, WHY he takes it, WHEN he takes it, HOW and HOW often he takes it , and possible side effects of each medication  Complete the [place] pre -  Driving cognitive screen without any errors.                      Time Tracking:     OT Date of Treatment: 04/26/19  OT Start Time: 1215  OT Stop Time: 1245  OT Total Time (min): 30 min    Billable Minutes:Self Care/Home Management 15  Therapeutic Exercise 15    Zarina Gonzalez OT  4/26/2019

## 2019-04-27 PROCEDURE — 99900035 HC TECH TIME PER 15 MIN (STAT)

## 2019-04-27 PROCEDURE — 63600175 PHARM REV CODE 636 W HCPCS: Performed by: INTERNAL MEDICINE

## 2019-04-27 PROCEDURE — 25000003 PHARM REV CODE 250: Performed by: INTERNAL MEDICINE

## 2019-04-27 PROCEDURE — 25000003 PHARM REV CODE 250: Performed by: NURSE PRACTITIONER

## 2019-04-27 PROCEDURE — 94761 N-INVAS EAR/PLS OXIMETRY MLT: CPT

## 2019-04-27 PROCEDURE — A4216 STERILE WATER/SALINE, 10 ML: HCPCS | Performed by: INTERNAL MEDICINE

## 2019-04-27 PROCEDURE — 11000004 HC SNF PRIVATE

## 2019-04-27 RX ORDER — HYDROMORPHONE HYDROCHLORIDE 1 MG/ML
0.5 INJECTION, SOLUTION INTRAMUSCULAR; INTRAVENOUS; SUBCUTANEOUS EVERY 4 HOURS PRN
Status: DISCONTINUED | OUTPATIENT
Start: 2019-04-27 | End: 2019-05-16 | Stop reason: HOSPADM

## 2019-04-27 RX ORDER — TRAMADOL HYDROCHLORIDE 50 MG/1
50 TABLET ORAL EVERY 6 HOURS PRN
Status: DISCONTINUED | OUTPATIENT
Start: 2019-04-27 | End: 2019-04-28

## 2019-04-27 RX ORDER — HYDROMORPHONE HYDROCHLORIDE 1 MG/ML
0.5 INJECTION, SOLUTION INTRAMUSCULAR; INTRAVENOUS; SUBCUTANEOUS EVERY 8 HOURS PRN
Status: DISCONTINUED | OUTPATIENT
Start: 2019-04-27 | End: 2019-04-27

## 2019-04-27 RX ADMIN — LISINOPRIL 20 MG: 20 TABLET ORAL at 09:04

## 2019-04-27 RX ADMIN — Medication 10 ML: at 12:04

## 2019-04-27 RX ADMIN — ATORVASTATIN CALCIUM 10 MG: 10 TABLET, FILM COATED ORAL at 08:04

## 2019-04-27 RX ADMIN — AMPICILLIN SODIUM 2 G: 2 INJECTION, POWDER, FOR SOLUTION INTRAMUSCULAR; INTRAVENOUS at 03:04

## 2019-04-27 RX ADMIN — TRAMADOL HYDROCHLORIDE 50 MG: 50 TABLET, FILM COATED ORAL at 12:04

## 2019-04-27 RX ADMIN — HYDROCHLOROTHIAZIDE 12.5 MG: 12.5 TABLET ORAL at 09:04

## 2019-04-27 RX ADMIN — AMLODIPINE BESYLATE 10 MG: 10 TABLET ORAL at 09:04

## 2019-04-27 RX ADMIN — HYDROMORPHONE HYDROCHLORIDE 0.5 MG: 1 INJECTION, SOLUTION INTRAMUSCULAR; INTRAVENOUS; SUBCUTANEOUS at 09:04

## 2019-04-27 RX ADMIN — AMPICILLIN SODIUM 2 G: 2 INJECTION, POWDER, FOR SOLUTION INTRAMUSCULAR; INTRAVENOUS at 09:04

## 2019-04-27 RX ADMIN — TAMSULOSIN HYDROCHLORIDE 0.4 MG: 0.4 CAPSULE ORAL at 09:04

## 2019-04-27 RX ADMIN — CARVEDILOL 25 MG: 25 TABLET, FILM COATED ORAL at 07:04

## 2019-04-27 RX ADMIN — TRAMADOL HYDROCHLORIDE 50 MG: 50 TABLET, FILM COATED ORAL at 06:04

## 2019-04-27 RX ADMIN — HYDROMORPHONE HYDROCHLORIDE 0.5 MG: 1 INJECTION, SOLUTION INTRAMUSCULAR; INTRAVENOUS; SUBCUTANEOUS at 03:04

## 2019-04-27 RX ADMIN — Medication 10 ML: at 03:04

## 2019-04-27 RX ADMIN — DABIGATRAN ETEXILATE MESYLATE 150 MG: 75 CAPSULE ORAL at 08:04

## 2019-04-27 RX ADMIN — TRAMADOL HYDROCHLORIDE 50 MG: 50 TABLET, FILM COATED ORAL at 07:04

## 2019-04-27 RX ADMIN — CARVEDILOL 25 MG: 25 TABLET, FILM COATED ORAL at 05:04

## 2019-04-27 RX ADMIN — FUROSEMIDE 20 MG: 20 TABLET ORAL at 09:04

## 2019-04-27 RX ADMIN — AMPICILLIN SODIUM 2 G: 2 INJECTION, POWDER, FOR SOLUTION INTRAMUSCULAR; INTRAVENOUS at 08:04

## 2019-04-27 RX ADMIN — ASPIRIN 81 MG: 81 TABLET, COATED ORAL at 09:04

## 2019-04-27 RX ADMIN — DABIGATRAN ETEXILATE MESYLATE 150 MG: 75 CAPSULE ORAL at 09:04

## 2019-04-27 RX ADMIN — Medication 10 ML: at 06:04

## 2019-04-27 RX ADMIN — PANTOPRAZOLE SODIUM 40 MG: 40 TABLET, DELAYED RELEASE ORAL at 09:04

## 2019-04-27 NOTE — PROGRESS NOTES
Nursing Notes  Report received from Edith Thibodeaux RN.  Walking rounding.  Patient up in chair visiting with son.  Voicing no complaints.    Huddle Comments

## 2019-04-27 NOTE — PT/OT/SLP PROGRESS
Physical Therapy      Patient Name:  St Lacho Farooq   MRN:  [id number]    Patient not seen today secondary to Patient unwilling to participate, Pain. Pt c/o neck pain with pt reports of receiving meds and does not want to move. Pt's family present at this time.  Will follow-up later this AM.    Gino Krause, PT

## 2019-04-27 NOTE — PLAN OF CARE
Problem: Adult Inpatient Plan of Care  Goal: Plan of Care Review  Outcome: Ongoing (interventions implemented as appropriate)  Pt remains on swing bed for bacteremia. Receiving ampicillin 2gm every 6 hours. Afebrile. WBC negative. PICC line to right upper arm intact; flushes easily with good blood return to both ports. Dressing change due 4/29/19. PT and OT consulted. Pt ambulating well with walker and stand by assist. No deficits noted from recent stroke. Pt with complaints of severe neck pain today; alternating PO and IV prn medication; moderate relief noted. Vitals stable. Call bell within reach. Reviewed plan of care with pt and children; state agreement.

## 2019-04-27 NOTE — PT/OT/SLP PROGRESS
Physical Therapy      Patient Name:  St Lacho Farooq   MRN:  [id number]    Patient not seen today secondary to Other (Comment)(pt still c/o neck pain, pt received medications and currently c/o dizziness with activity). Family reported that pt attempted to stand with family but c/o dizziness. PT attempted to get patient to attempt mobility activity with staff, pt requesting to defer session until medication has worn off. Will follow-up tomorrow AM..    Gino Krause, PT

## 2019-04-27 NOTE — PLAN OF CARE
Problem: Adult Inpatient Plan of Care  Goal: Plan of Care Review  Emphasized need to call for assist if no other family in room.  Avysis in place.  Bed alarm on.  Alert and oriented.  PICC double lumen clamped at present.  Flushes without resistance.  Son at bedside.

## 2019-04-28 LAB
BASOPHILS # BLD AUTO: 0.03 K/UL (ref 0–0.2)
BASOPHILS NFR BLD: 0.4 % (ref 0–1.9)
DIFFERENTIAL METHOD: ABNORMAL
EOSINOPHIL # BLD AUTO: 0.2 K/UL (ref 0–0.5)
EOSINOPHIL NFR BLD: 2.1 % (ref 0–8)
ERYTHROCYTE [DISTWIDTH] IN BLOOD BY AUTOMATED COUNT: 20.1 % (ref 11.5–14.5)
HCT VFR BLD AUTO: 29.9 % (ref 40–54)
HGB BLD-MCNC: 9.1 G/DL (ref 14–18)
LYMPHOCYTES # BLD AUTO: 1 K/UL (ref 1–4.8)
LYMPHOCYTES NFR BLD: 12.7 % (ref 18–48)
MCH RBC QN AUTO: 23.5 PG (ref 27–31)
MCHC RBC AUTO-ENTMCNC: 30.4 G/DL (ref 32–36)
MCV RBC AUTO: 77 FL (ref 82–98)
MONOCYTES # BLD AUTO: 1.1 K/UL (ref 0.3–1)
MONOCYTES NFR BLD: 14.5 % (ref 4–15)
NEUTROPHILS # BLD AUTO: 5.4 K/UL (ref 1.8–7.7)
NEUTROPHILS NFR BLD: 70.3 % (ref 38–73)
PLATELET # BLD AUTO: 273 K/UL (ref 150–350)
PMV BLD AUTO: 9.5 FL (ref 9.2–12.9)
RBC # BLD AUTO: 3.87 M/UL (ref 4.6–6.2)
WBC # BLD AUTO: 7.63 K/UL (ref 3.9–12.7)

## 2019-04-28 PROCEDURE — 63600175 PHARM REV CODE 636 W HCPCS: Performed by: INTERNAL MEDICINE

## 2019-04-28 PROCEDURE — A4216 STERILE WATER/SALINE, 10 ML: HCPCS | Performed by: INTERNAL MEDICINE

## 2019-04-28 PROCEDURE — 11000004 HC SNF PRIVATE

## 2019-04-28 PROCEDURE — 85025 COMPLETE CBC W/AUTO DIFF WBC: CPT

## 2019-04-28 PROCEDURE — 25000003 PHARM REV CODE 250: Performed by: INTERNAL MEDICINE

## 2019-04-28 PROCEDURE — 25000003 PHARM REV CODE 250: Performed by: NURSE PRACTITIONER

## 2019-04-28 PROCEDURE — 94761 N-INVAS EAR/PLS OXIMETRY MLT: CPT

## 2019-04-28 PROCEDURE — 99900035 HC TECH TIME PER 15 MIN (STAT)

## 2019-04-28 PROCEDURE — 97530 THERAPEUTIC ACTIVITIES: CPT

## 2019-04-28 PROCEDURE — 36415 COLL VENOUS BLD VENIPUNCTURE: CPT

## 2019-04-28 PROCEDURE — 97116 GAIT TRAINING THERAPY: CPT

## 2019-04-28 RX ORDER — TRAMADOL HYDROCHLORIDE 50 MG/1
100 TABLET ORAL EVERY 6 HOURS PRN
Status: DISCONTINUED | OUTPATIENT
Start: 2019-04-28 | End: 2019-05-16 | Stop reason: HOSPADM

## 2019-04-28 RX ADMIN — Medication 10 ML: at 05:04

## 2019-04-28 RX ADMIN — HYDROMORPHONE HYDROCHLORIDE 0.5 MG: 1 INJECTION, SOLUTION INTRAMUSCULAR; INTRAVENOUS; SUBCUTANEOUS at 08:04

## 2019-04-28 RX ADMIN — AMPICILLIN SODIUM 2 G: 2 INJECTION, POWDER, FOR SOLUTION INTRAMUSCULAR; INTRAVENOUS at 09:04

## 2019-04-28 RX ADMIN — DABIGATRAN ETEXILATE MESYLATE 150 MG: 75 CAPSULE ORAL at 08:04

## 2019-04-28 RX ADMIN — AMLODIPINE BESYLATE 10 MG: 10 TABLET ORAL at 08:04

## 2019-04-28 RX ADMIN — CARVEDILOL 25 MG: 25 TABLET, FILM COATED ORAL at 08:04

## 2019-04-28 RX ADMIN — Medication 10 ML: at 03:04

## 2019-04-28 RX ADMIN — HYDROCHLOROTHIAZIDE 12.5 MG: 12.5 TABLET ORAL at 08:04

## 2019-04-28 RX ADMIN — CARVEDILOL 25 MG: 25 TABLET, FILM COATED ORAL at 05:04

## 2019-04-28 RX ADMIN — AMPICILLIN SODIUM 2 G: 2 INJECTION, POWDER, FOR SOLUTION INTRAMUSCULAR; INTRAVENOUS at 03:04

## 2019-04-28 RX ADMIN — LISINOPRIL 20 MG: 20 TABLET ORAL at 08:04

## 2019-04-28 RX ADMIN — ASPIRIN 81 MG: 81 TABLET, COATED ORAL at 08:04

## 2019-04-28 RX ADMIN — FUROSEMIDE 20 MG: 20 TABLET ORAL at 08:04

## 2019-04-28 RX ADMIN — TRAMADOL HYDROCHLORIDE 100 MG: 50 TABLET, FILM COATED ORAL at 11:04

## 2019-04-28 RX ADMIN — TRAMADOL HYDROCHLORIDE 50 MG: 50 TABLET, FILM COATED ORAL at 04:04

## 2019-04-28 RX ADMIN — DABIGATRAN ETEXILATE MESYLATE 150 MG: 75 CAPSULE ORAL at 09:04

## 2019-04-28 RX ADMIN — Medication 10 ML: at 12:04

## 2019-04-28 RX ADMIN — Medication 10 ML: at 09:04

## 2019-04-28 RX ADMIN — Medication 10 ML: at 06:04

## 2019-04-28 RX ADMIN — TAMSULOSIN HYDROCHLORIDE 0.4 MG: 0.4 CAPSULE ORAL at 08:04

## 2019-04-28 RX ADMIN — PANTOPRAZOLE SODIUM 40 MG: 40 TABLET, DELAYED RELEASE ORAL at 08:04

## 2019-04-28 RX ADMIN — AMPICILLIN SODIUM 2 G: 2 INJECTION, POWDER, FOR SOLUTION INTRAMUSCULAR; INTRAVENOUS at 04:04

## 2019-04-28 RX ADMIN — ATORVASTATIN CALCIUM 10 MG: 10 TABLET, FILM COATED ORAL at 09:04

## 2019-04-28 RX ADMIN — Medication 10 ML: at 10:04

## 2019-04-28 RX ADMIN — AMPICILLIN SODIUM 2 G: 2 INJECTION, POWDER, FOR SOLUTION INTRAMUSCULAR; INTRAVENOUS at 08:04

## 2019-04-28 NOTE — PLAN OF CARE
Problem: Adult Inpatient Plan of Care  Goal: Plan of Care Review  Outcome: Ongoing (interventions implemented as appropriate)  Pt remains on swing bed for continued IV ampicillin to treat bacteremia. Afebrile. WBC negative. PICC line to right upper arm intact; both ports flush easily with good blood return. Dressing change due tomorrow. PT and OT consulted. Ambulating with walker and primarily stand by assist. Complaints of neck pain; slight to moderate relief with prn medications. Mild confusion today after receiving IV dilaudid for pain; will try to hold unless pain is severe. Vitals stable. Call bell within reach. Reviewed plan of care with pt and family; state agreement.

## 2019-04-28 NOTE — NURSING
Report received from Alban. Pt sitting up in chair with son at side. Call bell within reach. Denies needs at this time.

## 2019-04-28 NOTE — PT/OT/SLP PROGRESS
"Physical Therapy Treatment    Patient Name:  St Lacho Farooq   MRN:  [id number]    Recommendations:     Discharge Recommendations:  home with home health, home health PT   Discharge Equipment Recommendations: none   Barriers to discharge: None    Assessment:     St Lacho Farooq is a 84 y.o. male admitted with a medical diagnosis of Bacteremia.  He presents with the following impairments/functional limitations:  weakness, impaired balance, decreased safety awareness, impaired joint extensibility, impaired endurance, impaired functional mobilty, impaired coordination, impaired muscle length, gait instability, impaired cognition, decreased lower extremity function, impaired cardiopulmonary response to activity .  Patient was c/o neck pain, nurse reports that the patient was given  his pain medication.  Patient is very slow to respond and seems confused.  Increase time needed with functional mobility with verbal and tactile cueing. Pushes the walker too far from him, patient requested to walk with a walker than the cane today, does not want to be corrected with "staying in the walker". Patient was received sitting on the recliner chair, ambulated ~120 feet with CGA/SBA, seems to get lost with directions, running into the walls and gets upset when corrected.  Tolerated standing for ~5 mins with SBA using a RW, does not seem to comprehend how to sit on the recliner chair after walking.  Nurse informed, she seems to think that it is the pain medication that causes this change in mental status, son-in-law concurs.  Continue to monitor patient.   Treatment frequency changed from daily to Monday to Friday starting 4/29/19.     Rehab Prognosis: Fair; patient would benefit from acute skilled PT services to address these deficits and reach maximum level of function.    Recent Surgery: * No surgery found *      Plan:     During this hospitalization, patient to be seen 5 x/week(Change treatment frequency to Monday to " "Friday ) to address the identified rehab impairments via gait training, therapeutic activities, therapeutic exercises and progress toward the following goals:    · Plan of Care Expires:  04/30/19    Subjective     Chief Complaint: neck pain  Patient/Family Comments/goals: "It is easier said than than , lady." when asked to stay in the walker during gait.   Pain/Comfort:  · Pain Rating 1: (Unable to rate)  · Location 1: neck  · Pain Addressed 1: Pre-medicate for activity, Reposition  · Pain Rating Post-Intervention 1: (Unable to quantify )      Objective:     Communicated with nurse and son-in-law  prior to session.  Patient found sitting on the recliner chair  with PICC line upon PT entry to room.     General Precautions: Standard, fall   Orthopedic Precautions:N/A   Braces: N/A     Functional Mobility:  · Transfers:     · Sit to Stand:  stand by assistance with rolling walker  · Gait: 120 feet with RW CGA/SBA, slow estefanía, flexed posture, pushes the walker too far from him, assist to manage the walker  so as not to run into the walls and obstacles   · Balance: SBA with static standing using a RW for ~5 mins. while trying to figure out how to sit on the recliner chair      AM-PAC 6 CLICK MOBILITY  Turning over in bed (including adjusting bedclothes, sheets and blankets)?: 3  Sitting down on and standing up from a chair with arms (e.g., wheelchair, bedside commode, etc.): 3  Moving from lying on back to sitting on the side of the bed?: 3  Moving to and from a bed to a chair (including a wheelchair)?: 3  Need to walk in hospital room?: 3  Climbing 3-5 steps with a railing?: 3  Basic Mobility Total Score: 18       Therapeutic Activities and Exercises:   Gait with RW, standing balance and safety with functional mobility    Patient left up in chair with all lines intact, call button in reach, nurse notified and son-in-law present present..    GOALS:   Multidisciplinary Problems     Physical Therapy Goals        Problem: " Physical Therapy Goal    Goal Priority Disciplines Outcome Goal Variances Interventions   Physical Therapy Goal     PT, PT/OT Ongoing (interventions implemented as appropriate)     Description:  Goals to be met by:  19     Patient will increase functional independence with mobility by performin. Supine to sit with Independent - met 19  2. Sit to supine with Independent - met 19  3. Bed to chair transfer with Modified Independent with or without R W or straight Cane  using Step Transfer TECHNIQUE  4. Gait  x 300  feet with Modified Independent with or without straight cane or RW  5. Lower extremity exercise program x10 reps per handout, with assistance as needed.  6. Able to ascend/descend 12 stair steps using handrail/Straight  Cane with supervision.                      Time Tracking:     PT Received On: 19  PT Start Time: 0900     PT Stop Time: 09  PT Total Time (min): 25 min     Billable Minutes: Gait Training 15 and Therapeutic Activity 10    Treatment Type: Treatment  PT/PTA: PT           Megan bar, PT  2019

## 2019-04-29 PROBLEM — N28.9 RENAL INSUFFICIENCY: Status: ACTIVE | Noted: 2019-04-29

## 2019-04-29 LAB
ALBUMIN SERPL BCP-MCNC: 2.8 G/DL (ref 3.5–5.2)
ALP SERPL-CCNC: 54 U/L (ref 55–135)
ALT SERPL W/O P-5'-P-CCNC: 9 U/L (ref 10–44)
ANION GAP SERPL CALC-SCNC: 13 MMOL/L (ref 8–16)
AST SERPL-CCNC: 10 U/L (ref 10–40)
BASOPHILS # BLD AUTO: 0.02 K/UL (ref 0–0.2)
BASOPHILS NFR BLD: 0.2 % (ref 0–1.9)
BILIRUB SERPL-MCNC: 1.1 MG/DL (ref 0.1–1)
BUN SERPL-MCNC: 33 MG/DL (ref 8–23)
CALCIUM SERPL-MCNC: 10.2 MG/DL (ref 8.7–10.5)
CHLORIDE SERPL-SCNC: 101 MMOL/L (ref 95–110)
CO2 SERPL-SCNC: 24 MMOL/L (ref 23–29)
CREAT SERPL-MCNC: 2.1 MG/DL (ref 0.5–1.4)
DIFFERENTIAL METHOD: ABNORMAL
EOSINOPHIL # BLD AUTO: 0.1 K/UL (ref 0–0.5)
EOSINOPHIL NFR BLD: 0.6 % (ref 0–8)
ERYTHROCYTE [DISTWIDTH] IN BLOOD BY AUTOMATED COUNT: 20.4 % (ref 11.5–14.5)
EST. GFR  (AFRICAN AMERICAN): 33 ML/MIN/1.73 M^2
EST. GFR  (NON AFRICAN AMERICAN): 28 ML/MIN/1.73 M^2
GLUCOSE SERPL-MCNC: 123 MG/DL (ref 70–110)
HCT VFR BLD AUTO: 28.1 % (ref 40–54)
HGB BLD-MCNC: 8.4 G/DL (ref 14–18)
LYMPHOCYTES # BLD AUTO: 0.9 K/UL (ref 1–4.8)
LYMPHOCYTES NFR BLD: 9.4 % (ref 18–48)
MCH RBC QN AUTO: 23.1 PG (ref 27–31)
MCHC RBC AUTO-ENTMCNC: 29.9 G/DL (ref 32–36)
MCV RBC AUTO: 77 FL (ref 82–98)
MONOCYTES # BLD AUTO: 1 K/UL (ref 0.3–1)
MONOCYTES NFR BLD: 10.9 % (ref 4–15)
NEUTROPHILS # BLD AUTO: 7.4 K/UL (ref 1.8–7.7)
NEUTROPHILS NFR BLD: 78.9 % (ref 38–73)
PLATELET # BLD AUTO: 279 K/UL (ref 150–350)
PMV BLD AUTO: 8.8 FL (ref 9.2–12.9)
POTASSIUM SERPL-SCNC: 3.9 MMOL/L (ref 3.5–5.1)
PROT SERPL-MCNC: 6.2 G/DL (ref 6–8.4)
RBC # BLD AUTO: 3.63 M/UL (ref 4.6–6.2)
SODIUM SERPL-SCNC: 138 MMOL/L (ref 136–145)
WBC # BLD AUTO: 9.39 K/UL (ref 3.9–12.7)

## 2019-04-29 PROCEDURE — A4216 STERILE WATER/SALINE, 10 ML: HCPCS | Performed by: INTERNAL MEDICINE

## 2019-04-29 PROCEDURE — 25000003 PHARM REV CODE 250: Performed by: NURSE PRACTITIONER

## 2019-04-29 PROCEDURE — 63600175 PHARM REV CODE 636 W HCPCS: Performed by: INTERNAL MEDICINE

## 2019-04-29 PROCEDURE — 25000003 PHARM REV CODE 250: Performed by: INTERNAL MEDICINE

## 2019-04-29 PROCEDURE — 99233 SBSQ HOSP IP/OBS HIGH 50: CPT | Mod: ,,, | Performed by: INTERNAL MEDICINE

## 2019-04-29 PROCEDURE — 94761 N-INVAS EAR/PLS OXIMETRY MLT: CPT

## 2019-04-29 PROCEDURE — 99233 PR SUBSEQUENT HOSPITAL CARE,LEVL III: ICD-10-PCS | Mod: ,,, | Performed by: PSYCHIATRY & NEUROLOGY

## 2019-04-29 PROCEDURE — 99233 PR SUBSEQUENT HOSPITAL CARE,LEVL III: ICD-10-PCS | Mod: ,,, | Performed by: INTERNAL MEDICINE

## 2019-04-29 PROCEDURE — 99233 SBSQ HOSP IP/OBS HIGH 50: CPT | Mod: ,,, | Performed by: PSYCHIATRY & NEUROLOGY

## 2019-04-29 PROCEDURE — 99900035 HC TECH TIME PER 15 MIN (STAT)

## 2019-04-29 PROCEDURE — 85025 COMPLETE CBC W/AUTO DIFF WBC: CPT

## 2019-04-29 PROCEDURE — 80053 COMPREHEN METABOLIC PANEL: CPT

## 2019-04-29 PROCEDURE — 11000004 HC SNF PRIVATE

## 2019-04-29 PROCEDURE — 36415 COLL VENOUS BLD VENIPUNCTURE: CPT

## 2019-04-29 PROCEDURE — 99900031 HC PATIENT EDUCATION (STAT)

## 2019-04-29 RX ORDER — DABIGATRAN ETEXILATE 75 MG/1
75 CAPSULE ORAL 2 TIMES DAILY
Status: DISCONTINUED | OUTPATIENT
Start: 2019-04-29 | End: 2019-04-30

## 2019-04-29 RX ADMIN — LISINOPRIL 20 MG: 20 TABLET ORAL at 08:04

## 2019-04-29 RX ADMIN — Medication 10 ML: at 12:04

## 2019-04-29 RX ADMIN — AMPICILLIN SODIUM 2 G: 2 INJECTION, POWDER, FOR SOLUTION INTRAMUSCULAR; INTRAVENOUS at 02:04

## 2019-04-29 RX ADMIN — HYDROCHLOROTHIAZIDE 12.5 MG: 12.5 TABLET ORAL at 08:04

## 2019-04-29 RX ADMIN — FUROSEMIDE 20 MG: 20 TABLET ORAL at 08:04

## 2019-04-29 RX ADMIN — TRAMADOL HYDROCHLORIDE 100 MG: 50 TABLET, FILM COATED ORAL at 10:04

## 2019-04-29 RX ADMIN — AMPICILLIN SODIUM 2 G: 2 INJECTION, POWDER, FOR SOLUTION INTRAMUSCULAR; INTRAVENOUS at 08:04

## 2019-04-29 RX ADMIN — Medication 10 ML: at 05:04

## 2019-04-29 RX ADMIN — TRAMADOL HYDROCHLORIDE 100 MG: 50 TABLET, FILM COATED ORAL at 08:04

## 2019-04-29 RX ADMIN — ATORVASTATIN CALCIUM 10 MG: 10 TABLET, FILM COATED ORAL at 08:04

## 2019-04-29 RX ADMIN — PANTOPRAZOLE SODIUM 40 MG: 40 TABLET, DELAYED RELEASE ORAL at 08:04

## 2019-04-29 RX ADMIN — CARVEDILOL 25 MG: 25 TABLET, FILM COATED ORAL at 05:04

## 2019-04-29 RX ADMIN — AMPICILLIN SODIUM 2 G: 2 INJECTION, POWDER, FOR SOLUTION INTRAMUSCULAR; INTRAVENOUS at 03:04

## 2019-04-29 RX ADMIN — DABIGATRAN ETEXILATE MESYLATE 75 MG: 75 CAPSULE ORAL at 08:04

## 2019-04-29 RX ADMIN — SODIUM CHLORIDE 500 ML: 0.9 INJECTION, SOLUTION INTRAVENOUS at 12:04

## 2019-04-29 RX ADMIN — CARVEDILOL 25 MG: 25 TABLET, FILM COATED ORAL at 08:04

## 2019-04-29 RX ADMIN — Medication 10 ML: at 11:04

## 2019-04-29 RX ADMIN — TAMSULOSIN HYDROCHLORIDE 0.4 MG: 0.4 CAPSULE ORAL at 08:04

## 2019-04-29 RX ADMIN — HYDROMORPHONE HYDROCHLORIDE 0.5 MG: 1 INJECTION, SOLUTION INTRAMUSCULAR; INTRAVENOUS; SUBCUTANEOUS at 07:04

## 2019-04-29 RX ADMIN — ASPIRIN 81 MG: 81 TABLET, COATED ORAL at 08:04

## 2019-04-29 RX ADMIN — AMLODIPINE BESYLATE 10 MG: 10 TABLET ORAL at 08:04

## 2019-04-29 RX ADMIN — DABIGATRAN ETEXILATE MESYLATE 75 MG: 75 CAPSULE ORAL at 09:04

## 2019-04-29 NOTE — NURSING
Patient back from MRI. IVF NS bolus resumed. Patient offered new lunch. Refused. Offered strawberry boost. Patient accepted boost. Will continue to monitor. Tele-sitter in use. Call bell within reach of patient.

## 2019-04-29 NOTE — PT/OT/SLP PROGRESS
Physical Therapy      Patient Name:  St Lacho Farooq   MRN:  [id number]    Patient not seen today secondary to Patient ill (Comment). Will follow-up tomorrow 4/30/19.    Keven Goldstein PT

## 2019-04-29 NOTE — ASSESSMENT & PLAN NOTE
Subacute CVA with focal deficits noted on 4/14, with significant improvement in symptoms   MRI did not demonstrate acute findings but neuro reviewed and Dx with new CVA;   Was being tx with Pradaxa when he had acute stroke but Will not consider failed therapy as patient has had lapses in therapy due to recent surgical interventions and non-compliance  Per Dr. Fernandez's recommendations we will continue:  -Telemetry  -Neurochecks  -NO TPA as he presented initially on Sunday  -Physical Therapy and OT Consult, evaluation and treatment at [facility] once patient is admitted to  SNF  -Completed permissive HTN and will need strict BP control.   -Continue his current dose home meds otherwise including Asprin, Pradaxa and statin       4/29: Reduce pradaxa due to renal function today  Consult colin due to AMS and neuro changes overnight. Repeat CT head without acute changes. ? Watershed injury; ? Keep BP a little higher? Dehydration playing a role

## 2019-04-29 NOTE — NURSING
Dr. Shipman notified of pt  Confusion and difficulty ambulation.  Pt had right leg weakness.  Dr. Shipman ordered stat CT of head.  Will continue to monitor.

## 2019-04-29 NOTE — PT/OT/SLP PROGRESS
Occupational Therapy      Patient Name:  St Lacho Farooq   MRN:  [id number]    Patient not seen today secondary to out for MRI then Patient ill (Comment). Will follow-up tomorrow 4/30/19.      Zarina Gonzalez OT  4/29/2019

## 2019-04-29 NOTE — PROGRESS NOTES
[facility] Hospital Medicine  Progress Note    Patient Name: St Lacho Farooq  MRN: [id number]  Patient Class: IP- Swing   Admission Date: 4/23/2019  Length of Stay: 6 days  Attending Physician: John Saeed MD  Primary Care Provider: Dylan Adam MD        Subjective:     Principal Problem:Bacteremia    HPI:  This 84 year old man with PMHX of CAD p CABG, [facility] 2/19; stable lesions, PAD, bilateral CVD, AAA,  PAF, HTN, hyperlipidemia, MALATHI, NYHA II chronic diastolic HF and non rheumatic critical AS with JAMAAL 0.8cm2 with preserved LVSFX EF55%,  per Echo underwent  + TAVR placed 3/18/19 per Dr. Babcock. Post op course was complicated by CVA ~ 2 weeks post TAVR; he presented to [place] 4/16 with altered mental status and fever where he was Dx with CVA and gram + bacteremia.   He was  [place] [facility] [place] cardiology and ID eval ant tx; CT at that time was unrevealing for an acute intracranial process  His MRI was abnormal.  His blood cultures have grown Enterococcus faecalis sensitve to all tested drugs,  Pt initially given vancomycin and then changed to ampicillin IV.  He has clinically improved.  His INES shows a septic atrial thrombus. The cRP is 49 and the ESR is 15.  Pt is no longer febrile and he and daughter feel he is at base line physically and mentally.Pt transferred to [place] yesterday for continued skilled care and prolonged IV ABX. Plan is 6 weeks of ABX; ampicillin 2gms IV q 6; day 1 with negative blood cultures was 4/19; so today is day 6/42  Requests DNR     Hospital Course:  4/25/19 SKILLED NURSING for prolonged ABX   Pt sitting up in chair watching TV; reports feeling fine but did have urinary incontinence and noting frequency   No fever, WBC normal   Day 7/42 ABX for bacteremia/septic emboli     4/26/19 SKILLED NURSING for prolonged ABX for bacteremia, septic thrombus; day 8/42  NAD Overnight, still with night time urinary incontinence; will try trial of flomax    PSA 1.4, repeat U/A ok; bladder scan ok     4/28 pt on SKILLED bed for prolonged abx day 10/42 for bacteremia from septic emboli. Ampicillin which cultures show sensitivity to. He also had a low grade temp last night. With the temp he had confusion and right sided weakness. Ct head done with no changes. Neuro was consulted for this am. He has this same type episode last visit with temp and mental status changes. This am he is back to normal. To note his H/H was lower and also creat bumped to 2.1.       Review of Systems   Constitutional: Negative.    HENT: Negative for congestion, ear pain, sinus pressure, sneezing and sore throat.    Eyes: Negative.    Respiratory: Negative for cough, chest tightness, shortness of breath and wheezing.    Cardiovascular: Negative.  Negative for chest pain.   Gastrointestinal: Negative for abdominal pain, blood in stool, constipation, diarrhea, nausea and vomiting.   Genitourinary: Negative for difficulty urinating, dysuria and flank pain.        Incontinence at night   Musculoskeletal: Negative.  Negative for joint swelling.   Skin: Negative.    Neurological: Positive for weakness (right sided last night, now resolved). Negative for dizziness and headaches.   Hematological: Negative.    Psychiatric/Behavioral: Negative.  Negative for behavioral problems and confusion.     Objective:     Vital Signs (Most Recent):  Temp: 98 °F (36.7 °C) (04/29/19 0657)  Pulse: 71 (04/29/19 0834)  Resp: 18 (04/29/19 0657)  BP: 127/68 (04/29/19 0834)  SpO2: (!) 92 % (04/29/19 0700) Vital Signs (24h Range):  Temp:  [96.6 °F (35.9 °C)-99.6 °F (37.6 °C)] 98 °F (36.7 °C)  Pulse:  [60-83] 71  Resp:  [18] 18  SpO2:  [92 %-95 %] 92 %  BP: (115-146)/(53-74) 127/68     Weight: 89.4 kg (197 lb)  Body mass index is 29.95 kg/m².    Physical Exam   Constitutional: He is oriented to person, place, and time. He appears well-developed and well-nourished.   HENT:   Head: Normocephalic and atraumatic.   Right Ear:  Tympanic membrane, external ear and ear canal normal.   Left Ear: Tympanic membrane, external ear and ear canal normal.   Mouth/Throat: Oropharynx is clear and moist.   Eyes: Pupils are equal, round, and reactive to light. Conjunctivae and EOM are normal.   Neck: Normal range of motion. Neck supple. No tracheal deviation present.   Cardiovascular: Normal rate, regular rhythm, intact distal pulses and normal pulses. Exam reveals no gallop and no friction rub.   Murmur heard.  Pulmonary/Chest: Effort normal and breath sounds normal.   Abdominal: Soft. Bowel sounds are normal. He exhibits no distension. There is no tenderness. Hernia confirmed negative in the right inguinal area and confirmed negative in the left inguinal area.   Musculoskeletal: Normal range of motion.   Neurological: He is alert and oriented to person, place, and time. He has normal reflexes.   5/5  strength, quad strength b/l   Skin: Skin is warm and dry.   Psychiatric: He has a normal mood and affect. His behavior is normal. Judgment and thought content normal.   Nursing note and vitals reviewed.        CRANIAL NERVES     CN III, IV, VI   Pupils are equal, round, and reactive to light.  Extraocular motions are normal.        Significant Labs:   CMP:   Recent Labs   Lab 04/29/19  0423      K 3.9      CO2 24   *   BUN 33*   CREATININE 2.1*   CALCIUM 10.2   PROT 6.2   ALBUMIN 2.8*   BILITOT 1.1*   ALKPHOS 54*   AST 10   ALT 9*   ANIONGAP 13   EGFRNONAA 28*     PSA 1.4,   Recent Labs   Lab 04/29/19  0423   WBC 9.39   RBC 3.63*   HGB 8.4*   HCT 28.1*      MCV 77*   MCH 23.1*   MCHC 29.9*     4/25 Urinalysis negative     Lab Results   Component Value Date    PSA 1.4 04/25/2019 4/23 Mag 2.2    Blood cultures 4/19 NGTD x 2    4/17 blood cultures   Enterococcus faecalis       CULTURE, BLOOD     Ampicillin <=2 mcg/mL Sensitive     Gentamicin Synergy Screen <=500 mcg/mL Sensitive     Tetracycline <=4 mcg/mL Sensitive      "Vancomycin 2 mcg/mL Sensitive      Flu negative       Significant Imagin/29 CT head   1.  No CT evidence of an acute intracranial abnormality.    2.  Atrophy and small vessel ischemic changes of the periventricular white matter.  Old lacunar infarcts.     x ray hip right   No evidence of right hip fracture.     x ray hip left   No evidence of left hip fracture.     CXR   Right-sided PICC with tip projecting over superior vena cava.     Echo   · Mildly decreased left ventricular systolic function. The estimated ejection fraction is 45%  · Local segmental wall motion abnormalities.  · Eccentric left ventricular hypertrophy.  · Severe left atrial enlargement.  · Grade I (mild) left ventricular diastolic dysfunction consistent with impaired relaxation.  · Normal left atrial pressure.  · Normal right ventricular systolic function.  · Mild right atrial enlargement.  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 I did not see mention of AVR in encounter notes or March echo report or prior INES report but there appears to be a bioprosthesis. There is mild central aortic insufficiency present.  · Mild mitral regurgitation.  · Normal central venous pressure (3 mm Hg).  · The estimated PA systolic pressure is 31 mm Hg    Assessment/Plan:      * Bacteremia  S/p recent TAVR now with septic emboli and concern for "flicking emboli"   His blood cultures have grown Enterococcus faecalis sensitve to all tested drugs  ID recommending total of 6 weeks IV ABX; Ampicillin 2gms IV q 6 hr; day 1 with negative blood cultures was 19; so today is day   Day 19 day  day  of IV abd.  was on vanc and  started on ampicillin.       Renal insufficiency  Noted : Cr up to 2.1  Reduce pradaxa  Hold lasix  Ns 500ml bolus today slowly      Nocturnal enuresis  Check U.A , PSA, bladder scan; bladder scan 89ml   Still with incontinence issues   Gets HCTz 12.5 and lasix 20mg po daily " in am   trial of flomax   Hold lasix for now and give fluids today gently.     Atrial thrombus  Large atrial thrombus found on INES ,Dr. Galicia deemed septic emboli; still no INES reporting thrombus   Continue Pradaxa; family plans to monitor meds once home  Will not consider failed therapy as patient has had lapses in therapy due to recent surgical interventions and non-compliance   as discussed above there is concern for infected thrombus.   Per Infectious Disease will treat bacteremia and possible infected thrombus for 6 weeks.          Cerebrovascular accident (CVA)  Subacute CVA with focal deficits noted on 4/14, with significant improvement in symptoms   MRI did not demonstrate acute findings but neuro reviewed and Dx with new CVA;   Was being tx with Pradaxa when he had acute stroke but Will not consider failed therapy as patient has had lapses in therapy due to recent surgical interventions and non-compliance  Per Dr. Fernandez's recommendations we will continue:  -Telemetry  -Neurochecks  -NO TPA as he presented initially on Sunday  -Physical Therapy and OT Consult, evaluation and treatment at [place] once patient is admitted to  SNF  -Completed permissive HTN and will need strict BP control.   -Continue his current dose home meds otherwise including Asprin, Pradaxa and statin       4/29: Reduce pradaxa due to renal function today  Consult colin due to AMS and neuro changes overnight. Repeat CT head without acute changes. ? Watershed injury; ? Keep BP a little higher? Dehydration playing a role      Hx of CABG  Asa, statin,     CHF (congestive heart failure), NYHA class II, chronic, diastolic  Continue Lisinopril, HCTZ  4/29: Hold lasix for now. Already had dose this am. Give gentle hydration given rising Cr. Likely a little dry      Anemia  H/H stable without acute indications for transfusion   Continue to monitor    check anemia w/u from last visit ferritin was 26, retic was 1.5  Occult stool today,  will not change our plan just give us answer to where blood is going. He needs the blood thinner for now due to septic emboli.             Essential hypertension  Continue amlodpine, Lisinopril, HCTZ, , coreg  4/29: Lasix- will hold now that Creat bumped and follow renal fxn    Aortic valve stenosis  S/p TAVR       VTE Risk Mitigation (From admission, onward)        Ordered     dabigatran etexilate capsule 75 mg  2 times daily      04/29/19 0938     heparin, porcine (PF) 100 unit/mL injection flush 500 Units  As needed (PRN)      04/25/19 1408              Laverne Shipman MD  Department of Hospital Medicine   [facility]

## 2019-04-29 NOTE — HPI
"St Lacho Farooq is a 84 y.o. male known to me for prior acute cerebellar CVA in 2016 and recent admission for acute CVA thought to be cardioembolic (he has afib and had a recent aortic valve repair and + blood cultures) and resulting in right sided weakness.  After being initially worked up at [facility], he was transferred [place] [facility] where he had INES and ID consult- thought to have  septic atrial thrombus and he is back here on SWING with plan to have IV antibiotics for 6 weeks.  He did improve since that point.  Last night, he was noted to have fever and with that confusion  confusion and right sided weakness. Ct head done with no changes. Neuro was consulted for this am. He has this same type of changes while in house with prior with fever and mental status changes. He did return to baseline. Primary team ordered CT head (see below)  He did have some lowering of his BP last night as well  Per review of INES, " Large atrial thrombus found on INES" although there is no final report in the computer to date. The patient was also non-compliant with NOAC prior to CVA.      "

## 2019-04-29 NOTE — PLAN OF CARE
Problem: Adult Inpatient Plan of Care  Goal: Plan of Care Review  Outcome: Ongoing (interventions implemented as appropriate)  Pt agrees with plan of care.  VS stable.  Pt had weakness to right leg and arm this shift. Dr. Shipman notified and CT of head done per order.  Pt rested rested off and on this shift.  Pt did state his neck is sore.   Pt did not ask for po pain med this shift.  PICC line in place to right arm.  Esitter at bedside.  Family at bedside this shift.  IV antibiotics given as ordered.  Pt is confused to place,time and situation at times.  Will continue to monitor.

## 2019-04-29 NOTE — SUBJECTIVE & OBJECTIVE
Review of Systems   Constitutional: Negative.    HENT: Negative for congestion, ear pain, sinus pressure, sneezing and sore throat.    Eyes: Negative.    Respiratory: Negative for cough, chest tightness, shortness of breath and wheezing.    Cardiovascular: Negative.  Negative for chest pain.   Gastrointestinal: Negative for abdominal pain, blood in stool, constipation, diarrhea, nausea and vomiting.   Genitourinary: Negative for difficulty urinating, dysuria and flank pain.        Incontinence at night   Musculoskeletal: Negative.  Negative for joint swelling.   Skin: Negative.    Neurological: Positive for weakness (right sided last night, now resolved). Negative for dizziness and headaches.   Hematological: Negative.    Psychiatric/Behavioral: Negative.  Negative for behavioral problems and confusion.     Objective:     Vital Signs (Most Recent):  Temp: 98 °F (36.7 °C) (04/29/19 0657)  Pulse: 71 (04/29/19 0834)  Resp: 18 (04/29/19 0657)  BP: 127/68 (04/29/19 0834)  SpO2: (!) 92 % (04/29/19 0700) Vital Signs (24h Range):  Temp:  [96.6 °F (35.9 °C)-99.6 °F (37.6 °C)] 98 °F (36.7 °C)  Pulse:  [60-83] 71  Resp:  [18] 18  SpO2:  [92 %-95 %] 92 %  BP: (115-146)/(53-74) 127/68     Weight: 89.4 kg (197 lb)  Body mass index is 29.95 kg/m².    Physical Exam   Constitutional: He is oriented to person, place, and time. He appears well-developed and well-nourished.   HENT:   Head: Normocephalic and atraumatic.   Right Ear: Tympanic membrane, external ear and ear canal normal.   Left Ear: Tympanic membrane, external ear and ear canal normal.   Mouth/Throat: Oropharynx is clear and moist.   Eyes: Pupils are equal, round, and reactive to light. Conjunctivae and EOM are normal.   Neck: Normal range of motion. Neck supple. No tracheal deviation present.   Cardiovascular: Normal rate, regular rhythm, intact distal pulses and normal pulses. Exam reveals no gallop and no friction rub.   Murmur heard.  Pulmonary/Chest: Effort normal  and breath sounds normal.   Abdominal: Soft. Bowel sounds are normal. He exhibits no distension. There is no tenderness. Hernia confirmed negative in the right inguinal area and confirmed negative in the left inguinal area.   Musculoskeletal: Normal range of motion.   Neurological: He is alert and oriented to person, place, and time. He has normal reflexes.   5/5  strength, quad strength b/l   Skin: Skin is warm and dry.   Psychiatric: He has a normal mood and affect. His behavior is normal. Judgment and thought content normal.   Nursing note and vitals reviewed.        CRANIAL NERVES     CN III, IV, VI   Pupils are equal, round, and reactive to light.  Extraocular motions are normal.        Significant Labs:   CMP:   Recent Labs   Lab 19      K 3.9      CO2 24   *   BUN 33*   CREATININE 2.1*   CALCIUM 10.2   PROT 6.2   ALBUMIN 2.8*   BILITOT 1.1*   ALKPHOS 54*   AST 10   ALT 9*   ANIONGAP 13   EGFRNONAA 28*     PSA 1.4,   Recent Labs   Lab 19   WBC 9.39   RBC 3.63*   HGB 8.4*   HCT 28.1*      MCV 77*   MCH 23.1*   MCHC 29.9*      Urinalysis negative     Lab Results   Component Value Date    PSA 1.4 2019 Mag 2.2    Blood cultures  NGTD x 2     blood cultures   Enterococcus faecalis       CULTURE, BLOOD     Ampicillin <=2 mcg/mL Sensitive     Gentamicin Synergy Screen <=500 mcg/mL Sensitive     Tetracycline <=4 mcg/mL Sensitive     Vancomycin 2 mcg/mL Sensitive      Flu negative       Significant Imagin/29 CT head   1.  No CT evidence of an acute intracranial abnormality.    2.  Atrophy and small vessel ischemic changes of the periventricular white matter.  Old lacunar infarcts.     x ray hip right   No evidence of right hip fracture.     x ray hip left   No evidence of left hip fracture.     CXR   Right-sided PICC with tip projecting over superior vena cava.     Echo   · Mildly decreased left ventricular  systolic function. The estimated ejection fraction is 45%  · Local segmental wall motion abnormalities.  · Eccentric left ventricular hypertrophy.  · Severe left atrial enlargement.  · Grade I (mild) left ventricular diastolic dysfunction consistent with impaired relaxation.  · Normal left atrial pressure.  · Normal right ventricular systolic function.  · Mild right atrial enlargement.  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 I did not see mention of AVR in encounter notes or March echo report or prior INES report but there appears to be a bioprosthesis. There is mild central aortic insufficiency present.  · Mild mitral regurgitation.  · Normal central venous pressure (3 mm Hg).  · The estimated PA systolic pressure is 31 mm Hg

## 2019-04-29 NOTE — ASSESSMENT & PLAN NOTE
H/H stable without acute indications for transfusion   Continue to monitor    check anemia w/u from last visit ferritin was 26, retic was 1.5  Occult stool today, will not change our plan just give us answer to where blood is going. He needs the blood thinner for now due to septic emboli.

## 2019-04-29 NOTE — NURSING
Bedside report received from Edith.  Vs stable.  No complaints of pain noted.  Pt up in chair.  Family at bedside.  Call bell in reach.  Will continue to monitor.

## 2019-04-29 NOTE — ASSESSMENT & PLAN NOTE
Continue Lisinopril, HCTZ  4/29: Hold lasix for now. Already had dose this am. Give gentle hydration given rising Cr. Likely a little dry

## 2019-04-29 NOTE — CONSULTS
"[facility]  Neurology  Consult Note    Patient Name: St Lacho Farooq  MRN: [id number]  Admission Date: 4/23/2019  Hospital Length of Stay: 6 days  Code Status: DNR   Attending Provider: John Saeed MD   Consulting Provider: Mitchel Fernandez MD  Primary Care Physician: Dylan Adam MD  Principal Problem:Bacteremia    Inpatient consult to Neurology  Consult performed by: Mitchel Fernandez MD  Consult ordered by: Marta Song NP         Subjective:     Chief Complaint:  Right sided weakness, confusion     HPI:   St Lacho Farooq is a 84 y.o. male known to me for prior acute cerebellar CVA in 2016 and recent admission for acute CVA thought to be cardioembolic (he has afib and had a recent aortic valve repair and + blood cultures) and resulting in right sided weakness.  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  He did improve since that point.  Last night, he was noted to have fever and with that confusion  confusion and right sided weakness. Ct head done with no changes. Neuro was consulted for this am. He has this same type of changes while in house with prior with fever and mental status changes. He did return to baseline. Primary team ordered CT head (see below)  He did have some lowering of his BP last night as well  Per review of INES, " Large atrial thrombus found on INES" although there is no final report in the computer to date. The patient was also non-compliant with NOAC prior to CVA.         Past Medical History:   Diagnosis Date    Anemia     Aortic valve stenosis     Arthritis     Atrial fibrillation     Cancer     Basal Cell Skin Cancer    Carotid artery stenosis     Coronary artery disease     CVA (cerebral vascular accident)     Encounter for blood transfusion  "    Exercise-induced angina     Hyperkalemia     at times    Hyperlipemia     Hypertension     Iron deficiency anemia     MI (myocardial infarction) 2004    MALATHI (obstructive sleep apnea)     PAD (peripheral artery disease)     Prostatitis     Renal failure     MILD after MI    S/P 2-vessel coronary artery bypass        Past Surgical History:   Procedure Laterality Date    CARPAL TUNNEL RELEASE      CATARACT EXTRACTION, BILATERAL      CORONARY ANGIOGRAPHY  2019    CORONARY ARTERY BYPASS GRAFT  2004    HERNIA REPAIR      Inguinal & Ventral with MESH    INSERTION, PICC N/A 4/22/2019    Performed by [facility] Provider at [facility] OR    REPLACEMENT, AORTIC VALVE, PERCUTANEOUS, TRANSCATHETER Right 3/18/2019    Performed by Mani Babcock MD at [facility] CATH    REPLACEMENT, AORTIC VALVE, PERCUTANEOUS, TRANSCATHETER Right 3/18/2019    Performed by Ac Osman MD at [facility] CATH    TONSILLECTOMY      Transesophageal echo (INES) intra-procedure log documentation N/A 4/18/2019    Performed by Tam Carrasco MD at [facility] CATH    TRANSESOPHAGEAL ECHOCARDIOGRAM (INES) N/A 6/28/2016    Performed by Hugh Begum MD at [facility] OR       Review of patient's allergies indicates:   Allergen Reactions    Bactrim [sulfamethoxazole-trimethoprim] Rash    Codeine Rash     I have reviewed all of this patient's past medical and surgical histories as well as family and social histories and active allergies and medications as documented in the electronic medical record.      No current facility-administered medications on file prior to encounter.      Current Outpatient Medications on File Prior to Encounter   Medication Sig    amLODIPine (NORVASC) 2.5 MG tablet Take 2.5 mg by mouth once daily.    ANORO ELLIPTA 62.5-25 mcg/actuation DsDv INHALE 1 PUFF INTO LUNGS ONCE DAILY    aspirin (ECOTRIN) 81 MG EC tablet Take 81 mg by mouth once daily.    atorvastatin (LIPITOR) 10 MG tablet Take 10 mg by mouth every evening.     "carvedilol (COREG) 12.5 MG tablet Take 25 mg by mouth 2 (two) times daily with meals.     dabigatran etexilate (PRADAXA) 150 mg Cap Take 150 mg by mouth 2 (two) times daily. "Do NOT break, chew, or open capsules."    furosemide (LASIX) 20 MG tablet Take 20 mg by mouth 2 (two) times daily.    lisinopril (PRINIVIL,ZESTRIL) 20 MG tablet Take 1 tablet (20 mg total) by mouth once daily.    nitroGLYCERIN (NITROSTAT) 0.4 MG SL tablet Place 0.4 mg under the tongue every 5 (five) minutes as needed for Chest pain.    omeprazole (PRILOSEC) 40 MG capsule Take 40 mg by mouth once daily.    ranolazine (RANEXA) 500 MG Tb12 Take 500 mg by mouth 2 (two) times daily.     Family History     Problem Relation (Age of Onset)    Atrial fibrillation Father, Sister    COPD Sister    Cancer Father    Diabetes type II Mother, Sister    Heart failure Mother    Hypertension Mother, Father, Sister, Brother    Pacemaker/defibrilator Sister    Pulmonary embolism Brother        Tobacco Use    Smoking status: Former Smoker     Last attempt to quit: 2004     Years since quitting: 15.3    Smokeless tobacco: Never Used    Tobacco comment: Smoked for 60 years   Substance and Sexual Activity    Alcohol use: No     Frequency: Never    Drug use: No    Sexual activity: Yes     Partners: Female     Review of Systems   Unable to perform ROS: Mental status change   Skin: Negative for rash.     Objective:     Vital Signs (Most Recent):  Temp: 98.1 °F (36.7 °C) (04/29/19 1132)  Pulse: 72 (04/29/19 1132)  Resp: 18 (04/29/19 1132)  BP: (!) 126/58 (04/29/19 1132)  SpO2: (!) 93 % (04/29/19 1132) Vital Signs (24h Range):  Temp:  [98 °F (36.7 °C)-99.6 °F (37.6 °C)] 98.1 °F (36.7 °C)  Pulse:  [62-83] 72  Resp:  [18] 18  SpO2:  [92 %-95 %] 93 %  BP: (115-146)/(53-74) 126/58     Weight: 89.4 kg (197 lb)  Body mass index is 29.95 kg/m².    Physical Exam       Exam:  Gen Appearance, well developed/nourished in no apparent distress  CV: 2+ distal pulses with no " edema or swelling  Neuro:  MS: Awake, alert, Sustains attention but not fully. But not oriented to exact day of week.  Recent details are impaired and he is mildly frustrated which is out of character for him /remote memory is intact.Language is full to spontaneous speech/comprehension. Fund of Knowledge is full.  CN: Optic discs are flat with normal vasculature, PERRL, Extraoccular movements and visual fields are full. Left eye lid mildly drooping (he states known cosmetic) and right lower facial droop is noted, mild.  Normal facial strength,Tongue and Palate are midline and  strong. Shoulder Shrug symmetric and strong.  Motor: Normal bulk, tone, no abnormal movements.  5/5 bilateral UE and LE strength  Except 4-/5 right tricepts and 1+ reflexes  Sensory: Romberg negative and intact to vibration and temp   Cerebellar: Finger-nose, heal to nose, Rapid alternating movements intact  Gait: Normal stance, no ataxia      Significant Labs: Creatinine elevation this am noted-addressed by primary team as is H/H    Significant Imaging: Ct head 4/29/19 1.  No CT evidence of an acute intracranial abnormality.    2.  Atrophy and small vessel ischemic changes of the periventricular white matter.  Old lacunar infarcts.    Assessment and Plan:   St Lacho Farooq is a 84 y.o. male known to me for cerebellar CVA in 2016, history of afib.  He was seen by me earlier this month due to confusion and right sided weakness which was found to be from acute cardioembolic CVA- thought to be due to septic emboli after recent TAVR.  He was improved, but has suffered increased confusion and brief increase in his right sided weakness after increased temperature last pm (has happened once prior since most recent CVA)  Cerebrovascular accident (CVA)  -MRI brain needed to evaluate for another CVA  -he is not a TPA candidate given his recent CVA, worsening anemia and NOAC use  -NOAC to continue for CVA prevention (note he was not taking this  regularly prior to CVA)  -He is mildly encephalopathic. This, and his brief worsening of weakness this am, could be due to worsening temp, renal status, reduced BP, and anemia if MRI is negative for acute lesion  -plan is continued , NOAC, SWING rehab, and IV antibiotics for artrial thrombus per ID. Hydration status and anemia are being addressed by primary team.We also discussed allowing some permissive HTN as he seems worse with lowered BP        VTE Risk Mitigation (From admission, onward)        Ordered     dabigatran etexilate capsule 75 mg  2 times daily      04/29/19 0938     heparin, porcine (PF) 100 unit/mL injection flush 500 Units  As needed (PRN)      04/25/19 1408          Thank you for your consult. Will follow    Mitchel Fernandez MD  Neurology  [facility]    ADDENDUM  I reviewed the patient's MRI Brain which shows 3 new acute punctate infarctions in bilateral vascular territories suggesting further cardio-embolic stroke. I reviewed his case with patient's daughter and primary team. I also reached out to vascular neurology at [facility] (Dr Ventura)who states they would continue management with antibiotics for stroke prevention. In reviewing the literature with septic emboli,  Anti-coagulation in the first 2 weeks after CVA is not currently recommended unless the patient has a good need. In his case, the atrial thrombus, afib, and continued CVAs are good indications for continuing his NOAC (benefits greater than risk). Patient's family understands the risk of continued embolic CVA and the risk of bleeding due to NOAC. The patient is a DNR  I recommend:  1. Continue NOAC and IV antibiotics. Risk of heparin outweighs any benefit at this point.  Consider updating blood cultures if the patient remains febrile  2. Re-consult ST given new CVAs found  3. Allow permissive HTN to 220/220 for 3 days given  Acute CVAs- lisinopril on hold currently  4. Consult cardiology in the am to help with  management of afib, HTN, and atrial thombus  5. He will need CT scans to rule out intracerebral bleeding for any further neurological changes   Will follow

## 2019-04-29 NOTE — ASSESSMENT & PLAN NOTE
Continue amlodpine, Lisinopril, HCTZ, , coreg  4/29: Lasix- will hold now that Creat bumped and follow renal fxn

## 2019-04-29 NOTE — ASSESSMENT & PLAN NOTE
-MRI brain needed to evaluate for another CVA  -he is not a TPA candidate given his recent CVA, worsening anemia and NOAC use  -NOAC to continue for CVA prevention (note he was not taking this regularly prior to CVA)  -He is mildly encephalopathic. This, and his brief worsening of weakness this am, could be due to worsening temp, renal status, reduced BP, and anemia if MRI is negative for acute lesion  -plan is continued , NOAC, SWING rehab, and IV antibiotics for artrial thrombus per ID. Hydration status and anemia are being addressed by primary team.We also discussed allowing some permissive HTN as he seems worse with lowered BP

## 2019-04-29 NOTE — ASSESSMENT & PLAN NOTE
Check U.A , PSA, bladder scan; bladder scan 89ml   Still with incontinence issues   Gets HCTz 12.5 and lasix 20mg po daily in am   trial of flomax   Hold lasix for now and give fluids today gently.

## 2019-04-29 NOTE — SUBJECTIVE & OBJECTIVE
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    S/P 2-vessel coronary artery bypass        Past Surgical History:   Procedure Laterality Date    CARPAL TUNNEL RELEASE      CATARACT EXTRACTION, BILATERAL      CORONARY ANGIOGRAPHY  2019    CORONARY ARTERY BYPASS GRAFT  2004    HERNIA REPAIR      Inguinal & Ventral with MESH    INSERTION, PICC N/A 4/22/2019    Performed by [facility] at [facility] OR    REPLACEMENT, AORTIC VALVE, PERCUTANEOUS, TRANSCATHETER Right 3/18/2019    Performed by Mani Babcock MD at [facility] CATH    REPLACEMENT, AORTIC VALVE, PERCUTANEOUS, TRANSCATHETER Right 3/18/2019    Performed by Ac Osman MD at [facility] CATH    TONSILLECTOMY      Transesophageal echo (INES) intra-procedure log documentation N/A 4/18/2019    Performed by Tam Carrasco MD at [facility] CATH    TRANSESOPHAGEAL ECHOCARDIOGRAM (INES) N/A 6/28/2016    Performed by Hugh Begum MD at [facility] OR       Review of patient's allergies indicates:   Allergen Reactions    Bactrim [sulfamethoxazole-trimethoprim] Rash    Codeine Rash     I have reviewed all of this patient's past medical and surgical histories as well as family and social histories and active allergies and medications as documented in the electronic medical record.      No current facility-administered medications on file prior to encounter.      Current Outpatient Medications on File Prior to Encounter   Medication Sig     "amLODIPine (NORVASC) 2.5 MG tablet Take 2.5 mg by mouth once daily.    ANORO ELLIPTA 62.5-25 mcg/actuation DsDv INHALE 1 PUFF INTO LUNGS ONCE DAILY    aspirin (ECOTRIN) 81 MG EC tablet Take 81 mg by mouth once daily.    atorvastatin (LIPITOR) 10 MG tablet Take 10 mg by mouth every evening.    carvedilol (COREG) 12.5 MG tablet Take 25 mg by mouth 2 (two) times daily with meals.     dabigatran etexilate (PRADAXA) 150 mg Cap Take 150 mg by mouth 2 (two) times daily. "Do NOT break, chew, or open capsules."    furosemide (LASIX) 20 MG tablet Take 20 mg by mouth 2 (two) times daily.    lisinopril (PRINIVIL,ZESTRIL) 20 MG tablet Take 1 tablet (20 mg total) by mouth once daily.    nitroGLYCERIN (NITROSTAT) 0.4 MG SL tablet Place 0.4 mg under the tongue every 5 (five) minutes as needed for Chest pain.    omeprazole (PRILOSEC) 40 MG capsule Take 40 mg by mouth once daily.    ranolazine (RANEXA) 500 MG Tb12 Take 500 mg by mouth 2 (two) times daily.     Family History     Problem Relation (Age of Onset)    Atrial fibrillation Father, Sister    COPD Sister    Cancer Father    Diabetes type II Mother, Sister    Heart failure Mother    Hypertension Mother, Father, Sister, Brother    Pacemaker/defibrilator Sister    Pulmonary embolism Brother        Tobacco Use    Smoking status: Former Smoker     Last attempt to quit: 2004     Years since quitting: 15.3    Smokeless tobacco: Never Used    Tobacco comment: Smoked for 60 years   Substance and Sexual Activity    Alcohol use: No     Frequency: Never    Drug use: No    Sexual activity: Yes     Partners: Female     Review of Systems   Unable to perform ROS: Mental status change   Skin: Negative for rash.     Objective:     Vital Signs (Most Recent):  Temp: 98.1 °F (36.7 °C) (04/29/19 1132)  Pulse: 72 (04/29/19 1132)  Resp: 18 (04/29/19 1132)  BP: (!) 126/58 (04/29/19 1132)  SpO2: (!) 93 % (04/29/19 1132) Vital Signs (24h Range):  Temp:  [98 °F (36.7 °C)-99.6 °F (37.6 " °C)] 98.1 °F (36.7 °C)  Pulse:  [62-83] 72  Resp:  [18] 18  SpO2:  [92 %-95 %] 93 %  BP: (115-146)/(53-74) 126/58     Weight: 89.4 kg (197 lb)  Body mass index is 29.95 kg/m².    Physical Exam       Exam:  Gen Appearance, well developed/nourished in no apparent distress  CV: 2+ distal pulses with no edema or swelling  Neuro:  MS: Awake, alert, Sustains attention but not fully. But not oriented to exact day of week.  Recent details are impaired and he is mildly frustrated which is out of character for him /remote memory is intact.Language is full to spontaneous speech/comprehension. Fund of Knowledge is full.  CN: Optic discs are flat with normal vasculature, PERRL, Extraoccular movements and visual fields are full. Left eye lid mildly drooping (he states known cosmetic) and right lower facial droop is noted, mild.  Normal facial strength,Tongue and Palate are midline and  strong. Shoulder Shrug symmetric and strong.  Motor: Normal bulk, tone, no abnormal movements.  5/5 bilateral UE and LE strength  Except 4-/5 right tricepts and 1+ reflexes  Sensory: Romberg negative and intact to vibration and temp   Cerebellar: Finger-nose, heal to nose, Rapid alternating movements intact  Gait: Normal stance, no ataxia      Significant Labs: Creatinine elevation this am noted-addressed by primary team as is H/H    Significant Imaging: Ct head 4/29/19 1.  No CT evidence of an acute intracranial abnormality.    2.  Atrophy and small vessel ischemic changes of the periventricular white matter.  Old lacunar infarcts.

## 2019-04-29 NOTE — ASSESSMENT & PLAN NOTE
"S/p recent TAVR now with septic emboli and concern for "flicking emboli"   His blood cultures have grown Enterococcus faecalis sensitve to all tested drugs  ID recommending total of 6 weeks IV ABX; Ampicillin 2gms IV q 6 hr; day 1 with negative blood cultures was 4/19/19; so today is day 6/42  Day 7/42 4/26/19 day 8/42 4/29 day 11/42 of IV abd. 4/19 was on vanc and 4/20 started on ampicillin.     "

## 2019-04-29 NOTE — PROGRESS NOTES
Nursing Notes  Bedside handoff completed with Deanna Borges RN. Patient sleeping between care in bed. Awakened by tactile and verbal stimuli. Patient confused to surrounding. Reoriented to place and time. See noted for further assessment. Patient assisted   up to bedside chair per request with max assist. Call bell within reach of patient. Tele-sitter in use. Granddaughter at bedside. Will continue to follow.       Huddle Comments

## 2019-04-29 NOTE — PLAN OF CARE
04/29/19 1035   Discharge Reassessment   Assessment Type Discharge Planning Reassessment         Patient will remain for continued treatment today. No needs or concerns voiced at this time. CM will continue to follow and assist as needed.

## 2019-04-30 LAB
ALBUMIN SERPL BCP-MCNC: 2.6 G/DL (ref 3.5–5.2)
ALP SERPL-CCNC: 51 U/L (ref 55–135)
ALT SERPL W/O P-5'-P-CCNC: 11 U/L (ref 10–44)
ANION GAP SERPL CALC-SCNC: 10 MMOL/L (ref 8–16)
AST SERPL-CCNC: 14 U/L (ref 10–40)
BASOPHILS # BLD AUTO: 0.01 K/UL (ref 0–0.2)
BASOPHILS NFR BLD: 0.1 % (ref 0–1.9)
BILIRUB SERPL-MCNC: 0.8 MG/DL (ref 0.1–1)
BUN SERPL-MCNC: 38 MG/DL (ref 8–23)
CALCIUM SERPL-MCNC: 10.2 MG/DL (ref 8.7–10.5)
CHLORIDE SERPL-SCNC: 104 MMOL/L (ref 95–110)
CO2 SERPL-SCNC: 24 MMOL/L (ref 23–29)
CREAT SERPL-MCNC: 1.5 MG/DL (ref 0.5–1.4)
DIFFERENTIAL METHOD: ABNORMAL
EOSINOPHIL # BLD AUTO: 0.1 K/UL (ref 0–0.5)
EOSINOPHIL NFR BLD: 0.8 % (ref 0–8)
ERYTHROCYTE [DISTWIDTH] IN BLOOD BY AUTOMATED COUNT: 20.3 % (ref 11.5–14.5)
EST. GFR  (AFRICAN AMERICAN): 49 ML/MIN/1.73 M^2
EST. GFR  (NON AFRICAN AMERICAN): 43 ML/MIN/1.73 M^2
GLUCOSE SERPL-MCNC: 114 MG/DL (ref 70–110)
HCT VFR BLD AUTO: 27.6 % (ref 40–54)
HGB BLD-MCNC: 8.2 G/DL (ref 14–18)
LYMPHOCYTES # BLD AUTO: 0.7 K/UL (ref 1–4.8)
LYMPHOCYTES NFR BLD: 9.8 % (ref 18–48)
MCH RBC QN AUTO: 23 PG (ref 27–31)
MCHC RBC AUTO-ENTMCNC: 29.7 G/DL (ref 32–36)
MCV RBC AUTO: 78 FL (ref 82–98)
MONOCYTES # BLD AUTO: 0.7 K/UL (ref 0.3–1)
MONOCYTES NFR BLD: 9.9 % (ref 4–15)
NEUTROPHILS # BLD AUTO: 5.7 K/UL (ref 1.8–7.7)
NEUTROPHILS NFR BLD: 79.4 % (ref 38–73)
OB PNL STL: POSITIVE
PLATELET # BLD AUTO: 285 K/UL (ref 150–350)
PMV BLD AUTO: 9.2 FL (ref 9.2–12.9)
POTASSIUM SERPL-SCNC: 3.6 MMOL/L (ref 3.5–5.1)
PROT SERPL-MCNC: 6.1 G/DL (ref 6–8.4)
RBC # BLD AUTO: 3.56 M/UL (ref 4.6–6.2)
SODIUM SERPL-SCNC: 138 MMOL/L (ref 136–145)
WBC # BLD AUTO: 7.24 K/UL (ref 3.9–12.7)

## 2019-04-30 PROCEDURE — 97530 THERAPEUTIC ACTIVITIES: CPT

## 2019-04-30 PROCEDURE — 99233 PR SUBSEQUENT HOSPITAL CARE,LEVL III: ICD-10-PCS | Mod: ,,, | Performed by: PSYCHIATRY & NEUROLOGY

## 2019-04-30 PROCEDURE — 63600175 PHARM REV CODE 636 W HCPCS: Performed by: INTERNAL MEDICINE

## 2019-04-30 PROCEDURE — 11000004 HC SNF PRIVATE

## 2019-04-30 PROCEDURE — 99233 SBSQ HOSP IP/OBS HIGH 50: CPT | Mod: ,,, | Performed by: INTERNAL MEDICINE

## 2019-04-30 PROCEDURE — 99233 SBSQ HOSP IP/OBS HIGH 50: CPT | Mod: ,,, | Performed by: PSYCHIATRY & NEUROLOGY

## 2019-04-30 PROCEDURE — 85025 COMPLETE CBC W/AUTO DIFF WBC: CPT

## 2019-04-30 PROCEDURE — 94761 N-INVAS EAR/PLS OXIMETRY MLT: CPT

## 2019-04-30 PROCEDURE — 25000003 PHARM REV CODE 250: Performed by: INTERNAL MEDICINE

## 2019-04-30 PROCEDURE — 99233 PR SUBSEQUENT HOSPITAL CARE,LEVL III: ICD-10-PCS | Mod: ,,, | Performed by: INTERNAL MEDICINE

## 2019-04-30 PROCEDURE — 25000003 PHARM REV CODE 250: Performed by: NURSE PRACTITIONER

## 2019-04-30 PROCEDURE — 80053 COMPREHEN METABOLIC PANEL: CPT

## 2019-04-30 PROCEDURE — 36415 COLL VENOUS BLD VENIPUNCTURE: CPT

## 2019-04-30 PROCEDURE — 92610 EVALUATE SWALLOWING FUNCTION: CPT

## 2019-04-30 PROCEDURE — 82272 OCCULT BLD FECES 1-3 TESTS: CPT

## 2019-04-30 PROCEDURE — 97535 SELF CARE MNGMENT TRAINING: CPT

## 2019-04-30 PROCEDURE — A4216 STERILE WATER/SALINE, 10 ML: HCPCS | Performed by: INTERNAL MEDICINE

## 2019-04-30 PROCEDURE — 99900035 HC TECH TIME PER 15 MIN (STAT)

## 2019-04-30 RX ORDER — DABIGATRAN ETEXILATE 75 MG/1
150 CAPSULE ORAL 2 TIMES DAILY
Status: DISCONTINUED | OUTPATIENT
Start: 2019-04-30 | End: 2019-05-13

## 2019-04-30 RX ORDER — AMLODIPINE BESYLATE 5 MG/1
5 TABLET ORAL DAILY
Status: DISCONTINUED | OUTPATIENT
Start: 2019-04-30 | End: 2019-05-01

## 2019-04-30 RX ORDER — LACTULOSE 10 G/15ML
20 SOLUTION ORAL ONCE
Status: COMPLETED | OUTPATIENT
Start: 2019-04-30 | End: 2019-04-30

## 2019-04-30 RX ADMIN — Medication 10 ML: at 10:04

## 2019-04-30 RX ADMIN — CARVEDILOL 25 MG: 25 TABLET, FILM COATED ORAL at 08:04

## 2019-04-30 RX ADMIN — LACTULOSE 20 G: 20 SOLUTION ORAL at 11:04

## 2019-04-30 RX ADMIN — DABIGATRAN ETEXILATE MESYLATE 75 MG: 75 CAPSULE ORAL at 08:04

## 2019-04-30 RX ADMIN — AMPICILLIN SODIUM 2 G: 2 INJECTION, POWDER, FOR SOLUTION INTRAMUSCULAR; INTRAVENOUS at 03:04

## 2019-04-30 RX ADMIN — TRAMADOL HYDROCHLORIDE 100 MG: 50 TABLET, FILM COATED ORAL at 06:04

## 2019-04-30 RX ADMIN — CARVEDILOL 25 MG: 25 TABLET, FILM COATED ORAL at 04:04

## 2019-04-30 RX ADMIN — Medication 10 ML: at 11:04

## 2019-04-30 RX ADMIN — AMPICILLIN SODIUM 2 G: 2 INJECTION, POWDER, FOR SOLUTION INTRAMUSCULAR; INTRAVENOUS at 09:04

## 2019-04-30 RX ADMIN — DABIGATRAN ETEXILATE MESYLATE 150 MG: 75 CAPSULE ORAL at 09:04

## 2019-04-30 RX ADMIN — PANTOPRAZOLE SODIUM 40 MG: 40 TABLET, DELAYED RELEASE ORAL at 08:04

## 2019-04-30 RX ADMIN — ASPIRIN 81 MG: 81 TABLET, COATED ORAL at 08:04

## 2019-04-30 RX ADMIN — Medication 10 ML: at 03:04

## 2019-04-30 RX ADMIN — AMLODIPINE BESYLATE 5 MG: 5 TABLET ORAL at 08:04

## 2019-04-30 RX ADMIN — AMPICILLIN SODIUM 2 G: 2 INJECTION, POWDER, FOR SOLUTION INTRAMUSCULAR; INTRAVENOUS at 08:04

## 2019-04-30 RX ADMIN — Medication 10 ML: at 04:04

## 2019-04-30 RX ADMIN — ATORVASTATIN CALCIUM 10 MG: 10 TABLET, FILM COATED ORAL at 09:04

## 2019-04-30 RX ADMIN — TAMSULOSIN HYDROCHLORIDE 0.4 MG: 0.4 CAPSULE ORAL at 08:04

## 2019-04-30 RX ADMIN — AMPICILLIN SODIUM 2 G: 2 INJECTION, POWDER, FOR SOLUTION INTRAMUSCULAR; INTRAVENOUS at 04:04

## 2019-04-30 NOTE — ASSESSMENT & PLAN NOTE
Check U.A , PSA, bladder scan; bladder scan 89ml   Still with incontinence issues   Gets HCTz 12.5 and lasix 20mg po daily in am   trial of flomax   Hold lasix for now and give fluids today gently.   Stop fluid opill, creat better with hydration

## 2019-04-30 NOTE — PT/OT/SLP PROGRESS
"Physical Therapy Treatment    Patient Name:  St Lacho Farooq   MRN:  [id number]    Recommendations:     Discharge Recommendations:  home health PT, home with home health   Discharge Equipment Recommendations: cane, straight   Barriers to discharge: Inaccessible home    Assessment:     St Lacho Farooq is a 84 y.o. male admitted with a medical diagnosis of Bacteremia.  He presents with the following impairments/functional limitations:  weakness, impaired cognition, impaired self care skills, impaired functional mobilty, impaired endurance, gait instability, decreased lower extremity function, impaired cardiopulmonary response to activity, impaired muscle length. Patient seen at bedside chair and agreed with PT tx today. Ambulated patient using RW x 175 feet with Contact Guard Assistance  for inc balance and safety. Noted patient some how gets confuse and forgetful providing with verbal cues to perform and complete tasks safely. Recommended patient to use RW for safe gait tasks at this time. Patient verbalized understanding.    Rehab Prognosis: Good; patient would benefit from acute skilled PT services to address these deficits and reach maximum level of function.    Recent Surgery: * No surgery found *      Plan:     During this hospitalization, patient to be seen 5 x/week to address the identified rehab impairments via gait training, therapeutic activities, therapeutic exercises and progress toward the following goals:    · Plan of Care Expires:  05/01/19    Subjective     Chief Complaint: Patient stated, " I feel better today than yesterday".  Patient/Family Comments/goals: "To get better and walk better" - per patient.  Pain/Comfort:  · Pain Rating 1: 5/10  · Location 1: back  · Pain Addressed 1: Distraction, Reposition  · Pain Rating Post-Intervention 1: 5/10      Objective:     Communicated with patient  prior to session.  Patient found up in chair with PICC line upon PT entry to room.     General " Precautions: Standard, fall   Orthopedic Precautions:N/A   Braces: N/A     Functional Mobility:  · Bed Mobility:  Rolling Left:  modified independence  · Rolling Right: modified independence  · Supine to Sit: modified independence  · Sit to Supine: modified independence  · Transfers:     · Sit to Stand:  supervision with rolling walker  · Bed to Chair: supervision with  rolling walker  using  Step Transfer  · Toilet Transfer: supervision with  rolling walker  using  Step Transfer  · Gait: RW Ambulation x 175 feet with Contact Guard Assistance for inc balance and safety. Exhibits dec weight shifting, dec stride and dec floor clearance.  · Balance: Standing Dynamic with Good- grade      AM-PAC 6 CLICK MOBILITY  Turning over in bed (including adjusting bedclothes, sheets and blankets)?: 4  Sitting down on and standing up from a chair with arms (e.g., wheelchair, bedside commode, etc.): 3  Moving from lying on back to sitting on the side of the bed?: 3  Moving to and from a bed to a chair (including a wheelchair)?: 3  Need to walk in hospital room?: 3  Climbing 3-5 steps with a railing?: 3  Basic Mobility Total Score: 19       Therapeutic Activities and Exercises:   Pt performed Bilateral  LE exercises consisting of Active range of motion exercises x 10 reps consisting of Ankle DF, Ankle PF, ABD/ADD, LAQ, Marching at Sitting and Arm Push Ups in the Chair with pt able to tolerate activities well with no sign of respiratory distress and fatigue. Worked on Gait trng using RW with safety measures.   PT discussed importance of patient's performance of Home Exercise Program (HEP) with pt verbalizing understanding.     Patient left up in chair with all lines intact, call button in reach and nurse notified..    GOALS:   Multidisciplinary Problems     Physical Therapy Goals        Problem: Physical Therapy Goal    Goal Priority Disciplines Outcome Goal Variances Interventions   Physical Therapy Goal     PT, PT/OT Ongoing  (interventions implemented as appropriate)     Description:  Goals to be met by:  19     Patient will increase functional independence with mobility by performin. Supine to sit with Independent - met 19  2. Sit to supine with Independent - met 19  3. Bed to chair transfer with Modified Independent with or without R W or straight Cane  using Step Transfer TECHNIQUE  4. Gait  x 300  feet with Modified Independent with or without straight cane or RW  5. Lower extremity exercise program x10 reps per handout, with assistance as needed.  6. Able to ascend/descend 12 stair steps using handrail/Straight  Cane with supervision.                      Time Tracking:     PT Received On: 19  PT Start Time: 1336     PT Stop Time: 1410  PT Total Time (min): 34 min     Billable Minutes: Therapeutic Activity 30    Treatment Type: Treatment  PT/PTA: PT           Keven Goldstein, PT  2019

## 2019-04-30 NOTE — PROGRESS NOTES
Staff Handoff    Bedside report received from SILVESTRE Cardenas. VS stable. Afebrile. No distress noted. Assessment complete per flowsheet. Call bell in reach. Instructed to call for any needs. Will continue to monitor for any change in status.  Resident Handoff

## 2019-04-30 NOTE — SUBJECTIVE & OBJECTIVE
Review of Systems   Constitutional: Negative.    HENT: Negative for congestion, ear pain, sinus pressure, sneezing and sore throat.    Eyes: Negative.    Respiratory: Negative for cough, chest tightness, shortness of breath and wheezing.    Cardiovascular: Negative.  Negative for chest pain.   Gastrointestinal: Negative for abdominal pain, blood in stool, constipation, diarrhea, nausea and vomiting.   Genitourinary: Negative for difficulty urinating, dysuria and flank pain.        Incontinence at night   Musculoskeletal: Negative.  Negative for joint swelling.   Skin: Negative.    Neurological: Positive for facial asymmetry and weakness (right sided last night, now resolved). Negative for dizziness and headaches.   Hematological: Negative.    Psychiatric/Behavioral: Positive for confusion. Negative for behavioral problems.     Objective:     Vital Signs (Most Recent):  Temp: 96.4 °F (35.8 °C) (04/30/19 0744)  Pulse: 67 (04/30/19 0744)  Resp: 18 (04/30/19 0744)  BP: (!) 151/61 (04/30/19 0744)  SpO2: (!) 94 % (04/30/19 0800) Vital Signs (24h Range):  Temp:  [96.4 °F (35.8 °C)-99.2 °F (37.3 °C)] 96.4 °F (35.8 °C)  Pulse:  [63-72] 67  Resp:  [18-20] 18  SpO2:  [93 %-94 %] 94 %  BP: (126-151)/(58-62) 151/61     Weight: 89.4 kg (197 lb)  Body mass index is 29.95 kg/m².    Physical Exam   Constitutional: He is oriented to person, place, and time. He appears well-developed and well-nourished.   HENT:   Head: Normocephalic and atraumatic.   Right Ear: Tympanic membrane, external ear and ear canal normal.   Left Ear: Tympanic membrane, external ear and ear canal normal.   Mouth/Throat: Oropharynx is clear and moist.   Eyes: Pupils are equal, round, and reactive to light. Conjunctivae and EOM are normal.   Neck: Normal range of motion. Neck supple. No tracheal deviation present.   Cardiovascular: Normal rate, regular rhythm, intact distal pulses and normal pulses. Exam reveals no gallop and no friction rub.   Murmur  heard.  Pulmonary/Chest: Effort normal and breath sounds normal.   Abdominal: Soft. Bowel sounds are normal. He exhibits no distension. There is no tenderness. Hernia confirmed negative in the right inguinal area and confirmed negative in the left inguinal area.   Musculoskeletal: Normal range of motion.   Neurological: He is alert and oriented to person, place, and time. He has normal reflexes. A cranial nerve deficit is present.   R facial asymmetry    Skin: Skin is warm and dry.   Psychiatric: He has a normal mood and affect. His behavior is normal. Judgment and thought content normal.   Nursing note and vitals reviewed.        CRANIAL NERVES     CN III, IV, VI   Pupils are equal, round, and reactive to light.  Extraocular motions are normal.        Significant Labs:   CMP:   Recent Labs   Lab 19  0423 19  0552    138   K 3.9 3.6    104   CO2 24 24   * 114*   BUN 33* 38*   CREATININE 2.1* 1.5*   CALCIUM 10.2 10.2   PROT 6.2 6.1   ALBUMIN 2.8* 2.6*   BILITOT 1.1* 0.8   ALKPHOS 54* 51*   AST 10 14   ALT 9* 11   ANIONGAP 13 10   EGFRNONAA 28* 43*     PSA 1.4,   Recent Labs   Lab 19  0552   WBC 7.24   RBC 3.56*   HGB 8.2*   HCT 27.6*      MCV 78*   MCH 23.0*   MCHC 29.7*      Urinalysis negative     Lab Results   Component Value Date    PSA 1.4 2019 Mag 2.2    Blood cultures  NGTD x 2     blood cultures   Enterococcus faecalis       CULTURE, BLOOD     Ampicillin <=2 mcg/mL Sensitive     Gentamicin Synergy Screen <=500 mcg/mL Sensitive     Tetracycline <=4 mcg/mL Sensitive     Vancomycin 2 mcg/mL Sensitive      Flu negative       Significant Imagin/29 MRI   1.  Two tiny focal areas of increased signal intensity on diffusion-weighted images as described above likely representing artifact at the crux of the sulci.  A secondary consideration is tiny lacunar infarcts.  Dr. Laverne Shipman was telephoned with a verbal report at the time of this  dictation.    2.  Atrophy and small vessel ischemic changes of the periventricular white matter.    4/29 CT head   1.  No CT evidence of an acute intracranial abnormality.    2.  Atrophy and small vessel ischemic changes of the periventricular white matter.  Old lacunar infarcts.    4/22 x ray hip right   No evidence of right hip fracture.    4/22 x ray hip left   No evidence of left hip fracture.    4/22 CXR   Right-sided PICC with tip projecting over superior vena cava.    4/18 Echo   · Mildly decreased left ventricular systolic function. The estimated ejection fraction is 45%  · Local segmental wall motion abnormalities.  · Eccentric left ventricular hypertrophy.  · Severe left atrial enlargement.  · Grade I (mild) left ventricular diastolic dysfunction consistent with impaired relaxation.  · Normal left atrial pressure.  · Normal right ventricular systolic function.  · Mild right atrial enlargement.  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 I did not see mention of AVR in encounter notes or March echo report or prior INES report but there appears to be a bioprosthesis. There is mild central aortic insufficiency present.  · Mild mitral regurgitation.  · Normal central venous pressure (3 mm Hg).  · The estimated PA systolic pressure is 31 mm Hg

## 2019-04-30 NOTE — NURSING
Pt c/o neck pain  10/10 pain med given as per md order family no longer at bedside pt repositioned in chair states hes waiting for his daughter call bell w/in [place] will cont to monitor

## 2019-04-30 NOTE — NURSING
Med pass patient takes meds whole daughter at bedside pt c/o neck pain ultram given as per md order daughter states nothing helps his neck pain pillow placed behindneck will cont to monitor

## 2019-04-30 NOTE — ASSESSMENT & PLAN NOTE
Continue amlodpine, Lisinopril, HCTZ, , coreg  4/29: Lasix- will hold now that Creat bumped and follow renal fxn  4/30 stopped lasix and lisinopril yesterday, today stop hctz and reduce amlodipine  He needs a little high bp.

## 2019-04-30 NOTE — PLAN OF CARE
Problem: Adult Inpatient Plan of Care  Goal: Plan of Care Review  Outcome: Ongoing (interventions implemented as appropriate)  Patient aware of plan of care. VS stable. Afebrile. IV antibiotic continued. Did well with PT. Stool sent to lab, positive for occult blood, Dr. Aiken notified. Free from falls/injuries. No questions or concerns at this time. Agrees with plan of care.

## 2019-04-30 NOTE — PLAN OF CARE
04/30/19 1148   Discharge Reassessment   Assessment Type Discharge Planning Reassessment         Weekly skilled level of care meeting done. Patient is progressing toward goals with PT, OT, and SLP. He is currently on day 12/42 of IV antibiotics. Estimated discharge date 5/30/19.       PT Progress/recommendation: Please see latest PT note.    OT Progress/recommendation: Please see latest OT note.    SLP Progress/recommendation: Please see latest SLP note.

## 2019-04-30 NOTE — PROGRESS NOTES
[facility] Hospital Medicine  Progress Note    Patient Name: St Lacho Farooq  MRN: [id number]  Patient Class: IP- Swing   Admission Date: 4/23/2019  Length of Stay: 7 days  Attending Physician: John Saeed MD  Primary Care Provider: Dylan Adam MD        Subjective:     Principal Problem:Bacteremia    HPI:  This 84 year old man with PMHX of CAD p CABG, [facility] 2/19; stable lesions, PAD, bilateral CVD, AAA,  PAF, HTN, hyperlipidemia, MALATHI, NYHA II chronic diastolic HF and non rheumatic critical AS with JAMAAL 0.8cm2 with preserved LVSFX EF55%,  per Echo underwent  + TAVR placed 3/18/19 per Dr. Babcock. Post op course was complicated by CVA ~ 2 weeks post TAVR; he presented to [place] 4/16 with altered mental status and fever where he was Dx with CVA and gram + bacteremia.   [place]  [place] [facility] [place] and ID eval ant tx; CT at that time was unrevealing for an acute intracranial process  His MRI was abnormal.  His blood cultures have grown Enterococcus faecalis sensitve to all tested drugs,  Pt initially given vancomycin and then changed to ampicillin IV.  He has clinically improved.  His INES shows a septic atrial thrombus. The cRP is 49 and the ESR is 15.  Pt is no longer febrile and he and daughter feel he is at base line physically and mentally.Pt transferred to [facility] yesterday for continued skilled care and prolonged IV ABX. Plan is 6 weeks of ABX; ampicillin 2gms IV q 6; day 1 with negative blood cultures was 4/19; so today is day 6/42  Requests DNR .    Hospital Course:  4/25/19 SKILLED NURSING for prolonged ABX   Pt sitting up in chair watching TV; reports feeling fine but did have urinary incontinence and noting frequency   No fever, WBC normal   Day 7/42 ABX for bacteremia/septic emboli     4/26/19 SKILLED NURSING for prolonged ABX for bacteremia, septic thrombus; day 8/42  NAD Overnight, still with night time urinary incontinence; will try trial of flomax    PSA 1.4, repeat U/A ok; bladder scan ok     4/28 pt on SKILLED bed for prolonged abx day 10/42 for bacteremia from septic emboli. Ampicillin which cultures show sensitivity to. He also had a low grade temp last night. With the temp he had confusion and right sided weakness. Ct head done with no changes. Neuro was consulted for this am. He has this same type episode last visit with temp and mental status changes. This am he is back to normal. To note his H/H was lower and also creat bumped to 2.1.     4/29 pt on SKILL. Has some neuro changes on Sunday evening. CT without new strokes. MRI ordered per Dr shaw yesterday. Shows 3 new strokes. Facial droop this am . Neuro/cards and vascular surgery discussed pt case. He needs the anticoagulation with afib and thrombus in atrium. Will cont pradaxa, no heparin. Also remains on IV abx for 6 weeks. Day 12/42. No fever. No elevated WBC.     Creat was elevated yesterday 2.1. Lasix was stopped and 500ml IVF given. Creat better today 1.5. Will hold the hctz today as well as stopped the lisinopril and reduce amlodipine to have him a little higher with pressure due to acute strokes. Use LAUREL for swelling of lower extremity   R facial drop still apparent       Review of Systems   Constitutional: Negative.    HENT: Negative for congestion, ear pain, sinus pressure, sneezing and sore throat.    Eyes: Negative.    Respiratory: Negative for cough, chest tightness, shortness of breath and wheezing.    Cardiovascular: Negative.  Negative for chest pain.   Gastrointestinal: Negative for abdominal pain, blood in stool, constipation, diarrhea, nausea and vomiting.   Genitourinary: Negative for difficulty urinating, dysuria and flank pain.        Incontinence at night   Musculoskeletal: Negative.  Negative for joint swelling.   Skin: Negative.    Neurological: Positive for facial asymmetry and weakness (right sided last night, now resolved). Negative for dizziness and headaches.    Hematological: Negative.    Psychiatric/Behavioral: Positive for confusion. Negative for behavioral problems.     Objective:     Vital Signs (Most Recent):  Temp: 96.4 °F (35.8 °C) (04/30/19 0744)  Pulse: 67 (04/30/19 0744)  Resp: 18 (04/30/19 0744)  BP: (!) 151/61 (04/30/19 0744)  SpO2: (!) 94 % (04/30/19 0800) Vital Signs (24h Range):  Temp:  [96.4 °F (35.8 °C)-99.2 °F (37.3 °C)] 96.4 °F (35.8 °C)  Pulse:  [63-72] 67  Resp:  [18-20] 18  SpO2:  [93 %-94 %] 94 %  BP: (126-151)/(58-62) 151/61     Weight: 89.4 kg (197 lb)  Body mass index is 29.95 kg/m².    Physical Exam   Constitutional: He is oriented to person, place, and time. He appears well-developed and well-nourished.   HENT:   Head: Normocephalic and atraumatic.   Right Ear: Tympanic membrane, external ear and ear canal normal.   Left Ear: Tympanic membrane, external ear and ear canal normal.   Mouth/Throat: Oropharynx is clear and moist.   Eyes: Pupils are equal, round, and reactive to light. Conjunctivae and EOM are normal.   Neck: Normal range of motion. Neck supple. No tracheal deviation present.   Cardiovascular: Normal rate, regular rhythm, intact distal pulses and normal pulses. Exam reveals no gallop and no friction rub.   Murmur heard.  Pulmonary/Chest: Effort normal and breath sounds normal.   Abdominal: Soft. Bowel sounds are normal. He exhibits no distension. There is no tenderness. Hernia confirmed negative in the right inguinal area and confirmed negative in the left inguinal area.   Musculoskeletal: Normal range of motion.   Neurological: He is alert and oriented to person, place, and time. He has normal reflexes. A cranial nerve deficit is present.   R facial asymmetry    Skin: Skin is warm and dry.   Psychiatric: He has a normal mood and affect. His behavior is normal. Judgment and thought content normal.   Nursing note and vitals reviewed.        CRANIAL NERVES     CN III, IV, VI   Pupils are equal, round, and reactive to  light.  Extraocular motions are normal.        Significant Labs:   CMP:   Recent Labs   Lab 19  0423 19  0552    138   K 3.9 3.6    104   CO2 24 24   * 114*   BUN 33* 38*   CREATININE 2.1* 1.5*   CALCIUM 10.2 10.2   PROT 6.2 6.1   ALBUMIN 2.8* 2.6*   BILITOT 1.1* 0.8   ALKPHOS 54* 51*   AST 10 14   ALT 9* 11   ANIONGAP 13 10   EGFRNONAA 28* 43*     PSA 1.4,   Recent Labs   Lab 19  0552   WBC 7.24   RBC 3.56*   HGB 8.2*   HCT 27.6*      MCV 78*   MCH 23.0*   MCHC 29.7*      Urinalysis negative     Lab Results   Component Value Date    PSA 1.4 2019 Mag 2.2    Blood cultures  NGTD x 2     blood cultures   Enterococcus faecalis       CULTURE, BLOOD     Ampicillin <=2 mcg/mL Sensitive     Gentamicin Synergy Screen <=500 mcg/mL Sensitive     Tetracycline <=4 mcg/mL Sensitive     Vancomycin 2 mcg/mL Sensitive      Flu negative       Significant Imagin/29 MRI   1.  Two tiny focal areas of increased signal intensity on diffusion-weighted images as described above likely representing artifact at the crux of the sulci.  A secondary consideration is tiny lacunar infarcts.  Dr. Laverne Shipman was telephoned with a verbal report at the time of this dictation.    2.  Atrophy and small vessel ischemic changes of the periventricular white matter.     CT head   1.  No CT evidence of an acute intracranial abnormality.    2.  Atrophy and small vessel ischemic changes of the periventricular white matter.  Old lacunar infarcts.     x ray hip right   No evidence of right hip fracture.     x ray hip left   No evidence of left hip fracture.     CXR   Right-sided PICC with tip projecting over superior vena cava.     Echo   · Mildly decreased left ventricular systolic function. The estimated ejection fraction is 45%  · Local segmental wall motion abnormalities.  · Eccentric left ventricular hypertrophy.  · Severe left atrial enlargement.  · Grade  "I (mild) left ventricular diastolic dysfunction consistent with impaired relaxation.  · Normal left atrial pressure.  · Normal right ventricular systolic function.  · Mild right atrial enlargement.  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 I did not see mention of AVR in encounter notes or March echo report or prior INES report but there appears to be a bioprosthesis. There is mild central aortic insufficiency present.  · Mild mitral regurgitation.  · Normal central venous pressure (3 mm Hg).  · The estimated PA systolic pressure is 31 mm Hg    Assessment/Plan:      * Bacteremia  S/p recent TAVR now with septic emboli and concern for "flicking emboli"   His blood cultures have grown Enterococcus faecalis sensitve to all tested drugs  ID recommending total of 6 weeks IV ABX; Ampicillin 2gms IV q 6 hr; day 1 with negative blood cultures was 4/19/19; so today is day 6/42  Day 7/42 4/26/19 day 8/42 4/29 day 11/42 of IV abd. 4/19 was on vanc and 4/20 started on ampicillin  4/30 day 12/42.  4/19 was on vanc and 4/20 started on ampicillin    Renal insufficiency  Noted 4/29: Cr up to 2.1  Reduce pradaxa  Hold lasix  Ns 500ml bolus today slowly  Better today, hold hctz and lasix .    Nocturnal enuresis  Check U.A , PSA, bladder scan; bladder scan 89ml   Still with incontinence issues   Gets HCTz 12.5 and lasix 20mg po daily in am   trial of flomax   Hold lasix for now and give fluids today gently.   Stop fluid opill, creat better with hydration    Atrial thrombus  Large atrial thrombus found on INES ,Dr. Galicia deemed septic emboli; still no INES reporting thrombus   Continue Pradaxa; family plans to monitor meds once home  Will not consider failed therapy as patient has had lapses in therapy due to recent surgical interventions and non-compliance   as discussed above there is concern for infected thrombus.   Per Infectious Disease will treat bacteremia and possible infected thrombus for 6 " weeks.          Cerebrovascular accident (CVA)  Subacute CVA with focal deficits noted on 4/14, with significant improvement in symptoms   MRI did not demonstrate acute findings but neuro reviewed and Dx with new CVA;   Was being tx with Pradaxa when he had acute stroke but Will not consider failed therapy as patient has had lapses in therapy due to recent surgical interventions and non-compliance  Per Dr. Fernandez's recommendations we will continue:  -Telemetry.  -Neurochecks  -NO TPA as he presented initially on Sunday  -Physical Therapy and OT Consult, evaluation and treatment at [place] once patient is admitted to  SNF  -Completed permissive HTN and will need strict BP control.   -Continue his current dose home meds otherwise including Asprin, Pradaxa and statin       4/29: Reduce pradaxa due to renal function today  Consult colin due to AMS and neuro changes overnight. Repeat CT head without acute changes. ? Watershed injury; ? Keep BP a little higher? Dehydration playing a role    4/30 go back up on pradaxa due to kidneys now functioning. Reviewed MRI with Kel Fernandez and Dr Ventura. ST/OT/PT    Hx of CABG  Asa, statin.    CHF (congestive heart failure), NYHA class II, chronic, diastolic  Continue Lisinopril, HCTZ  4/29: Hold lasix for now. Already had dose this am. Give gentle hydration given rising Cr. Likely a little dry  4/30 cont to hold lasix, lisinopril and hctz LAUREL hose.    Anemia  H/H stable without acute indications for transfusion   Continue to monitor    check anemia w/u from last visit ferritin was 26, retic was 1.5  Occult stool today, will not change our plan just give us answer to where blood is going. He needs the blood thinner for now due to septic emboli.     Cont to monitor occult pending  Transfuse as needed, needs blood thinner.          Essential hypertension  Continue amlodpine, Lisinopril, HCTZ, , coreg  4/29: Lasix- will hold now that Creat bumped and follow renal fxn  4/30  stopped lasix and lisinopril yesterday, today stop hctz and reduce amlodipine  He needs a little high bp.    Aortic valve stenosis  S/p TAVR .      VTE Risk Mitigation (From admission, onward)        Ordered     dabigatran etexilate capsule 150 mg  2 times daily      04/30/19 0907     heparin, porcine (PF) 100 unit/mL injection flush 500 Units  As needed (PRN)      04/25/19 1408              Ciera Aiken MD  Department of Hospital Medicine   [facility]

## 2019-04-30 NOTE — ASSESSMENT & PLAN NOTE
Subacute CVA with focal deficits noted on 4/14, with significant improvement in symptoms   MRI did not demonstrate acute findings but neuro reviewed and Dx with new CVA;   Was being tx with Pradaxa when he had acute stroke but Will not consider failed therapy as patient has had lapses in therapy due to recent surgical interventions and non-compliance  Per Dr. Fernandez's recommendations we will continue:  -Telemetry.  -Neurochecks  -NO TPA as he presented initially on Sunday  -Physical Therapy and OT Consult, evaluation and treatment at [place] once patient is admitted to  SNF  -Completed permissive HTN and will need strict BP control.   -Continue his current dose home meds otherwise including Asprin, Pradaxa and statin       4/29: Reduce pradaxa due to renal function today  Consult colin due to AMS and neuro changes overnight. Repeat CT head without acute changes. ? Watershed injury; ? Keep BP a little higher? Dehydration playing a role    4/30 go back up on pradaxa due to kidneys now functioning. Reviewed MRI with Kel Fernandez and Dr Ventura. ST/OT/PT

## 2019-04-30 NOTE — ASSESSMENT & PLAN NOTE
H/H stable without acute indications for transfusion   Continue to monitor    check anemia w/u from last visit ferritin was 26, retic was 1.5  Occult stool today, will not change our plan just give us answer to where blood is going. He needs the blood thinner for now due to septic emboli.     Cont to monitor occult pending  Transfuse as needed, needs blood thinner.

## 2019-04-30 NOTE — PROGRESS NOTES
"[facility]  Neurology  Progress Note     Patient Name: St Lacho Farooq  MRN: [id number]  Admission Date: 4/23/2019  Hospital Length of Stay: 6 days  Code Status: DNR   Attending Provider: John Saeed MD   Consulting Provider: Mitchel Fernandez MD  Primary Care Physician: Dylan Adam MD  Principal Problem:Bacteremia     Inpatient consult to Neurology  Consult performed by: Mitchel Fernandez MD  Consult ordered by: Marta Song NP        Subjective:      Chief Complaint:  Right sided weakness, confusion      HPI:   St Lacho Farooq is a 84 y.o. male known to me for prior acute cerebellar CVA in 2016 and recent admission for acute CVA thought to be cardioembolic (he has afib and had a recent aortic valve repair and + blood cultures) and resulting in right sided weakness.  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  He did improve since that point.  Last night, he was noted to have fever and with that confusion  confusion and right sided weakness. Ct head done with no changes. Neuro was consulted for this am. He has this same type of changes while in house with prior with fever and mental status changes. He did return to baseline. Primary team ordered CT head (see below)  He did have some lowering of his BP last night as well  Per review of INES, " Large atrial thrombus found on INES" although there is no final report in the computer to date. The patient was also non-compliant with NOAC prior to CVA.      Interval history 4/29/19: Patient has been afebrile. Left facial droop still noted but not severe weakness like yesterday. Reviewed/ appreciate cardiology consult.           Past Medical History:   Diagnosis Date    Anemia      Aortic valve stenosis      Arthritis      Atrial " fibrillation      Cancer       Basal Cell Skin Cancer    Carotid artery stenosis      Coronary artery disease      CVA (cerebral vascular accident)      Encounter for blood transfusion      Exercise-induced angina      Hyperkalemia       at times    Hyperlipemia      Hypertension      Iron deficiency anemia      MI (myocardial infarction) 2004    MALATHI (obstructive sleep apnea)      PAD (peripheral artery disease)      Prostatitis      Renal failure       MILD after MI    S/P 2-vessel coronary artery bypass                 Past Surgical History:   Procedure Laterality Date    CARPAL TUNNEL RELEASE        CATARACT EXTRACTION, BILATERAL        CORONARY ANGIOGRAPHY   2019    CORONARY ARTERY BYPASS GRAFT   2004    HERNIA REPAIR         Inguinal & Ventral with MESH    INSERTION, PICC N/A 4/22/2019     Performed by [facility]    REPLACEMENT, AORTIC VALVE, PERCUTANEOUS, TRANSCATHETER Right 3/18/2019     Performed by Mani Babcock MD at [facility] CATH    REPLACEMENT, AORTIC VALVE, PERCUTANEOUS, TRANSCATHETER Right 3/18/2019     Performed by Ac Osman MD at [facility] CATH    TONSILLECTOMY        Transesophageal echo (INES) intra-procedure log documentation N/A 4/18/2019     Performed by Tam Carrasco MD at [facility] CATH    TRANSESOPHAGEAL ECHOCARDIOGRAM (INES) N/A 6/28/2016     Performed by Hugh [facility]um MD at [facility] OR              Review of patient's allergies indicates:   Allergen Reactions    Bactrim [sulfamethoxazole-trimethoprim] Rash    Codeine Rash      I have reviewed all of this patient's past medical and surgical histories as well as family and social histories and active allergies and medications as documented in the electronic medical record.        No current facility-administered medications on file prior to encounter.            Current Outpatient Medications on File Prior to Encounter   Medication Sig    amLODIPine (NORVASC) 2.5 MG tablet Take 2.5 mg by  "mouth once daily.    ANORO ELLIPTA 62.5-25 mcg/actuation DsDv INHALE 1 PUFF INTO LUNGS ONCE DAILY    aspirin (ECOTRIN) 81 MG EC tablet Take 81 mg by mouth once daily.    atorvastatin (LIPITOR) 10 MG tablet Take 10 mg by mouth every evening.    carvedilol (COREG) 12.5 MG tablet Take 25 mg by mouth 2 (two) times daily with meals.     dabigatran etexilate (PRADAXA) 150 mg Cap Take 150 mg by mouth 2 (two) times daily. "Do NOT break, chew, or open capsules."    furosemide (LASIX) 20 MG tablet Take 20 mg by mouth 2 (two) times daily.    lisinopril (PRINIVIL,ZESTRIL) 20 MG tablet Take 1 tablet (20 mg total) by mouth once daily.    nitroGLYCERIN (NITROSTAT) 0.4 MG SL tablet Place 0.4 mg under the tongue every 5 (five) minutes as needed for Chest pain.    omeprazole (PRILOSEC) 40 MG capsule Take 40 mg by mouth once daily.    ranolazine (RANEXA) 500 MG Tb12 Take 500 mg by mouth 2 (two) times daily.           Family History      Problem Relation (Age of Onset)     Atrial fibrillation Father, Sister     COPD Sister     Cancer Father     Diabetes type II Mother, Sister     Heart failure Mother     Hypertension Mother, Father, Sister, Brother     Pacemaker/defibrilator Sister     Pulmonary embolism Brother                Tobacco Use    Smoking status: Former Smoker       Last attempt to quit: 2004       Years since quitting: 15.3    Smokeless tobacco: Never Used    Tobacco comment: Smoked for 60 years   Substance and Sexual Activity    Alcohol use: No       Frequency: Never    Drug use: No    Sexual activity: Yes       Partners: Female      Review of Systems   Unable to perform ROS: Mental status change   Skin: Negative for rash.      Objective:      Vitals:    04/30/19 0400 04/30/19 0744 04/30/19 0800 04/30/19 1117   BP: 139/62 (!) 151/61  123/60   BP Location:  Left arm  Left arm   Patient Position:  Sitting  Sitting   Pulse: 70 67  69   Resp: 20 18  16   Temp: 99.2 °F (37.3 °C) 96.4 °F (35.8 °C)  97.2 °F (36.2 " °C)   TempSrc:  Oral  Oral   SpO2: (!) 93% (!) 94% (!) 94% 95%   Weight:       Height:                Weight: 89.4 kg (197 lb)  Body mass index is 29.95 kg/m².     Physical Exam        Exam:  Gen Appearance, well developed/nourished in no apparent distress  CV: 2+ distal pulses with no edema or swelling  Neuro:  MS: Awake, alert, Sustains attention but not fully (distracted at times). But not oriented to exact day of week.  Recent details are impaired and he is mildly frustrated which is out of character for him /remote memory is intact.Language is full to spontaneous speech/comprehension. Fund of Knowledge is full.  CN: Optic discs are flat with normal vasculature, PERRL, Extraoccular movements and visual fields are full. Left eye lid mildly drooping (he states known cosmetic) and right lower facial droop is noted, mild.  Normal facial strength,Tongue and Palate are midline and  strong. Shoulder Shrug symmetric and strong.  Motor: Normal bulk, tone, no abnormal movements.  5/5 bilateral UE and LE strength  Except 4-/5 right tricepts and 1+ reflexes  Sensory: Romberg negative and intact to vibration and temp   Cerebellar: Finger-nose, heal to nose, Rapid alternating movements intact  Gait: Not done        Significant Labs: Creatinine improved, CBC noted (occult stool pending)     Significant Imaging: Ct head 4/29/19 1.  No CT evidence of an acute intracranial abnormality.    2.  Atrophy and small vessel ischemic changes of the periventricular white matter.  Old lacunar infarcts.    MRI brain 4/29/19:1.  Two tiny focal areas of increased signal intensity on diffusion-weighted images as described above likely representing artifact at the crux of the sulci.  A secondary consideration is tiny lacunar infarcts.  Dr. Laverne Shipman was telephoned with a verbal report at the time of this dictation.    2.  Atrophy and small vessel ischemic changes of the periventricular white matter.     Assessment and Plan:   St Lacho Meadows  Oseas is a 84 y.o. male known to me for cerebellar CVA in 2016, history of afib.  He was seen by me earlier this month due to confusion and right sided weakness which was found to be from acute cardioembolic CVA- thought to be due to septic emboli after recent TAVR and due to non-compliance with NOAC at home.  He was improved, but has suffered increased confusion and brief increase in his right sided weakness after increased temperature on 4/29/19 (has happened once prior since most recent CVA). Repeat MRI shows continued and new emobolic CVAs  Cerebrovascular accident (CVA)  -He is at risk to shower more emboli, but the treatment is antibotics as this is a septic thrombus  -He is a DNR.   -Continue NOAC given his afib and the thrombus (benefits of this are greater than risk of bleeding currently)  -Repeat Head CT for any significant changes in MS or neuro exam (especially any lasting longer than 15 minutes)  -Allow permissive HTN to 220/220 for 3 days given  Acute CVAs- lisinopril on hold currently (as is HCTZ and he is on reduced dose Amlodipine)       -plan is continued  NOAC, SWING rehab, and IV antibiotics for artrial thrombus per ID. Hydration status and anemia are being addressed by primary team.      Will see patient back on Thursday.   Mitchel Fernandez MD  Neurology  [facility]

## 2019-04-30 NOTE — PLAN OF CARE
Problem: Adult Inpatient Plan of Care  Goal: Plan of Care Review  Outcome: Ongoing (interventions implemented as appropriate)  Patient with decline in neuro status. DNR.  Confused and unable to perform neuro assessment . Patient able to follow commands later in shift. Patient Sycuan. C/o neck hydromorphone given earlier in shift no relief daughter present at this time requesting  Pain med tramadol  (administered as per md order) Patient did not get much relief from interventions for neck pain.   MRI of brain today. Dr. Fernandez consulted seen patient today. Neuro checks. DNR. Patient on IV ampicillin for bacteremia. picc line dressing changed to right forearm. Patient needing max assist getting in & out bed today. Afebrile. NO BM today, passed flatus. Safety and comfort maintained. Will continue to monitor. Agrees with plan of care.

## 2019-04-30 NOTE — PLAN OF CARE
04/30/19 0945   Discharge Reassessment   Assessment Type Discharge Planning Reassessment         MD seen patient today to check how he is doing, since new findings on MRI yesterday. Patient and family are are of new findings (stroke). MD has stated the plan is pretty much the same. He will remain on swing bed to finish his 6 weeks of IV antibiotics, and work with PT, OT in the process. Patient and family state agreement with POC, and verbalized understanding of POC. CM will continue to follow and assist as needed.

## 2019-04-30 NOTE — PT/OT/SLP PROGRESS
"Occupational Therapy   Treatment    Name: St Lacho Farooq  MRN: [id number]  Admitting Diagnosis:  Bacteremia       Recommendations:     Discharge Recommendations: home health PT, home with home health  Discharge Equipment Recommendations:  cane, straight  Barriers to discharge:  None    Assessment:     St Lacho Farooq is a 84 y.o. male with a medical diagnosis of Bacteremia.  He presents with neurological events from last night causing left sided weakness seems to be resolved this afternoon-no complaints of neck pain this afternoon, mild back pain,  here for IV ABX. Has mild memory impairment and mild peripheral vision difficulty, mild impaired coordination. Performance deficits affecting function are impaired endurance, impaired self care skills, gait instability, visual deficits, decreased lower extremity function, weakness, decreased safety awareness, impaired cognition, impaired functional mobilty.     He was oriented to correct place/ year/ one day off date (said May 1) and approximate time "dinner time"    Rehab Prognosis:  Fair; patient would benefit from acute skilled OT services to address these deficits and reach maximum level of function.       Plan:     Patient to be seen 5 x/week to address the above listed problems via self-care/home management, therapeutic exercises, therapeutic activities  · Plan of Care Expires: 06/10/19  · Plan of Care Reviewed with: patient    Subjective     Pain/Comfort:  · Pain Rating 1: 5/10  · Location 1: back  · Pain Addressed 1: Pre-medicate for activity, Distraction, Reposition  · Pain Rating Post-Intervention 1: 3/10    Objective:     Communicated with: nursing prior to session.  Patient found up in chair with peripheral IV, PICC line upon OT entry to room.    General Precautions: Standard, fall   Orthopedic Precautions:N/A   Braces: N/A     Occupational Performance:     Bed Mobility:    · NT    Functional Mobility/Transfers:  · Patient completed Sit <> Stand " "Transfer with supervision  with  rolling walker   · Patient completed Toilet Transfer Stand Pivot technique with stand by assistance with  rolling walker    Activities of Daily Living:  · Feeding:  independence mild coordination difficulty but perseveres and is able to open containers and cut meat and feed himself without spilling  · Grooming: supervision denture care  · Bathing: NT  · Upper Body Dressing: minimum assistance to pull arms out of long shirt so IV could be restarted by nursing  · Lower Body Dressing: minimum assistance footwear  · Toileting: minimum assistance for hygiene thourohness      [facility]    Treatment & Education:  Self care and performed perceptual testing given that he has some peripheral vision difficulty involving R & L parietal areas of the brain.      He sherice a clock and correctly put the numbers in which was similar to the one he sherice on admit (with numbers more to the middle than edge of clock but in the correct spots).    With a deck of cards (explained that this was to test vision, not a game"  Color perception : intact red/ black 100%  Form discrimination: intact hearts, diamonds, 75 % intact clubs/ spades and improved to 90% accuracy with practice  Cognitive function: calculation- patient asked to add cards as they are place on the table- needs re testing  Visual memory-   Short term- NT (plac 3 cards on the table and ask him to remember when removed  Long Term- NT ( various card games to evaluate past memory, patient never had time to play cards when working and is unfamiliar with card games  Eye-hand coordination: intact, able to place cards in the same suit in 4 separate piles, needed 6 cues when he places clubs in the spades piles, did better when black spade-red heart- black club-red yu sequence vs the blacks on the outside edges, made no errors with hearts and diamonds.  Auditory memory- NT  Visual attention- sequencing- able to sort 8 cards high to " "low  Shuffling cards- mild coordination problems- dropped 2 cards  Problem solving/ sorting/ categorization, naming: able to arrange cards in 4 piles by category (suit)  Same: now arranged those 4 piles by 2 piles (black / red)  Cognition, calculation In head or using pencil : NT  Short term memory ;NT - arrnage 8 cards, remove 3 and ask him to remember which 3 cards were removed  Scanning: lay out 20 cards and he was able to "pick out all the 2's"        Patient left up in chair with all lines intact and call button in reachEducation:      GOALS:   Multidisciplinary Problems     Occupational Therapy Goals        Problem: Occupational Therapy Goal    Goal Priority Disciplines Outcome Interventions   Occupational Therapy Goal     OT, PT/OT     Description:  Goals to be met by:6/10/2019     Patient will increase functional independence with ADLs by performing:    LE Dressing with Modified [place].  Grooming while standing at sink with Modified [place].  Toileting from toilet with Modified [place] for hygiene and clothing management.   Bathing from  shower chair/bench with Modified [place].  Upper extremity exercise program x10 reps per handout, with assistance as needed  Patient will consistently 3/3 trials at different times of the day demonstrate ability to be a) oriented x 4 (using watch if needed for time of day)  b) use RN call light appropriately before getting up c) complete a >10 piece puzzle d) complete a word search page with 90% accuracy d) add and subtract 2 digit numbers 5/5 tries with 90% accuracy e) color by number from coloring book attending to periphery of the page as well as center of the page without errors. E) fill his medbox with the 13 pills he says he takes daily, twice per day with 100% accuracy f) fill out his medication list with WHAT he takes, WHY he takes it, WHEN he takes it, HOW and HOW often he takes it , and possible side effects of each medication  Complete the " Short Blessed pre -  Driving cognitive screen without any errors.                      Time Tracking:     OT Date of Treatment: 04/30/19  OT Start Time: 1630  OT Stop Time: 1700  OT Total Time (min): 30 min    Billable Minutes:Self Care/Home Management 30    Zarina Gonzalez OT  4/30/2019

## 2019-04-30 NOTE — CONSULTS
[facility]  Cardiology  Consult Note    Patient Name: St Lacho Farooq  MRN: [id number]  Admission Date: 4/23/2019  Hospital Length of Stay: 7 days  Code Status: DNR   Attending Provider: John Saeed MD   Consulting Provider: DANK Huynh  Primary Care Physician: Dylan Adam MD  Principal Problem:Bacteremia    Patient information was obtained from patient, past medical records and ER records.     Inpatient consult to Cardiology[facility]  Consult performed by: Hugh Begum MD  Consult ordered by: Mitchel Fernandez MD        Subjective:     Chief Complaint:  Rt sided weakness, confusoin.      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 Hx A fib with recent TAVR and Sepsis and believed to have septic atrial thrombus and placed on IV ABX x 6 wks. He has been here on swing unit undergoing rehab. Patient developed fever confusion and rt sided weakness. Similar to his presentation previously. CT of head was unremarkable. Permissive HTN has been allowed.    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    S/P 2-vessel coronary artery bypass        Past Surgical History:   Procedure Laterality Date    CARPAL TUNNEL RELEASE      CATARACT EXTRACTION, BILATERAL      CORONARY ANGIOGRAPHY  2019    CORONARY ARTERY BYPASS GRAFT  2004    HERNIA REPAIR      Inguinal & Ventral with MESH    INSERTION, PICC N/A 4/22/2019     "Performed by [facility] Diagnostic Provider at [facility] OR    REPLACEMENT, AORTIC VALVE, PERCUTANEOUS, TRANSCATHETER Right 3/18/2019    Performed by Mani Babcock MD at [facility] CATH    REPLACEMENT, AORTIC VALVE, PERCUTANEOUS, TRANSCATHETER Right 3/18/2019    Performed by Ac Osman MD at [facility] CATH    TONSILLECTOMY      Transesophageal echo (INES) intra-procedure log documentation N/A 4/18/2019    Performed by Tam Carrasco MD at [facility] CATH    TRANSESOPHAGEAL ECHOCARDIOGRAM (INES) N/A 6/28/2016    Performed by Hugh Begum MD at [facility] OR       Review of patient's allergies indicates:   Allergen Reactions    Bactrim [sulfamethoxazole-trimethoprim] Rash    Codeine Rash       No current facility-administered medications on file prior to encounter.      Current Outpatient Medications on File Prior to Encounter   Medication Sig    amLODIPine (NORVASC) 2.5 MG tablet Take 2.5 mg by mouth once daily.    ANORO ELLIPTA 62.5-25 mcg/actuation DsDv INHALE 1 PUFF INTO LUNGS ONCE DAILY    aspirin (ECOTRIN) 81 MG EC tablet Take 81 mg by mouth once daily.    atorvastatin (LIPITOR) 10 MG tablet Take 10 mg by mouth every evening.    carvedilol (COREG) 12.5 MG tablet Take 25 mg by mouth 2 (two) times daily with meals.     dabigatran etexilate (PRADAXA) 150 mg Cap Take 150 mg by mouth 2 (two) times daily. "Do NOT break, chew, or open capsules."    furosemide (LASIX) 20 MG tablet Take 20 mg by mouth 2 (two) times daily.    lisinopril (PRINIVIL,ZESTRIL) 20 MG tablet Take 1 tablet (20 mg total) by mouth once daily.    nitroGLYCERIN (NITROSTAT) 0.4 MG SL tablet Place 0.4 mg under the tongue every 5 (five) minutes as needed for Chest pain.    omeprazole (PRILOSEC) 40 MG capsule Take 40 mg by mouth once daily.    ranolazine (RANEXA) 500 MG Tb12 Take 500 mg by mouth 2 (two) times daily.     Family History     Problem Relation (Age of Onset)    Atrial fibrillation Father, Sister    COPD Sister    Cancer Father    Diabetes type " II Mother, Sister    Heart failure Mother    Hypertension Mother, Father, Sister, Brother    Pacemaker/defibrilator Sister    Pulmonary embolism Brother        Tobacco Use    Smoking status: Former Smoker     Last attempt to quit: 2004     Years since quitting: 15.3    Smokeless tobacco: Never Used    Tobacco comment: Smoked for 60 years   Substance and Sexual Activity    Alcohol use: No     Frequency: Never    Drug use: No    Sexual activity: Yes     Partners: Female     ROS   Constitutional: Negative   Eyes: Negative    Respiratory: Negative    Cardiovascular: Negative   Gastrointestinal: Negative   Genitourinary: Negative   Musculoskeletal: Negative   Skin: Negative .   Neurological: Negative   Objective:     Vital Signs (Most Recent):  Temp: 96.4 °F (35.8 °C) (04/30/19 0744)  Pulse: 67 (04/30/19 0744)  Resp: 18 (04/30/19 0744)  BP: (!) 151/61 (04/30/19 0744)  SpO2: (!) 94 % (04/30/19 0800) Vital Signs (24h Range):  Temp:  [96.4 °F (35.8 °C)-99.2 °F (37.3 °C)] 96.4 °F (35.8 °C)  Pulse:  [63-72] 67  Resp:  [18-20] 18  SpO2:  [93 %-94 %] 94 %  BP: (126-151)/(58-68) 151/61     Weight: 89.4 kg (197 lb)  Body mass index is 29.95 kg/m².    SpO2: (!) 94 %  O2 Device (Oxygen Therapy): room air      Intake/Output Summary (Last 24 hours) at 4/30/2019 0829  Last data filed at 4/29/2019 1700  Gross per 24 hour   Intake 730 ml   Output 390 ml   Net 340 ml       Lines/Drains/Airways     Peripherally Inserted Central Catheter Line                 PICC Double Lumen 04/22/19 1108 7 days                Physical Exam  General appearance: alert, appears stated age and cooperative  Head: Normocephalic, without obvious abnormality, atraumatic  Eyes: conjunctivae/corneas clear. PERRL  Neck: no carotid bruit, no JVD and supple, symmetrical, trachea midline  Lungs: clear to auscultation bilaterally, normal respiratory effort  Chest Wall: no tenderness  Heart: regular rate and rhythm, S1, S2 normal, systolic 2/6 murmur, click, rub  or gallop  Abdomen: soft, non-tender; bowel sounds normal; no masses,  no organomegaly  Extremities: Extremities normal, atraumatic, no cyanosis, clubbing, 2+BLE edema  Pulses: Dorsalis Pedis R: 2+ (normal)/L: 2+ (normal)  Skin: Skin color, texture, turgor normal. No rashes or lesions  Neurologic: Normal mood and affect  Alert and oriented X 3  Significant Labs:   ABG: No results for input(s): PH, PCO2, HCO3, POCSATURATED, BE in the last 48 hours., Blood Culture: No results for input(s): LABBLOO in the last 48 hours., BMP:   Recent Labs   Lab 04/29/19  0423 04/30/19  0552   * 114*    138   K 3.9 3.6    104   CO2 24 24   BUN 33* 38*   CREATININE 2.1* 1.5*   CALCIUM 10.2 10.2   , CMP   Recent Labs   Lab 04/29/19  0423 04/30/19  0552    138   K 3.9 3.6    104   CO2 24 24   * 114*   BUN 33* 38*   CREATININE 2.1* 1.5*   CALCIUM 10.2 10.2   PROT 6.2 6.1   ALBUMIN 2.8* 2.6*   BILITOT 1.1* 0.8   ALKPHOS 54* 51*   AST 10 14   ALT 9* 11   ANIONGAP 13 10   ESTGFRAFRICA 33* 49*   EGFRNONAA 28* 43*   , CBC   Recent Labs   Lab 04/29/19  0423 04/30/19  0552   WBC 9.39 7.24   HGB 8.4* 8.2*   HCT 28.1* 27.6*    285   , INR No results for input(s): INR, PROTIME in the last 48 hours., Lipid Panel No results for input(s): CHOL, HDL, LDLCALC, TRIG, CHOLHDL in the last 48 hours.,   Pathology Results  (Last 10 years)    None       and Troponin No results for input(s): TROPONINI in the last 48 hours.    Significant Imaging: Cardiac Cath: , CT scan: CT ABDOMEN PELVIS WITH CONTRAST: No results found for this visit on 04/23/19., CT ABDOMEN PELVIS WITHOUT CONTRAST: No results found for this visit on 04/23/19. and , Echocardiogram:   2D echo with color flow doppler: No results found for this or any previous visit., Transthoracic echo (TTE) complete (Cupid Only):   Results for orders placed or performed during the hospital encounter of 04/16/19   Transthoracic echo (TTE) complete (Cupid Only)   Result  Value Ref Range    BSA 2.06 m2    LA WIDTH 4.45 cm    PV PEAK VELOCITY 1.38 cm/s    LVIDD 6.93 (A) 3.5 - 6.0 cm    IVS 1.13 (A) 0.6 - 1.1 cm    PW 1.17 (A) 0.6 - 1.1 cm    Ao root annulus 2.86 cm    LVIDS 4.76 (A) 2.1 - 4.0 cm    FS 31 28 - 44 %    LA volume 102.99 cm3    STJ 2.65 cm    LV mass 377.93 g    LA size 4.98 cm    RVDD 3.25 cm    Left Ventricle Relative Wall Thickness 0.34 cm    AV mean gradient 7.38 mmHg    AV valve area 2.16 cm2    AV Velocity Ratio 0.72     AV index (prosthetic) 0.70     E/A ratio 1.05     E wave decelartion time 305.25 msec    IVRT 0.11 msec    Pulm vein S/D ratio 1.26     LVOT diameter 1.99 cm    LVOT area 3.11 cm2    LVOT peak abdifatah 9.7215809477 m/s    LVOT peak VTI 28.16 cm    Ao peak abdifatah 1.62 m/s    Ao VTI 40.46 cm    LVOT stroke volume 87.54 cm3    AV peak gradient 10.50 mmHg    MV Peak E Abdifatah 1.16 m/s    TR Max Abdifatah 2.65 m/s    MV Peak A Abdifatah 1.11 m/s    PV Peak S Abdifatah 0.44 m/s    PV Peak D Abdifatah 0.35 m/s    LV Systolic Volume 105.45 mL    LV Systolic Volume Index 52.5 mL/m2    LV Diastolic Volume 249.54 mL    LV Diastolic Volume Index 124.20 mL/m2    LA Volume Index 51.3 mL/m2    LV Mass Index 188.1 g/m2    RA Major Axis 5.02 cm    Left Atrium Minor Axis 5.14 cm    Left Atrium Major Axis 5.84 cm    Triscuspid Valve Regurgitation Peak Gradient 28.09 mmHg    Right Atrial Pressure (from IVC) 3 mmHg    TV rest pulmonary artery pressure 31 mmHg    and , EKG: , Stress Test: , X-Ray: CXR: X-Ray Chest 1 View (CXR): No results found for this visit on 04/23/19., X-Ray Chest PA and Lateral (CXR): No results found for this visit on 04/23/19. and , KUB: X-Ray Abdomen AP 1 View (KUB): No results found for this visit on 04/23/19. and  and  and   Assessment and Plan:     Active Diagnoses:    Diagnosis Date Noted POA    PRINCIPAL PROBLEM:  Bacteremia [R78.81] 04/18/2019 Yes    Renal insufficiency [N28.9] 04/29/2019 Yes    Nocturnal enuresis [N39.44] 04/25/2019 No    Atrial thrombus [I51.3]  04/19/2019 Yes    Cerebrovascular accident (CVA) [I63.9] 04/16/2019 Yes    Hx of CABG [Z95.1] 02/19/2019 Not Applicable    CHF (congestive heart failure), NYHA class II, chronic, diastolic [I50.32] 02/19/2019 Yes    Anemia [D64.9] 11/03/2016 Yes    Essential hypertension [I10] 10/28/2016 Yes    Aortic valve stenosis [I35.0] 06/28/2016 Yes      Problems Resolved During this Admission:       VTE Risk Mitigation (From admission, onward)        Ordered     dabigatran etexilate capsule 75 mg  2 times daily      04/29/19 0938     heparin, porcine (PF) 100 unit/mL injection flush 500 Units  As needed (PRN)      04/25/19 1408        Current Facility-Administered Medications   Medication    acetaminophen tablet 650 mg    albuterol-ipratropium 2.5 mg-0.5 mg/3 mL nebulizer solution 3 mL    amLODIPine tablet 5 mg    ampicillin 2 g in sodium chloride 0.9 % 100 mL IVPB (ready to mix system)    aspirin EC tablet 81 mg    atorvastatin tablet 10 mg    carvedilol tablet 25 mg    dabigatran etexilate capsule 75 mg    heparin, porcine (PF) 100 unit/mL injection flush 500 Units    HYDROmorphone injection 0.5 mg    ondansetron injection 4 mg    pantoprazole EC tablet 40 mg    sodium chloride 0.9% flush 10 mL    And    sodium chloride 0.9% flush 10 mL    tamsulosin 24 hr capsule 0.4 mg    traMADol tablet 100 mg     CAD [facility] 2/19/2019 S/P CABG patent LIMA to LAD, Patent SVG to first Diag,  of CFX,  of RCA.     TAVR on 3/18/19  Admitted to [facility] of 4/18/19 with confusion found to have subacute CVA thought to be cardioembolic with Hx of A fib, anticoagulation interuption and recent valve replacement. During that admission developed fever with +blood cultures and was subsequently transferred to [facility] for [place]ES. INES on 4/18/19 revealed large thrombus burden in EDELMIRA, EF 55%, bubble study positive for grade II PFO, prosthetic AV functioning normally. No valvular vegetation noted. There was an episode of NSVT while at  [facility] and patient was evaluated by EP consider EF study of continued episodes. Patient has had no further episodes. Patient was seen by ID while at [facility] and it was deteremined that given recent valve replacement LA thrombus, and bacteremia patient should be treated with endocarditis ABX regimen. Patient was started on 6 wk regiment of IV ABX and transferred back to [facility] for rehab.     On evening of 4/28/19 patient developed fever, confusion, and rt sided weakness with hypotension. MRI reveals acute punctate infarctions. Suggestive of further cardio-embolic stroke. NOAC been continued per neurology recommendation. Mental status has since improved. Permissive HTN has been allowed given acute CVAs.   Patient is a DNR    PLAN:continue anticoagulation and antibiotics  Not much else to do regarding septic emboli otherwise  Hold diuretic and ACEI for MANOLO      Vik Blue, DANK  Cardiology   [facility]    I attest that I have personally seen and examined this patient. I have reviewed and discussed the management in detail as outlined above.

## 2019-04-30 NOTE — PLAN OF CARE
Problem: Adult Inpatient Plan of Care  Goal: Plan of Care Review  Outcome: Ongoing (interventions implemented as appropriate)  Patient with decline in neuro status. DNR.  Confused and unable to perform neuro assessment earlier in shift. Patient able to follow commands later in shift. Patient [place]. C/o neck pain tramadol and warm heat applied. Patient did not get much relief from interventions for neck pain.   MRI of brain today. Dr. Fernandez consulted seen patient today. Neuro checks. DNR. Patient on IV ampicillin for bacteremia. picc line dressing changed to right forearm. Patient needing max assist getting in & out bed today. Afebrile. NO BM today, passed flatus. Safety and comfort maintained. Will continue to monitor. Agrees with plan of care.

## 2019-04-30 NOTE — ASSESSMENT & PLAN NOTE
Noted 4/29: Cr up to 2.1  Reduce pradaxa  Hold lasix  Ns 500ml bolus today slowly  Better today, hold hctz and lasix .

## 2019-04-30 NOTE — ASSESSMENT & PLAN NOTE
Large atrial thrombus found on NIES ,Dr. aGlicia deemed septic emboli; still no INES reporting thrombus   Continue Pradaxa; family plans to monitor meds once home  Will not consider failed therapy as patient has had lapses in therapy due to recent surgical interventions and non-compliance   as discussed above there is concern for infected thrombus.   Per Infectious Disease will treat bacteremia and possible infected thrombus for 6 weeks.

## 2019-04-30 NOTE — ASSESSMENT & PLAN NOTE
"S/p recent TAVR now with septic emboli and concern for "flicking emboli"   His blood cultures have grown Enterococcus faecalis sensitve to all tested drugs  ID recommending total of 6 weeks IV ABX; Ampicillin 2gms IV q 6 hr; day 1 with negative blood cultures was 4/19/19; so today is day 6/42  Day 7/42 4/26/19 day 8/42 4/29 day 11/42 of IV abd. 4/19 was on vanc and 4/20 started on ampicillin  4/30 day 12/42.  4/19 was on vanc and 4/20 started on ampicillin  "

## 2019-04-30 NOTE — PT/OT/SLP EVAL
Speech Language Pathology Evaluation  Bedside Swallow    Patient Name:  St Lacho Farooq   MRN:  [id number]  Admitting Diagnosis: Bacteremia    Recommendations:                 General Recommendations:  Follow up for diet tolerance only  Diet recommendations: Solid Diet Level: Regular, Liquid Diet Level: Thin   Aspiration Precautions: Standard aspiration precautions   General Precautions: Standard    Communication strategies:  none    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    S/P 2-vessel coronary artery bypass        Past Surgical History:   Procedure Laterality Date    CARPAL TUNNEL RELEASE      CATARACT EXTRACTION, BILATERAL      CORONARY ANGIOGRAPHY  2019    CORONARY ARTERY BYPASS GRAFT  2004    HERNIA REPAIR      Inguinal & Ventral with MESH    INSERTION, PICC N/A 4/22/2019    Performed by [facility] Provider at [facility] OR    REPLACEMENT, AORTIC VALVE, PERCUTANEOUS, TRANSCATHETER Right 3/18/2019    Performed by Mani Babcock MD at [facility] CATH    REPLACEMENT, AORTIC VALVE, PERCUTANEOUS, TRANSCATHETER Right 3/18/2019    Performed by Ac Osman MD at [facility] CATH    TONSILLECTOMY      Transesophageal echo (INES) intra-procedure log documentation N/A 4/18/2019    Performed by Tam Carrasco MD at [facility] CATH    TRANSESOPHAGEAL ECHOCARDIOGRAM (INES) N/A 6/28/2016    Performed by Hugh Begum MD at [facility] OR       Prior Intubation HX:  n/a    Modified Barium  Swallow: n/a    Chest X-Rays: 4/22/19- Lungs are clear.  No pleural fluid.    MRI: There are 2 tiny focal areas of increased signal intensity on diffusion-weighted images seen along the superior right and left parietal regions both at the crux of sulci which may be artifactual. A secondary consideration is tiny lacunar infarcts.    Prior diet: Regular diet with thin liquids    Occupation/hobbies/homemaking: Pt noted that he often goes to the truck stop at 4:30 AM to have coffee with friends.    Subjective     Pt found seated in bedside chair with visitors present upon SLP entry into room. Pt was an active participant throughout all PO trials. Slight left sided facial droop noted to develop as evaluation progressed but did not appear to hinder swallow function.     Patient goals: none stated     Pain/Comfort:  · Pain Rating 1: 5/10  · Location 1: back  · Pain Addressed 1: Distraction  · Pain Rating Post-Intervention 1: (post intervention pain not rated)    Objective:     Oral Musculature Evaluation  · Oral Musculature: WFL  · Dentition: edentulous  · Mucosal Quality: good  · Mandibular Strength and Mobility: WFL  · Oral Labial Strength and Mobility: WFL  · Lingual Strength and Mobility: WFL  · Velar Elevation: WFL  · Buccal Strength and Mobility: WFL    Bedside Swallow Eval:   Consistencies Assessed:  · Thin liquids - pt-fed via single open cup sips and single and consecutive straw sips  · Solids - pt-fed via self-regulated bites of yareli cracker     Oral Phase:   · WFL    Pharyngeal Phase:   · No overt clinical signs/symptoms of aspiration  · No overt clinical signs/symptoms of pharyngeal dysphagia    Compensatory Strategies  · None    Treatment: Continuation of current diet consistencies was discussed with pt who verbalized understanding.     Assessment:     St Lacho Farooq is a 84 y.o. male who presents with a functional swallow for PO intake. Recommend continuation of current regular diet with thin  liquids and standard aspiration precautions. SLP to follow up for diet tolerance only.     Plan:     · Plan of Care reviewed with:  patient   · SLP Follow-Up:  Yes(for diet tolerance only)       Discharge recommendations:  (home per speech)   Barriers to Discharge:  None per speech    Time Tracking:     SLP Treatment Date:   04/30/19  Speech Start Time:  0845  Speech Stop Time:  0908     Speech Total Time (min):  23 min    Billable Minutes: Eval Swallow and Oral Function - 9 minutes       STEPHANIE Lopez-SLP  04/30/2019

## 2019-05-01 LAB
ALBUMIN SERPL BCP-MCNC: 2.7 G/DL (ref 3.5–5.2)
ALP SERPL-CCNC: 62 U/L (ref 55–135)
ALT SERPL W/O P-5'-P-CCNC: 11 U/L (ref 10–44)
ANION GAP SERPL CALC-SCNC: 9 MMOL/L (ref 8–16)
AST SERPL-CCNC: 15 U/L (ref 10–40)
BASOPHILS # BLD AUTO: 0.03 K/UL (ref 0–0.2)
BASOPHILS NFR BLD: 0.5 % (ref 0–1.9)
BILIRUB SERPL-MCNC: 0.7 MG/DL (ref 0.1–1)
BUN SERPL-MCNC: 31 MG/DL (ref 8–23)
CALCIUM SERPL-MCNC: 10.3 MG/DL (ref 8.7–10.5)
CHLORIDE SERPL-SCNC: 104 MMOL/L (ref 95–110)
CO2 SERPL-SCNC: 26 MMOL/L (ref 23–29)
CREAT SERPL-MCNC: 1.3 MG/DL (ref 0.5–1.4)
DIFFERENTIAL METHOD: ABNORMAL
EOSINOPHIL # BLD AUTO: 0.2 K/UL (ref 0–0.5)
EOSINOPHIL NFR BLD: 3.3 % (ref 0–8)
ERYTHROCYTE [DISTWIDTH] IN BLOOD BY AUTOMATED COUNT: 20.1 % (ref 11.5–14.5)
EST. GFR  (AFRICAN AMERICAN): 59 ML/MIN/1.73 M^2
EST. GFR  (NON AFRICAN AMERICAN): 51 ML/MIN/1.73 M^2
GLUCOSE SERPL-MCNC: 95 MG/DL (ref 70–110)
HCT VFR BLD AUTO: 28 % (ref 40–54)
HGB BLD-MCNC: 8.4 G/DL (ref 14–18)
LYMPHOCYTES # BLD AUTO: 1 K/UL (ref 1–4.8)
LYMPHOCYTES NFR BLD: 17.2 % (ref 18–48)
MCH RBC QN AUTO: 23.1 PG (ref 27–31)
MCHC RBC AUTO-ENTMCNC: 30 G/DL (ref 32–36)
MCV RBC AUTO: 77 FL (ref 82–98)
MONOCYTES # BLD AUTO: 0.8 K/UL (ref 0.3–1)
MONOCYTES NFR BLD: 12.4 % (ref 4–15)
NEUTROPHILS # BLD AUTO: 4 K/UL (ref 1.8–7.7)
NEUTROPHILS NFR BLD: 66.6 % (ref 38–73)
PLATELET # BLD AUTO: 308 K/UL (ref 150–350)
PMV BLD AUTO: 9.3 FL (ref 9.2–12.9)
POTASSIUM SERPL-SCNC: 3.6 MMOL/L (ref 3.5–5.1)
PROT SERPL-MCNC: 6.3 G/DL (ref 6–8.4)
RBC # BLD AUTO: 3.63 M/UL (ref 4.6–6.2)
SODIUM SERPL-SCNC: 139 MMOL/L (ref 136–145)
WBC # BLD AUTO: 6.03 K/UL (ref 3.9–12.7)

## 2019-05-01 PROCEDURE — 85025 COMPLETE CBC W/AUTO DIFF WBC: CPT

## 2019-05-01 PROCEDURE — 97530 THERAPEUTIC ACTIVITIES: CPT

## 2019-05-01 PROCEDURE — 36415 COLL VENOUS BLD VENIPUNCTURE: CPT

## 2019-05-01 PROCEDURE — 99900035 HC TECH TIME PER 15 MIN (STAT)

## 2019-05-01 PROCEDURE — 94761 N-INVAS EAR/PLS OXIMETRY MLT: CPT

## 2019-05-01 PROCEDURE — 63600175 PHARM REV CODE 636 W HCPCS: Performed by: INTERNAL MEDICINE

## 2019-05-01 PROCEDURE — 25000003 PHARM REV CODE 250: Performed by: NURSE PRACTITIONER

## 2019-05-01 PROCEDURE — 25000003 PHARM REV CODE 250: Performed by: FAMILY MEDICINE

## 2019-05-01 PROCEDURE — 11000004 HC SNF PRIVATE

## 2019-05-01 PROCEDURE — 80053 COMPREHEN METABOLIC PANEL: CPT

## 2019-05-01 PROCEDURE — A4216 STERILE WATER/SALINE, 10 ML: HCPCS | Performed by: INTERNAL MEDICINE

## 2019-05-01 PROCEDURE — 25000003 PHARM REV CODE 250: Performed by: INTERNAL MEDICINE

## 2019-05-01 RX ORDER — AMLODIPINE BESYLATE 2.5 MG/1
2.5 TABLET ORAL DAILY
Status: DISCONTINUED | OUTPATIENT
Start: 2019-05-01 | End: 2019-05-02

## 2019-05-01 RX ORDER — RAMELTEON 8 MG/1
8 TABLET ORAL NIGHTLY PRN
Status: DISCONTINUED | OUTPATIENT
Start: 2019-05-01 | End: 2019-05-16 | Stop reason: HOSPADM

## 2019-05-01 RX ADMIN — PANTOPRAZOLE SODIUM 40 MG: 40 TABLET, DELAYED RELEASE ORAL at 09:05

## 2019-05-01 RX ADMIN — ASPIRIN 81 MG: 81 TABLET, COATED ORAL at 09:05

## 2019-05-01 RX ADMIN — AMLODIPINE BESYLATE 2.5 MG: 2.5 TABLET ORAL at 09:05

## 2019-05-01 RX ADMIN — CARVEDILOL 25 MG: 25 TABLET, FILM COATED ORAL at 05:05

## 2019-05-01 RX ADMIN — AMPICILLIN SODIUM 2 G: 2 INJECTION, POWDER, FOR SOLUTION INTRAMUSCULAR; INTRAVENOUS at 09:05

## 2019-05-01 RX ADMIN — Medication 10 ML: at 04:05

## 2019-05-01 RX ADMIN — Medication 10 ML: at 10:05

## 2019-05-01 RX ADMIN — AMPICILLIN SODIUM 2 G: 2 INJECTION, POWDER, FOR SOLUTION INTRAMUSCULAR; INTRAVENOUS at 03:05

## 2019-05-01 RX ADMIN — ATORVASTATIN CALCIUM 10 MG: 10 TABLET, FILM COATED ORAL at 09:05

## 2019-05-01 RX ADMIN — DABIGATRAN ETEXILATE MESYLATE 150 MG: 75 CAPSULE ORAL at 09:05

## 2019-05-01 RX ADMIN — Medication 10 ML: at 05:05

## 2019-05-01 RX ADMIN — Medication 10 ML: at 09:05

## 2019-05-01 RX ADMIN — Medication 10 ML: at 01:05

## 2019-05-01 RX ADMIN — TAMSULOSIN HYDROCHLORIDE 0.4 MG: 0.4 CAPSULE ORAL at 09:05

## 2019-05-01 RX ADMIN — RAMELTEON 8 MG: 8 TABLET, FILM COATED ORAL at 09:05

## 2019-05-01 RX ADMIN — Medication 10 ML: at 03:05

## 2019-05-01 RX ADMIN — CARVEDILOL 25 MG: 25 TABLET, FILM COATED ORAL at 08:05

## 2019-05-01 RX ADMIN — AMPICILLIN SODIUM 2 G: 2 INJECTION, POWDER, FOR SOLUTION INTRAMUSCULAR; INTRAVENOUS at 02:05

## 2019-05-01 NOTE — PT/OT/SLP PROGRESS
Speech Language Pathology      [facility] Abdiel Farooq  MRN: [id number]    SLP spoke with pt's nurse Nuzhat who noted that pt tolerated all meals since clinical bedside swallow evaluation without difficulty.     Pt noted to have decreased cognitive linguistic skills per reports from nursing and case management as pt believed he was at [facility] at a basketball game and was unable to go home. Spoke with NP regarding initiation of cognitive linguistic evaluation as pt demonstrates increased confusion.        STEPHANIE Lopez-SLP   5/1/2019

## 2019-05-01 NOTE — PLAN OF CARE
Problem: Adult Inpatient Plan of Care  Goal: Plan of Care Review  Outcome: Ongoing (interventions implemented as appropriate)  Pt admitted to swing for continued ampicillin for bacteremia. PICC line to right upper arm; both ports intact with good blood return and saline locked. Septic thrombus per INES at [facility]. 3 new infarcts per neurology since pt on swing bed. Neuro checks every shift and as needed. Slight weakness to right side with some confusion at times. Neurology, cardiology, PT, OT and ST following. Vitals stable. Call bell within reach. Pt spends day up in chair. Wheels locked. [place] in use. Ambulates well with walker and stand by assist. Pt remains free from falls and injury. Reviewed plan of care with pt and family; states understanding but further reinforcement may be needed.

## 2019-05-01 NOTE — PT/OT/SLP PROGRESS
"Physical Therapy Treatment    Patient Name:  St Lacho Farooq   MRN:  [id number]    Recommendations:     Discharge Recommendations:  home health PT, home with home health   Discharge Equipment Recommendations: cane, straight   Barriers to discharge: Inaccessible home    Assessment:     St Lacho Farooq is a 84 y.o. male admitted with a medical diagnosis of Bacteremia.  He presents with the following impairments/functional limitations:  weakness, impaired cognition, impaired self care skills, gait instability, impaired functional mobilty, decreased lower extremity function, impaired muscle length, impaired cardiopulmonary response to activity. Patient seen sitting up at the Lobby  with Sitter present. No pain complaint and agreed with PT tx. Ambulated patient to Therapy room using RW x 250 feet with close SBA Assistance. Performed ascending/descending 12 steps using siderails with SBA and cues for increase safety and proper step sequence. Provided patient with rest periods and no sign of distress and fatigue.    Rehab Prognosis: Good; patient would benefit from acute skilled PT services to address these deficits and reach maximum level of function.    Recent Surgery: * No surgery found *      Plan:     During this hospitalization, patient to be seen 5 x/week to address the identified rehab impairments via gait training, therapeutic activities, therapeutic exercises and progress toward the following goals:    · Plan of Care Expires:  05/02/19    Subjective     Chief Complaint: Patient stated, " I feel good and sleep good last night".  Patient/Family Comments/goals: "To get better, walk better and go home" - per patient  Pain/Comfort:  · Pain Rating 1: 0/10  · Pain Rating Post-Intervention 1: 0/10      Objective:     Communicated with patient and Sitter prior to session.  Patient found up in chair with peripheral IV, PICC line upon PT entry to room.     General Precautions: Standard, fall   Orthopedic " Precautions:N/A   Braces: N/A     Functional Mobility:  · Bed Mobility:     · Rolling Left:  independence  · Rolling Right: independence  · Supine to Sit: supervision  · Sit to Supine: supervision  · Transfers:     · Sit to Stand:  supervision with rolling walker  · Bed to Chair: supervision with  rolling walker  using  Step Transfer  · Gait: RW Ambulation x 250 feet with clsoe SBA. Reciprocal Gait with slight dec right Foot/Floor clearance and slight dec weight shifting.  · Balance: Dynamic Standing Balance with Good- grade  · Stairs:  Pt ascended/descended 12  steps stair(s) with No Assistive Device with bilateral handrails with Stand-by Assistance.       AM-PAC 6 CLICK MOBILITY  Turning over in bed (including adjusting bedclothes, sheets and blankets)?: 4  Sitting down on and standing up from a chair with arms (e.g., wheelchair, bedside commode, etc.): 3  Moving from lying on back to sitting on the side of the bed?: 3  Moving to and from a bed to a chair (including a wheelchair)?: 3  Need to walk in hospital room?: 3  Climbing 3-5 steps with a railing?: 3  Basic Mobility Total Score: 19       Therapeutic Activities and Exercises:   Pt performed Bilat LE exercises consisting of Active range of motion exercises x 10 - 20 reps consisting of Ankle DF, Ankle PF,  , ABD/ADD, LAQ, Marching at Sitting and Arm Push ups with pt able to tolerate activities well with no sign of fatigue. Worked on Gait trng at the hallway and stair steps mgt.   PT discussed importance of patient's performance of Home Exercise Program (HEP) with pt verbalizing understanding.     Patient left up in chair with nurse notified and Sitter present..    GOALS:   Multidisciplinary Problems     Physical Therapy Goals        Problem: Physical Therapy Goal    Goal Priority Disciplines Outcome Goal Variances Interventions   Physical Therapy Goal     PT, PT/OT Ongoing (interventions implemented as appropriate)     Description:  Goals to be met by:  5/1/19      Patient will increase functional independence with mobility by performin. Supine to sit with Independent - met 19  2. Sit to supine with Independent - met 19  3. Bed to chair transfer with Modified Independent with or without R W or straight Cane  using Step Transfer TECHNIQUE  4. Gait  x 300  feet with Modified Independent with or without straight cane or RW  5. Lower extremity exercise program x10 reps per handout, with assistance as needed.  6. Able to ascend/descend 12 stair steps using handrail/Straight  Cane with supervision.                      Time Tracking:     PT Received On: 19  PT Start Time: 1240     PT Stop Time: 1315  PT Total Time (min): 35 min     Billable Minutes: Therapeutic Activity 30    Treatment Type: Treatment  PT/PTA: PT           Keven Goldstein, PT  2019

## 2019-05-01 NOTE — PLAN OF CARE
Problem: Adult Inpatient Plan of Care  Goal: Plan of Care Review  Outcome: Ongoing (interventions implemented as appropriate)  Pt and Family member (brother) agree with plan of care.  VS stable.  No complaints of pain noted.  Pt is confused at times. IV antibiotics continued as ordered.  E sitter at bedside.  Call bell in reach.  Will continue to monitor.   Right PICC line intact and flushed both ports with normal saline.

## 2019-05-01 NOTE — PT/OT/SLP PROGRESS
Occupational Therapy   Treatment    Name: St Lacho Farooq  MRN: [id number]  Admitting Diagnosis:  Bacteremia       Recommendations:     Discharge Recommendations: home health PT, home with home health  Discharge Equipment Recommendations:  cane, straight  Barriers to discharge:  None    Assessment:     St Lacho Farooq is a 84 y.o. male with a medical diagnosis of Bacteremia.  He presents with overall weakness, mild forgetfulness which seems to wax and wane. Performance deficits affecting function are impaired endurance, impaired self care skills, gait instability, visual deficits, decreased lower extremity function, weakness, decreased safety awareness, impaired cognition, impaired functional mobilty.     Rehab Prognosis:  Fair; patient would benefit from acute skilled OT services to address these deficits and reach maximum level of function.       Plan:     Patient to be seen 5 x/week to address the above listed problems via self-care/home management, therapeutic exercises, therapeutic activities  · Plan of Care Expires: 06/10/19  · Plan of Care Reviewed with: patient    Subjective     Pain/Comfort:  · Pain Rating 1: 0/10  · Pain Rating Post-Intervention 1: 0/10    Objective:     Communicated with: nursing- and his  in room visiting him who reports that his wife is staying with a daughter in [place] and doing OK. prior to session.  Patient found up in chair with PICC line, peripheral IV upon OT entry to room.    General Precautions: Standard, fall   Orthopedic Precautions:N/A   Braces: N/A     Occupational Performance:     Bed Mobility:    · NT    Functional Mobility/Transfers:  · Patient completed Sit <> Stand Transfer with modified independence  with  rolling walker   · Patient completed Bed <> Chair Transfer using Stand Pivot technique with modified independence with rolling walker  · Patient completed Toilet Transfer Stand Pivot technique with modified independence with  rolling  walker  · Functional Mobility: out to day room for re orientation task with things in the parking lot    Activities of Daily Living:  · Feeding:  independence .  · Grooming: modified independence standing at sink to wash hands  · Bathing: NT  · Upper Body Dressing: supervision shirt  · Lower Body Dressing: minimum assistance shoes- needs help tying them  · Toileting: contact guard assistance - reports he has a sore bottom, benefits from getting out of chair to walk every few hours and frequent showering      [facility] 6 Click ADL: 21    Treatment & Education:  Self care and re orientation, he was A & 0 x 3 today    Patient left up in chair with all lines intact and call button in reachEducation:      GOALS:   Multidisciplinary Problems     Occupational Therapy Goals        Problem: Occupational Therapy Goal    Goal Priority Disciplines Outcome Interventions   Occupational Therapy Goal     OT, PT/OT     Description:  Goals to be met by:6/10/2019     Patient will increase functional independence with ADLs by performing:    LE Dressing with Modified [place].  Grooming while standing at sink with Modified [place].  Toileting from toilet with Modified [place] for hygiene and clothing management.   Bathing from  shower chair/bench with Modified [place].  Upper extremity exercise program x10 reps per handout, with assistance as needed  Patient will consistently 3/3 trials at different times of the day demonstrate ability to be a) oriented x 4 (using watch if needed for time of day)  b) use RN call light appropriately before getting up c) complete a >10 piece puzzle d) complete a word search page with 90% accuracy d) add and subtract 2 digit numbers 5/5 tries with 90% accuracy e) color by number from coloring book attending to periphery of the page as well as center of the page without errors. E) fill his medbox with the 13 pills he says he takes daily, twice per day with 100% accuracy f) fill out his  medication list with WHAT he takes, WHY he takes it, WHEN he takes it, HOW and HOW often he takes it , and possible side effects of each medication  Complete the Short Blessed pre -  Driving cognitive screen without any errors.                      Time Tracking:     OT Date of Treatment: 05/01/19  OT Start Time: 1515  OT Stop Time: 1530  OT Total Time (min): 15 min    Billable Minutes:Therapeutic Activity 15    Zarina Gonzalez OT  5/1/2019

## 2019-05-01 NOTE — NURSING
Report received from Deanna Borges RN. Pt sitting up in chair with family at side. Call bell within reach. Esitter in use. Denies needs.

## 2019-05-01 NOTE — NURSING
Bedside report received from Hanny.  Family at bedside.  Esitter at beside.  No complaints of pain at this time.  Call bell in reach.  Will continue to monitor.

## 2019-05-02 LAB
ALBUMIN SERPL BCP-MCNC: 2.6 G/DL (ref 3.5–5.2)
ALP SERPL-CCNC: 56 U/L (ref 55–135)
ALT SERPL W/O P-5'-P-CCNC: 12 U/L (ref 10–44)
ANION GAP SERPL CALC-SCNC: 9 MMOL/L (ref 8–16)
AST SERPL-CCNC: 14 U/L (ref 10–40)
BASOPHILS # BLD AUTO: 0.03 K/UL (ref 0–0.2)
BASOPHILS NFR BLD: 0.6 % (ref 0–1.9)
BILIRUB SERPL-MCNC: 0.6 MG/DL (ref 0.1–1)
BUN SERPL-MCNC: 24 MG/DL (ref 8–23)
CALCIUM SERPL-MCNC: 10 MG/DL (ref 8.7–10.5)
CHLORIDE SERPL-SCNC: 107 MMOL/L (ref 95–110)
CO2 SERPL-SCNC: 25 MMOL/L (ref 23–29)
CREAT SERPL-MCNC: 1.2 MG/DL (ref 0.5–1.4)
DIFFERENTIAL METHOD: ABNORMAL
EOSINOPHIL # BLD AUTO: 0.1 K/UL (ref 0–0.5)
EOSINOPHIL NFR BLD: 2.9 % (ref 0–8)
ERYTHROCYTE [DISTWIDTH] IN BLOOD BY AUTOMATED COUNT: 19.8 % (ref 11.5–14.5)
EST. GFR  (AFRICAN AMERICAN): >60 ML/MIN/1.73 M^2
EST. GFR  (NON AFRICAN AMERICAN): 56 ML/MIN/1.73 M^2
GLUCOSE SERPL-MCNC: 162 MG/DL (ref 70–110)
HCT VFR BLD AUTO: 26.9 % (ref 40–54)
HGB BLD-MCNC: 8.1 G/DL (ref 14–18)
LYMPHOCYTES # BLD AUTO: 0.7 K/UL (ref 1–4.8)
LYMPHOCYTES NFR BLD: 14.4 % (ref 18–48)
MCH RBC QN AUTO: 23.5 PG (ref 27–31)
MCHC RBC AUTO-ENTMCNC: 30.1 G/DL (ref 32–36)
MCV RBC AUTO: 78 FL (ref 82–98)
MONOCYTES # BLD AUTO: 0.5 K/UL (ref 0.3–1)
MONOCYTES NFR BLD: 10.5 % (ref 4–15)
NEUTROPHILS # BLD AUTO: 3.5 K/UL (ref 1.8–7.7)
NEUTROPHILS NFR BLD: 71.6 % (ref 38–73)
PLATELET # BLD AUTO: 287 K/UL (ref 150–350)
PMV BLD AUTO: 9 FL (ref 9.2–12.9)
POTASSIUM SERPL-SCNC: 3.4 MMOL/L (ref 3.5–5.1)
PROT SERPL-MCNC: 5.9 G/DL (ref 6–8.4)
RBC # BLD AUTO: 3.45 M/UL (ref 4.6–6.2)
SODIUM SERPL-SCNC: 141 MMOL/L (ref 136–145)
WBC # BLD AUTO: 4.85 K/UL (ref 3.9–12.7)

## 2019-05-02 PROCEDURE — 25000003 PHARM REV CODE 250: Performed by: FAMILY MEDICINE

## 2019-05-02 PROCEDURE — 25000003 PHARM REV CODE 250: Performed by: INTERNAL MEDICINE

## 2019-05-02 PROCEDURE — 25000003 PHARM REV CODE 250: Performed by: NURSE PRACTITIONER

## 2019-05-02 PROCEDURE — 11000004 HC SNF PRIVATE

## 2019-05-02 PROCEDURE — A4216 STERILE WATER/SALINE, 10 ML: HCPCS | Performed by: INTERNAL MEDICINE

## 2019-05-02 PROCEDURE — 97535 SELF CARE MNGMENT TRAINING: CPT

## 2019-05-02 PROCEDURE — 80053 COMPREHEN METABOLIC PANEL: CPT

## 2019-05-02 PROCEDURE — 99232 SBSQ HOSP IP/OBS MODERATE 35: CPT | Mod: ,,, | Performed by: FAMILY MEDICINE

## 2019-05-02 PROCEDURE — 92523 SPEECH SOUND LANG COMPREHEN: CPT

## 2019-05-02 PROCEDURE — 25000003 PHARM REV CODE 250: Performed by: PSYCHIATRY & NEUROLOGY

## 2019-05-02 PROCEDURE — 85025 COMPLETE CBC W/AUTO DIFF WBC: CPT

## 2019-05-02 PROCEDURE — 63600175 PHARM REV CODE 636 W HCPCS: Performed by: INTERNAL MEDICINE

## 2019-05-02 PROCEDURE — 97802 MEDICAL NUTRITION INDIV IN: CPT

## 2019-05-02 PROCEDURE — 87040 BLOOD CULTURE FOR BACTERIA: CPT

## 2019-05-02 PROCEDURE — 97530 THERAPEUTIC ACTIVITIES: CPT

## 2019-05-02 PROCEDURE — 94761 N-INVAS EAR/PLS OXIMETRY MLT: CPT

## 2019-05-02 PROCEDURE — 99233 SBSQ HOSP IP/OBS HIGH 50: CPT | Mod: ,,, | Performed by: PSYCHIATRY & NEUROLOGY

## 2019-05-02 PROCEDURE — 36415 COLL VENOUS BLD VENIPUNCTURE: CPT

## 2019-05-02 PROCEDURE — 99233 PR SUBSEQUENT HOSPITAL CARE,LEVL III: ICD-10-PCS | Mod: ,,, | Performed by: PSYCHIATRY & NEUROLOGY

## 2019-05-02 PROCEDURE — 99232 PR SUBSEQUENT HOSPITAL CARE,LEVL II: ICD-10-PCS | Mod: ,,, | Performed by: FAMILY MEDICINE

## 2019-05-02 RX ORDER — LEVETIRACETAM 250 MG/1
250 TABLET ORAL 2 TIMES DAILY
Status: DISCONTINUED | OUTPATIENT
Start: 2019-05-02 | End: 2019-05-16 | Stop reason: HOSPADM

## 2019-05-02 RX ORDER — POTASSIUM CHLORIDE 20 MEQ/1
20 TABLET, EXTENDED RELEASE ORAL ONCE
Status: COMPLETED | OUTPATIENT
Start: 2019-05-02 | End: 2019-05-02

## 2019-05-02 RX ORDER — LIDOCAINE 50 MG/G
1 PATCH TOPICAL
Status: DISCONTINUED | OUTPATIENT
Start: 2019-05-02 | End: 2019-05-16 | Stop reason: HOSPADM

## 2019-05-02 RX ADMIN — AMPICILLIN SODIUM 2 G: 2 INJECTION, POWDER, FOR SOLUTION INTRAMUSCULAR; INTRAVENOUS at 08:05

## 2019-05-02 RX ADMIN — LEVETIRACETAM 250 MG: 250 TABLET ORAL at 08:05

## 2019-05-02 RX ADMIN — Medication 10 ML: at 05:05

## 2019-05-02 RX ADMIN — LIDOCAINE 1 PATCH: 50 PATCH TOPICAL at 05:05

## 2019-05-02 RX ADMIN — POTASSIUM CHLORIDE 20 MEQ: 1500 TABLET, EXTENDED RELEASE ORAL at 01:05

## 2019-05-02 RX ADMIN — DABIGATRAN ETEXILATE MESYLATE 150 MG: 75 CAPSULE ORAL at 09:05

## 2019-05-02 RX ADMIN — CARVEDILOL 25 MG: 25 TABLET, FILM COATED ORAL at 05:05

## 2019-05-02 RX ADMIN — LEVETIRACETAM 250 MG: 250 TABLET ORAL at 02:05

## 2019-05-02 RX ADMIN — Medication 10 ML: at 02:05

## 2019-05-02 RX ADMIN — AMPICILLIN SODIUM 2 G: 2 INJECTION, POWDER, FOR SOLUTION INTRAMUSCULAR; INTRAVENOUS at 02:05

## 2019-05-02 RX ADMIN — ATORVASTATIN CALCIUM 10 MG: 10 TABLET, FILM COATED ORAL at 08:05

## 2019-05-02 RX ADMIN — RAMELTEON 8 MG: 8 TABLET, FILM COATED ORAL at 08:05

## 2019-05-02 RX ADMIN — CARVEDILOL 25 MG: 25 TABLET, FILM COATED ORAL at 08:05

## 2019-05-02 RX ADMIN — TAMSULOSIN HYDROCHLORIDE 0.4 MG: 0.4 CAPSULE ORAL at 09:05

## 2019-05-02 RX ADMIN — DABIGATRAN ETEXILATE MESYLATE 150 MG: 75 CAPSULE ORAL at 08:05

## 2019-05-02 RX ADMIN — PANTOPRAZOLE SODIUM 40 MG: 40 TABLET, DELAYED RELEASE ORAL at 09:05

## 2019-05-02 RX ADMIN — AMLODIPINE BESYLATE 2.5 MG: 2.5 TABLET ORAL at 09:05

## 2019-05-02 RX ADMIN — AMPICILLIN SODIUM 2 G: 2 INJECTION, POWDER, FOR SOLUTION INTRAMUSCULAR; INTRAVENOUS at 03:05

## 2019-05-02 RX ADMIN — AMPICILLIN SODIUM 2 G: 2 INJECTION, POWDER, FOR SOLUTION INTRAMUSCULAR; INTRAVENOUS at 09:05

## 2019-05-02 RX ADMIN — Medication 10 ML: at 03:05

## 2019-05-02 RX ADMIN — ASPIRIN 81 MG: 81 TABLET, COATED ORAL at 09:05

## 2019-05-02 NOTE — ASSESSMENT & PLAN NOTE
Continue Lisinopril, HCTZ  4/29: Hold lasix for now. Already had dose this am. Give gentle hydration given rising Cr. Likely a little dry  4/30 cont to hold lasix, lisinopril and hctz LAUREL hose.    5/2 holding ace. Diuretics cont compression hose and BB

## 2019-05-02 NOTE — ASSESSMENT & PLAN NOTE
Contributing Nutrition Diagnosis  Inadequate energy intake    Related to (etiology):   Inability to feed self    Signs and Symptoms (as evidenced by):   Noted new CVA with decrease coordination, varied intake of meals     Interventions  Decreased Sodium Diet  Decreased Fat Diet  Commercial Beverage  Feeding Assistance    Recommendations (treatment strategy):  Cardiac Diet  Add Boost Plus BID  Assists with meals as needed due to coordination.    Nutrition Diagnosis Status:   New

## 2019-05-02 NOTE — ASSESSMENT & PLAN NOTE
H/H stable without acute indications for transfusion   Continue to monitor    check anemia w/u from last visit ferritin was 26, retic was 1.5  Occult stool today, will not change our plan just give us answer to where blood is going. He needs the blood thinner for now due to septic emboli.     Cont to monitor occult pending  Transfuse as needed, needs blood thinner.    H/H stable

## 2019-05-02 NOTE — ASSESSMENT & PLAN NOTE
"S/p recent TAVR now with septic emboli and concern for "flicking emboli"   His blood cultures have grown Enterococcus faecalis sensitve to all tested drugs  ID recommending total of 6 weeks IV ABX; Ampicillin 2gms IV q 6 hr; day 1 with negative blood cultures was 4/19/19; so today is day 6/42  Day 7/42 4/26/19 day 8/42 4/29 day 11/42 of IV abd. 4/19 was on vanc and 4/20 started on ampicillin  4/30 day 12/42.  4/19 was on vanc and 4/20 started on ampicillin    5/2 day 15/42 of IV abx- repeat blood cultures today  "

## 2019-05-02 NOTE — PLAN OF CARE
Problem: Adult Inpatient Plan of Care  Goal: Plan of Care Review  Outcome: Ongoing (interventions implemented as appropriate)  Patient received Antibiotics Via PICC line Q6H. Tolerated well. No fever. Keppra 250mg Given. CT Scan performed. Patient ambulated with Therapy.  Daughter Present. Plan of care reviewed with Patient and Daughter. Patient and Daughter verbalized understanding.

## 2019-05-02 NOTE — PT/OT/SLP PROGRESS
Occupational Therapy   Treatment    Name: St Lacho Farooq  MRN: [id number]  Admitting Diagnosis:  Bacteremia       Recommendations:     Discharge Recommendations: home health PT, home with home health  Discharge Equipment Recommendations:  cane, straight  Barriers to discharge:  None    Assessment:     St Lacho Farooq is a 84 y.o. male with a medical diagnosis of Bacteremia.  He presents with waxing and waning ability to coordinate his body and remember. Performance deficits affecting function are impaired endurance, impaired self care skills, gait instability, visual deficits, decreased lower extremity function, weakness, decreased safety awareness, impaired cognition, impaired functional mobilty.     Rehab Prognosis:  Fair; patient would benefit from acute skilled OT services to address these deficits and reach maximum level of function.       Plan:     Patient to be seen 5 x/week to address the above listed problems via self-care/home management, therapeutic exercises, therapeutic activities  · Plan of Care Expires: 06/10/19  · Plan of Care Reviewed with: patient    Subjective     Pain/Comfort:  · Pain Rating 1: 0/10  · Pain Rating Post-Intervention 1: 0/10    Objective:     Communicated with: nursing / family prior to session- he had just finished taking a shower (needed min Assist below the waist and help putting his pants, shoes and belt on) had an episode earlier per family where he could not figure out , or did not have the strength to lift the silverware to feed himself, but now- he can..  Patient found up in chair with PICC line, peripheral IV upon OT entry to room.    General Precautions: Standard, fall   Orthopedic Precautions:N/A   Braces:       Occupational Performance:     Bed Mobility:    · NT    Functional Mobility/Transfers:  · Patient completed Sit <> Stand Transfer with supervision  with  rolling walker   · Patient completed Bed <> Chair Transfer using Stand Pivot technique with  supervision with rolling walker  · Patient completed Toilet Transfer Stand Pivot technique with supervision with  rolling walker  ·     Activities of Daily Living:  · Feeding:  supervision .  · Grooming: supervision .  · Bathing: minimum assistance needed help with legs / feet and back  · Upper Body Dressing: supervision to don shirt  · Lower Body Dressing: minimum assistance to don pants, belt, socks and shoes  · Toileting: supervision needs supervision to be sure he does complete wiping- he does complain occasionally of tailbone pain, family says he did have an episode at [place] where he fell on his tailbone missing the chair to sit on      [facility] 6 Click ADL: 21    Treatment & Education:  Self care and cognitive testing.  Short Blessed predriving screen score of 2/28 is  Within normal limits. Patient very fatiqued after PT - MRI testing and showering today.    Patient left up in chair with all lines intact and call button in reachEducation:      GOALS:   Multidisciplinary Problems     Occupational Therapy Goals        Problem: Occupational Therapy Goal    Goal Priority Disciplines Outcome Interventions   Occupational Therapy Goal     OT, PT/OT     Description:  Goals to be met by:6/10/2019     Patient will increase functional independence with ADLs by performing:    LE Dressing with Modified [place].  Grooming while standing at sink with Modified [place].  Toileting from toilet with Modified [place] for hygiene and clothing management.   Bathing from  shower chair/bench with Modified [place].  Upper extremity exercise program x10 reps per handout, with assistance as needed  Patient will consistently 3/3 trials at different times of the day demonstrate ability to be a) oriented x 4 (using watch if needed for time of day)  b) use RN call light appropriately before getting up c) complete a >10 piece puzzle d) complete a word search page with 90% accuracy d) add and subtract 2 digit numbers 5/5  tries with 90% accuracy e) color by number from coloring book attending to periphery of the page as well as center of the page without errors. E) fill his medbox with the 13 pills he says he takes daily, twice per day with 100% accuracy f) fill out his medication list with WHAT he takes, WHY he takes it, WHEN he takes it, HOW and HOW often he takes it , and possible side effects of each medication  Complete the Short Blessed pre -  Driving cognitive screen without any errors.                      Time Tracking:     OT Date of Treatment: 05/02/19  OT Start Time: 1345  OT Stop Time: 1400  OT Total Time (min): 15 min    Billable Minutes:Self Care/Home Management 15    Zarina Gonzalez OT  5/2/2019

## 2019-05-02 NOTE — SUBJECTIVE & OBJECTIVE
Review of Systems   Constitutional: Negative.    HENT: Negative for congestion, ear pain, sinus pressure, sneezing and sore throat.    Eyes: Negative.    Respiratory: Negative for cough, chest tightness, shortness of breath and wheezing.    Cardiovascular: Negative.  Negative for chest pain.   Gastrointestinal: Negative for abdominal pain, blood in stool, constipation, diarrhea, nausea and vomiting.   Genitourinary: Negative for difficulty urinating, dysuria and flank pain.   Musculoskeletal: Negative.  Negative for joint swelling.   Skin: Negative.    Neurological: Positive for facial asymmetry and weakness (right sided last night, now resolved). Negative for dizziness and headaches.   Hematological: Negative.    Psychiatric/Behavioral: Positive for confusion. Negative for behavioral problems.     Objective:     Vital Signs (Most Recent):  Temp: 97.8 °F (36.6 °C) (05/02/19 0728)  Pulse: 62 (05/02/19 0728)  Resp: 18 (05/02/19 0728)  BP: (!) 125/56 (05/02/19 0728)  SpO2: 95 % (05/02/19 0745) Vital Signs (24h Range):  Temp:  [97.8 °F (36.6 °C)-98.1 °F (36.7 °C)] 97.8 °F (36.6 °C)  Pulse:  [62-66] 62  Resp:  [18] 18  SpO2:  [95 %] 95 %  BP: (123-133)/(56-72) 125/56     Weight: 89.4 kg (197 lb)  Body mass index is 29.95 kg/m².    Physical Exam   Constitutional: He is oriented to person, place, and time. He appears well-developed and well-nourished.   HENT:   Head: Normocephalic and atraumatic.   Right Ear: Tympanic membrane, external ear and ear canal normal.   Left Ear: Tympanic membrane, external ear and ear canal normal.   Mouth/Throat: Oropharynx is clear and moist.   Eyes: Pupils are equal, round, and reactive to light. Conjunctivae and EOM are normal.   Neck: Normal range of motion. Neck supple. No tracheal deviation present.   Cardiovascular: Normal rate, regular rhythm, intact distal pulses and normal pulses. Exam reveals no gallop and no friction rub.   Murmur heard.  Pulmonary/Chest: Effort normal and breath  sounds normal.   Abdominal: Soft. Bowel sounds are normal. He exhibits no distension. There is no tenderness. Hernia confirmed negative in the right inguinal area and confirmed negative in the left inguinal area.   Musculoskeletal: Normal range of motion. He exhibits edema.   Neurological: He is alert and oriented to person, place, and time. He has normal reflexes. A cranial nerve deficit is present.   R facial asymmetry    Skin: Skin is warm and dry.   Psychiatric: He has a normal mood and affect. His behavior is normal. Judgment and thought content normal.   Nursing note and vitals reviewed.        CRANIAL NERVES     CN III, IV, VI   Pupils are equal, round, and reactive to light.  Extraocular motions are normal.        Significant Labs:   CMP:   Recent Labs   Lab 19  0546 19  0528    141   K 3.6 3.4*    107   CO2 26 25   GLU 95 162*   BUN 31* 24*   CREATININE 1.3 1.2   CALCIUM 10.3 10.0   PROT 6.3 5.9*   ALBUMIN 2.7* 2.6*   BILITOT 0.7 0.6   ALKPHOS 62 56   AST 15 14   ALT 11 12   ANIONGAP 9 9   EGFRNONAA 51* 56*     PSA 1.4,   Recent Labs   Lab 19  0528   WBC 4.85   RBC 3.45*   HGB 8.1*   HCT 26.9*      MCV 78*   MCH 23.5*   MCHC 30.1*      Urinalysis negative     Lab Results   Component Value Date    PSA 1.4 2019 Mag 2.2    Blood cultures  NGTD x 2     blood cultures   Enterococcus faecalis       CULTURE, BLOOD     Ampicillin <=2 mcg/mL Sensitive     Gentamicin Synergy Screen <=500 mcg/mL Sensitive     Tetracycline <=4 mcg/mL Sensitive     Vancomycin 2 mcg/mL Sensitive      Flu negative       Significant Imagin/29 MRI   1.  Two tiny focal areas of increased signal intensity on diffusion-weighted images as described above likely representing artifact at the crux of the sulci.  A secondary consideration is tiny lacunar infarcts.  Dr. Laverne Shipman was telephoned with a verbal report at the time of this dictation.    2.  Atrophy and small  vessel ischemic changes of the periventricular white matter.    4/29 CT head   1.  No CT evidence of an acute intracranial abnormality.    2.  Atrophy and small vessel ischemic changes of the periventricular white matter.  Old lacunar infarcts.    4/22 x ray hip right   No evidence of right hip fracture.    4/22 x ray hip left   No evidence of left hip fracture.    4/22 CXR   Right-sided PICC with tip projecting over superior vena cava.    4/18 Echo   · Mildly decreased left ventricular systolic function. The estimated ejection fraction is 45%  · Local segmental wall motion abnormalities.  · Eccentric left ventricular hypertrophy.  · Severe left atrial enlargement.  · Grade I (mild) left ventricular diastolic dysfunction consistent with impaired relaxation.  · Normal left atrial pressure.  · Normal right ventricular systolic function.  · Mild right atrial enlargement.  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 I did not see mention of AVR in encounter notes or March echo report or prior [facility]ES report but there appears to be a bioprosthesis. There is mild central aortic insufficiency present.  · Mild mitral regurgitation.  · Normal central venous pressure (3 mm Hg).  · The estimated PA systolic pressure is 31 mm Hg    4/18 INES  Final Impressions  1. The study quality is good.   2. 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  3. Bubble study is positive for Grade II Patent Foramen Ovale.  4. Moderate lipomatous atrial septal hypertrophy.  5. Normal functioning prosthetic aortic valve with trivial   paravalvular regurgitation.   6. Preserved left ventricular function. Ejection fraction around 55%.  7. Grade III atherosclerosis of aortic arch.

## 2019-05-02 NOTE — PT/OT/SLP PROGRESS
Speech Language Pathology      [facility] Abdiel Farooq  MRN: [id number]    Patient not seen today secondary to requesting SLP return later as he was visiting with family and friends. Pt was very agreeable to complete all testing. Will follow-up at a later time.      ADRIANA Lopez, CCC-SLP  5/2/2019

## 2019-05-02 NOTE — PT/OT/SLP PROGRESS
"Physical Therapy Treatment    Patient Name:  St Lacho Farooq   MRN:  [id number]    Recommendations:     Discharge Recommendations:  home health PT, home with home health   Discharge Equipment Recommendations: cane, straight   Barriers to discharge: Inaccessible home    Assessment:     St Lacho Farooq is a 84 y.o. male admitted with a medical diagnosis of Bacteremia.  He presents with the following impairments/functional limitations:  weakness, impaired cognition, impaired self care skills, gait instability, impaired functional mobilty, decreased lower extremity function, impaired muscle length, impaired cardiopulmonary response to activity. Patient seen at bedside chair with Sitter and a Friend at bedside. Patient noted off and on confusion but able to follow simple directions and 2-3 commands. Gross motor coordination on bilat LE appears intact during Therapeutic ex.    Rehab Prognosis: Good; patient would benefit from acute skilled PT services to address these deficits and reach maximum level of function.    Recent Surgery: * No surgery found *      Plan:     During this hospitalization, patient to be seen 5 x/week to address the identified rehab impairments via gait training, therapeutic activities, therapeutic exercises and progress toward the following goals:    · Plan of Care Expires:  05/02/19    Subjective     Chief Complaint: No pain complain  Patient/Family Comments/goals: "Able to walk better, go home and to spend time with friends at the truck stop.  Pain/Comfort:  ·        Objective:     Communicated with patient and Sitter and family friend prior to session.  Patient found up in chair with peripheral IV, PICC line upon PT entry to room.     General Precautions: Standard, fall   Orthopedic Precautions:N/A   Braces:       Functional Mobility:  · Bed Mobility:     · Rolling Left:  modified independence  · Rolling Right: modified independence  · Supine to Sit: modified independence  · Sit to " Supine: modified independence  · Transfers:     · Sit to Stand:  supervision with rolling walker  · Bed to Chair: supervision with  rolling walker  using  Step Transfer  · Gait: RW Ambulation x 250 feet with supervision. Exhibits reciprogal gait with 25% increased Right Foot/Floor clearance and increased estefanía.  · Balance: Dynamic Standing Balance with Good- grade  · Stairs:  Pt ascended/descended 16 steps stair(s) with No Assistive Device with bilateral handrails with Supervision or Set-up Assistance.       AM-PAC 6 CLICK MOBILITY  Turning over in bed (including adjusting bedclothes, sheets and blankets)?: 4  Sitting down on and standing up from a chair with arms (e.g., wheelchair, bedside commode, etc.): 3  Moving from lying on back to sitting on the side of the bed?: 4  Moving to and from a bed to a chair (including a wheelchair)?: 3  Need to walk in hospital room?: 3  Climbing 3-5 steps with a railing?: 3  Basic Mobility Total Score: 20       Therapeutic Activities and Exercises:   Pt performed bilat  LE coordination and strengthening  exercises consisting of Active range of motion exercises x 20 reps consisting of Ankle DF, Ankle PF, Heel , ABD/ADD, LAQ, Marching in place and Arm Push Ups with pt able to tolerate activities well with rest periods. GAIT: Pt able to ambulate with rolling walker with Supervision or Set-up Assistance x 250 ft with the following gait deviation(s): increased by 25% Right Foot/Floor clearance, increased estefanía and increased weight shifting leading to greater stability. Worked on safe technique on  stairs mg.  PT discussed importance of patient's performance of Home Exercise Program (HEP) with pt verbalizing understanding.     Patient left up in chair with all lines intact, call button in reach, nurse Edith notified and Sitter and Friend present..    GOALS:   Multidisciplinary Problems     Physical Therapy Goals        Problem: Physical Therapy Goal    Goal Priority Disciplines  Outcome Goal Variances Interventions   Physical Therapy Goal     PT, PT/OT Ongoing (interventions implemented as appropriate)     Description:  Goals to be met by:  19     Patient will increase functional independence with mobility by performin. Supine to sit with Independent - met 19  2. Sit to supine with Independent - met 19  3. Bed to chair transfer with Modified Independent with or without R W or straight Cane  using Step Transfer TECHNIQUE  4. Gait  x 300  feet with Modified Independent with or without straight cane or RW  5. Lower extremity exercise program x10 reps per handout, with assistance as needed.  6. Able to ascend/descend 12 stair steps using handrail/Straight  Cane with supervision.                      Time Tracking:     PT Received On: 19  PT Start Time: 1220     PT Stop Time: 1255  PT Total Time (min): 35 min     Billable Minutes: Therapeutic Activity 30 mins    Treatment Type: Treatment  PT/PTA: PT           Keven Goldstein, PT  2019

## 2019-05-02 NOTE — ASSESSMENT & PLAN NOTE
Subacute CVA with focal deficits noted on 4/14, with significant improvement in symptoms   MRI did not demonstrate acute findings but neuro reviewed and Dx with new CVA;   Was being tx with Pradaxa when he had acute stroke but Will not consider failed therapy as patient has had lapses in therapy due to recent surgical interventions and non-compliance  Per Dr. Fernandez's recommendations we will continue:  -Telemetry.  -Neurochecks  -NO TPA as he presented initially on Sunday  -Physical Therapy and OT Consult, evaluation and treatment at [place] once patient is admitted to  SNF  -Completed permissive HTN and will need strict BP control.   -Continue his current dose home meds otherwise including Asprin, Pradaxa and statin       4/29: Reduce pradaxa due to renal function today  Consult colin due to AMS and neuro changes overnight. Repeat CT head without acute changes. ? Watershed injury; ? Keep BP a little higher? Dehydration playing a role    4/30 go back up on pradaxa due to kidneys now functioning. Reviewed MRI with Kel Fernandez and Dr Ventura. ST/OT/PT    5/2 pt doing well with gait training and speech. Up in day room today

## 2019-05-02 NOTE — PROGRESS NOTES
"[facility]  Adult Nutrition  Progress Note    SUMMARY       Recommendations    Recommendation/Intervention: Continue cardiac diet as tolerated encouraging/assisting with meals. Add Boost Plus BID due to varied intake; Weekly weights,  Goals: adequate po intake >=85% EEN by discharge  Nutrition Goal Status: new  Communication of RD Recs: other (comment)(POC, Casemanager)    Nutrition Discharge Planning: Cardiac diet with ONS as needed to meet EEN/EPN    Reason for Assessment    Reason For Assessment: length of stay  Diagnosis: infection/sepsis, stroke/CVA(Bacteremia)  Relevant Medical History: HTN, CAD, HLD, Anemia, Afib, CVA, PAD, MI, Cancer  Interdisciplinary Rounds: did not attend(Unable to attend meeting yesterday; discussed with )  General Information Comments: Pt admitted on SWING for antibiotics. New strokes noted since admit; SLP followiing rec Regular diet with thin liquids. Per RN/, pt unable to grasp fork this morning with uncoordinated movements, +confusion. No NFPE at this time due to stable wt per chart review and reported good appetite. RD to monitor.    Nutrition Risk Screen    Nutrition Risk Screen: no indicators present    Nutrition/Diet History    Patient Reported Diet/Restrictions/Preferences: low salt  Food Preferences: likes strawberry boost  Spiritual, Cultural Beliefs, Druze Practices, Values that Affect Care: no  Food Allergies: NKFA  Factors Affecting Nutritional Intake: impaired cognitive status/motor control, behavioral (mealtime)    Anthropometrics    Temp: 97.8 °F (36.6 °C)  Height Method: Stated  Height: 5' 8" (172.7 cm)  Height (inches): 68 in  Weight Method: Standard Scale  Weight: 89.4 kg (197 lb)(RD reviewed)  Weight (lb): 197 lb  Ideal Body Weight (IBW), Male: 154 lb  % Ideal Body Weight, Male (lb): 127.92 lb  BMI (Calculated): 30  BMI Grade: 30 - 34.9- obesity - grade I       Lab/Procedures/Meds    Pertinent Labs Reviewed: " reviewed  Pertinent Medications Reviewed: reviewed    Recent Labs   Lab 05/02/19  0528   CALCIUM 10.0   PROT 5.9*      K 3.4*   CO2 25      BUN 24*   CREATININE 1.2   ALKPHOS 56   ALT 12   AST 14   BILITOT 0.6     Scheduled Meds:   ampicillin IVPB  2 g Intravenous Q6H    aspirin  81 mg Oral Daily    atorvastatin  10 mg Oral QHS    carvedilol  25 mg Oral BID WM    dabigatran etexilate  150 mg Oral BID    levETIRAcetam  250 mg Oral BID    pantoprazole  40 mg Oral Daily    sodium chloride 0.9%  10 mL Intravenous Q6H    tamsulosin  0.4 mg Oral Daily     Continuous Infusions:  PRN Meds:.acetaminophen, albuterol-ipratropium, heparin, porcine (PF), HYDROmorphone, ondansetron, ramelteon, Flushing PICC Protocol **AND** sodium chloride 0.9% **AND** sodium chloride 0.9%, traMADol     Estimated/Assessed Needs    Weight Used For Calorie Calculations: 89.4 kg (197 lb 1.5 oz)  Energy Calorie Requirements (kcal): 1882  Energy Need Method: [place]-St Jeor(x1.2)  Protein Requirements: (1.0-1.2)  Weight Used For Protein Calculations: 89.4 kg (197 lb 1.5 oz)     Estimated Fluid Requirement Method: RDA Method  RDA Method (mL): 1882         Nutrition Prescription Ordered    Current Diet Order: Cardiac    Evaluation of Received Nutrient/Fluid Intake    Energy Calories Required: meeting needs  Protein Required: not meeting needs  Fluid Required: meeting needs  Tolerance: tolerating  % Intake of Estimated Energy Needs: 50 - 75 %  % Meal Intake: 75 - 100 %    Nutrition Risk    Level of Risk/Frequency of Follow-up: low - moderate     Assessment and Plan    Cerebrovascular accident (CVA)  Contributing Nutrition Diagnosis  Inadequate energy intake    Related to (etiology):   Inability to feed self    Signs and Symptoms (as evidenced by):   Noted new CVA with decrease coordination, varied intake of meals     Interventions  Decreased Sodium Diet  Decreased Fat Diet  Commercial Beverage  Feeding  Assistance    Recommendations (treatment strategy):  Cardiac Diet  Add Boost Plus BID  Assists with meals as needed due to coordination.    Nutrition Diagnosis Status:   New           Monitor and Evaluation    Food and Nutrient Intake: food and beverage intake  Food and Nutrient Adminstration: diet order  Physical Activity and Function: nutrition-related ADLs and IADLs  Anthropometric Measurements: weight, weight change  Biochemical Data, Medical Tests and Procedures: electrolyte and renal panel, gastrointestinal profile, glucose/endocrine profile, inflammatory profile, lipid profile  Nutrition-Focused Physical Findings: overall appearance       Nutrition Follow-Up    RD Follow-up?: Yes

## 2019-05-02 NOTE — ASSESSMENT & PLAN NOTE
Check U.A , PSA, bladder scan; bladder scan 89ml   Still with incontinence issues   Gets HCTz 12.5 and lasix 20mg po daily in am   trial of flomax     Stop fluid pill, creat better with hydration

## 2019-05-02 NOTE — ASSESSMENT & PLAN NOTE
Continue amlodpine, Lisinopril, HCTZ, , coreg  4/29: Lasix- will hold now that Creat bumped and follow renal fxn  4/30 stopped lasix and lisinopril yesterday, today stop hctz and reduce amlodipine  He needs a little high bp.    5/2 Bp still in 120/50- cut the amlodipine out today

## 2019-05-02 NOTE — PROGRESS NOTES
[facility] Medicine  Progress Note    Patient Name: St Lacho Farooq  MRN: [id number]  Patient Class: IP- Swing   Admission Date: 4/23/2019  Length of Stay: 9 days  Attending Physician: John Saeed MD  Primary Care Provider: Dylan Adam MD        Subjective:     Principal Problem:Bacteremia    HPI:  This 84 year old man with PMHX of CAD p CABG, [facility] 2/19; stable lesions, PAD, bilateral CVD, AAA,  PAF, HTN, hyperlipidemia, MALATHI, NYHA II chronic diastolic HF and non rheumatic critical AS with JAMAAL 0.8cm2 with preserved LVSFX EF55%,  per Echo underwent  + TAVR placed 3/18/19 per Dr. Babcock. Post op course was complicated by CVA ~ 2 weeks post TAVR; he presented to [place] 4/16 with altered mental status and fever where he was Dx with CVA and gram + bacteremia.   He was  transferred to [facility] 4/19  for cardiology and ID eval ant tx; CT at that time was unrevealing for an acute intracranial process  His MRI was abnormal.  His blood cultures have grown Enterococcus faecalis sensitve to all tested drugs,  Pt initially given vancomycin and then changed to ampicillin IV.  He has clinically improved.  His INES shows a septic atrial thrombus. The cRP is 49 and the ESR is 15.  Pt is no longer febrile and he and daughter feel he is at base line physically and mentally.Pt transferred to [facility] yesterday for continued skilled care and prolonged IV ABX. Plan is 6 weeks of ABX; ampicillin 2gms IV q 6; day 1 with negative blood cultures was 4/19; so today is day 6/42  Requests DNR .    Hospital Course:  4/25/19 SKILLED NURSING for prolonged ABX   Pt sitting up in chair watching TV; reports feeling fine but did have urinary incontinence and noting frequency   No fever, WBC normal   Day 7/42 ABX for bacteremia/septic emboli     4/26/19 SKILLED NURSING for prolonged ABX for bacteremia, septic thrombus; day 8/42  NAD Overnight, still with night time urinary incontinence; will try trial of flomax    PSA 1.4, repeat U/A ok; bladder scan ok     4/28 pt on SKILLED bed for prolonged abx day 10/42 for bacteremia from septic emboli. Ampicillin which cultures show sensitivity to. He also had a low grade temp last night. With the temp he had confusion and right sided weakness. Ct head done with no changes. Neuro was consulted for this am. He has this same type episode last visit with temp and mental status changes. This am he is back to normal. To note his H/H was lower and also creat bumped to 2.1.     4/29 pt on SKILL. Has some neuro changes on Sunday evening. CT without new strokes. MRI ordered per Dr shaw yesterday. Shows 3 new strokes. Facial droop this am . Neuro/cards and vascular surgery discussed pt case. He needs the anticoagulation with afib and thrombus in atrium. Will cont pradaxa, no heparin. Also remains on IV abx for 6 weeks. Day 12/42. No fever. No elevated WBC.     Creat was elevated yesterday 2.1. Lasix was stopped and 500ml IVF given. Creat better today 1.5. Will hold the hctz today as well as stopped the lisinopril and reduce amlodipine to have him a little higher with pressure due to acute strokes. Use LAUREL for swelling of lower extremity   R facial drop still apparent     5/2 pt doing well. he is sitting in day the day room. No complaints./ H.H stable. K a little low. Remains on pradaxa full dose now as kidneys have returned to baseline. Day 15/42 on ampicillin.       Review of Systems   Constitutional: Negative.    HENT: Negative for congestion, ear pain, sinus pressure, sneezing and sore throat.    Eyes: Negative.    Respiratory: Negative for cough, chest tightness, shortness of breath and wheezing.    Cardiovascular: Negative.  Negative for chest pain.   Gastrointestinal: Negative for abdominal pain, blood in stool, constipation, diarrhea, nausea and vomiting.   Genitourinary: Negative for difficulty urinating, dysuria and flank pain.   Musculoskeletal: Negative.  Negative for joint  swelling.   Skin: Negative.    Neurological: Positive for facial asymmetry and weakness (right sided last night, now resolved). Negative for dizziness and headaches.   Hematological: Negative.    Psychiatric/Behavioral: Positive for confusion. Negative for behavioral problems.     Objective:     Vital Signs (Most Recent):  Temp: 97.8 °F (36.6 °C) (05/02/19 0728)  Pulse: 62 (05/02/19 0728)  Resp: 18 (05/02/19 0728)  BP: (!) 125/56 (05/02/19 0728)  SpO2: 95 % (05/02/19 0745) Vital Signs (24h Range):  Temp:  [97.8 °F (36.6 °C)-98.1 °F (36.7 °C)] 97.8 °F (36.6 °C)  Pulse:  [62-66] 62  Resp:  [18] 18  SpO2:  [95 %] 95 %  BP: (123-133)/(56-72) 125/56     Weight: 89.4 kg (197 lb)  Body mass index is 29.95 kg/m².    Physical Exam   Constitutional: He is oriented to person, place, and time. He appears well-developed and well-nourished.   HENT:   Head: Normocephalic and atraumatic.   Right Ear: Tympanic membrane, external ear and ear canal normal.   Left Ear: Tympanic membrane, external ear and ear canal normal.   Mouth/Throat: Oropharynx is clear and moist.   Eyes: Pupils are equal, round, and reactive to light. Conjunctivae and EOM are normal.   Neck: Normal range of motion. Neck supple. No tracheal deviation present.   Cardiovascular: Normal rate, regular rhythm, intact distal pulses and normal pulses. Exam reveals no gallop and no friction rub.   Murmur heard.  Pulmonary/Chest: Effort normal and breath sounds normal.   Abdominal: Soft. Bowel sounds are normal. He exhibits no distension. There is no tenderness. Hernia confirmed negative in the right inguinal area and confirmed negative in the left inguinal area.   Musculoskeletal: Normal range of motion. He exhibits edema.   Neurological: He is alert and oriented to person, place, and time. He has normal reflexes. A cranial nerve deficit is present.   R facial asymmetry    Skin: Skin is warm and dry.   Psychiatric: He has a normal mood and affect. His behavior is normal.  Judgment and thought content normal.   Nursing note and vitals reviewed.        CRANIAL NERVES     CN III, IV, VI   Pupils are equal, round, and reactive to light.  Extraocular motions are normal.        Significant Labs:   CMP:   Recent Labs   Lab 19    141   K 3.6 3.4*    107   CO2 26 25   GLU 95 162*   BUN 31* 24*   CREATININE 1.3 1.2   CALCIUM 10.3 10.0   PROT 6.3 5.9*   ALBUMIN 2.7* 2.6*   BILITOT 0.7 0.6   ALKPHOS 62 56   AST 15 14   ALT 11 12   ANIONGAP 9 9   EGFRNONAA 51* 56*     PSA 1.4,   Recent Labs   Lab 19   WBC 4.85   RBC 3.45*   HGB 8.1*   HCT 26.9*      MCV 78*   MCH 23.5*   MCHC 30.1*      Urinalysis negative     Lab Results   Component Value Date    PSA 1.4 2019 Mag 2.2    Blood cultures  NGTD x 2     blood cultures   Enterococcus faecalis       CULTURE, BLOOD     Ampicillin <=2 mcg/mL Sensitive     Gentamicin Synergy Screen <=500 mcg/mL Sensitive     Tetracycline <=4 mcg/mL Sensitive     Vancomycin 2 mcg/mL Sensitive      Flu negative       Significant Imagin/29 MRI   1.  Two tiny focal areas of increased signal intensity on diffusion-weighted images as described above likely representing artifact at the crux of the sulci.  A secondary consideration is tiny lacunar infarcts.  Dr. Laverne Shipman was telephoned with a verbal report at the time of this dictation.    2.  Atrophy and small vessel ischemic changes of the periventricular white matter.     CT head   1.  No CT evidence of an acute intracranial abnormality.    2.  Atrophy and small vessel ischemic changes of the periventricular white matter.  Old lacunar infarcts.     x ray hip right   No evidence of right hip fracture.     x ray hip left   No evidence of left hip fracture.     CXR   Right-sided PICC with tip projecting over superior vena cava.     Echo   · Mildly decreased left ventricular systolic function. The estimated ejection  "fraction is 45%  · Local segmental wall motion abnormalities.  · Eccentric left ventricular hypertrophy.  · Severe left atrial enlargement.  · Grade I (mild) left ventricular diastolic dysfunction consistent with impaired relaxation.  · Normal left atrial pressure.  · Normal right ventricular systolic function.  · Mild right atrial enlargement.  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 I did not see mention of AVR in encounter notes or March echo report or prior INES report but there appears to be a bioprosthesis. There is mild central aortic insufficiency present.  · Mild mitral regurgitation.  · Normal central venous pressure (3 mm Hg).  · The estimated PA systolic pressure is 31 mm Hg    4/18 INES  Final Impressions  1. The study quality is good.   2. 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  3. Bubble study is positive for Grade II Patent Foramen Ovale.  4. Moderate lipomatous atrial septal hypertrophy.  5. Normal functioning prosthetic aortic valve with trivial   paravalvular regurgitation.   6. Preserved left ventricular function. Ejection fraction around 55%.  7. Grade III atherosclerosis of aortic arch.    Assessment/Plan:      * Bacteremia  S/p recent TAVR now with septic emboli and concern for "flicking emboli"   His blood cultures have grown Enterococcus faecalis sensitve to all tested drugs  ID recommending total of 6 weeks IV ABX; Ampicillin 2gms IV q 6 hr; day 1 with negative blood cultures was 4/19/19; so today is day 6/42  Day 7/42 4/26/19 day 8/42 4/29 day 11/42 of IV abd. 4/19 was on vanc and 4/20 started on ampicillin  4/30 day 12/42.  4/19 was on vanc and 4/20 started on ampicillin    5/2 day 15/42 of IV abx- repeat blood cultures today    Encephalopathy  Somnolent today.      Renal insufficiency  Noted 4/29: Cr up to 2.1  Reduce pradaxa  Hold lasix  Ns 500ml bolus today slowly  Better today, hold hctz and lasix .    Nocturnal enuresis  Check U.A , PSA, bladder scan; bladder scan " 89ml   Still with incontinence issues   Gets HCTz 12.5 and lasix 20mg po daily in am   trial of flomax     Stop fluid pill, creat better with hydration    Atrial thrombus  Large atrial thrombus found on INES ,Dr. Galicia deemed septic emboli; still no INES reporting thrombus   Continue Pradaxa; family plans to monitor meds once home  Will not consider failed therapy as patient has had lapses in therapy due to recent surgical interventions and non-compliance   as discussed above there is concern for infected thrombus.   Per Infectious Disease will treat bacteremia and possible infected thrombus for 6 weeks.    5/2 Discussed with family and cardiology. rec repeat INES 2 weeks consider changing NOAC at that time, not now as concern over anticoagulation is issue with new CVA         Cerebrovascular accident (CVA)  Subacute CVA with focal deficits noted on 4/14, with significant improvement in symptoms   MRI did not demonstrate acute findings but neuro reviewed and Dx with new CVA;   Was being tx with Pradaxa when he had acute stroke but Will not consider failed therapy as patient has had lapses in therapy due to recent surgical interventions and non-compliance  Per Dr. Fernandez's recommendations we will continue:  -Telemetry.  -Neurochecks  -NO TPA as he presented initially on Sunday  -Physical Therapy and OT Consult, evaluation and treatment at [place] once patient is admitted to  SNF  -Completed permissive HTN and will need strict BP control.   -Continue his current dose home meds otherwise including Asprin, Pradaxa and statin       4/29: Reduce pradaxa due to renal function today  Consult colin due to AMS and neuro changes overnight. Repeat CT head without acute changes. ? Watershed injury; ? Keep BP a little higher? Dehydration playing a role    4/30 go back up on pradaxa due to kidneys now functioning. Reviewed MRI with Kel Fernandez and Dr Ventura. ST/OT/PT    5/2 pt doing well with gait training and speech.  Up in day room today    Hx of CABG  Asa, statin.    CHF (congestive heart failure), NYHA class II, chronic, diastolic  Continue Lisinopril, HCTZ  4/29: Hold lasix for now. Already had dose this am. Give gentle hydration given rising Cr. Likely a little dry  4/30 cont to hold lasix, lisinopril and hctz LAUREL hose.    5/2 holding ace. Diuretics cont compression hose and BB    Anemia  H/H stable without acute indications for transfusion   Continue to monitor    check anemia w/u from last visit ferritin was 26, retic was 1.5  Occult stool today, will not change our plan just give us answer to where blood is going. He needs the blood thinner for now due to septic emboli.     Cont to monitor occult pending  Transfuse as needed, needs blood thinner.    H/H stable        Essential hypertension  Continue amlodpine, Lisinopril, HCTZ, , coreg  4/29: Lasix- will hold now that Creat bumped and follow renal fxn  4/30 stopped lasix and lisinopril yesterday, today stop hctz and reduce amlodipine  He needs a little high bp.    5/2 Bp still in 120/50- cut the amlodipine out today    Aortic valve stenosis  S/p TAVR .      VTE Risk Mitigation (From admission, onward)        Ordered     dabigatran etexilate capsule 150 mg  2 times daily      04/30/19 0907     heparin, porcine (PF) 100 unit/mL injection flush 500 Units  As needed (PRN)      04/25/19 1408              Gill Figueroa MD  Department of Hospital Medicine   [facility]

## 2019-05-02 NOTE — ASSESSMENT & PLAN NOTE
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 Galicia deemed septic emboli; still no INES reporting thrombus   Continue Pradaxa; family plans to monitor meds once home  Will not consider failed therapy as patient has had lapses in therapy due to recent surgical interventions and non-compliance   as discussed above there is concern for infected thrombus.   Per Infectious Disease will treat bacteremia and possible infected thrombus for 6 weeks.    5/2 Discussed with family and cardiology. rec repeat INES 2 weeks consider changing NOAC at that time, not now as concern over anticoagulation is issue with new CVA

## 2019-05-02 NOTE — NURSING
Bedside report received from pradip.  VS stable.  Family at bedside.  Call bell in reach.  PICC line intact.  Esitter t bedside.  Will continue to monitor.

## 2019-05-02 NOTE — NURSING
Chlorhexidine Shower/Bath given. Assistance given with back, legs, and feet. PICC line covered and secured. Patient tolerated well. Will continue to monitor

## 2019-05-02 NOTE — PT/OT/SLP EVAL
Speech Language Pathology Evaluation  Cognitive Communication    Patient Name:  St Lacho Farooq   MRN:  [id number]  Admitting Diagnosis: Bacteremia    Recommendations:     Recommendations:                General Recommendations:  Speech/language therapy  Diet recommendations:  Regular, Thin   Aspiration Precautions: Standard aspiration precautions   General Precautions: Standard    Communication strategies:  none    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    S/P 2-vessel coronary artery bypass        Past Surgical History:   Procedure Laterality Date    CARPAL TUNNEL RELEASE      CATARACT EXTRACTION, BILATERAL      CORONARY ANGIOGRAPHY  2019    CORONARY ARTERY BYPASS GRAFT  2004    HERNIA REPAIR      Inguinal & Ventral with MESH    INSERTION, PICC N/A 4/22/2019    Performed by Dosc [facility] Provider at [facility]    REPLACEMENT, AORTIC VALVE, PERCUTANEOUS, TRANSCATHETER Right 3/18/2019    Performed by Mani Babcock MD at [facility] CATH    REPLACEMENT, AORTIC VALVE, PERCUTANEOUS, TRANSCATHETER Right 3/18/2019    Performed by Ac Osman MD at [facility] CATH    TONSILLECTOMY      Transesophageal echo (INES) intra-procedure log documentation N/A 4/18/2019    Performed by Tam Carrasco MD at [facility] CATH    TRANSESOPHAGEAL ECHOCARDIOGRAM (INES) N/A 6/28/2016    Performed by Hugh Begum MD at [facility] OR       Prior Intubation HX:  n/a    Modified Barium Swallow:  "n/a    Prior diet: Regular with thin liquids.    Occupation/hobbies/homemaking: Pt noted he enjoys sports and was a .      Subjective     Pt found sitting in sunroom with friend prior to initiation of evaluation. Pt was agreeable to all aspects of evaluation but became defensive upon discussing initiation of cognitive linguistic services. He noted that he didn't understand why "we are doing this because I don't really care if I know this or if I know that." SLP provided education on purpose of therapy and potential for decline of cognitive linguistic skills due to prolonged hospitalization and decreased time spent performing typical ADLs. Pt eventually agreed to the potential of therapy only if "we talk about important things."      Patient goals: none stated     Pain/Comfort:  · Pain Rating 1: 0/10  · Location 1: back  · Pain Addressed 1: Distraction  · Pain Rating Post-Intervention 1: (post intervention pain not rated)    Objective:     Cognitive Status:    Attention - Pt with no obvious deficits observed     Orientation - Pt oriented x4    Memory - Pt recalled 2/3 unrelated words following 5 minute delay with distractions on 2/3 trials Independently and 3/3 trials given MIN semantic verbal cueing. Immediate recall of words completed with MIN-MOD verbal cueing. Following a short paragraph, pt answered 1/4 questions Independently.     Problem Solving - Pt demonstrated deficits in problem solving characterized by logical thought direction but illogical conclusions. When asked What would you do if you needed help here a the hospitals, pt responded "call the fire department."     Reasoning - Pt's reasoning and categorization skills noted to have mild overall deficits. When given a conrect generative naming task (vegetables), pt able to identify 6 items; pt provided 5 items during abstract generative naming tasks (things that start with M). When asked What can be portable, plays, music, and has an antena, " "pt responded, "automobile." Complex Y/N questions completed on 4/5 trials.        Receptive Language:   Comprehension:    WFL    Pragmatics:    WFL    Expressive Language:  Verbal:    WFL      Motor Speech:  WFL    Voice:   WFL    Treatment: SLP discussed purpose of skilled speech services with pt initially showing great disinterest in beginning therapy. However, pt stated that he would "try it" following additional discussion.     Assessment:     St Lacho Farooq is a 84 y.o. 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 He presents with deficits in reasoning, problem solving, memory, and problem solving. Recommend initiation of skilled speech services to target cognitive linguistic skills.     Goals:   Multidisciplinary Problems     SLP Goals        Problem: SLP Goal    Goal Priority Disciplines Outcome   SLP Goal     SLP    Description:    Long Term Goals:  1. Pt will increase cognitive linguistic skills to a functional level in order to promote safe and independent activities of choice in known and unknown environments                    Plan:     · Patient to be seen:  3 x/week   · Plan of Care expires:  05/30/19  · Plan of Care reviewed with:  patient   · SLP Follow-Up:  Yes       Discharge recommendations:  Discharge Facility/Level of Care Needs: (home with assistance)   Barriers to Discharge:  Level of Skilled Assistance Needed - pt continues to require skilled personnel for managment of medical diagnoses    Time Tracking:     SLP Treatment Date:   05/02/19  Speech Start Time:  0922  Speech Stop Time:  0941     Speech Total Time (min):  19 min    Billable Minutes: Eval of speech and language - 19 minutes       ADRIANA Lopez, CCC-SLP  05/02/2019         "

## 2019-05-02 NOTE — PT/OT/SLP PROGRESS
Occupational Therapy      Patient Name:  St Lacho Farooq   MRN:  [id number]    Patient not seen today secondary to out to MRI  . Will follow-up later as able.    Zarina Gonzalez OT  5/2/2019

## 2019-05-02 NOTE — PLAN OF CARE
Problem: Adult Inpatient Plan of Care  Goal: Plan of Care Review  Outcome: Ongoing (interventions implemented as appropriate)  Nutrition Recommendation/Intervention: Continue cardiac diet as tolerated encouraging/assisting with meals. Add Boost Plus BID due to varied intake; Weekly weights,  Goals: adequate po intake >=85% EEN by discharge  Nutrition Goal Status: new  Communication of RD Recs: other (comment)(POC, Casemanager)    Nutrition Discharge Planning: Cardiac diet with ONS as needed to meet EEN/EPN

## 2019-05-02 NOTE — PROGRESS NOTES
"[facility]  Neurology  Progress Note     Patient Name: St Lacho Farooq  MRN: [id number]  Admission Date: 4/23/2019  Hospital Length of Stay: 6 days  Code Status: DNR   Attending Provider: John Saeed MD   Consulting Provider: Mitchel Fernandez MD  Primary Care Physician: Dylan Adam MD  Principal Problem:Bacteremia     Inpatient consult to Neurology  Consult performed by: Mitchel Fernandez MD  Consult ordered by: Marta Song NP        Subjective:      Chief Complaint:  Right sided weakness, confusion      HPI:   St Lacho Farooq is a 84 y.o. male known to me for prior acute cerebellar CVA in 2016 and recent admission for acute CVA thought to be cardioembolic (he has afib and had a recent aortic valve repair and + blood cultures) and resulting in right sided weakness.  After being initially worked up at [facility], he was transferred to [facility] where he had INES and ID consult- thought to have  septic atrial thrombus and he is back here on SWING with plan to have IV antibiotics for 6 weeks.  He did improve since that point.  Last night, he was noted to have fever and with that confusion  confusion and right sided weakness. Ct head done with no changes. Neuro was consulted for this am. He has this same type of changes while in house with prior with fever and mental status changes. He did return to baseline. Primary team ordered CT head (see below)  He did have some lowering of his BP last night as well  Per review of INES, " Large atrial thrombus found on INES" although there is no final report in the computer to date. The patient was also non-compliant with NOAC prior to CVA.      Interval history 4/29/19: Patient has been afebrile. Left facial droop still noted but not severe weakness like yesterday. Reviewed/ appreciate cardiology consult.     Interval History 5/2/2019: Called to beside as patient has episode of confusion while eating and right sided weakness " again. CT head ordered. Patient has returned to baseline again- is seen in the [place] while ambulating with Therapy and has no signs of focal weakness. He is confused at times, but this was resolved this am.              Past Medical History:   Diagnosis Date    Anemia      Aortic valve stenosis      Arthritis      Atrial fibrillation      Cancer       Basal Cell Skin Cancer    Carotid artery stenosis      Coronary artery disease      CVA (cerebral vascular accident)      Encounter for blood transfusion      Exercise-induced angina      Hyperkalemia       at times    Hyperlipemia      Hypertension      Iron deficiency anemia      MI (myocardial infarction) 2004    MALATHI (obstructive sleep apnea)      PAD (peripheral artery disease)      Prostatitis      Renal failure       MILD after MI    S/P 2-vessel coronary artery bypass                     Past Surgical History:   Procedure Laterality Date    CARPAL TUNNEL RELEASE        CATARACT EXTRACTION, BILATERAL        CORONARY ANGIOGRAPHY   2019    CORONARY ARTERY BYPASS GRAFT   2004    HERNIA REPAIR         Inguinal & Ventral with MESH    INSERTION, PICC N/A 4/22/2019     Performed by [facility] Provider at [facility] OR    REPLACEMENT, AORTIC VALVE, PERCUTANEOUS, TRANSCATHETER Right 3/18/2019     Performed by Mani Babcock MD at [facility] CATH    REPLACEMENT, AORTIC VALVE, PERCUTANEOUS, TRANSCATHETER Right 3/18/2019     Performed by Ac Osman MD at [facility] CATH    TONSILLECTOMY        Transesophageal echo (INES) intra-procedure log documentation N/A 4/18/2019     Performed by Tam Carrasco MD at [facility] CATH    TRANSESOPHAGEAL ECHOCARDIOGRAM (INES) N/A 6/28/2016     Performed by Hugh Begum MD at [facility] OR                 Review of patient's allergies indicates:   Allergen Reactions    Bactrim [sulfamethoxazole-trimethoprim] Rash    Codeine Rash      I have reviewed all of this patient's past medical and surgical histories as well as  "family and social histories and active allergies and medications as documented in the electronic medical record.        No current facility-administered medications on file prior to encounter.               Current Outpatient Medications on File Prior to Encounter   Medication Sig    amLODIPine (NORVASC) 2.5 MG tablet Take 2.5 mg by mouth once daily.    ANORO ELLIPTA 62.5-25 mcg/actuation DsDv INHALE 1 PUFF INTO LUNGS ONCE DAILY    aspirin (ECOTRIN) 81 MG EC tablet Take 81 mg by mouth once daily.    atorvastatin (LIPITOR) 10 MG tablet Take 10 mg by mouth every evening.    carvedilol (COREG) 12.5 MG tablet Take 25 mg by mouth 2 (two) times daily with meals.     dabigatran etexilate (PRADAXA) 150 mg Cap Take 150 mg by mouth 2 (two) times daily. "Do NOT break, chew, or open capsules."    furosemide (LASIX) 20 MG tablet Take 20 mg by mouth 2 (two) times daily.    lisinopril (PRINIVIL,ZESTRIL) 20 MG tablet Take 1 tablet (20 mg total) by mouth once daily.    nitroGLYCERIN (NITROSTAT) 0.4 MG SL tablet Place 0.4 mg under the tongue every 5 (five) minutes as needed for Chest pain.    omeprazole (PRILOSEC) 40 MG capsule Take 40 mg by mouth once daily.    ranolazine (RANEXA) 500 MG Tb12 Take 500 mg by mouth 2 (two) times daily.              Family History      Problem Relation (Age of Onset)     Atrial fibrillation Father, Sister     COPD Sister     Cancer Father     Diabetes type II Mother, Sister     Heart failure Mother     Hypertension Mother, Father, Sister, Brother     Pacemaker/defibrilator Sister     Pulmonary embolism Brother                    Tobacco Use    Smoking status: Former Smoker       Last attempt to quit: 2004       Years since quitting: 15.3    Smokeless tobacco: Never Used    Tobacco comment: Smoked for 60 years   Substance and Sexual Activity    Alcohol use: No       Frequency: Never    Drug use: No    Sexual activity: Yes       Partners: Female      Review of Systems   Unable to perform " ROS: Mental status change   Skin: Negative for rash.      Objective:      Vitals:    05/01/19 2032 05/02/19 0728 05/02/19 0745 05/02/19 1210   BP:  (!) 125/56  (!) 111/55   BP Location:  Left arm  Left arm   Patient Position:  Sitting  Sitting   Pulse:  62  67   Resp:  18     Temp:  97.8 °F (36.6 °C)     TempSrc:  Tympanic     SpO2: 95% 95% 95%    Weight:       Height:                 Weight: 89.4 kg (197 lb)  Body mass index is 29.95 kg/m².     Physical Exam        Exam:  Gen Appearance, well developed/nourished in no apparent distress  CV: 2+ distal pulses with no edema or swelling  Neuro:  MS: Awake, alert, Sustains attention but not fully (distracted at times). But not oriented to exact day of week.  Recent details are impaired and he is mildly frustrated which is out of character for him /remote memory is intact.Language is full to spontaneous speech/comprehension. Fund of Knowledge is full.  CN: Optic discs are flat with normal vasculature, PERRL, Extraoccular movements and visual fields are full. Left eye lid mildly drooping (he states known cosmetic) and right lower facial droop is noted, mild.  Normal facial strength,Tongue and Palate are midline and  strong. Shoulder Shrug symmetric and strong.  Motor: Normal bulk, tone, no abnormal movements.  5/5 bilateral UE and LE strength  Except 4-/5 right tricepts and 1+ reflexes  Sensory: Romberg negative and intact to vibration and temp   Cerebellar: Finger-nose, heal to nose, Rapid alternating movements intact  Gait: Not done        Significant Labs: Creatinine improved again today, CBC reviewed  Occult blood positive     Significant Imaging: Ct head 4/29/19 1.  No CT evidence of an acute intracranial abnormality.    2.  Atrophy and small vessel ischemic changes of the periventricular white matter.  Old lacunar infarcts.     MRI brain 4/29/19:1.  Two tiny focal areas of increased signal intensity on diffusion-weighted images as described above likely representing  artifact at the crux of the sulci.  A secondary consideration is tiny lacunar infarcts.  Dr. Laverne Shipman was telephoned with a verbal report at the time of this dictation.    2.  Atrophy and small vessel ischemic changes of the periventricular white matter.     Assessment and Plan:   St Lacho Farooq is a 84 y.o. male known to me for cerebellar CVA in 2016, history of afib.  He was seen by me earlier this month due to confusion and right sided weakness which was found to be from acute cardioembolic CVA- thought to be due to septic emboli after recent TAVR and due to non-compliance with NOAC at home.  He was improved, but has suffered increased confusion and brief increase in his right sided weakness after increased temperature on 4/29/19 (has happened once prior since most recent CVA). Repeat MRI shows continued and new emobolic CVAs  Cerebrovascular accident (CVA)  -He is at risk to shower more emboli, but the treatment is antibotics as this is a septic thrombus as reviewed with patient's daughter. The event witnessed this am could have been another shower of emboli or seizure. Must consider seizure as an additional diagnosis given the stereotyped nature of his events. Check CT head now. Daughter, Mitzy, is aware we do not have EEG here. She does not wish for him to be transferred at this point. Will try with small dose Keppra to see if this improves his symptoms.   -He is a DNR.   -Continue NOAC given his afib and the thrombus (benefits of this are greater than risk of bleeding currently)  -Repeat Head CT for any significant changes in MS or neuro exam (especially any lasting longer than 15 minutes) going forward vs family can decline if they wish to avoid more aggressive measures (if patient has continued decline)  -Allow permissive HTN to 220/220 for   Acute CVAs- lisinopril on hold currently (as is HCTZ and he is on reduced dose Amlodipine). Ok to start lowering BP again this weekend if needed         -plan is continue  NOAC, SWING rehab, and IV antibiotics for artrial thrombus per ID. Hydration status and anemia are being addressed by primary team. Daughter is aware that the patient is occult stool blood positive but wishes he continue NOAC given stable H/H.       I am not available to see this patient until Monday. For any further persistent neurological symptoms, a transfer to higher level of care can be considered.   Mitchel Fernandez MD  Neurology  [facility]

## 2019-05-02 NOTE — NURSING
Called to patients room by rahat Guardado; pt sitting up trying to eat lunch; unable to grasp fork. Very uncoordinated movements with right arm. Disoriented to place and situation. Vitals taken. MD notified. New orders noted.

## 2019-05-02 NOTE — PLAN OF CARE
Problem: Adult Inpatient Plan of Care  Goal: Plan of Care Review  Outcome: Ongoing (interventions implemented as appropriate)  Pt and granddaughter agree with plan of care.  Vs stable.  Pt confused of time and place.  Granddaughter at bedside with pt.  Call bell in reach.  Esitter at bedside.  IV antibiotics continued.  PICC line intact and dry.  Will continue to monitor.

## 2019-05-03 PROBLEM — L98.421 SKIN ULCER OF SACRUM, LIMITED TO BREAKDOWN OF SKIN: Status: ACTIVE | Noted: 2019-05-03

## 2019-05-03 LAB
ALBUMIN SERPL BCP-MCNC: 2.6 G/DL (ref 3.5–5.2)
ALP SERPL-CCNC: 53 U/L (ref 55–135)
ALT SERPL W/O P-5'-P-CCNC: 13 U/L (ref 10–44)
ANION GAP SERPL CALC-SCNC: 8 MMOL/L (ref 8–16)
AST SERPL-CCNC: 15 U/L (ref 10–40)
BASOPHILS # BLD AUTO: 0.03 K/UL (ref 0–0.2)
BASOPHILS NFR BLD: 0.8 % (ref 0–1.9)
BILIRUB SERPL-MCNC: 0.6 MG/DL (ref 0.1–1)
BUN SERPL-MCNC: 16 MG/DL (ref 8–23)
CALCIUM SERPL-MCNC: 10.1 MG/DL (ref 8.7–10.5)
CHLORIDE SERPL-SCNC: 111 MMOL/L (ref 95–110)
CO2 SERPL-SCNC: 26 MMOL/L (ref 23–29)
CREAT SERPL-MCNC: 1 MG/DL (ref 0.5–1.4)
DIFFERENTIAL METHOD: ABNORMAL
EOSINOPHIL # BLD AUTO: 0.1 K/UL (ref 0–0.5)
EOSINOPHIL NFR BLD: 2.9 % (ref 0–8)
ERYTHROCYTE [DISTWIDTH] IN BLOOD BY AUTOMATED COUNT: 19.8 % (ref 11.5–14.5)
EST. GFR  (AFRICAN AMERICAN): >60 ML/MIN/1.73 M^2
EST. GFR  (NON AFRICAN AMERICAN): >60 ML/MIN/1.73 M^2
GLUCOSE SERPL-MCNC: 102 MG/DL (ref 70–110)
HCT VFR BLD AUTO: 28.6 % (ref 40–54)
HGB BLD-MCNC: 8.3 G/DL (ref 14–18)
LYMPHOCYTES # BLD AUTO: 0.8 K/UL (ref 1–4.8)
LYMPHOCYTES NFR BLD: 20.7 % (ref 18–48)
MCH RBC QN AUTO: 22.8 PG (ref 27–31)
MCHC RBC AUTO-ENTMCNC: 29 G/DL (ref 32–36)
MCV RBC AUTO: 79 FL (ref 82–98)
MONOCYTES # BLD AUTO: 0.4 K/UL (ref 0.3–1)
MONOCYTES NFR BLD: 11.7 % (ref 4–15)
NEUTROPHILS # BLD AUTO: 2.4 K/UL (ref 1.8–7.7)
NEUTROPHILS NFR BLD: 63.9 % (ref 38–73)
PLATELET # BLD AUTO: 286 K/UL (ref 150–350)
PMV BLD AUTO: 8.8 FL (ref 9.2–12.9)
POTASSIUM SERPL-SCNC: 3.7 MMOL/L (ref 3.5–5.1)
PROT SERPL-MCNC: 6 G/DL (ref 6–8.4)
RBC # BLD AUTO: 3.64 M/UL (ref 4.6–6.2)
SODIUM SERPL-SCNC: 145 MMOL/L (ref 136–145)
WBC # BLD AUTO: 3.76 K/UL (ref 3.9–12.7)

## 2019-05-03 PROCEDURE — 63600175 PHARM REV CODE 636 W HCPCS: Performed by: INTERNAL MEDICINE

## 2019-05-03 PROCEDURE — 94761 N-INVAS EAR/PLS OXIMETRY MLT: CPT

## 2019-05-03 PROCEDURE — 97110 THERAPEUTIC EXERCISES: CPT

## 2019-05-03 PROCEDURE — 25000003 PHARM REV CODE 250: Performed by: FAMILY MEDICINE

## 2019-05-03 PROCEDURE — 25000003 PHARM REV CODE 250: Performed by: INTERNAL MEDICINE

## 2019-05-03 PROCEDURE — A4216 STERILE WATER/SALINE, 10 ML: HCPCS | Performed by: INTERNAL MEDICINE

## 2019-05-03 PROCEDURE — 80053 COMPREHEN METABOLIC PANEL: CPT

## 2019-05-03 PROCEDURE — 97530 THERAPEUTIC ACTIVITIES: CPT

## 2019-05-03 PROCEDURE — 25000003 PHARM REV CODE 250: Performed by: PSYCHIATRY & NEUROLOGY

## 2019-05-03 PROCEDURE — 85025 COMPLETE CBC W/AUTO DIFF WBC: CPT

## 2019-05-03 PROCEDURE — 11000004 HC SNF PRIVATE

## 2019-05-03 PROCEDURE — 97535 SELF CARE MNGMENT TRAINING: CPT

## 2019-05-03 PROCEDURE — 99232 PR SUBSEQUENT HOSPITAL CARE,LEVL II: ICD-10-PCS | Mod: ,,, | Performed by: INTERNAL MEDICINE

## 2019-05-03 PROCEDURE — 99232 SBSQ HOSP IP/OBS MODERATE 35: CPT | Mod: ,,, | Performed by: INTERNAL MEDICINE

## 2019-05-03 PROCEDURE — 25000003 PHARM REV CODE 250: Performed by: NURSE PRACTITIONER

## 2019-05-03 PROCEDURE — 36415 COLL VENOUS BLD VENIPUNCTURE: CPT

## 2019-05-03 RX ORDER — DOXYLAMINE SUCCINATE 25 MG
TABLET ORAL 2 TIMES DAILY
Status: DISCONTINUED | OUTPATIENT
Start: 2019-05-03 | End: 2019-05-16 | Stop reason: HOSPADM

## 2019-05-03 RX ADMIN — RAMELTEON 8 MG: 8 TABLET, FILM COATED ORAL at 08:05

## 2019-05-03 RX ADMIN — LEVETIRACETAM 250 MG: 250 TABLET ORAL at 08:05

## 2019-05-03 RX ADMIN — ATORVASTATIN CALCIUM 10 MG: 10 TABLET, FILM COATED ORAL at 08:05

## 2019-05-03 RX ADMIN — ASPIRIN 81 MG: 81 TABLET, COATED ORAL at 08:05

## 2019-05-03 RX ADMIN — DABIGATRAN ETEXILATE MESYLATE 150 MG: 75 CAPSULE ORAL at 08:05

## 2019-05-03 RX ADMIN — Medication 10 ML: at 08:05

## 2019-05-03 RX ADMIN — CARVEDILOL 25 MG: 25 TABLET, FILM COATED ORAL at 05:05

## 2019-05-03 RX ADMIN — MICONAZOLE NITRATE: 20 CREAM TOPICAL at 08:05

## 2019-05-03 RX ADMIN — LIDOCAINE 1 PATCH: 50 PATCH TOPICAL at 05:05

## 2019-05-03 RX ADMIN — MICONAZOLE NITRATE: 20 CREAM TOPICAL at 02:05

## 2019-05-03 RX ADMIN — Medication 10 ML: at 12:05

## 2019-05-03 RX ADMIN — Medication 10 ML: at 05:05

## 2019-05-03 RX ADMIN — TAMSULOSIN HYDROCHLORIDE 0.4 MG: 0.4 CAPSULE ORAL at 08:05

## 2019-05-03 RX ADMIN — AMPICILLIN SODIUM 2 G: 2 INJECTION, POWDER, FOR SOLUTION INTRAMUSCULAR; INTRAVENOUS at 08:05

## 2019-05-03 RX ADMIN — AMPICILLIN SODIUM 2 G: 2 INJECTION, POWDER, FOR SOLUTION INTRAMUSCULAR; INTRAVENOUS at 02:05

## 2019-05-03 RX ADMIN — PANTOPRAZOLE SODIUM 40 MG: 40 TABLET, DELAYED RELEASE ORAL at 08:05

## 2019-05-03 RX ADMIN — AMPICILLIN SODIUM 2 G: 2 INJECTION, POWDER, FOR SOLUTION INTRAMUSCULAR; INTRAVENOUS at 03:05

## 2019-05-03 RX ADMIN — CARVEDILOL 25 MG: 25 TABLET, FILM COATED ORAL at 08:05

## 2019-05-03 RX ADMIN — Medication 10 ML: at 02:05

## 2019-05-03 NOTE — ASSESSMENT & PLAN NOTE
H/H stable without acute indications for transfusion   Continue to monitor    check anemia w/u from last visit ferritin was 26, retic was 1.5  Occult stool today, will not change our plan just give us answer to where blood is going. He needs the blood thinner for now due to septic emboli.     Cont to monitor occult pending  Transfuse as needed, needs blood thinner.    H/H stable.

## 2019-05-03 NOTE — ASSESSMENT & PLAN NOTE
Subacute CVA with focal deficits noted on 4/14, with significant improvement in symptoms   MRI did not demonstrate acute findings but neuro reviewed and Dx with new CVA;   Was being tx with Pradaxa when he had acute stroke but Will not consider failed therapy as patient has had lapses in therapy due to recent surgical interventions and non-compliance  Per Dr. Fernandez's recommendations we will continue:  -Telemetry.  -Neurochecks.  -NO TPA as he presented initially on Sunday  -Physical Therapy and OT Consult, evaluation and treatment at [place] once patient is admitted to  SNF  -Completed permissive HTN and will need strict BP control.   -Continue his current dose home meds otherwise including Asprin, Pradaxa and statin       4/29: Reduce pradaxa due to renal function today  Consult colin due to AMS and neuro changes overnight. Repeat CT head without acute changes. ? Watershed injury; ? Keep BP a little higher? Dehydration playing a role    4/30 go back up on pradaxa due to kidneys now functioning. Reviewed MRI with Kel Fernandez and Dr Ventura. ST/OT/PT    5/2 pt doing well with gait training and speech. Up in day room today    5/3 had another episode of change in mental status yesterday evening. Started on keppra, ct negative. Doing better today

## 2019-05-03 NOTE — ASSESSMENT & PLAN NOTE
Check U.A , PSA, bladder scan; bladder scan 89ml   Still with incontinence issues   Gets HCTz 12.5 and lasix 20mg po daily in am   trial of flomax     Stop fluid pill, creat better with hydration.

## 2019-05-03 NOTE — SUBJECTIVE & OBJECTIVE
Review of Systems   Constitutional: Negative.    HENT: Negative for congestion, ear pain, sinus pressure, sneezing and sore throat.    Eyes: Negative.    Respiratory: Negative for cough, chest tightness, shortness of breath and wheezing.    Cardiovascular: Negative.  Negative for chest pain.   Gastrointestinal: Negative for abdominal pain, blood in stool, constipation, diarrhea, nausea and vomiting.   Genitourinary: Negative for difficulty urinating, dysuria and flank pain.   Musculoskeletal: Negative.  Negative for joint swelling.        Tailbone area discomfort    Skin: Negative.    Neurological: Positive for facial asymmetry and weakness (right sided last night, now resolved). Negative for dizziness and headaches.   Hematological: Negative.    Psychiatric/Behavioral: Positive for confusion. Negative for behavioral problems.     Objective:     Vital Signs (Most Recent):  Temp: 96.9 °F (36.1 °C) (05/02/19 1922)  Pulse: 71 (05/02/19 1922)  Resp: 17 (05/02/19 1922)  BP: (!) 124/58 (05/02/19 1922)  SpO2: 95 % (05/02/19 1922) Vital Signs (24h Range):  Temp:  [96.9 °F (36.1 °C)-98.4 °F (36.9 °C)] 96.9 °F (36.1 °C)  Pulse:  [62-71] 71  Resp:  [17-18] 17  SpO2:  [94 %-96 %] 95 %  BP: (111-140)/(55-63) 124/58     Weight: 89.4 kg (197 lb)(RD reviewed)  Body mass index is 29.95 kg/m².    Physical Exam   Constitutional: He is oriented to person, place, and time. He appears well-developed and well-nourished.   HENT:   Head: Normocephalic and atraumatic.   Right Ear: Tympanic membrane, external ear and ear canal normal.   Left Ear: Tympanic membrane, external ear and ear canal normal.   Mouth/Throat: Oropharynx is clear and moist.   Eyes: Pupils are equal, round, and reactive to light. Conjunctivae and EOM are normal.   Neck: Normal range of motion. Neck supple. No tracheal deviation present.   Cardiovascular: Normal rate, regular rhythm, intact distal pulses and normal pulses. Exam reveals no gallop and no friction rub.    Murmur heard.  Pulmonary/Chest: Effort normal and breath sounds normal.   Abdominal: Soft. Bowel sounds are normal. He exhibits no distension. There is no tenderness. Hernia confirmed negative in the right inguinal area and confirmed negative in the left inguinal area.   Musculoskeletal: Normal range of motion. He exhibits edema.   Neurological: He is alert and oriented to person, place, and time. He has normal reflexes. A cranial nerve deficit is present.   R facial asymmetry    Skin: Skin is warm and dry.   Skin breakdown inter gluteal   Macerated     Psychiatric: He has a normal mood and affect. His behavior is normal. Judgment and thought content normal.   Nursing note and vitals reviewed.        CRANIAL NERVES     CN III, IV, VI   Pupils are equal, round, and reactive to light.  Extraocular motions are normal.        Significant Labs:   CMP:   Recent Labs   Lab 19  0519  0553    145   K 3.4* 3.7    111*   CO2 25 26   * 102   BUN 24* 16   CREATININE 1.2 1.0   CALCIUM 10.0 10.1   PROT 5.9* 6.0   ALBUMIN 2.6* 2.6*   BILITOT 0.6 0.6   ALKPHOS 56 53*   AST 14 15   ALT 12 13   ANIONGAP 9 8   EGFRNONAA 56* >60     PSA 1.4,   Recent Labs   Lab 19  0553   WBC 3.76*   RBC 3.64*   HGB 8.3*   HCT 28.6*      MCV 79*   MCH 22.8*   MCHC 29.0*      Urinalysis negative     Lab Results   Component Value Date    PSA 1.4 2019 Mag 2.2    Blood cultures  NGTD x 2     blood cultures   Enterococcus faecalis       CULTURE, BLOOD     Ampicillin <=2 mcg/mL Sensitive     Gentamicin Synergy Screen <=500 mcg/mL Sensitive     Tetracycline <=4 mcg/mL Sensitive     Vancomycin 2 mcg/mL Sensitive      Flu negative       Significant Imagin/2 CT head   1.  No CT evidence of an acute intracranial abnormality.    2.  Atrophy and small vessel ischemic changes of the periventricular white matter.     MRI   1.  Two tiny focal areas of increased signal intensity on  diffusion-weighted images as described above likely representing artifact at the crux of the sulci.  A secondary consideration is tiny lacunar infarcts.  Dr. Laverne Shipman was telephoned with a verbal report at the time of this dictation.    2.  Atrophy and small vessel ischemic changes of the periventricular white matter.    4/29 CT head   1.  No CT evidence of an acute intracranial abnormality.    2.  Atrophy and small vessel ischemic changes of the periventricular white matter.  Old lacunar infarcts.    4/22 x ray hip right   No evidence of right hip fracture.    4/22 x ray hip left   No evidence of left hip fracture.    4/22 CXR   Right-sided PICC with tip projecting over superior vena cava.    4/18 Echo   · Mildly decreased left ventricular systolic function. The estimated ejection fraction is 45%  · Local segmental wall motion abnormalities.  · Eccentric left ventricular hypertrophy.  · Severe left atrial enlargement.  · Grade I (mild) left ventricular diastolic dysfunction consistent with impaired relaxation.  · Normal left atrial pressure.  · Normal right ventricular systolic function.  · Mild right atrial enlargement.  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 I did not see mention of AVR in encounter notes or March echo report or prior INES report but there appears to be a bioprosthesis. There is mild central aortic insufficiency present.  · Mild mitral regurgitation.  · Normal central venous pressure (3 mm Hg).  · The estimated PA systolic pressure is 31 mm Hg    4/18 INES  Final Impressions  1. The study quality is good.   2. 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  3. Bubble study is positive for Grade II Patent Foramen Ovale.  4. Moderate lipomatous atrial septal hypertrophy.  5. Normal functioning prosthetic aortic valve with trivial   paravalvular regurgitation.   6. Preserved left ventricular function. Ejection fraction around 55%.  7. Grade III atherosclerosis of aortic arch.

## 2019-05-03 NOTE — PT/OT/SLP PROGRESS
"Physical Therapy Treatment    Patient Name:  St Lacho Farooq   MRN:  [id number]    Recommendations:     Discharge Recommendations:  home health PT, home with home health   Discharge Equipment Recommendations: cane, straight   Barriers to discharge: Inaccessible home    Assessment:     St Lacho Farooq is a 84 y.o. male admitted with a medical diagnosis of Bacteremia.  He presents with the following impairments/functional limitations:  weakness, impaired endurance, impaired self care skills, gait instability, impaired functional mobilty, impaired cognition, decreased lower extremity function, impaired balance. Patient seen at bedside chair where he slept good  last night with sitter present today. Ambulated patient using Straight Cane x 250 feet to Therapy room with Supervision. Performed ascending/desending 16 stair steps using St Cane and handrail with Supervision. Provided patietn with rest periods. No sign of distressa and fatigue.    Rehab Prognosis: Good; patient would benefit from acute skilled PT services to address these deficits and reach maximum level of function.    Recent Surgery: * No surgery found *      Plan:     During this hospitalization, patient to be seen 5 x/week to address the identified rehab impairments via therapeutic activities, gait training, therapeutic exercises and progress toward the following goals:    · Plan of Care Expires:  05/02/19    Subjective     Chief Complaint: Patient has no new complain. " I slept good last night and I have a good breakfast this morning".  Patient/Family Comments/goals: "To get better and go home with my daughter".  Pain/Comfort:  · Pain Rating 1: 0/10  · Pain Rating Post-Intervention 1: 0/10      Objective:     Communicated with patient  and sitter prior to session.  Patient found up in chair with peripheral IV, PICC line upon PT entry to room.     General Precautions: Standard, fall   Orthopedic Precautions:N/A   Braces: N/A     Functional " Mobility:  · Bed Mobility:     · Rolling Left:  modified independence  · Rolling Right: modified independence  · Supine to Sit: modified independence  · Sit to Supine: modified independence  · Transfers:     · Sit to Stand:  supervision with no AD  · Bed to Chair: supervision with  no AD  using  Step Transfer  · Gait: Patient able to ambulate using eightr RW or Straight Cane x 250 feet with Supervision. Reciprogal gait with 25% increased Right Foot/Floor clearance, increased estefanía and increased weight shifting.  · Balance: Dynamic Standing with Good- grade  · Stairs:  Pt ascended/descended 16 steps stair(s) with Straight Cane with right handrail with Supervision or Set-up Assistance.       AM-PAC 6 CLICK MOBILITY  Turning over in bed (including adjusting bedclothes, sheets and blankets)?: 4  Sitting down on and standing up from a chair with arms (e.g., wheelchair, bedside commode, etc.): 3  Moving from lying on back to sitting on the side of the bed?: 4  Moving to and from a bed to a chair (including a wheelchair)?: 3  Need to walk in hospital room?: 3  Climbing 3-5 steps with a railing?: 3  Basic Mobility Total Score: 20       Therapeutic Activities and Exercises:   Pt performed Bilateral  LE exercises consisting of Active range of motion exercises for strengthening and coordination ex  x 20 reps consisting of Ankle DF, Ankle PF, side steps at sitting  alternately, LAQ alternately, marching at sitting and arm push ups on the chair with pt able to tolerate activities very well with rest periods. Implemented Gait trng using Straight Cane x 250 feet with Supervision and Ascending/Descending 16 stair steps using Left St cane and Right handrail    PT discussed importance of patient's performance of Home Exercise Program (HEP) with pt verbalizing understanding.     Patient left up in chair with all lines intact, call button in reach, Nurse notified and Sitter present..    GOALS:   Multidisciplinary Problems      Physical Therapy Goals        Problem: Physical Therapy Goal    Goal Priority Disciplines Outcome Goal Variances Interventions   Physical Therapy Goal     PT, PT/OT Ongoing (interventions implemented as appropriate)     Description:  Goals to be met by:  19     Patient will increase functional independence with mobility by performin. Supine to sit with Independent - met 19  2. Sit to supine with Independent - met 19  3. Bed to chair transfer with Modified Independent with or without R W or straight Cane  using Step Transfer TECHNIQUE  4. Gait  x 300  feet with Modified Independent with or without straight cane or RW  5. Lower extremity exercise program x10 reps per handout, with assistance as needed.  6. Able to ascend/descend 12 stair steps using handrail/Straight  Cane with supervision. Met 5/3/19  7. Able to ascend/descend 20 statir steps using handrail/Straight Cane with Supervision.                         Time Tracking:     PT Received On: 19  PT Start Time: 1235     PT Stop Time: 1325  PT Total Time (min): 50 min     Billable Minutes: Therapeutic Activity 45 mins    Treatment Type: Treatment  PT/PTA: PT           Keven Goldstein, PT  2019

## 2019-05-03 NOTE — ASSESSMENT & PLAN NOTE
Continue Lisinopril, HCTZ  4/29: Hold lasix for now. Already had dose this am. Give gentle hydration given rising Cr. Likely a little dry  4/30 cont to hold lasix, lisinopril and hctz LAUREL hose.    5/2 holding ace. Diuretics cont compression hose and BB    5/3 Cont BB and asa, no ace/arb/lasix due to permissive HTN.

## 2019-05-03 NOTE — ASSESSMENT & PLAN NOTE
Continue amlodpine, Lisinopril, HCTZ, , coreg  4/29: Lasix- will hold now that Creat bumped and follow renal fxn  4/30 stopped lasix and lisinopril yesterday, today stop hctz and reduce amlodipine  He needs a little high bp.    5/2 Bp still in 120/50- cut the amlodipine out today  5/3 cont to hold bp meds, allowing permissive HTN, bp still only 124/58. Can start to treat bp if needed over weekend per neuro recs.

## 2019-05-03 NOTE — PLAN OF CARE
Problem: Adult Inpatient Plan of Care  Goal: Plan of Care Review  Outcome: Ongoing (interventions implemented as appropriate)  Patient with some agitation and being uncooperative at beginning on shift. Patient questioning the need for any staff or the sitter at bedside. Tried reorienting and patient still questioning things. He talked to daughter Mitzy and was more cooperative. Becoming more pleasant throughout shift. Still some confusion noted. VS stable, Alert to person only. PICC line flushed and blood return noted. IV antibiotics administered as ordered. Sitter at bedside.Patient up in chair. Use RR with stand by assist and walker. No acute changes note. Plan of care reviewed and followed.

## 2019-05-03 NOTE — PROGRESS NOTES
[facility] Medicine  Progress Note    Patient Name: St Lacho Farooq  MRN: [id number]  Patient Class: IP- Swing   Admission Date: 4/23/2019  Length of Stay: 10 days  Attending Physician: John Saeed MD  Primary Care Provider: Dylan Adam MD        Subjective:     Principal Problem:Bacteremia    HPI:  This 84 year old man with PMHX of CAD p CABG, [facility] 2/19; stable lesions, PAD, bilateral CVD, AAA,  PAF, HTN, hyperlipidemia, MALATHI, NYHA II chronic diastolic HF and non rheumatic critical AS with JAMAAL 0.8cm2 with preserved LVSFX EF55%,  per Echo underwent  + TAVR placed 3/18/19 per Dr. Babcock. Post op course was complicated by CVA ~ 2 weeks post TAVR; he presented to [place] 4/16 with altered mental status and fever where he was Dx with CVA and gram + bacteremia.   He was  transferred to [facility] 4/19  for cardiology and ID eval ant tx; CT at that time was unrevealing for an acute intracranial process  His MRI was abnormal.  His blood cultures have grown Enterococcus faecalis sensitve to all tested drugs,  Pt initially given vancomycin and then changed to ampicillin IV.  He has clinically improved.  His INES shows a septic atrial thrombus. The cRP is 49 and the ESR is 15.  Pt is no longer febrile and he and daughter feel he is at base line physically and mentally.Pt transferred to [place] yesterday for continued skilled care and prolonged IV ABX. Plan is 6 weeks of ABX; ampicillin 2gms IV q 6; day 1 with negative blood cultures was 4/19; so today is day 6/42  Requests DNR .    Hospital Course:  4/25/19 SKILLED NURSING for prolonged ABX   Pt sitting up in chair watching TV; reports feeling fine but did have urinary incontinence and noting frequency   No fever, WBC normal   Day 7/42 ABX for bacteremia/septic emboli     4/26/19 SKILLED NURSING for prolonged ABX for bacteremia, septic thrombus; day 8/42  NAD Overnight, still with night time urinary incontinence; will try trial of  flomax   PSA 1.4, repeat U/A ok; bladder scan ok     4/28 pt on SKILLED bed for prolonged abx day 10/42 for bacteremia from septic emboli. Ampicillin which cultures show sensitivity to. He also had a low grade temp last night. With the temp he had confusion and right sided weakness. Ct head done with no changes. Neuro was consulted for this am. He has this same type episode last visit with temp and mental status changes. This am he is back to normal. To note his H/H was lower and also creat bumped to 2.1.     4/29 pt on SKILL. Has some neuro changes on Sunday evening. CT without new strokes. MRI ordered per Dr shaw yesterday. Shows 3 new strokes. Facial droop this am . Neuro/cards and vascular surgery discussed pt case. He needs the anticoagulation with afib and thrombus in atrium. Will cont pradaxa, no heparin. Also remains on IV abx for 6 weeks. Day 12/42. No fever. No elevated WBC.     Creat was elevated yesterday 2.1. Lasix was stopped and 500ml IVF given. Creat better today 1.5. Will hold the hctz today as well as stopped the lisinopril and reduce amlodipine to have him a little higher with pressure due to acute strokes. Use LAUREL for swelling of lower extremity   R facial drop still apparent     5/2 pt doing well. he is sitting in day the day room. No complaints./ H.H stable. K a little low. Remains on pradaxa full dose now as kidneys have returned to baseline. Day 15/42 on ampicillin.     5/3 he is now on day 16/42 on enterococcus sensitive bacteremia to ampicillin. He is also on pradaxa full strength for atrial thrombus. Occult positive but h/h stable. Risk of discontinuing NOAC vs continuing discussed repetitively and family agrees to cont. Watching h/h daily.     Having episodes of mental status changes. Neuro following. Ct done again yesterday no acute changes. Keppra started for consideration of sz given the description of his events. No EEG available here, family does not wish for transfer.  Otherwise all bp meds have been held to allow for permissive HTN. Bp still only 124/58 this am.      Review of Systems   Constitutional: Negative.    HENT: Negative for congestion, ear pain, sinus pressure, sneezing and sore throat.    Eyes: Negative.    Respiratory: Negative for cough, chest tightness, shortness of breath and wheezing.    Cardiovascular: Negative.  Negative for chest pain.   Gastrointestinal: Negative for abdominal pain, blood in stool, constipation, diarrhea, nausea and vomiting.   Genitourinary: Negative for difficulty urinating, dysuria and flank pain.   Musculoskeletal: Negative.  Negative for joint swelling.        Tailbone area discomfort    Skin: Negative.    Neurological: Positive for facial asymmetry and weakness (right sided last night, now resolved). Negative for dizziness and headaches.   Hematological: Negative.    Psychiatric/Behavioral: Positive for confusion. Negative for behavioral problems.     Objective:     Vital Signs (Most Recent):  Temp: 96.9 °F (36.1 °C) (05/02/19 1922)  Pulse: 71 (05/02/19 1922)  Resp: 17 (05/02/19 1922)  BP: (!) 124/58 (05/02/19 1922)  SpO2: 95 % (05/02/19 1922) Vital Signs (24h Range):  Temp:  [96.9 °F (36.1 °C)-98.4 °F (36.9 °C)] 96.9 °F (36.1 °C)  Pulse:  [62-71] 71  Resp:  [17-18] 17  SpO2:  [94 %-96 %] 95 %  BP: (111-140)/(55-63) 124/58     Weight: 89.4 kg (197 lb)(RD reviewed)  Body mass index is 29.95 kg/m².    Physical Exam   Constitutional: He is oriented to person, place, and time. He appears well-developed and well-nourished.   HENT:   Head: Normocephalic and atraumatic.   Right Ear: Tympanic membrane, external ear and ear canal normal.   Left Ear: Tympanic membrane, external ear and ear canal normal.   Mouth/Throat: Oropharynx is clear and moist.   Eyes: Pupils are equal, round, and reactive to light. Conjunctivae and EOM are normal.   Neck: Normal range of motion. Neck supple. No tracheal deviation present.   Cardiovascular: Normal rate,  regular rhythm, intact distal pulses and normal pulses. Exam reveals no gallop and no friction rub.   Murmur heard.  Pulmonary/Chest: Effort normal and breath sounds normal.   Abdominal: Soft. Bowel sounds are normal. He exhibits no distension. There is no tenderness. Hernia confirmed negative in the right inguinal area and confirmed negative in the left inguinal area.   Musculoskeletal: Normal range of motion. He exhibits edema.   Neurological: He is alert and oriented to person, place, and time. He has normal reflexes. A cranial nerve deficit is present.   R facial asymmetry    Skin: Skin is warm and dry.   Skin breakdown inter gluteal   Macerated     Psychiatric: He has a normal mood and affect. His behavior is normal. Judgment and thought content normal.   Nursing note and vitals reviewed.        CRANIAL NERVES     CN III, IV, VI   Pupils are equal, round, and reactive to light.  Extraocular motions are normal.        Significant Labs:   CMP:   Recent Labs   Lab 19  0528 19  0553    145   K 3.4* 3.7    111*   CO2 25 26   * 102   BUN 24* 16   CREATININE 1.2 1.0   CALCIUM 10.0 10.1   PROT 5.9* 6.0   ALBUMIN 2.6* 2.6*   BILITOT 0.6 0.6   ALKPHOS 56 53*   AST 14 15   ALT 12 13   ANIONGAP 9 8   EGFRNONAA 56* >60     PSA 1.4,   Recent Labs   Lab 19  0553   WBC 3.76*   RBC 3.64*   HGB 8.3*   HCT 28.6*      MCV 79*   MCH 22.8*   MCHC 29.0*      Urinalysis negative     Lab Results   Component Value Date    PSA 1.4 2019 Mag 2.2    Blood cultures  NGTD x 2     blood cultures   Enterococcus faecalis       CULTURE, BLOOD     Ampicillin <=2 mcg/mL Sensitive     Gentamicin Synergy Screen <=500 mcg/mL Sensitive     Tetracycline <=4 mcg/mL Sensitive     Vancomycin 2 mcg/mL Sensitive      Flu negative       Significant Imagin/2 CT head   1.  No CT evidence of an acute intracranial abnormality.    2.  Atrophy and small vessel ischemic changes of the  periventricular white matter.    4/29 MRI   1.  Two tiny focal areas of increased signal intensity on diffusion-weighted images as described above likely representing artifact at the crux of the sulci.  A secondary consideration is tiny lacunar infarcts.  Dr. Laverne Shipman was telephoned with a verbal report at the time of this dictation.    2.  Atrophy and small vessel ischemic changes of the periventricular white matter.    4/29 CT head   1.  No CT evidence of an acute intracranial abnormality.    2.  Atrophy and small vessel ischemic changes of the periventricular white matter.  Old lacunar infarcts.    4/22 x ray hip right   No evidence of right hip fracture.    4/22 x ray hip left   No evidence of left hip fracture.    4/22 CXR   Right-sided PICC with tip projecting over superior vena cava.    4/18 Echo   · Mildly decreased left ventricular systolic function. The estimated ejection fraction is 45%  · Local segmental wall motion abnormalities.  · Eccentric left ventricular hypertrophy.  · Severe left atrial enlargement.  · Grade I (mild) left ventricular diastolic dysfunction consistent with impaired relaxation.  · Normal left atrial pressure.  · Normal right ventricular systolic function.  · Mild right atrial enlargement.  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 I did not see mention of AVR in encounter notes or March echo report or prior INES report but there appears to be a bioprosthesis. There is mild central aortic insufficiency present.  · Mild mitral regurgitation.  · Normal central venous pressure (3 mm Hg).  · The estimated PA systolic pressure is 31 mm Hg    4/18 INES  Final Impressions  1. The study quality is good.   2. 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  3. Bubble study is positive for Grade II Patent Foramen Ovale.  4. Moderate lipomatous atrial septal hypertrophy.  5. Normal functioning prosthetic aortic valve with trivial   paravalvular regurgitation.   6. Preserved left  "ventricular function. Ejection fraction around 55%.  7. Grade III atherosclerosis of aortic arch.    Assessment/Plan:      * Bacteremia  S/p recent TAVR now with septic emboli and concern for "flicking emboli"   ENTEROCOCCUS faecalis    His blood cultures have grown Enterococcus faecalis sensitve to all tested drugs  ID recommending total of 6 weeks IV ABX; Ampicillin 2gms IV q 6 hr; day 1 with negative blood cultures was 4/19/19; so today is day 6/42  Day 7/42 4/26/19 day 8/42 4/29 day 11/42 of IV abd. 4/19 was on vanc and 4/20 started on ampicillin  4/30 day 12/42.  4/19 was on vanc and 4/20 started on ampicillin    5/2 day 15/42 of IV abx- repeat blood cultures today  5/3 day 16/42 on ampicillin, no growth in repeat blood cultures from 5/2    Skin ulcer of sacrum, limited to breakdown of skin  Barrier cream ; nizoral cream  Donut to sit ;already doing it       Encephalopathy  Somnolent today.  Considering emolic showers vs sz activity. CT negative, neuro started keppra.    Renal insufficiency  Noted 4/29: Cr up to 2.1  Reduce pradaxa  Hold lasix.  Ns 500ml bolus today slowly  Better today, hold hctz and lasix .    Nocturnal enuresis  Check U.A , PSA, bladder scan; bladder scan 89ml   Still with incontinence issues   Gets HCTz 12.5 and lasix 20mg po daily in am   trial of flomax     Stop fluid pill, creat better with hydration.    Atrial thrombus  Large atrial thrombus found on INES ,Dr. Galicia deemed septic emboli; still no INES reporting thrombus   Continue Pradaxa; family plans to monitor meds once home  Will not consider failed therapy as patient has had lapses in therapy due to recent surgical interventions and non-compliance   as discussed above there is concern for infected thrombus.   Per Infectious Disease will treat bacteremia and possible infected thrombus for 6 weeks.    5/2 Discussed with family and cardiology. rec repeat INES 2 weeks consider changing NOAC at that time, not now as concern over " anticoagulation is issue with new CVA       Cerebrovascular accident (CVA)  Subacute CVA with focal deficits noted on 4/14, with significant improvement in symptoms   MRI did not demonstrate acute findings but neuro reviewed and Dx with new CVA;   Was being tx with Pradaxa when he had acute stroke but Will not consider failed therapy as patient has had lapses in therapy due to recent surgical interventions and non-compliance  Per Dr. Fernandez's recommendations we will continue:  -Telemetry.  -Neurochecks.  -NO TPA as he presented initially on Sunday  -Physical Therapy and OT Consult, evaluation and treatment at [place] once patient is admitted to  SNF  -Completed permissive HTN and will need strict BP control.   -Continue his current dose home meds otherwise including Asprin, Pradaxa and statin       4/29: Reduce pradaxa due to renal function today  Consult colin due to AMS and neuro changes overnight. Repeat CT head without acute changes. ? Watershed injury; ? Keep BP a little higher? Dehydration playing a role    4/30 go back up on pradaxa due to kidneys now functioning. Reviewed MRI with Kel Fernandez and Dr Ventura. ST/OT/PT    5/2 pt doing well with gait training and speech. Up in day room today    5/3 had another episode of change in mental status yesterday evening. Started on keppra, ct negative. Doing better today    Hx of CABG  Asa, statin    CHF (congestive heart failure), NYHA class II, chronic, diastolic  Continue Lisinopril, HCTZ  4/29: Hold lasix for now. Already had dose this am. Give gentle hydration given rising Cr. Likely a little dry  4/30 cont to hold lasix, lisinopril and hctz LAUREL hose.    5/2 holding ace. Diuretics cont compression hose and BB    5/3 Cont BB and asa, no ace/arb/lasix due to permissive HTN.    Anemia  H/H stable without acute indications for transfusion   Continue to monitor    check anemia w/u from last visit ferritin was 26, retic was 1.5  Occult stool today, will not  change our plan just give us answer to where blood is going. He needs the blood thinner for now due to septic emboli.     Cont to monitor occult pending  Transfuse as needed, needs blood thinner.    H/H stable.        Essential hypertension  Continue amlodpine, Lisinopril, HCTZ, , coreg  4/29: Lasix- will hold now that Creat bumped and follow renal fxn  4/30 stopped lasix and lisinopril yesterday, today stop hctz and reduce amlodipine  He needs a little high bp.    5/2 Bp still in 120/50- cut the amlodipine out today  5/3 cont to hold bp meds, allowing permissive HTN, bp still only 124/58. Can start to treat bp if needed over weekend per neuro recs.    Aortic valve stenosis  S/p TAVR       VTE Risk Mitigation (From admission, onward)        Ordered     dabigatran etexilate capsule 150 mg  2 times daily      04/30/19 0907     heparin, porcine (PF) 100 unit/mL injection flush 500 Units  As needed (PRN)      04/25/19 1408              Ciera Aiken MD  Department of Hospital Medicine   [facility]

## 2019-05-03 NOTE — ASSESSMENT & PLAN NOTE
Noted 4/29: Cr up to 2.1  Reduce pradaxa  Hold lasix.  Ns 500ml bolus today slowly  Better today, hold hctz and lasix .

## 2019-05-03 NOTE — ASSESSMENT & PLAN NOTE
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 4/19 was on vanc and 4/20 started on ampicillin  4/30 day 12/42.  4/19 was on vanc and 4/20 started on ampicillin    5/2 day 15/42 of IV abx- repeat blood cultures today  5/3 day 16/42 on ampicillin, no growth in repeat blood cultures from 5/2  "

## 2019-05-03 NOTE — PLAN OF CARE
Problem: Adult Inpatient Plan of Care  Goal: Plan of Care Review  Outcome: Ongoing (interventions implemented as appropriate)  Patient had a good day today. Up with walker assisted by personal sitter. Neuro checks. IV ampicillin. Picc line to Right upper forearm. Patient experiencing discomfort from excoriated area near sacrum; miconazole cream started. Up in solarium most of afternoon visiting with old friend. No distress. Tele-sitter in use. Safety and comfort maintained. Agrees with plan of care.

## 2019-05-03 NOTE — PT/OT/SLP PROGRESS
Occupational Therapy   Treatment    Name: St Lacho Farooq  MRN: [id number]  Admitting Diagnosis:  Bacteremia       Recommendations:     Discharge Recommendations: home health PT, home health OT, home with home health  Discharge Equipment Recommendations:  cane, straight, walker, rolling  Barriers to discharge:  None    Assessment:     St Lacho Farooq is a 84 y.o. male with a medical diagnosis of Bacteremia.  He presents with generalized weakness and is reported to be more fatigued in the afternoon. Pt is alert and oriented to his surroundings. Minimal SOB and muscle fatigue following performing therapueitc  Interventions. Pt noted with minimal incoordination while transferring to the toilet during a toilet transfer and hygiene. Performance deficits affecting function are weakness, impaired endurance, impaired self care skills, decreased ROM, decreased coordination, impaired cognition, impaired functional mobilty, decreased safety awareness, impaired coordination, impaired fine motor, abnormal tone, impaired balance, decreased lower extremity function.     Rehab Prognosis:  Good; patient would benefit from acute skilled OT services to address these deficits and reach maximum level of function.       Plan:     Patient to be seen 5 x/week to address the above listed problems via self-care/home management, therapeutic activities, therapeutic exercises  · Plan of Care Expires: 05/10/19  · Plan of Care Reviewed with: patient    Subjective     Pain/Comfort:  · Pain Rating 1: 0/10  · Pain Rating Post-Intervention 1: 0/10    Objective:     Communicated with: patient and pts sitter prior to session.  Patient found up in chair with PICC line, peripheral IV upon OT entry to room.    General Precautions: Standard, fall   Orthopedic Precautions:N/A   Braces: N/A     Occupational Performance:     Bed Mobility:    · NT today as patient was sitting up in chair finishing dinner upon treatment.     Functional  Mobility/Transfers:  · Patient completed Sit <> Stand Transfer with stand by assistance  with  rolling walker   · Patient completed Toilet Transfer Step Transfer technique with stand by assistance with  rolling walker  · Functional Mobility: Minimally limited due to incoordination and decreased dynamic balance due to fatigue. Deficits with safety awareness negatively impacting fxnal mobility and independence.    Activities of Daily Living:  · Feeding:  modified independence due to pt was able to feed himself dinner, requiring only help to place his tray infront of himself and help opening drink due to decreased FMC.  · Grooming: NT- Supervision- reported by sitter who indicated he is able to complete tasks but needs help remembering steps every now and again.  · Bathing: NT  · UB Dressing: NT- Sitter reported he is dressing with supervision. Needing help occasionally to manipulate fasteners.  · LB Dressing: NT- Sitter reported he is dressing himself with supervision. Needing help occasionally to manipulate fasteners  · Toileting: Modified independence- requires a walker to transfer into bathroom. Cuing to remember to keep the walker close for safety concerns.      [facility] 6 Click ADL: 21    Treatment & Education:  Performed sit to stand x 10 reps  Performed overhead reaching/shoulder flexion x 25 reps (each arm)  Performed lateral reaching x 25 reps (each side)  Performed bicep curls x 25 (each arm)  Unsupported sitting during dinner to increase trunk control and strength to increase ADL performance.  Sit to stand w/ manipulation of environment from chair to bathroom to perform grooming hygiene and toileting and hygiene.  Educated on safety awareness with keeping walker close when transferring to prevent fall risk. Pt verbalized understanding.    Patient left up in chair with all lines intact and call button in reachEducation:      GOALS:   Multidisciplinary Problems     Occupational Therapy Goals        Problem:  Occupational Therapy Goal    Goal Priority Disciplines Outcome Interventions   Occupational Therapy Goal     OT, PT/OT     Description:  Goals to be met by:6/10/2019     Patient will increase functional independence with ADLs by performing:    LE Dressing with Modified [place].  Grooming while standing at sink with Modified [place].  Toileting from toilet with Modified [place] for hygiene and clothing management.   Bathing from  shower chair/bench with Modified [place].  Upper extremity exercise program x10 reps per handout, with assistance as needed  Patient will consistently 3/3 trials at different times of the day demonstrate ability to be a) oriented x 4 (using watch if needed for time of day)  b) use RN call light appropriately before getting up c) complete a >10 piece puzzle d) complete a word search page with 90% accuracy d) add and subtract 2 digit numbers 5/5 tries with 90% accuracy e) color by number from coloring book attending to periphery of the page as well as center of the page without errors. E) fill his medbox with the 13 pills he says he takes daily, twice per day with 100% accuracy f) fill out his medication list with WHAT he takes, WHY he takes it, WHEN he takes it, HOW and HOW often he takes it , and possible side effects of each medication  Complete the [employer] pre -  Driving cognitive screen without any errors.                      Time Tracking:     OT Date of Treatment: 05/03/19  OT Start Time: 0500  OT Stop Time: 0525  OT Total Time (min): 25 min    Billable Minutes:Self Care/Home Management 15 minutes- Toileting, toilet hygiene, grooming hygiene, self feeding.  Therapeutic Exercise 10 minutes- repetitive reaching activities in various directions/planes, sit to stands, unsupported trunk control.    Yazmin Frankel, OT  5/3/2019

## 2019-05-03 NOTE — PROGRESS NOTES
Nursing Notes  Bedside handoff completed with SILVESTRE [place]. Patient sleeping between care in chair. Private sitter sitting on sofa awake in view of patient. Call bell within reach of patient. Tele-sitter in use.  Will continue to follow.       Huddle Comments

## 2019-05-03 NOTE — PROGRESS NOTES
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 Hx A fib with recent TAVR and Sepsis and believed to have septic atrial thrombus and placed on IV ABX x 6 wks. He has been here on swing unit undergoing rehab. Patient developed fever confusion and rt sided weakness. Similar to his presentation previously. CT of head was unremarkable. Permissive HTN has been allowed.    Interval History: Pt without complaints currently. Pt with episodes of decreased LOC.    ROS   Constitutional: Negative   Eyes: Negative    Respiratory: Negative    Cardiovascular: Negative   Gastrointestinal: Negative   Genitourinary: Negative   Musculoskeletal: Negative   Skin: Negative .   Neurological: Negative       Objective:     Vital Signs (Most Recent):  Temp: 96.3 °F (35.7 °C) (05/03/19 0758)  Pulse: 62 (05/03/19 0758)  Resp: 18 (05/03/19 0758)  BP: 139/65 (05/03/19 0758)  SpO2: (!) 94 % (05/03/19 0800) Vital Signs (24h Range):  Temp:  [96.3 °F (35.7 °C)-98.4 °F (36.9 °C)] 96.3 °F (35.7 °C)  Pulse:  [62-71] 62  Resp:  [17-18] 18  SpO2:  [94 %-96 %] 94 %  BP: (111-140)/(55-65) 139/65     Weight: 89.4 kg (197 lb)(RD reviewed)  Body mass index is 29.95 kg/m².    SpO2: (!) 94 %  O2 Device (Oxygen Therapy): room air      Intake/Output Summary (Last 24 hours) at 5/3/2019 0926  Last data filed at 5/3/2019 0821  Gross per 24 hour   Intake 730 ml   Output --   Net 730 ml       Lines/Drains/Airways     Peripherally Inserted Central Catheter Line                 PICC Double Lumen 04/22/19 1108 10 days                Physical Exam  General  appearance: alert, appears stated age and cooperative  Head: Normocephalic, without obvious abnormality, atraumatic  Eyes: conjunctivae/corneas clear. PERRL  Neck: no carotid bruit, no JVD and supple, symmetrical, trachea midline  Lungs: clear to auscultation bilaterally, normal respiratory effort  Chest Wall: no tenderness  Heart: regular rate and rhythm, S1, S2 normal, systolic 2/6 murmur, click, rub or gallop  Abdomen: soft, non-tender; bowel sounds normal; no masses,  no organomegaly  Extremities: Extremities normal, atraumatic, no cyanosis, clubbing, 2+BLE edema  Pulses: Dorsalis Pedis R: 2+ (normal)/L: 2+ (normal)  Skin: Skin color, texture, turgor normal. No rashes or lesions  Neurologic: Normal mood and affect  Alert and oriented X 3    Significant Labs:   BMP:   Recent Labs   Lab 05/02/19 0528 05/03/19  0553   * 102    145   K 3.4* 3.7    111*   CO2 25 26   BUN 24* 16   CREATININE 1.2 1.0   CALCIUM 10.0 10.1   , CMP   Recent Labs   Lab 05/02/19 0528 05/03/19  0553    145   K 3.4* 3.7    111*   CO2 25 26   * 102   BUN 24* 16   CREATININE 1.2 1.0   CALCIUM 10.0 10.1   PROT 5.9* 6.0   ALBUMIN 2.6* 2.6*   BILITOT 0.6 0.6   ALKPHOS 56 53*   AST 14 15   ALT 12 13   ANIONGAP 9 8   ESTGFRAFRICA >60 >60   EGFRNONAA 56* >60   , CBC   Recent Labs   Lab 05/02/19 0528 05/03/19  0553   WBC 4.85 3.76*   HGB 8.1* 8.3*   HCT 26.9* 28.6*    286    and Troponin No results for input(s): TROPONINI in the last 48 hours.      Assessment and Plan:       Active Diagnoses:    Diagnosis Date Noted POA    PRINCIPAL PROBLEM:  Bacteremia [R78.81] 04/18/2019 Yes    Skin ulcer of sacrum, limited to breakdown of skin [L98.421] 05/03/2019 No    Encephalopathy [G93.40]  No    Renal insufficiency [N28.9] 04/29/2019 Yes    Nocturnal enuresis [N39.44] 04/25/2019 No    Atrial thrombus [I51.3] 04/19/2019 Yes    Cerebrovascular accident (CVA) [I63.9] 04/16/2019 Yes    Hx of CABG [Z95.1]  02/19/2019 Not Applicable    CHF (congestive heart failure), NYHA class II, chronic, diastolic [I50.32] 02/19/2019 Yes    Anemia [D64.9] 11/03/2016 Yes    Essential hypertension [I10] 10/28/2016 Yes    Aortic valve stenosis [I35.0] 06/28/2016 Yes      Problems Resolved During this Admission:       VTE Risk Mitigation (From admission, onward)        Ordered     dabigatran etexilate capsule 150 mg  2 times daily      04/30/19 0907     heparin, porcine (PF) 100 unit/mL injection flush 500 Units  As needed (PRN)      04/25/19 1408        Current Facility-Administered Medications   Medication    acetaminophen tablet 650 mg    albuterol-ipratropium 2.5 mg-0.5 mg/3 mL nebulizer solution 3 mL    ampicillin 2 g in sodium chloride 0.9 % 100 mL IVPB (ready to mix system)    aspirin EC tablet 81 mg    atorvastatin tablet 10 mg    carvedilol tablet 25 mg    dabigatran etexilate capsule 150 mg    heparin, porcine (PF) 100 unit/mL injection flush 500 Units    HYDROmorphone injection 0.5 mg    levETIRAcetam tablet 250 mg    lidocaine 5 % patch 1 patch    miconazole 2 % cream    ondansetron injection 4 mg    pantoprazole EC tablet 40 mg    ramelteon tablet 8 mg    sodium chloride 0.9% flush 10 mL    And    sodium chloride 0.9% flush 10 mL    tamsulosin 24 hr capsule 0.4 mg    traMADol tablet 100 mg     CAD [facility] 2/19/2019 S/P CABG patent LIMA to LAD, Patent SVG to first Diag,  of CFX,  of RCA.      TAVR on 3/18/19  Admitted to [facility] of 4/18/19 with confusion found to have subacute CVA thought to be cardioembolic with Hx of A fib, anticoagulation interuption and recent valve replacement. During that admission developed fever with +blood cultures and was subsequently transferred to [facility] for INES. INES on 4/18/19 revealed large thrombus burden in EDELMIRA, EF 55%, bubble study positive for grade II PFO, prosthetic AV functioning normally. No valvular vegetation noted. There was an episode of NSVT while at [facility] and  patient was evaluated by EP consider EF study of continued episodes. Patient has had no further episodes. Patient was seen by ID while at [facility] and it was deteremined that given recent valve replacement LA thrombus, and bacteremia patient should be treated with endocarditis ABX regimen. Patient was started on 6 wk regiment of IV ABX and transferred back to [facility] for rehab.      On evening of 4/28/19 patient developed fever, confusion, and rt sided weakness with hypotension. MRI reveals acute punctate infarctions. Suggestive of further cardio-embolic stroke. NOAC been continued per neurology recommendation. Mental status has since improved. Permissive HTN has been allowed given acute CVAs.   Patient is a DNR      Dat Mederos NP  Cardiology  [facility]    PLAN:  Recent CVA with interruption of NOAC.  Pt with multiple other comorbidities which puts him at risk for CVA including multiple calcified vessels of the carotid and vertebral system.  May consider repeat INES in the coming weeks (as he has recently had one roughly 3 weeks ago) to reassess left atrial thrombus.   Continue current NOAC for now.

## 2019-05-04 LAB
ALBUMIN SERPL BCP-MCNC: 2.6 G/DL (ref 3.5–5.2)
ALP SERPL-CCNC: 56 U/L (ref 55–135)
ALT SERPL W/O P-5'-P-CCNC: 13 U/L (ref 10–44)
ANION GAP SERPL CALC-SCNC: 8 MMOL/L (ref 8–16)
AST SERPL-CCNC: 14 U/L (ref 10–40)
BASOPHILS # BLD AUTO: 0.03 K/UL (ref 0–0.2)
BASOPHILS NFR BLD: 0.6 % (ref 0–1.9)
BILIRUB SERPL-MCNC: 0.5 MG/DL (ref 0.1–1)
BUN SERPL-MCNC: 18 MG/DL (ref 8–23)
CALCIUM SERPL-MCNC: 9.8 MG/DL (ref 8.7–10.5)
CHLORIDE SERPL-SCNC: 112 MMOL/L (ref 95–110)
CO2 SERPL-SCNC: 24 MMOL/L (ref 23–29)
CREAT SERPL-MCNC: 0.9 MG/DL (ref 0.5–1.4)
DIFFERENTIAL METHOD: ABNORMAL
EOSINOPHIL # BLD AUTO: 0.2 K/UL (ref 0–0.5)
EOSINOPHIL NFR BLD: 3 % (ref 0–8)
ERYTHROCYTE [DISTWIDTH] IN BLOOD BY AUTOMATED COUNT: 19.8 % (ref 11.5–14.5)
EST. GFR  (AFRICAN AMERICAN): >60 ML/MIN/1.73 M^2
EST. GFR  (NON AFRICAN AMERICAN): >60 ML/MIN/1.73 M^2
GLUCOSE SERPL-MCNC: 104 MG/DL (ref 70–110)
HCT VFR BLD AUTO: 28.4 % (ref 40–54)
HGB BLD-MCNC: 8.3 G/DL (ref 14–18)
LYMPHOCYTES # BLD AUTO: 1 K/UL (ref 1–4.8)
LYMPHOCYTES NFR BLD: 20.5 % (ref 18–48)
MCH RBC QN AUTO: 22.9 PG (ref 27–31)
MCHC RBC AUTO-ENTMCNC: 29.2 G/DL (ref 32–36)
MCV RBC AUTO: 79 FL (ref 82–98)
MONOCYTES # BLD AUTO: 0.5 K/UL (ref 0.3–1)
MONOCYTES NFR BLD: 9.8 % (ref 4–15)
NEUTROPHILS # BLD AUTO: 3.3 K/UL (ref 1.8–7.7)
NEUTROPHILS NFR BLD: 66.1 % (ref 38–73)
PLATELET # BLD AUTO: 285 K/UL (ref 150–350)
PMV BLD AUTO: 9.2 FL (ref 9.2–12.9)
POTASSIUM SERPL-SCNC: 4 MMOL/L (ref 3.5–5.1)
PROT SERPL-MCNC: 5.8 G/DL (ref 6–8.4)
RBC # BLD AUTO: 3.62 M/UL (ref 4.6–6.2)
SODIUM SERPL-SCNC: 144 MMOL/L (ref 136–145)
WBC # BLD AUTO: 4.98 K/UL (ref 3.9–12.7)

## 2019-05-04 PROCEDURE — 36415 COLL VENOUS BLD VENIPUNCTURE: CPT

## 2019-05-04 PROCEDURE — 25000003 PHARM REV CODE 250: Performed by: PSYCHIATRY & NEUROLOGY

## 2019-05-04 PROCEDURE — 25000003 PHARM REV CODE 250: Performed by: NURSE PRACTITIONER

## 2019-05-04 PROCEDURE — 99900035 HC TECH TIME PER 15 MIN (STAT)

## 2019-05-04 PROCEDURE — 85025 COMPLETE CBC W/AUTO DIFF WBC: CPT

## 2019-05-04 PROCEDURE — 25000003 PHARM REV CODE 250: Performed by: INTERNAL MEDICINE

## 2019-05-04 PROCEDURE — 63600175 PHARM REV CODE 636 W HCPCS: Performed by: INTERNAL MEDICINE

## 2019-05-04 PROCEDURE — 11000004 HC SNF PRIVATE

## 2019-05-04 PROCEDURE — 97116 GAIT TRAINING THERAPY: CPT

## 2019-05-04 PROCEDURE — 80053 COMPREHEN METABOLIC PANEL: CPT

## 2019-05-04 PROCEDURE — 25000003 PHARM REV CODE 250: Performed by: FAMILY MEDICINE

## 2019-05-04 PROCEDURE — A4216 STERILE WATER/SALINE, 10 ML: HCPCS | Performed by: INTERNAL MEDICINE

## 2019-05-04 RX ADMIN — Medication 10 ML: at 02:05

## 2019-05-04 RX ADMIN — MICONAZOLE NITRATE: 20 CREAM TOPICAL at 08:05

## 2019-05-04 RX ADMIN — AMPICILLIN SODIUM 2 G: 2 INJECTION, POWDER, FOR SOLUTION INTRAMUSCULAR; INTRAVENOUS at 02:05

## 2019-05-04 RX ADMIN — LEVETIRACETAM 250 MG: 250 TABLET ORAL at 08:05

## 2019-05-04 RX ADMIN — Medication 10 ML: at 12:05

## 2019-05-04 RX ADMIN — TAMSULOSIN HYDROCHLORIDE 0.4 MG: 0.4 CAPSULE ORAL at 08:05

## 2019-05-04 RX ADMIN — ATORVASTATIN CALCIUM 10 MG: 10 TABLET, FILM COATED ORAL at 08:05

## 2019-05-04 RX ADMIN — CARVEDILOL 25 MG: 25 TABLET, FILM COATED ORAL at 08:05

## 2019-05-04 RX ADMIN — AMPICILLIN SODIUM 2 G: 2 INJECTION, POWDER, FOR SOLUTION INTRAMUSCULAR; INTRAVENOUS at 08:05

## 2019-05-04 RX ADMIN — LIDOCAINE 1 PATCH: 50 PATCH TOPICAL at 06:05

## 2019-05-04 RX ADMIN — DABIGATRAN ETEXILATE MESYLATE 150 MG: 75 CAPSULE ORAL at 08:05

## 2019-05-04 RX ADMIN — AMPICILLIN SODIUM 2 G: 2 INJECTION, POWDER, FOR SOLUTION INTRAMUSCULAR; INTRAVENOUS at 03:05

## 2019-05-04 RX ADMIN — Medication 10 ML: at 07:05

## 2019-05-04 RX ADMIN — ASPIRIN 81 MG: 81 TABLET, COATED ORAL at 08:05

## 2019-05-04 RX ADMIN — CARVEDILOL 25 MG: 25 TABLET, FILM COATED ORAL at 06:05

## 2019-05-04 RX ADMIN — PANTOPRAZOLE SODIUM 40 MG: 40 TABLET, DELAYED RELEASE ORAL at 08:05

## 2019-05-04 NOTE — PROGRESS NOTES
Nursing Notes  Bedside handoff completed with SILVESTRE [place]. Patient awake sitting in chair. Sitter  in view of patient. Call bell within reach of patient. Tele-sitter in use. Patient denies needs.  Will continue to follow.       Huddle Comments

## 2019-05-04 NOTE — PLAN OF CARE
Problem: Adult Inpatient Plan of Care  Goal: Plan of Care Review  Outcome: Ongoing (interventions implemented as appropriate)  Patient resting in chair with no complaints of pain. Alert to person/place/situation. Walks to restroom with help from walker and standby assist. No acute changes noted. Patient free from falls/injury. IV antibiotics administered as ordered. PICC line flush and draws, AM labs obtained from purple line and saline lock changed after. Plan of care reviewed and followed.

## 2019-05-04 NOTE — PT/OT/SLP PROGRESS
"Physical Therapy Treatment    Patient Name:  St Lacho Farooq   MRN:  [id number]    Recommendations:     Discharge Recommendations:  home with home health, home health PT, home health OT   Discharge Equipment Recommendations: cane, straight, walker, rolling   Barriers to discharge: Inaccessible home    Assessment:     St Lacho Farooq is a 84 y.o. male admitted with a medical diagnosis of Bacteremia.  He presents with the following impairments/functional limitations:  weakness, impaired endurance, impaired self care skills, decreased ROM, decreased coordination, decreased lower extremity function, abnormal tone, decreased safety awareness, impaired balance, impaired functional mobilty, impaired cognition. Pt was found sitting in lobby chair with sitter present this morning. Pt ambulated 140ft with RW, CGA/SBA. Noted pt with decrease swing to gait pattern and decrease heel strike. Pt performed TE well with no complaints of SOB, fatigued, or distressed. Will continue to progress towards treatment goals.     Rehab Prognosis: Good; patient would benefit from acute skilled PT services to address these deficits and reach maximum level of function.    Recent Surgery: * No surgery found *      Plan:     During this hospitalization, patient to be seen 5 x/week to address the identified rehab impairments via gait training, therapeutic activities, therapeutic exercises and progress toward the following goals:    · Plan of Care Expires:  05/02/19    Subjective     Chief Complaint: pt had no complaint at this time.  Patient/Family Comments/goals: "I'm ready, let's do it".  Pain/Comfort:  · Pain Rating 1: 0/10      Objective:     Communicated with nursing and pt prior to session.  Patient found up in chair in the lobby with sitter present   upon PT entry.     General Precautions: Standard, fall   Orthopedic Precautions:N/A   Braces: N/A     Functional Mobility:  · Transfers:     · Sit to Stand:  stand by assistance with " rolling walker  · Gait: 140ft with RW, CGA/SBA.   · Balance: Good in standing      AM-PAC 6 CLICK MOBILITY  Turning over in bed (including adjusting bedclothes, sheets and blankets)?: 4  Sitting down on and standing up from a chair with arms (e.g., wheelchair, bedside commode, etc.): 3  Moving from lying on back to sitting on the side of the bed?: 4  Moving to and from a bed to a chair (including a wheelchair)?: 3  Need to walk in hospital room?: 3  Climbing 3-5 steps with a railing?: 3  Basic Mobility Total Score: 20       Therapeutic Activities and Exercises:  Pt performed therapeutic exercises for BLE strengthening, endurance, and flexibility including: BLE AROM ankle DF/PF, LAQ, and standing marching with RW all 2 x 10. Pt displayed no fatigued or distressed during activities.     Patient left up in chair with sitter present, nurse Daya notified and sitter present..    GOALS:   Multidisciplinary Problems     Physical Therapy Goals        Problem: Physical Therapy Goal    Goal Priority Disciplines Outcome Goal Variances Interventions   Physical Therapy Goal     PT, PT/OT Ongoing (interventions implemented as appropriate)     Description:  Goals to be met by:  19     Patient will increase functional independence with mobility by performin. Supine to sit with Independent - met 19  2. Sit to supine with Independent - met 19  3. Bed to chair transfer with Modified Independent with or without R W or straight Cane  using Step Transfer TECHNIQUE  4. Gait  x 300  feet with Modified Independent with or without straight cane or RW  5. Lower extremity exercise program x10 reps per handout, with assistance as needed.  6. Able to ascend/descend 12 stair steps using handrail/Straight  Cane with supervision. Met 5/3/19  7. Able to ascend/descend 20 statir steps using handrail/Straight Cane with Supervision.                         Time Tracking:     PT Received On: 19  PT Start Time: 1000     PT Stop  Time: 1016  PT Total Time (min): 16 min     Billable Minutes: Gait Training 15    Treatment Type: Treatment  PT/PTA: PTA     PTA Visit Number: 1     Birgit Perez PTA  05/04/2019

## 2019-05-05 LAB
ALBUMIN SERPL BCP-MCNC: 2.6 G/DL (ref 3.5–5.2)
ALP SERPL-CCNC: 54 U/L (ref 55–135)
ALT SERPL W/O P-5'-P-CCNC: 13 U/L (ref 10–44)
ANION GAP SERPL CALC-SCNC: 7 MMOL/L (ref 8–16)
AST SERPL-CCNC: 17 U/L (ref 10–40)
BASOPHILS # BLD AUTO: 0.03 K/UL (ref 0–0.2)
BASOPHILS NFR BLD: 0.6 % (ref 0–1.9)
BILIRUB SERPL-MCNC: 0.5 MG/DL (ref 0.1–1)
BUN SERPL-MCNC: 15 MG/DL (ref 8–23)
CALCIUM SERPL-MCNC: 9.8 MG/DL (ref 8.7–10.5)
CHLORIDE SERPL-SCNC: 113 MMOL/L (ref 95–110)
CO2 SERPL-SCNC: 24 MMOL/L (ref 23–29)
CREAT SERPL-MCNC: 0.9 MG/DL (ref 0.5–1.4)
DIFFERENTIAL METHOD: ABNORMAL
EOSINOPHIL # BLD AUTO: 0.2 K/UL (ref 0–0.5)
EOSINOPHIL NFR BLD: 3.1 % (ref 0–8)
ERYTHROCYTE [DISTWIDTH] IN BLOOD BY AUTOMATED COUNT: 19.8 % (ref 11.5–14.5)
EST. GFR  (AFRICAN AMERICAN): >60 ML/MIN/1.73 M^2
EST. GFR  (NON AFRICAN AMERICAN): >60 ML/MIN/1.73 M^2
GLUCOSE SERPL-MCNC: 100 MG/DL (ref 70–110)
HCT VFR BLD AUTO: 29 % (ref 40–54)
HGB BLD-MCNC: 8.4 G/DL (ref 14–18)
LYMPHOCYTES # BLD AUTO: 1 K/UL (ref 1–4.8)
LYMPHOCYTES NFR BLD: 18.4 % (ref 18–48)
MCH RBC QN AUTO: 22.8 PG (ref 27–31)
MCHC RBC AUTO-ENTMCNC: 29 G/DL (ref 32–36)
MCV RBC AUTO: 79 FL (ref 82–98)
MONOCYTES # BLD AUTO: 0.5 K/UL (ref 0.3–1)
MONOCYTES NFR BLD: 9.4 % (ref 4–15)
NEUTROPHILS # BLD AUTO: 3.7 K/UL (ref 1.8–7.7)
NEUTROPHILS NFR BLD: 68.5 % (ref 38–73)
PLATELET # BLD AUTO: 257 K/UL (ref 150–350)
PMV BLD AUTO: 8.9 FL (ref 9.2–12.9)
POTASSIUM SERPL-SCNC: 4.2 MMOL/L (ref 3.5–5.1)
PROT SERPL-MCNC: 5.8 G/DL (ref 6–8.4)
RBC # BLD AUTO: 3.69 M/UL (ref 4.6–6.2)
SODIUM SERPL-SCNC: 144 MMOL/L (ref 136–145)
WBC # BLD AUTO: 5.44 K/UL (ref 3.9–12.7)

## 2019-05-05 PROCEDURE — A4216 STERILE WATER/SALINE, 10 ML: HCPCS | Performed by: INTERNAL MEDICINE

## 2019-05-05 PROCEDURE — 63600175 PHARM REV CODE 636 W HCPCS: Performed by: FAMILY MEDICINE

## 2019-05-05 PROCEDURE — 11000004 HC SNF PRIVATE

## 2019-05-05 PROCEDURE — 97530 THERAPEUTIC ACTIVITIES: CPT

## 2019-05-05 PROCEDURE — 85025 COMPLETE CBC W/AUTO DIFF WBC: CPT

## 2019-05-05 PROCEDURE — 63600175 PHARM REV CODE 636 W HCPCS: Performed by: INTERNAL MEDICINE

## 2019-05-05 PROCEDURE — 94760 N-INVAS EAR/PLS OXIMETRY 1: CPT

## 2019-05-05 PROCEDURE — 25000003 PHARM REV CODE 250: Performed by: NURSE PRACTITIONER

## 2019-05-05 PROCEDURE — 25000003 PHARM REV CODE 250: Performed by: PSYCHIATRY & NEUROLOGY

## 2019-05-05 PROCEDURE — 25000003 PHARM REV CODE 250: Performed by: FAMILY MEDICINE

## 2019-05-05 PROCEDURE — 25000003 PHARM REV CODE 250: Performed by: INTERNAL MEDICINE

## 2019-05-05 PROCEDURE — 36415 COLL VENOUS BLD VENIPUNCTURE: CPT

## 2019-05-05 PROCEDURE — 94761 N-INVAS EAR/PLS OXIMETRY MLT: CPT

## 2019-05-05 PROCEDURE — 80053 COMPREHEN METABOLIC PANEL: CPT

## 2019-05-05 RX ADMIN — AMPICILLIN SODIUM 2 G: 2 INJECTION, POWDER, FOR SOLUTION INTRAMUSCULAR; INTRAVENOUS at 09:05

## 2019-05-05 RX ADMIN — AMPICILLIN SODIUM 2 G: 2 INJECTION, POWDER, FOR SOLUTION INTRAMUSCULAR; INTRAVENOUS at 03:05

## 2019-05-05 RX ADMIN — HEPARIN 500 UNITS: 100 SYRINGE at 09:05

## 2019-05-05 RX ADMIN — Medication 10 ML: at 06:05

## 2019-05-05 RX ADMIN — PANTOPRAZOLE SODIUM 40 MG: 40 TABLET, DELAYED RELEASE ORAL at 08:05

## 2019-05-05 RX ADMIN — TAMSULOSIN HYDROCHLORIDE 0.4 MG: 0.4 CAPSULE ORAL at 08:05

## 2019-05-05 RX ADMIN — Medication 10 ML: at 03:05

## 2019-05-05 RX ADMIN — CARVEDILOL 25 MG: 25 TABLET, FILM COATED ORAL at 08:05

## 2019-05-05 RX ADMIN — MICONAZOLE NITRATE: 20 CREAM TOPICAL at 08:05

## 2019-05-05 RX ADMIN — DABIGATRAN ETEXILATE MESYLATE 150 MG: 75 CAPSULE ORAL at 08:05

## 2019-05-05 RX ADMIN — RAMELTEON 8 MG: 8 TABLET, FILM COATED ORAL at 09:05

## 2019-05-05 RX ADMIN — ATORVASTATIN CALCIUM 10 MG: 10 TABLET, FILM COATED ORAL at 09:05

## 2019-05-05 RX ADMIN — MICONAZOLE NITRATE: 20 CREAM TOPICAL at 10:05

## 2019-05-05 RX ADMIN — ASPIRIN 81 MG: 81 TABLET, COATED ORAL at 08:05

## 2019-05-05 RX ADMIN — LEVETIRACETAM 250 MG: 250 TABLET ORAL at 09:05

## 2019-05-05 RX ADMIN — Medication 10 ML: at 08:05

## 2019-05-05 RX ADMIN — CARVEDILOL 25 MG: 25 TABLET, FILM COATED ORAL at 05:05

## 2019-05-05 RX ADMIN — AMPICILLIN SODIUM 2 G: 2 INJECTION, POWDER, FOR SOLUTION INTRAMUSCULAR; INTRAVENOUS at 08:05

## 2019-05-05 RX ADMIN — DABIGATRAN ETEXILATE MESYLATE 150 MG: 75 CAPSULE ORAL at 09:05

## 2019-05-05 RX ADMIN — LEVETIRACETAM 250 MG: 250 TABLET ORAL at 08:05

## 2019-05-05 RX ADMIN — LIDOCAINE 1 PATCH: 50 PATCH TOPICAL at 05:05

## 2019-05-05 NOTE — PLAN OF CARE
Problem: Adult Inpatient Plan of Care  Goal: Plan of Care Review  Outcome: Ongoing (interventions implemented as appropriate)  Patient alert family at bedside answers all questions appropriately at present, up in ary. Afebrile. Ambulated with walker and standby assist. Safety and comfort maintained. PICC to RFA flushed without problems. IV ampicillin. Private sitter. Tele-sitter in use. Lidocaine patch to sacrum. Excoriated area to sacrum;denies pain at this time Agrees with plan of care.

## 2019-05-05 NOTE — PLAN OF CARE
Problem: Adult Inpatient Plan of Care  Goal: Plan of Care Review  Outcome: Ongoing (interventions implemented as appropriate)  Patient alert/awake and oriented today. Afebrile. Ambulated with walker. Safety and comfort maintained. PICC to RFA flushed without problems. IV ampicillin. Private sitter. Tele-sitter in use. Lidocaine patch to sacrum. Excoriated area to sacrum; miconazole cream started yesterday-improvement noted. Agrees with plan of care.

## 2019-05-05 NOTE — PROGRESS NOTES
Nursing Notes  Bedside handoff completed with SILVESTRE Cardenas. Patient awake sitting in chair. Sitter  in view of patient. Call bell within reach of patient. Tele-sitter in use. Patient denies needs.  Will continue to follow.       Huddle Comments

## 2019-05-05 NOTE — PT/OT/SLP PROGRESS
Physical Therapy Treatment    Patient Name:  St Lacho Farooq   MRN:  [id number]    Recommendations:     Discharge Recommendations:  home, home with home health, home health PT, home health OT   Discharge Equipment Recommendations: cane, straight, walker, rolling   Barriers to discharge: Inaccessible home    Assessment:     St Lacho Farooq is a 84 y.o. male admitted with a medical diagnosis of Bacteremia.  He presents with the following impairments/functional limitations:  impaired functional mobilty, weakness, impaired endurance, decreased safety awareness.  Pt in good spirits and eager to try moving around. Pt able to complete transfer activity with RW with good stability and able to ambulate  With good estefanía, fair stability without loss of balance today. Pt reports of neck pain no longer bothering him and that he is eager to move around.     Rehab Prognosis: Good; patient would benefit from acute skilled PT services to address these deficits and reach maximum level of function.    Recent Surgery: * No surgery found *      Plan:     During this hospitalization, patient to be seen 5 x/week to address the identified rehab impairments via gait training, therapeutic activities, therapeutic exercises and progress toward the following goals:    · Plan of Care Expires:  05/10/19    Subjective     Chief Complaint: No new complaints  Patient/Family Comments/goals: I want to move around  Pain/Comfort:  · Pain Rating 1: 0/10  · Pain Rating Post-Intervention 1: 0/10      Objective:     Communicated with patient prior to session.  Patient found up in chair with   upon PT entry to room.     General Precautions: Standard, fall   Orthopedic Precautions:N/A   Braces: N/A     Functional Mobility:  · Transfers:     · Sit to Stand:  modified independence with rolling walker  · Bed to Chair: modified independence with  rolling walker  using  Step Transfer  · Gait: Pt able to abmulate with RW (per pt's choice) x 300 ft with  SBA/CGA with pt needing occasional verbal cues for assistive device placement and sequencing.       AM-PAC 6 CLICK MOBILITY  Turning over in bed (including adjusting bedclothes, sheets and blankets)?: 4  Sitting down on and standing up from a chair with arms (e.g., wheelchair, bedside commode, etc.): 3  Moving from lying on back to sitting on the side of the bed?: 4  Moving to and from a bed to a chair (including a wheelchair)?: 3  Need to walk in hospital room?: 3  Climbing 3-5 steps with a railing?: 3  Basic Mobility Total Score: 20       Therapeutic Activities and Exercises:  Pt able to perform standing activity, functional reaching activity with emphasis on weight shifting away from COG. Pt requiring CGA/SBA to complete task.    Patient left up in chair with call button in reach and nursing notified..    GOALS:   Multidisciplinary Problems     Physical Therapy Goals        Problem: Physical Therapy Goal    Goal Priority Disciplines Outcome Goal Variances Interventions   Physical Therapy Goal     PT, PT/OT Revised     Description:  Goals to be met by:  5/10/19 (updated 19)    Patient will increase functional independence with mobility by performin. Supine to sit with Independent - met 19  2. Sit to supine with Independent - met 19  3. Bed to chair transfer with Modified Independent with or without R W or straight Cane  using Step Transfer TECHNIQUE  4. Gait  x 300  feet with Modified Independent with or without straight cane or RW  5. Lower extremity exercise program x10 reps per handout, with assistance as needed. Met 19  6. Able to ascend/descend 12 stair steps using handrail/Straight  Cane with supervision. Met 5/3/19  7. Able to ascend/descend 20 statir steps using handrail/Straight Cane with Supervision.                          Time Tracking:     PT Received On: 19  PT Start Time: 1100     PT Stop Time: 1115  PT Total Time (min): 15 min     Billable Minutes: Therapeutic  Activity 15    Treatment Type: Treatment  PT/PTA: PT     PTA Visit Number: 1     Gino Krause, PT  05/05/2019

## 2019-05-05 NOTE — PLAN OF CARE
Problem: Adult Inpatient Plan of Care  Goal: Plan of Care Review  Outcome: Ongoing (interventions implemented as appropriate)  Patient alert, awake and no distress today. Up with walker assistance. Private sitter today. Tele-sitter at bedside. No c/o pain. Picc line to RFA  patent; no complications. IV ampicillin Q 6 hours. Denies needs. Excoriation to sacrum improving; miconazole cream bid. Safety and comfort maintained .agrees with plan of care.

## 2019-05-06 LAB
ALBUMIN SERPL BCP-MCNC: 2.7 G/DL (ref 3.5–5.2)
ALP SERPL-CCNC: 59 U/L (ref 55–135)
ALT SERPL W/O P-5'-P-CCNC: 14 U/L (ref 10–44)
ANION GAP SERPL CALC-SCNC: 8 MMOL/L (ref 8–16)
AST SERPL-CCNC: 14 U/L (ref 10–40)
BASOPHILS # BLD AUTO: 0.03 K/UL (ref 0–0.2)
BASOPHILS NFR BLD: 0.4 % (ref 0–1.9)
BILIRUB SERPL-MCNC: 0.5 MG/DL (ref 0.1–1)
BUN SERPL-MCNC: 11 MG/DL (ref 8–23)
CALCIUM SERPL-MCNC: 9.8 MG/DL (ref 8.7–10.5)
CHLORIDE SERPL-SCNC: 112 MMOL/L (ref 95–110)
CO2 SERPL-SCNC: 24 MMOL/L (ref 23–29)
CREAT SERPL-MCNC: 0.9 MG/DL (ref 0.5–1.4)
DIFFERENTIAL METHOD: ABNORMAL
EOSINOPHIL # BLD AUTO: 0.3 K/UL (ref 0–0.5)
EOSINOPHIL NFR BLD: 3.7 % (ref 0–8)
ERYTHROCYTE [DISTWIDTH] IN BLOOD BY AUTOMATED COUNT: 19.7 % (ref 11.5–14.5)
EST. GFR  (AFRICAN AMERICAN): >60 ML/MIN/1.73 M^2
EST. GFR  (NON AFRICAN AMERICAN): >60 ML/MIN/1.73 M^2
GLUCOSE SERPL-MCNC: 120 MG/DL (ref 70–110)
HCT VFR BLD AUTO: 28.9 % (ref 40–54)
HGB BLD-MCNC: 8.4 G/DL (ref 14–18)
LYMPHOCYTES # BLD AUTO: 1 K/UL (ref 1–4.8)
LYMPHOCYTES NFR BLD: 15.2 % (ref 18–48)
MCH RBC QN AUTO: 22.9 PG (ref 27–31)
MCHC RBC AUTO-ENTMCNC: 29.1 G/DL (ref 32–36)
MCV RBC AUTO: 79 FL (ref 82–98)
MONOCYTES # BLD AUTO: 0.7 K/UL (ref 0.3–1)
MONOCYTES NFR BLD: 10.4 % (ref 4–15)
NEUTROPHILS # BLD AUTO: 4.7 K/UL (ref 1.8–7.7)
NEUTROPHILS NFR BLD: 70.3 % (ref 38–73)
PLATELET # BLD AUTO: 257 K/UL (ref 150–350)
PMV BLD AUTO: 9.1 FL (ref 9.2–12.9)
POTASSIUM SERPL-SCNC: 4 MMOL/L (ref 3.5–5.1)
PROT SERPL-MCNC: 5.7 G/DL (ref 6–8.4)
RBC # BLD AUTO: 3.67 M/UL (ref 4.6–6.2)
SODIUM SERPL-SCNC: 144 MMOL/L (ref 136–145)
WBC # BLD AUTO: 6.7 K/UL (ref 3.9–12.7)

## 2019-05-06 PROCEDURE — 36415 COLL VENOUS BLD VENIPUNCTURE: CPT

## 2019-05-06 PROCEDURE — 97530 THERAPEUTIC ACTIVITIES: CPT

## 2019-05-06 PROCEDURE — 25000003 PHARM REV CODE 250: Performed by: INTERNAL MEDICINE

## 2019-05-06 PROCEDURE — A4216 STERILE WATER/SALINE, 10 ML: HCPCS | Performed by: INTERNAL MEDICINE

## 2019-05-06 PROCEDURE — 85025 COMPLETE CBC W/AUTO DIFF WBC: CPT

## 2019-05-06 PROCEDURE — 25000003 PHARM REV CODE 250: Performed by: NURSE PRACTITIONER

## 2019-05-06 PROCEDURE — 94761 N-INVAS EAR/PLS OXIMETRY MLT: CPT

## 2019-05-06 PROCEDURE — 11000004 HC SNF PRIVATE

## 2019-05-06 PROCEDURE — 25000003 PHARM REV CODE 250: Performed by: FAMILY MEDICINE

## 2019-05-06 PROCEDURE — 99233 PR SUBSEQUENT HOSPITAL CARE,LEVL III: ICD-10-PCS | Mod: ,,, | Performed by: PSYCHIATRY & NEUROLOGY

## 2019-05-06 PROCEDURE — 99233 SBSQ HOSP IP/OBS HIGH 50: CPT | Mod: ,,, | Performed by: PSYCHIATRY & NEUROLOGY

## 2019-05-06 PROCEDURE — 94760 N-INVAS EAR/PLS OXIMETRY 1: CPT

## 2019-05-06 PROCEDURE — 97127 HC THERAPEUTIC INTVTN, COGN FUNCTION - ST: CPT

## 2019-05-06 PROCEDURE — 25000003 PHARM REV CODE 250: Performed by: PSYCHIATRY & NEUROLOGY

## 2019-05-06 PROCEDURE — 99233 SBSQ HOSP IP/OBS HIGH 50: CPT | Mod: ,,, | Performed by: INTERNAL MEDICINE

## 2019-05-06 PROCEDURE — 63600175 PHARM REV CODE 636 W HCPCS: Performed by: INTERNAL MEDICINE

## 2019-05-06 PROCEDURE — 99233 PR SUBSEQUENT HOSPITAL CARE,LEVL III: ICD-10-PCS | Mod: ,,, | Performed by: INTERNAL MEDICINE

## 2019-05-06 PROCEDURE — 80053 COMPREHEN METABOLIC PANEL: CPT

## 2019-05-06 RX ADMIN — LEVETIRACETAM 250 MG: 250 TABLET ORAL at 09:05

## 2019-05-06 RX ADMIN — AMPICILLIN SODIUM 2 G: 2 INJECTION, POWDER, FOR SOLUTION INTRAMUSCULAR; INTRAVENOUS at 09:05

## 2019-05-06 RX ADMIN — ASPIRIN 81 MG: 81 TABLET, COATED ORAL at 08:05

## 2019-05-06 RX ADMIN — CARVEDILOL 25 MG: 25 TABLET, FILM COATED ORAL at 08:05

## 2019-05-06 RX ADMIN — MICONAZOLE NITRATE: 20 CREAM TOPICAL at 11:05

## 2019-05-06 RX ADMIN — ATORVASTATIN CALCIUM 10 MG: 10 TABLET, FILM COATED ORAL at 09:05

## 2019-05-06 RX ADMIN — DABIGATRAN ETEXILATE MESYLATE 150 MG: 75 CAPSULE ORAL at 09:05

## 2019-05-06 RX ADMIN — Medication 10 ML: at 12:05

## 2019-05-06 RX ADMIN — PANTOPRAZOLE SODIUM 40 MG: 40 TABLET, DELAYED RELEASE ORAL at 08:05

## 2019-05-06 RX ADMIN — AMPICILLIN SODIUM 2 G: 2 INJECTION, POWDER, FOR SOLUTION INTRAMUSCULAR; INTRAVENOUS at 03:05

## 2019-05-06 RX ADMIN — CARVEDILOL 25 MG: 25 TABLET, FILM COATED ORAL at 05:05

## 2019-05-06 RX ADMIN — MICONAZOLE NITRATE: 20 CREAM TOPICAL at 09:05

## 2019-05-06 RX ADMIN — RAMELTEON 8 MG: 8 TABLET, FILM COATED ORAL at 09:05

## 2019-05-06 RX ADMIN — TAMSULOSIN HYDROCHLORIDE 0.4 MG: 0.4 CAPSULE ORAL at 08:05

## 2019-05-06 RX ADMIN — DABIGATRAN ETEXILATE MESYLATE 150 MG: 75 CAPSULE ORAL at 08:05

## 2019-05-06 RX ADMIN — Medication 10 ML: at 06:05

## 2019-05-06 RX ADMIN — LIDOCAINE 1 PATCH: 50 PATCH TOPICAL at 06:05

## 2019-05-06 RX ADMIN — Medication 10 ML: at 09:05

## 2019-05-06 RX ADMIN — LEVETIRACETAM 250 MG: 250 TABLET ORAL at 08:05

## 2019-05-06 NOTE — ASSESSMENT & PLAN NOTE
Continue amlodpine, Lisinopril, HCTZ, , coreg  4/29: Lasix- will hold now that Creat bumped and follow renal fxn  4/30 stopped lasix and lisinopril yesterday, today stop hctz and reduce amlodipine  He needs a little high bp.    5/2 Bp still in 120/50- cut the amlodipine out today  5/3 cont to hold bp meds, allowing permissive HTN, bp still only 124/58. Can start to treat bp if needed over weekend per neuro recs.    5/6: overall remains stable. No medication changes today

## 2019-05-06 NOTE — ASSESSMENT & PLAN NOTE
Subacute CVA with focal deficits noted on 4/14, with significant improvement in symptoms   MRI did not demonstrate acute findings but neuro reviewed and Dx with new CVA;   Was being tx with Pradaxa when he had acute stroke but Will not consider failed therapy as patient has had lapses in therapy due to recent surgical interventions and non-compliance  Per Dr. Fernandez's recommendations we will continue:  -Telemetry.  -Neurochecks.  -NO TPA as he presented initially on Sunday  -Physical Therapy and OT Consult, evaluation and treatment at [facility] once patient is admitted to  SNF  -Completed permissive HTN and will need strict BP control.   -Continue his current dose home meds otherwise including Asprin, Pradaxa and statin       4/29: Reduce pradaxa due to renal function today  Consult colin due to AMS and neuro changes overnight. Repeat CT head without acute changes. ? Watershed injury; ? Keep BP a little higher? Dehydration playing a role    4/30 go back up on pradaxa due to kidneys now functioning. Reviewed MRI with Kel Fernandez and Dr Ventura. ST/OT/PT    5/2 pt doing well with gait training and speech. Up in day room today    5/3 had another episode of change in mental status yesterday evening. Started on keppra, ct negative. Doing better today  5/6 No further mental status changes since staring KEPPRA

## 2019-05-06 NOTE — ASSESSMENT & PLAN NOTE
Somnolent today.  Considering emolic showers vs sz activity. CT negative, neuro started keppra.    5/6/19 much better with Keppra rx, no further mental status changes.

## 2019-05-06 NOTE — SUBJECTIVE & OBJECTIVE
Review of Systems   Constitutional: Negative.    HENT: Negative for congestion, ear pain, sinus pressure, sneezing and sore throat.    Eyes: Negative.    Respiratory: Negative for cough, chest tightness, shortness of breath and wheezing.    Cardiovascular: Negative.  Negative for chest pain.   Gastrointestinal: Negative for abdominal pain, constipation, diarrhea, nausea and vomiting.   Genitourinary: Negative for difficulty urinating, dysuria and flank pain.   Musculoskeletal: Negative.  Negative for joint swelling.   Skin: Negative.    Neurological: Negative for dizziness, facial asymmetry, weakness and headaches.   Hematological: Negative.    Psychiatric/Behavioral: Negative for behavioral problems and confusion.     Objective:     Vital Signs (Most Recent):  Temp: 97.9 °F (36.6 °C) (05/05/19 1946)  Pulse: 65 (05/05/19 1946)  Resp: 15 (05/05/19 1946)  BP: (!) 146/67 (05/05/19 1946)  SpO2: 97 % (05/05/19 1946) Vital Signs (24h Range):  Temp:  [97.9 °F (36.6 °C)] 97.9 °F (36.6 °C)  Pulse:  [65] 65  Resp:  [15] 15  SpO2:  [95 %-97 %] 97 %  BP: (146)/(67) 146/67     Weight: 89.4 kg (197 lb)(RD reviewed)  Body mass index is 29.95 kg/m².    Physical Exam   Constitutional: He is oriented to person, place, and time. He appears well-developed and well-nourished.   HENT:   Head: Normocephalic and atraumatic.   Right Ear: Tympanic membrane, external ear and ear canal normal.   Left Ear: Tympanic membrane, external ear and ear canal normal.   Mouth/Throat: Oropharynx is clear and moist.   Eyes: Pupils are equal, round, and reactive to light. Conjunctivae and EOM are normal.   Neck: Normal range of motion. Neck supple. No tracheal deviation present.   Cardiovascular: Normal rate, regular rhythm, intact distal pulses and normal pulses. Exam reveals no gallop and no friction rub.   Murmur heard.  Pulmonary/Chest: Effort normal and breath sounds normal.   Abdominal: Soft. Bowel sounds are normal. He exhibits no distension.  There is no tenderness. Hernia confirmed negative in the right inguinal area and confirmed negative in the left inguinal area.   Musculoskeletal: Normal range of motion. He exhibits no edema.   Neurological: He is alert and oriented to person, place, and time. He has normal reflexes. No cranial nerve deficit.   Skin: Skin is warm and dry.   Skin breakdown inter gluteal   Macerated     Psychiatric: He has a normal mood and affect. His behavior is normal. Judgment and thought content normal.   Nursing note and vitals reviewed.        CRANIAL NERVES     CN III, IV, VI   Pupils are equal, round, and reactive to light.  Extraocular motions are normal.        Significant Labs:   CMP:   Recent Labs   Lab 19  0654 19  0526    144   K 4.2 4.0   * 112*   CO2 24 24    120*   BUN 15 11   CREATININE 0.9 0.9   CALCIUM 9.8 9.8   PROT 5.8* 5.7*   ALBUMIN 2.6* 2.7*   BILITOT 0.5 0.5   ALKPHOS 54* 59   AST 17 14   ALT 13 14   ANIONGAP 7* 8   EGFRNONAA >60 >60     PSA 1.4,   Recent Labs   Lab 19  0526   WBC 6.70   RBC 3.67*   HGB 8.4*   HCT 28.9*      MCV 79*   MCH 22.9*   MCHC 29.1*      Urinalysis negative     Lab Results   Component Value Date    PSA 1.4 2019 Mag 2.2    Blood cultures  NGTD x 2     blood cultures   Enterococcus faecalis       CULTURE, BLOOD     Ampicillin <=2 mcg/mL Sensitive     Gentamicin Synergy Screen <=500 mcg/mL Sensitive     Tetracycline <=4 mcg/mL Sensitive     Vancomycin 2 mcg/mL Sensitive      Flu negative       Significant Imagin/2 CT head   1.  No CT evidence of an acute intracranial abnormality.    2.  Atrophy and small vessel ischemic changes of the periventricular white matter.     MRI   1.  Two tiny focal areas of increased signal intensity on diffusion-weighted images as described above likely representing artifact at the crux of the sulci.  A secondary consideration is tiny lacunar infarcts.  Dr. Laverne Shipman was  telephoned with a verbal report at the time of this dictation.    2.  Atrophy and small vessel ischemic changes of the periventricular white matter.    4/29 CT head   1.  No CT evidence of an acute intracranial abnormality.    2.  Atrophy and small vessel ischemic changes of the periventricular white matter.  Old lacunar infarcts.    4/22 x ray hip right   No evidence of right hip fracture.    4/22 x ray hip left   No evidence of left hip fracture.    4/22 CXR   Right-sided PICC with tip projecting over superior vena cava.    4/18 Echo   · Mildly decreased left ventricular systolic function. The estimated ejection fraction is 45%  · Local segmental wall motion abnormalities.  · Eccentric left ventricular hypertrophy.  · Severe left atrial enlargement.  · Grade I (mild) left ventricular diastolic dysfunction consistent with impaired relaxation.  · Normal left atrial pressure.  · Normal right ventricular systolic function.  · Mild right atrial enlargement.  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 I did not see mention of AVR in encounter notes or March echo report or prior [facility] report but there appears to be a bioprosthesis. There is mild central aortic insufficiency present.  · Mild mitral regurgitation.  · Normal central venous pressure (3 mm Hg).  · The estimated PA systolic pressure is 31 mm Hg    4/18 [facility]  Final Impressions  1. The study quality is good.   2. 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  3. Bubble study is positive for Grade II Patent Foramen Ovale.  4. Moderate lipomatous atrial septal hypertrophy.  5. Normal functioning prosthetic aortic valve with trivial   paravalvular regurgitation.   6. Preserved left ventricular function. Ejection fraction around 55%.  7. Grade III atherosclerosis of aortic arch.

## 2019-05-06 NOTE — PT/OT/SLP PROGRESS
Speech Language Pathology Treatment    Patient Name:  St Lacho Farooq   MRN:  [id number]  Admitting Diagnosis: Bacteremia    Recommendations:                 General Recommendations:  Speech/language therapy  Diet recommendations:  Regular, Liquid Diet Level: Thin   Aspiration Precautions: Standard aspiration precautions   General Precautions: Standard    Communication strategies:  none    Subjective     Pt found sitting in sun room upon initiation of session. Pt agreeable to all tasks throughout session.     Patient goals: none stated     Pain/Comfort:  · Pain Rating 1: 0/10    Objective:     Has the patient been evaluated by SLP for swallowing?   No  Keep patient NPO? No   Current Respiratory Status: room air      Pt was given a movie theatre schedule to target various cognitive lingusitic skills including reasoning, time management skills, and problem solving abilities. Pt was able to answer questions regarding schedule on 60% of trials given MOD verbal and visual cues and 90% of tasks given MAX verbal and visual cues. Slightly increased time needed for processing and organization of linguistic material. Noted deficits in mental manipulation of time elements as pt required MAX cueing during all time-related questions. Pt responded well to clinician praise.     Assessment:     St Lacho Farooq is a 84 y.o. 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  He presents with deficits across reasoning, problem solving, and short term recall, affecting his ability to complete higher level tasks independently and efficiently. Recommend continuation of skilled speech services to target cognitive lingusitic deficits.     Goals:   Multidisciplinary Problems     SLP Goals        Problem: SLP Goal    Goal Priority Disciplines Outcome   SLP Goal     SLP Ongoing (interventions implemented as appropriate)   Description:    Long Term Goals:  1. Pt will increase cognitive linguistic skills to  a functional level in order to promote safe and independent activities of choice in known and unknown environments                    Plan:     · Patient to be seen:  3 x/week   · Plan of Care expires:  05/30/19  · Plan of Care reviewed with:  patient   · SLP Follow-Up:  Yes       Discharge recommendations:  home with home health   Barriers to Discharge:  Level of Skilled Assistance Needed - pt continues to require skilled personnel for managmeent of medical diagnoses    Time Tracking:     SLP Treatment Date:   05/06/19  Speech Start Time:  0840  Speech Stop Time:  0855     Speech Total Time (min):  15 min    Billable Minutes: Speech Therapy Individual for cognitive skills- 15 minutes       Ame Gunn CCC-SLP  05/06/2019

## 2019-05-06 NOTE — NURSING
Patient currently on IV antibiotic therapy. PICC line dry, intact, and patent. Saline flushes Q6H. Patient ambulates to bathroom. Antifungal cream applied to irritation after shower.

## 2019-05-06 NOTE — PT/OT/SLP PROGRESS
"Physical Therapy Treatment    Patient Name:  St Lacho Farooq   MRN:  [id number]    Recommendations:     Discharge Recommendations:  home with home health, home health PT, home health OT   Discharge Equipment Recommendations: cane, straight   Barriers to discharge: Inaccessible home    Assessment:     St Lacho Farooq is a 84 y.o. male admitted with a medical diagnosis of Bacteremia.  He presents with the following impairments/functional limitations:  impaired functional mobilty, pain, decreased safety awareness, impaired endurance.  Patient continue showing good motivation and making good steady progress with Physical Therapy. Ambulated patient in the hallway to Therapy room using RW x 250 feet with supervision. Performed ascending/descending 16 stair steps using bilat handrails with supervision. Noted patient requires lesser verbal cues with step sequence and for inc safety. No sign of inc pain on Right hip during gait activity.     Rehab Prognosis: Good; patient would benefit from acute skilled PT services to address these deficits and reach maximum level of function.    Recent Surgery: * No surgery found *      Plan:     During this hospitalization, patient to be seen 5 x/week to address the identified rehab impairments via gait training, therapeutic activities, therapeutic exercises and progress toward the following goals:    · Plan of Care Expires:  05/10/19    Subjective     Chief Complaint: Right hip pain  Patient/Family Comments/goals: "To increase balance with gait tasks".  Pain/Comfort:  · Pain Rating 1: 6/10  · Location - Side 1: Right  · Location 1: hip  · Pain Addressed 1: Cessation of Activity, Reposition  · Pain Rating Post-Intervention 1: 3/10      Objective:     Communicated with patient, daughter and Sitter prior to session.  Patient found up in chair with PICC line, peripheral IV upon PT entry to room.     General Precautions: Standard, fall   Orthopedic Precautions:N/A   Braces: N/A "     Functional Mobility:  · Transfers:     · Sit to Stand:  supervision with rolling walker  · Bed to Chair: supervision with  rolling walker  using  Step Transfer  · Gait: RW Ambulation x 250 feet. Reciprocal gait with dec right Foot/Floor clearance and dec weight shifting.  · Stairs:  Pt ascended/descended 16 stair(s) with No Assistive Device with bilateral handrails with Supervision or Set-up Assistance.       AM-PAC 6 CLICK MOBILITY  Turning over in bed (including adjusting bedclothes, sheets and blankets)?: 4  Sitting down on and standing up from a chair with arms (e.g., wheelchair, bedside commode, etc.): 3  Moving from lying on back to sitting on the side of the bed?: 4  Moving to and from a bed to a chair (including a wheelchair)?: 3  Need to walk in hospital room?: 3  Climbing 3-5 steps with a railing?: 3  Basic Mobility Total Score: 20       Therapeutic Activities and Exercises:   Pt performed bilat LE exercises consisting of Active range of motion exercises x 20 reps consisting of Ankle DF, Ankle PF, side steps alternately at Sitting, LAQ Alternately, Marching in place at Sitting and Standing using RW, Tip Toe at standing with RW and Arm Push ups at Sitting with pt able to tolerate activities well provide with rest periods. Worked on gait trng  And stairs activity.   PT discussed importance of patient's performance of Home Exercise Program (HEP) with pt verbalizing understanding. Advised and recommended to patient and caregiver (daughter and sitter) to procure and use proper seat Cushion vs a pillow/ santiago nut plastic cushion for proper body repositioning and prevent body ache and discomfort.    Patient left up in chair with all lines intact, call button in reach, nurse notified and Sitter present..    GOALS:   Multidisciplinary Problems     Physical Therapy Goals        Problem: Physical Therapy Goal    Goal Priority Disciplines Outcome Goal Variances Interventions   Physical Therapy Goal     PT, PT/OT  Ongoing (interventions implemented as appropriate)     Description:  Goals to be met by:  5/10/19 (updated 19)    Patient will increase functional independence with mobility by performin. Supine to sit with Independent - met 19  2. Sit to supine with Independent - met 19  3. Bed to chair transfer with Modified Independent with or without R W or straight Cane  using Step Transfer TECHNIQUE  4. Gait  x 300  feet with Modified Independent with or without straight cane or RW  5. Lower extremity exercise program x10 reps per handout, with assistance as needed. Met 19  6. Able to ascend/descend 12 stair steps using handrail/Straight  Cane with supervision. Met 5/3/19  7. Able to ascend/descend 20 statir steps using handrail/Straight Cane with Supervision.                          Time Tracking:     PT Received On: 19  PT Start Time: 1416     PT Stop Time: 1450  PT Total Time (min): 34 min     Billable Minutes: Therapeutic Activity 30 mins    Treatment Type: Treatment  PT/PTA: PT     PTA Visit Number: 1     Keven oGldstein, PT  2019

## 2019-05-06 NOTE — PROGRESS NOTES
[facility] Medicine  Progress Note    Patient Name: St Lacho Farooq  MRN: [id number]  Patient Class: IP- Swing   Admission Date: 4/23/2019  Length of Stay: 13 days  Attending Physician: John Saeed MD  Primary Care Provider: Dylan Adam MD        Subjective:     Principal Problem:Bacteremia    HPI:  This 84 year old man with PMHX of CAD p CABG, [facility] 2/19; stable lesions, PAD, bilateral CVD, AAA,  PAF, HTN, hyperlipidemia, MALATHI, NYHA II chronic diastolic HF and non rheumatic critical AS with JAMAAL 0.8cm2 with preserved LVSFX EF55%,  per Echo underwent  + TAVR placed 3/18/19 per Dr. Babcock. Post op course was complicated by CVA ~ 2 weeks post TAVR; he presented to [facility] 4/16 with altered mental status and fever where he was Dx with CVA and gram + bacteremia.   He was  transferred to [facility] 4/19  for cardiology and ID eval ant tx; CT at that time was unrevealing for an acute intracranial process  His MRI was abnormal.  His blood cultures have grown Enterococcus faecalis sensitve to all tested drugs,  Pt initially given vancomycin and then changed to ampicillin IV.  He has clinically improved.  His INES shows a septic atrial thrombus. The cRP is 49 and the ESR is 15.  Pt is no longer febrile and he and daughter feel he is at base line physically and mentally.Pt transferred to [facility] yesterday for continued skilled care and prolonged IV ABX. Plan is 6 weeks of ABX; ampicillin 2gms IV q 6; day 1 with negative blood cultures was 4/19; so today is day 6/42  Requests DNR .    Hospital Course:  4/25/19 SKILLED NURSING for prolonged ABX   Pt sitting up in chair watching TV; reports feeling fine but did have urinary incontinence and noting frequency   No fever, WBC normal   Day 7/42 ABX for bacteremia/septic emboli     4/26/19 SKILLED NURSING for prolonged ABX for bacteremia, septic thrombus; day 8/42  NAD Overnight, still with night time urinary incontinence; will try trial of  flomax   PSA 1.4, repeat U/A ok; bladder scan ok     4/28 pt on SKILLED bed for prolonged abx day 10/42 for bacteremia from septic emboli. Ampicillin which cultures show sensitivity to. He also had a low grade temp last night. With the temp he had confusion and right sided weakness. Ct head done with no changes. Neuro was consulted for this am. He has this same type episode last visit with temp and mental status changes. This am he is back to normal. To note his H/H was lower and also creat bumped to 2.1.     4/29 pt on SKILL. Has some neuro changes on Sunday evening. CT without new strokes. MRI ordered per Dr shaw yesterday. Shows 3 new strokes. Facial droop this am . Neuro/cards and vascular surgery discussed pt case. He needs the anticoagulation with afib and thrombus in atrium. Will cont pradaxa, no heparin. Also remains on IV abx for 6 weeks. Day 12/42. No fever. No elevated WBC.     Creat was elevated yesterday 2.1. Lasix was stopped and 500ml IVF given. Creat better today 1.5. Will hold the hctz today as well as stopped the lisinopril and reduce amlodipine to have him a little higher with pressure due to acute strokes. Use LAUREL for swelling of lower extremity   R facial drop still apparent     5/2 pt doing well. he is sitting in day the day room. No complaints./ H.H stable. K a little low. Remains on pradaxa full dose now as kidneys have returned to baseline. Day 15/42 on ampicillin.     5/3 he is now on day 16/42 on enterococcus sensitive bacteremia to ampicillin. He is also on pradaxa full strength for atrial thrombus. Occult positive but h/h stable. Risk of discontinuing NOAC vs continuing discussed repetitively and family agrees to cont. Watching h/h daily.     Having episodes of mental status changes. Neuro following. Ct done again yesterday no acute changes. Keppra started for consideration of sz given the description of his events. No EEG available here, family does not wish for transfer.  "Otherwise all bp meds have been held to allow for permissive HTN. Bp still only 124/58 this am.    5/6/18 he is now on day 19/42 on enterococcus sensitive bacteremia to ampicillin. 5/2 BC NGTD x 4d  He is also on pradaxa full strength for atrial thrombus. Occult positive but h/h stable. NOAC  Risks/benefits reviewed  Keppra started for possible seizure activity with mental status changes( No EEG available here) . Pt is much improved with RX   H&H 8.4/28.9 , afebrile, WBC 6.7- VSS  Up in chair, "feeling good"      Review of Systems   Constitutional: Negative.    HENT: Negative for congestion, ear pain, sinus pressure, sneezing and sore throat.    Eyes: Negative.    Respiratory: Negative for cough, chest tightness, shortness of breath and wheezing.    Cardiovascular: Negative.  Negative for chest pain.   Gastrointestinal: Negative for abdominal pain, constipation, diarrhea, nausea and vomiting.   Genitourinary: Negative for difficulty urinating, dysuria and flank pain.   Musculoskeletal: Negative.  Negative for joint swelling.   Skin: Negative.    Neurological: Negative for dizziness, facial asymmetry, weakness and headaches.   Hematological: Negative.    Psychiatric/Behavioral: Negative for behavioral problems and confusion.     Objective:     Vital Signs (Most Recent):  Temp: 97.9 °F (36.6 °C) (05/05/19 1946)  Pulse: 65 (05/05/19 1946)  Resp: 15 (05/05/19 1946)  BP: (!) 146/67 (05/05/19 1946)  SpO2: 97 % (05/05/19 1946) Vital Signs (24h Range):  Temp:  [97.9 °F (36.6 °C)] 97.9 °F (36.6 °C)  Pulse:  [65] 65  Resp:  [15] 15  SpO2:  [95 %-97 %] 97 %  BP: (146)/(67) 146/67     Weight: 89.4 kg (197 lb)(RD reviewed)  Body mass index is 29.95 kg/m².    Physical Exam   Constitutional: He is oriented to person, place, and time. He appears well-developed and well-nourished.   HENT:   Head: Normocephalic and atraumatic.   Right Ear: Tympanic membrane, external ear and ear canal normal.   Left Ear: Tympanic membrane, external " ear and ear canal normal.   Mouth/Throat: Oropharynx is clear and moist.   Eyes: Pupils are equal, round, and reactive to light. Conjunctivae and EOM are normal.   Neck: Normal range of motion. Neck supple. No tracheal deviation present.   Cardiovascular: Normal rate, regular rhythm, intact distal pulses and normal pulses. Exam reveals no gallop and no friction rub.   Murmur heard.  Pulmonary/Chest: Effort normal and breath sounds normal.   Abdominal: Soft. Bowel sounds are normal. He exhibits no distension. There is no tenderness. Hernia confirmed negative in the right inguinal area and confirmed negative in the left inguinal area.   Musculoskeletal: Normal range of motion. He exhibits no edema.   Neurological: He is alert and oriented to person, place, and time. He has normal reflexes. No cranial nerve deficit.   Skin: Skin is warm and dry.   Skin breakdown inter gluteal   Macerated     Psychiatric: He has a normal mood and affect. His behavior is normal. Judgment and thought content normal.   Nursing note and vitals reviewed.        CRANIAL NERVES     CN III, IV, VI   Pupils are equal, round, and reactive to light.  Extraocular motions are normal.        Significant Labs:   CMP:   Recent Labs   Lab 05/05/19  0654 05/06/19  0526    144   K 4.2 4.0   * 112*   CO2 24 24    120*   BUN 15 11   CREATININE 0.9 0.9   CALCIUM 9.8 9.8   PROT 5.8* 5.7*   ALBUMIN 2.6* 2.7*   BILITOT 0.5 0.5   ALKPHOS 54* 59   AST 17 14   ALT 13 14   ANIONGAP 7* 8   EGFRNONAA >60 >60     PSA 1.4,   Recent Labs   Lab 05/06/19  0526   WBC 6.70   RBC 3.67*   HGB 8.4*   HCT 28.9*      MCV 79*   MCH 22.9*   MCHC 29.1*     4/25 Urinalysis negative     Lab Results   Component Value Date    PSA 1.4 04/25/2019 4/23 Mag 2.2    Blood cultures 4/19 NGTD x 2    4/17 blood cultures   Enterococcus faecalis       CULTURE, BLOOD     Ampicillin <=2 mcg/mL Sensitive     Gentamicin Synergy Screen <=500 mcg/mL Sensitive      Tetracycline <=4 mcg/mL Sensitive     Vancomycin 2 mcg/mL Sensitive      Flu negative       Significant Imagin/2 CT head   1.  No CT evidence of an acute intracranial abnormality.    2.  Atrophy and small vessel ischemic changes of the periventricular white matter.     MRI   1.  Two tiny focal areas of increased signal intensity on diffusion-weighted images as described above likely representing artifact at the crux of the sulci.  A secondary consideration is tiny lacunar infarcts.  Dr. Laverne Shipman was telephoned with a verbal report at the time of this dictation.    2.  Atrophy and small vessel ischemic changes of the periventricular white matter.     CT head   1.  No CT evidence of an acute intracranial abnormality.    2.  Atrophy and small vessel ischemic changes of the periventricular white matter.  Old lacunar infarcts.     x ray hip right   No evidence of right hip fracture.     x ray hip left   No evidence of left hip fracture.     CXR   Right-sided PICC with tip projecting over superior vena cava.     Echo   · Mildly decreased left ventricular systolic function. The estimated ejection fraction is 45%  · Local segmental wall motion abnormalities.  · Eccentric left ventricular hypertrophy.  · Severe left atrial enlargement.  · Grade I (mild) left ventricular diastolic dysfunction consistent with impaired relaxation.  · Normal left atrial pressure.  · Normal right ventricular systolic function.  · Mild right atrial enlargement.  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 I did not see mention of AVR in encounter notes or March echo report or prior INES report but there appears to be a bioprosthesis. There is mild central aortic insufficiency present.  · Mild mitral regurgitation.  · Normal central venous pressure (3 mm Hg).  · The estimated PA systolic pressure is 31 mm Hg     INES  Final Impressions  1. The study quality is good.   2. Large thrombus burden in left  "atrial appendage.  3. Bubble study is positive for Grade II Patent Foramen Ovale.  4. Moderate lipomatous atrial septal hypertrophy.  5. Normal functioning prosthetic aortic valve with trivial   paravalvular regurgitation.   6. Preserved left ventricular function. Ejection fraction around 55%.  7. Grade III atherosclerosis of aortic arch.    Assessment/Plan:      * Bacteremia  S/p recent TAVR now with septic emboli and concern for "flicking emboli"   ENTEROCOCCUS faecalis    His blood cultures have grown Enterococcus faecalis sensitve to all tested drugs  ID recommending total of 6 weeks IV ABX; Ampicillin 2gms IV q 6 hr; day 1 with negative blood cultures was 4/19/19; so today is day 6/42  Day 7/42 4/26/19 day 8/42 4/29 day 11/42 of IV abd. 4/19 was on vanc and 4/20 started on ampicillin  4/30 day 12/42.  4/19 was on vanc and 4/20 started on ampicillin    5/2 day 15/42 of IV abx- repeat blood cultures today  5/3 day 16/42 on ampicillin, no growth in repeat blood cultures from 5/2 5/6 Day 19/42 Ampicillin;     Skin ulcer of sacrum, limited to breakdown of skin  Barrier cream ; nizoral cream  Donut to sit ;already doing it       Encephalopathy  Somnolent today.  Considering emolic showers vs sz activity. CT negative, neuro started keppra.    5/6/19 much better with Keppra rx, no further mental status changes.     Renal insufficiency  Noted 4/29: Cr up to 2.1  Reduce pradaxa  Hold lasix.  Ns 500ml bolus today slowly  Better today, hold hctz and lasix .  Creat stable     Nocturnal enuresis  Check U.A , PSA, bladder scan; bladder scan 89ml   Still with incontinence issues   Gets HCTz 12.5 and lasix 20mg po daily in am   trial of flomax     Stop fluid pill, creat better with hydration.    Atrial thrombus  Large atrial thrombus found on INES ,Dr. Galicia deemed septic emboli; still no INES reporting thrombus   Continue Pradaxa; family plans to monitor meds once home  Will not consider failed therapy as patient has had " lapses in therapy due to recent surgical interventions and non-compliance   as discussed above there is concern for infected thrombus.   Per Infectious Disease will treat bacteremia and possible infected thrombus for 6 weeks.    5/2 Discussed with family and cardiology. rec repeat INES 2 weeks consider changing NOAC at that time, not now as concern over anticoagulation is issue with new CVA     5/6: unsure will repeat INES at this point. Will do 6 weeks IV antibx regardless of repeat INES results. Will continue to discuss with cardiology need for repeat procedure      Cerebrovascular accident (CVA)  Subacute CVA with focal deficits noted on 4/14, with significant improvement in symptoms   MRI did not demonstrate acute findings but neuro reviewed and Dx with new CVA;   Was being tx with Pradaxa when he had acute stroke but Will not consider failed therapy as patient has had lapses in therapy due to recent surgical interventions and non-compliance  Per Dr. Fernandez's recommendations we will continue:  -Telemetry.  -Neurochecks.  -NO TPA as he presented initially on Sunday  -Physical Therapy and OT Consult, evaluation and treatment at [facility] once patient is admitted to  SNF  -Completed permissive HTN and will need strict BP control.   -Continue his current dose home meds otherwise including Asprin, Pradaxa and statin       4/29: Reduce pradaxa due to renal function today  Consult [facility] due to AMS and neuro changes overnight. Repeat CT head without acute changes. ? Watershed injury; ? Keep BP a little higher? Dehydration playing a role    4/30 go back up on pradaxa due to kidneys now functioning. Reviewed MRI with Kel Fernandez and Dr Ventura. ST/OT/PT    5/2 pt doing well with gait training and speech. Up in day room today    5/3 had another episode of change in mental status yesterday evening. Started on keppra, ct negative. Doing better today  5/6 No further mental status changes since staring KEPPRA     Hx of  CABG  Asa, statin    CHF (congestive heart failure), NYHA class II, chronic, diastolic  Continue Lisinopril, HCTZ  4/29: Hold lasix for now. Already had dose this am. Give gentle hydration given rising Cr. Likely a little dry  4/30 cont to hold lasix, lisinopril and hctz LAUREL hose.    5/2 holding ace. Diuretics cont compression hose and BB    5/3 Cont BB and asa, no ace/arb/lasix due to permissive HTN.    5/6 /67. No signs of acute exacerbation today    Anemia  H/H stable without acute indications for transfusion   Continue to monitor    check anemia w/u from last visit ferritin was 26, retic was 1.5  Occult stool today, will not change our plan just give us answer to where blood is going. He needs the blood thinner for now due to septic emboli.     Cont to monitor occult pending  Transfuse as needed, needs blood thinner.    H/H stable.        Essential hypertension  Continue amlodpine, Lisinopril, HCTZ, , coreg  4/29: Lasix- will hold now that Creat bumped and follow renal fxn  4/30 stopped lasix and lisinopril yesterday, today stop hctz and reduce amlodipine  He needs a little high bp.    5/2 Bp still in 120/50- cut the amlodipine out today  5/3 cont to hold bp meds, allowing permissive HTN, bp still only 124/58. Can start to treat bp if needed over weekend per neuro recs.    5/6: overall remains stable. No medication changes today    Aortic valve stenosis  S/p TAVR       VTE Risk Mitigation (From admission, onward)        Ordered     dabigatran etexilate capsule 150 mg  2 times daily      04/30/19 0907     heparin, porcine (PF) 100 unit/mL injection flush 500 Units  As needed (PRN)      04/25/19 1408              Laverne Shipman MD  Department of Hospital Medicine   [facility]

## 2019-05-06 NOTE — PROGRESS NOTES
[facility]  Cardiology  Progress Note    Patient Name: St Lacho Farooq  MRN: [id number]  Admission Date: 4/23/2019  Hospital Length of Stay: 13 days  Code Status: DNR   Attending Physician: John Saeed MD   Primary Care Physician: Dylan Adam MD  Expected Discharge Date: 5/30/2019  Principal Problem:Bacteremia    Subjective:     Interval History: no new neuro deficit  On pradaxa    Review of Systems   Cardiovascular: Negative for chest pain, dyspnea on exertion and near-syncope.   Neurological:        CVA       Objective:     Vital Signs (Most Recent):  Temp: 97.5 °F (36.4 °C) (05/06/19 0711)  Pulse: 63 (05/06/19 0711)  Resp: 16 (05/06/19 0711)  BP: 139/63 (05/06/19 0711)  SpO2: 96 % (05/06/19 0711) Vital Signs (24h Range):  Temp:  [97.5 °F (36.4 °C)-97.9 °F (36.6 °C)] 97.5 °F (36.4 °C)  Pulse:  [63-65] 63  Resp:  [15-16] 16  SpO2:  [95 %-97 %] 96 %  BP: (139-146)/(63-67) 139/63     Weight: 89.4 kg (197 lb)(RD reviewed)  Body mass index is 29.95 kg/m².    SpO2: 96 %  O2 Device (Oxygen Therapy): room air      Intake/Output Summary (Last 24 hours) at 5/6/2019 1118  Last data filed at 5/5/2019 1800  Gross per 24 hour   Intake 130 ml   Output --   Net 130 ml       Lines/Drains/Airways     Peripherally Inserted Central Catheter Line                 PICC Double Lumen 04/22/19 1108 14 days                Physical Exam   Constitutional: He appears well-developed and well-nourished.   Neck: No JVD present.   Cardiovascular: Normal rate.   Pulmonary/Chest: Effort normal.   Neurological: He is alert.   Skin: Skin is warm and dry.   Nursing note and vitals reviewed.      Significant Labs:   BMP:   Recent Labs   Lab 05/05/19  0654 05/06/19  0526    120*    144   K 4.2 4.0   * 112*   CO2 24 24   BUN 15 11   CREATININE 0.9 0.9   CALCIUM 9.8 9.8   , CMP   Recent Labs   Lab 05/05/19  0654 05/06/19  0526    144   K 4.2 4.0   * 112*   CO2 24 24    120*    BUN 15 11   CREATININE 0.9 0.9   CALCIUM 9.8 9.8   PROT 5.8* 5.7*   ALBUMIN 2.6* 2.7*   BILITOT 0.5 0.5   ALKPHOS 54* 59   AST 17 14   ALT 13 14   ANIONGAP 7* 8   ESTGFRAFRICA >60 >60   EGFRNONAA >60 >60   , CBC   Recent Labs   Lab 05/05/19  0654 05/06/19  0526   WBC 5.44 6.70   HGB 8.4* 8.4*   HCT 29.0* 28.9*    257    and Troponin No results for input(s): TROPONINI in the last 48 hours.    Significant Imaging: Echocardiogram:   Transthoracic echo (TTE) complete (Cupid Only):   Results for orders placed or performed during the hospital encounter of 04/16/19   Transthoracic echo (TTE) complete (Cupid Only)   Result Value Ref Range    BSA 2.06 m2    LA WIDTH 4.45 cm    PV PEAK VELOCITY 1.38 cm/s    LVIDD 6.93 (A) 3.5 - 6.0 cm    IVS 1.13 (A) 0.6 - 1.1 cm    PW 1.17 (A) 0.6 - 1.1 cm    Ao root annulus 2.86 cm    LVIDS 4.76 (A) 2.1 - 4.0 cm    FS 31 28 - 44 %    LA volume 102.99 cm3    STJ 2.65 cm    LV mass 377.93 g    LA size 4.98 cm    RVDD 3.25 cm    Left Ventricle Relative Wall Thickness 0.34 cm    AV mean gradient 7.38 mmHg    AV valve area 2.16 cm2    AV Velocity Ratio 0.72     AV index (prosthetic) 0.70     E/A ratio 1.05     E wave decelartion time 305.25 msec    IVRT 0.11 msec    Pulm vein S/D ratio 1.26     LVOT diameter 1.99 cm    LVOT area 3.11 cm2    LVOT peak abdifatah 9.6746902623 m/s    LVOT peak VTI 28.16 cm    Ao peak abdifatah 1.62 m/s    Ao VTI 40.46 cm    LVOT stroke volume 87.54 cm3    AV peak gradient 10.50 mmHg    MV Peak E Abdifatah 1.16 m/s    TR Max Abdifatah 2.65 m/s    MV Peak A Abdifatah 1.11 m/s    PV Peak S Abdifatah 0.44 m/s    PV Peak D Abdifatah 0.35 m/s    LV Systolic Volume 105.45 mL    LV Systolic Volume Index 52.5 mL/m2    LV Diastolic Volume 249.54 mL    LV Diastolic Volume Index 124.20 mL/m2    LA Volume Index 51.3 mL/m2    LV Mass Index 188.1 g/m2    RA Major Axis 5.02 cm    Left Atrium Minor Axis 5.14 cm    Left Atrium Major Axis 5.84 cm    Triscuspid Valve Regurgitation Peak Gradient 28.09 mmHg    Right  Atrial Pressure (from IVC) 3 mmHg    TV rest pulmonary artery pressure 31 mmHg     Assessment and Plan:     Recent recurrent CVA with interruption of NOAC. Now stable  Pt with multiple other comorbidities which puts him at risk for CVA including multiple calcified vessels of the carotid and vertebral system. ?septic emoboli  May consider repeat INES in the coming weeks (as he has recently had one roughly 3 weeks ago) to reassess left atrial thrombus.   CAF  CAD [facility] 2/19/2019 S/P CABG patent LIMA to LAD, Patent SVG to first Diag,  of CFX,  of RCA.      TAVR on 3/18/19  Admitted to [facility] of 4/18/19 with confusion found to have subacute CVA thought to be cardioembolic with Hx of A fib, anticoagulation interuption and recent valve replacement. During that admission developed fever with +blood cultures and was subsequently transferred to [facility] for INES. INES on 4/18/19 revealed large thrombus burden in EDELMIRA, EF 55%, bubble study positive for grade II PFO, prosthetic AV functioning normally. No valvular vegetation noted. There was an episode of NSVT while at [facility] and patient was evaluated by EP consider EF study of continued episodes. Patient has had no further episodes. Patient was seen by ID while at [facility] and it was deteremined that given recent valve replacement LA thrombus, and bacteremia patient should be treated with endocarditis ABX regimen. Patient was started on 6 wk regiment of IV ABX and transferred back to [facility] for rehab.      On evening of 4/28/19 patient developed fever, confusion, and rt sided weakness with hypotension. MRI reveals acute punctate infarctions. Suggestive of further cardio-embolic stroke. NOAC been continued per neurology recommendation. Mental status has since improved. Permissive HTN has been allowed given acute CVAs.   Patient is a DNR          Plan: Continue current NOAC for now.  Will need long term abx  Consider repeat INES at end of ABX course                   Active Diagnoses:     Diagnosis Date Noted POA    PRINCIPAL PROBLEM:  Bacteremia [R78.81] 04/18/2019 Yes    Skin ulcer of sacrum, limited to breakdown of skin [L98.421] 05/03/2019 No    Encephalopathy [G93.40]  No    Renal insufficiency [N28.9] 04/29/2019 Yes    Nocturnal enuresis [N39.44] 04/25/2019 No    Atrial thrombus [I51.3] 04/19/2019 Yes    Cerebrovascular accident (CVA) [I63.9] 04/16/2019 Yes    Hx of CABG [Z95.1] 02/19/2019 Not Applicable    CHF (congestive heart failure), NYHA class II, chronic, diastolic [I50.32] 02/19/2019 Yes    Anemia [D64.9] 11/03/2016 Yes    Essential hypertension [I10] 10/28/2016 Yes    Aortic valve stenosis [I35.0] 06/28/2016 Yes      Problems Resolved During this Admission:       VTE Risk Mitigation (From admission, onward)        Ordered     dabigatran etexilate capsule 150 mg  2 times daily      04/30/19 0907     heparin, porcine (PF) 100 unit/mL injection flush 500 Units  As needed (PRN)      04/25/19 1408          Divya Pereyra NP  Cardiology  [facility]    I attest that I have personally seen and examined this patient. I have reviewed and discussed the management in detail as outlined above.

## 2019-05-06 NOTE — PT/OT/SLP PROGRESS
"Occupational Therapy   Treatment    Name: St Lacho Farooq  MRN: [id number]  Admitting Diagnosis:  Bacteremia       Recommendations:     Discharge Recommendations: home with home health  Discharge Equipment Recommendations:  walker, rolling  Barriers to discharge:  None    Assessment:     St Lacho Farooq is a 84 y.o. male with a medical diagnosis of Bacteremia.  He presents with chronic back pain, sacral pain (apparently fell on tailbone at prior hospital- and per chart review was sleeping in recliner on a donut pillow, and regular pillow, has been sleeping in a recliner for quite some time due to back pain, and now per chart notes has a lidocane patch/ sacrum and an antifungal cream for excoriation to sacrum. He has family, a private sitter and an esitter in room. When asked to do exercises he says "oh no, I'm not doing that, I already worked with PT this morning. Explained importance of getting up off his bottom several times per day for pressure relievef, he was willing to walk to nursing station, in and around day room and back. Performance deficits affecting function are impaired endurance, impaired self care skills, gait instability, visual deficits, decreased lower extremity function, weakness, decreased safety awareness, impaired cognition, impaired functional mobilty.     Rehab Prognosis:  Fair; patient would benefit from acute skilled OT services to address these deficits and reach maximum level of function.       Plan:     Patient to be seen 5 x/week to address the above listed problems via self-care/home management, therapeutic activities, therapeutic exercises  · Plan of Care Expires: 05/31/19  · Plan of Care Reviewed with: patient, caregiver, spouse, family, daughter    Subjective     Pain/Comfort:  · Pain Rating 1: 2/10  · Location - Side 1: Bilateral  · Location - Orientation 1: generalized  · Location 1: back  · Pain Addressed 1: Pre-medicate for activity, Reposition, Distraction  · Pain " Rating Post-Intervention 1: 1/10    Objective:     Communicated with: nursing/ family/ PT prior to session.  Patient found up in chair with PICC line, peripheral IV upon OT entry to room.    General Precautions: Standard, fall   Orthopedic Precautions:N/A   Braces: N/A     Occupational Performance:     Bed Mobility:    · NT    Functional Mobility/Transfers:  · Patient completed Sit <> Stand Transfer with supervision  with  rolling walker   · Patient completed Bed <> Chair Transfer using Stand Pivot technique with supervision with rolling walker  · Functional Mobility: to nursing station and day room and back with OT monitoring the IV pole.    Activities of Daily Living:  · Feeding:  independence .  · Grooming: supervision .  · Lower Body Dressing: minimum assistance capable of putting his shoes on and tying them but family sitter did for him due to back pain when bending      AMPAC 6 Click ADL: 21    Treatment & Education:  Walks with shuffling gait but no loss of balance walking around obstacles.    Patient left up in chair with all lines intact and call button in reachEducation:      GOALS:   Multidisciplinary Problems     Occupational Therapy Goals        Problem: Occupational Therapy Goal    Goal Priority Disciplines Outcome Interventions   Occupational Therapy Goal     OT, PT/OT     Description:  Goals to be met by:6/10/2019     Patient will increase functional independence with ADLs by performing:    LE Dressing with Modified Moore.  Grooming while standing at sink with Modified Moore.  Toileting from toilet with Modified Moore for hygiene and clothing management.   Bathing from  shower chair/bench with Modified Moore.  Upper extremity exercise program x10 reps per handout, with assistance as needed  Patient will consistently 3/3 trials at different times of the day demonstrate ability to be a) oriented x 4 (using watch if needed for time of day)  b) use RN call light appropriately  before getting up c) complete a >10 piece puzzle d) complete a word search page with 90% accuracy d) add and subtract 2 digit numbers 5/5 tries with 90% accuracy e) color by number from coloring book attending to periphery of the page as well as center of the page without errors. E) fill his medbox with the 13 pills he says he takes daily, twice per day with 100% accuracy f) fill out his medication list with WHAT he takes, WHY he takes it, WHEN he takes it, HOW and HOW often he takes it , and possible side effects of each medication  Complete the [facility]  Driving cognitive screen without any errors.                      Time Tracking:     OT Date of Treatment: 05/06/19  OT Start Time: 1540  OT Stop Time: 1555  OT Total Time (min): 15 min    Billable Minutes:Therapeutic Activity 15    Zarina Gonzalez OT  5/6/2019

## 2019-05-06 NOTE — PLAN OF CARE
Problem: Adult Inpatient Plan of Care  Goal: Plan of Care Review  Outcome: Ongoing (interventions implemented as appropriate)  Patient currently on IV antibiotic therapy. PICC line dry, intact, and patent.  Patient ambulates with supervision to toilet. Shower given and antifungal cream applied to buttocks and scrotal sac.  No sign of distress. Plan of care reviewed with patient, daughter, and caregiver. Patient, daughter and caregiver verbalized understandfing

## 2019-05-06 NOTE — PROGRESS NOTES
"[facility]  Neurology  Progress Note     Patient Name: St Lacho Farooq  MRN: [id number]  Admission Date: 4/23/2019  Hospital Length of Stay: 6 days  Code Status: DNR   Attending Provider: John Saeed MD   Consulting Provider: Mitchel Fernandez MD  Primary Care Physician: Dylan Adam MD  Principal Problem:Bacteremia     Inpatient consult to Neurology  Consult performed by: Mitchel Fernandez MD  Consult ordered by: Marta Song NP        Subjective:      Chief Complaint:  Right sided weakness, confusion      HPI:   St Lacho Farooq is a 84 y.o. male known to me for prior acute cerebellar CVA in 2016 and recent admission for acute CVA thought to be cardioembolic (he has afib and had a recent aortic valve repair and + blood cultures) and resulting in right sided weakness.  After being initially worked up at [facility], he was transferred [place] [facility] [place] he had INES and ID consult- thought to have  septic atrial thrombus and he is back here on SWING with plan to have IV antibiotics for 6 weeks.  He did improve since that point.  Last night, he was noted to have fever and with that confusion  confusion and right sided weakness. Ct head done with no changes. Neuro was consulted for this am. He has this same type of changes while in house with prior with fever and mental status changes. He did return to baseline. Primary team ordered CT head (see below)  He did have some lowering of his BP last night as well  Per review of INES, " Large atrial thrombus found on INES" although there is no final report in the computer to date. The patient was also non-compliant with NOAC prior to CVA.      Interval history 4/29/19: Patient has been afebrile. Left facial droop still noted but not severe weakness like yesterday. Reviewed/ appreciate cardiology consult.      Interval History 5/2/2019: Called to beside as patient has episode of confusion while eating and right sided weakness " again. CT head ordered. Patient has returned to baseline again- is seen in the [place] while ambulating with Therapy and has no signs of focal weakness. He is confused at times, but this was resolved this am.     Interval history 5/6/19: No further events since starting Keppra.Patient has been feeling better.  Still receiving antibiotics. Family has noted his improvement             Past Medical History:   Diagnosis Date    Anemia      Aortic valve stenosis      Arthritis      Atrial fibrillation      Cancer       Basal Cell Skin Cancer    Carotid artery stenosis      Coronary artery disease      CVA (cerebral vascular accident)      Encounter for blood transfusion      Exercise-induced angina      Hyperkalemia       at times    Hyperlipemia      Hypertension      Iron deficiency anemia      MI (myocardial infarction) 2004    MALATHI (obstructive sleep apnea)      PAD (peripheral artery disease)      Prostatitis      Renal failure       MILD after MI    S/P 2-vessel coronary artery bypass                     Past Surgical History:   Procedure Laterality Date    CARPAL TUNNEL RELEASE        CATARACT EXTRACTION, BILATERAL        CORONARY ANGIOGRAPHY   2019    CORONARY ARTERY BYPASS GRAFT   2004    HERNIA REPAIR         Inguinal & Ventral with MESH    INSERTION, PICC N/A 4/22/2019     Performed by [facility] OR    REPLACEMENT, AORTIC VALVE, PERCUTANEOUS, TRANSCATHETER Right 3/18/2019     Performed by Mani Babcock MD at [facility] CATH    REPLACEMENT, AORTIC VALVE, PERCUTANEOUS, TRANSCATHETER Right 3/18/2019     Performed by Ac Osman MD at [facility] CATH    TONSILLECTOMY        Transesophageal echo (INES) intra-procedure log documentation N/A 4/18/2019     Performed by Tam Carrasco MD at [facility] CATH    TRANSESOPHAGEAL ECHOCARDIOGRAM (INES) N/A 6/28/2016     Performed by Hugh Begum MD at [facility] OR                 Review of patient's allergies indicates:   Allergen  "Reactions    Bactrim [sulfamethoxazole-trimethoprim] Rash    Codeine Rash      I have reviewed all of this patient's past medical and surgical histories as well as family and social histories and active allergies and medications as documented in the electronic medical record.        No current facility-administered medications on file prior to encounter.               Current Outpatient Medications on File Prior to Encounter   Medication Sig    amLODIPine (NORVASC) 2.5 MG tablet Take 2.5 mg by mouth once daily.    ANORO ELLIPTA 62.5-25 mcg/actuation DsDv INHALE 1 PUFF INTO LUNGS ONCE DAILY    aspirin (ECOTRIN) 81 MG EC tablet Take 81 mg by mouth once daily.    atorvastatin (LIPITOR) 10 MG tablet Take 10 mg by mouth every evening.    carvedilol (COREG) 12.5 MG tablet Take 25 mg by mouth 2 (two) times daily with meals.     dabigatran etexilate (PRADAXA) 150 mg Cap Take 150 mg by mouth 2 (two) times daily. "Do NOT break, chew, or open capsules."    furosemide (LASIX) 20 MG tablet Take 20 mg by mouth 2 (two) times daily.    lisinopril (PRINIVIL,ZESTRIL) 20 MG tablet Take 1 tablet (20 mg total) by mouth once daily.    nitroGLYCERIN (NITROSTAT) 0.4 MG SL tablet Place 0.4 mg under the tongue every 5 (five) minutes as needed for Chest pain.    omeprazole (PRILOSEC) 40 MG capsule Take 40 mg by mouth once daily.    ranolazine (RANEXA) 500 MG Tb12 Take 500 mg by mouth 2 (two) times daily.              Family History      Problem Relation (Age of Onset)     Atrial fibrillation Father, Sister     COPD Sister     Cancer Father     Diabetes type II Mother, Sister     Heart failure Mother     Hypertension Mother, Father, Sister, Brother     Pacemaker/defibrilator Sister     Pulmonary embolism Brother                    Tobacco Use    Smoking status: Former Smoker       Last attempt to quit: 2004       Years since quitting: 15.3    Smokeless tobacco: Never Used    Tobacco comment: Smoked for 60 years   Substance and " Sexual Activity    Alcohol use: No       Frequency: Never    Drug use: No    Sexual activity: Yes       Partners: Female      Review of Systems   Unable to perform ROS: Mental status change   Skin: Negative for rash.      Objective:      Vitals:    05/05/19 1855 05/05/19 1946 05/06/19 0711 05/06/19 1126   BP:  (!) 146/67 139/63 (!) 152/65   BP Location:  Left arm Left arm    Patient Position:  Sitting Sitting    Pulse:  65 63 63   Resp:  15 16 18   Temp:  97.9 °F (36.6 °C) 97.5 °F (36.4 °C) 96.8 °F (36 °C)   TempSrc:  Oral Tympanic    SpO2: 95% 97% 96% 98%   Weight:       Height:                    Weight: 89.4 kg (197 lb)  Body mass index is 29.95 kg/m².     Physical Exam        Exam:  Gen Appearance, well developed/nourished in no apparent distress  CV: 2+ distal pulses with no edema or swelling  Neuro:  MS: Awake, alert, Sustains attention but not fully (distracted at times). But not oriented to exact day of week.  Recent details are impaired and he is mildly frustrated which is out of character for him /remote memory is intact.Language is full to spontaneous speech/comprehension. Fund of Knowledge is full.  CN: Optic discs are flat with normal vasculature, PERRL, Extraoccular movements and visual fields are full. Left eye lid mildly drooping (he states known cosmetic) and right lower facial droop is noted, mild.  Normal facial strength,Tongue and Palate are midline and  strong. Shoulder Shrug symmetric and strong.  Motor: Normal bulk, tone, no abnormal movements.  5/5 bilateral UE and LE strength  Except 4-/5 right tricepts and 1+ reflexes  Sensory: Romberg negative and intact to vibration and temp   Cerebellar: Finger-nose, heal to nose, Rapid alternating movements intact  Gait: Not done        Significant Labs: CBC, CMP reviewed from today- CBC stable     Significant Imaging: Ct head 4/29/19 1.  No CT evidence of an acute intracranial abnormality.    2.  Atrophy and small vessel ischemic changes of the  periventricular white matter.  Old lacunar infarcts.     MRI brain 4/29/19:1.  Two tiny focal areas of increased signal intensity on diffusion-weighted images as described above likely representing artifact at the crux of the sulci.  A secondary consideration is tiny lacunar infarcts.  Dr. Laverne Shipman was telephoned with a verbal report at the time of this dictation.    2.  Atrophy and small vessel ischemic changes of the periventricular white matter.     Assessment and Plan:   St Lacho Farooq is a 84 y.o. male known to me for cerebellar CVA in 2016, history of afib.  He was seen by me earlier this month due to confusion and right sided weakness which was found to be from acute cardioembolic CVA- thought to be due to septic emboli after recent TAVR and due to non-compliance with NOAC at home.  He was improved, but has suffered increased confusion and brief increase in his right sided weakness after increased temperature on 4/29/19 (has happened once prior since most recent CVA). Repeat MRI shows continued and new emobolic CVAs  Cerebrovascular accident (CVA)  -He is at risk to shower more emboli, but the treatment is antibotics as this is a septic thrombus causing his symptoms.   -The patient has not had any further spells of breakthrough neurological symptoms since starting Keppra- continue current dose. EEG not available here at this time. Treating clinically.   -He is a DNR.        -Continue NOAC given his afib and the thrombus (benefits of this are greater than risk of cerebra;           bleeding currently). Daughter is aware that the patient is occult stool blood positive but wishes he           continue NOAC given stable H/H.    -Repeat Head CT for any significant changes in MS or neuro exam (especially any lasting longer than 15 minutes) going forward vs family can decline if they wish to avoid more aggressive measures (if patient has continued decline)  -Ok to start lowering BP as needed       -plan  is continue  NOAC, SWING rehab, and IV antibiotics for artrial thrombus per ID. Hydration      status and anemia are being addressed by primary team. Considering d/c to home health soon       -No driving (until at least 6 months seizure free) per d/c summary     Signing off given patient's improvement.   Please re-consult me PRN. Patient should follow up with me post d/c per D/C summary  Mitchel Fernandez MD  Neurology  [facility]

## 2019-05-06 NOTE — ASSESSMENT & PLAN NOTE
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 4/19 was on vanc and 4/20 started on ampicillin  4/30 day 12/42.  4/19 was on vanc and 4/20 started on ampicillin    5/2 day 15/42 of IV abx- repeat blood cultures today  5/3 day 16/42 on ampicillin, no growth in repeat blood cultures from 5/2 5/6 Day 19/42 Ampicillin;   "

## 2019-05-06 NOTE — ASSESSMENT & PLAN NOTE
Noted 4/29: Cr up to 2.1  Reduce pradaxa  Hold lasix.  Ns 500ml bolus today slowly  Better today, hold hctz and lasix .  Creat stable

## 2019-05-06 NOTE — PLAN OF CARE
Problem: Adult Inpatient Plan of Care  Goal: Plan of Care Review  Outcome: Ongoing (interventions implemented as appropriate)  Patient resting in chair with no complaints of pain. PICC line very sluggish in both ports. PRN heparin successful. PICC dressing and hubs changed. IV antibiotics administered as ordered. VS stable. A&O. No acute changes noted. Plan of care reviewed and followed.

## 2019-05-06 NOTE — ASSESSMENT & PLAN NOTE
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 Galicia deemed septic emboli; still no INES reporting thrombus   Continue Pradaxa; family plans to monitor meds once home  Will not consider failed therapy as patient has had lapses in therapy due to recent surgical interventions and non-compliance   as discussed above there is concern for infected thrombus.   Per Infectious Disease will treat bacteremia and possible infected thrombus for 6 weeks.    5/2 Discussed with family and cardiology. rec repeat INES 2 weeks consider changing NOAC at that time, not now as concern over anticoagulation is issue with new CVA     5/6: unsure will repeat INES at this point. Will do 6 weeks IV antibx regardless of repeat INES results. Will continue to discuss with cardiology need for repeat procedure

## 2019-05-06 NOTE — ASSESSMENT & PLAN NOTE
Continue Lisinopril, HCTZ  4/29: Hold lasix for now. Already had dose this am. Give gentle hydration given rising Cr. Likely a little dry  4/30 cont to hold lasix, lisinopril and hctz LAUREL hose.    5/2 holding ace. Diuretics cont compression hose and BB    5/3 Cont BB and asa, no ace/arb/lasix due to permissive HTN.    5/6 /67. No signs of acute exacerbation today

## 2019-05-07 LAB
ALBUMIN SERPL BCP-MCNC: 2.7 G/DL (ref 3.5–5.2)
ALP SERPL-CCNC: 57 U/L (ref 55–135)
ALT SERPL W/O P-5'-P-CCNC: 13 U/L (ref 10–44)
ANION GAP SERPL CALC-SCNC: 6 MMOL/L (ref 8–16)
AST SERPL-CCNC: 12 U/L (ref 10–40)
BACTERIA BLD CULT: NORMAL
BACTERIA BLD CULT: NORMAL
BASOPHILS # BLD AUTO: 0.01 K/UL (ref 0–0.2)
BASOPHILS NFR BLD: 0.2 % (ref 0–1.9)
BILIRUB SERPL-MCNC: 0.6 MG/DL (ref 0.1–1)
BUN SERPL-MCNC: 10 MG/DL (ref 8–23)
CALCIUM SERPL-MCNC: 9.9 MG/DL (ref 8.7–10.5)
CHLORIDE SERPL-SCNC: 112 MMOL/L (ref 95–110)
CO2 SERPL-SCNC: 24 MMOL/L (ref 23–29)
CREAT SERPL-MCNC: 0.9 MG/DL (ref 0.5–1.4)
DIFFERENTIAL METHOD: ABNORMAL
EOSINOPHIL # BLD AUTO: 0.2 K/UL (ref 0–0.5)
EOSINOPHIL NFR BLD: 2.7 % (ref 0–8)
ERYTHROCYTE [DISTWIDTH] IN BLOOD BY AUTOMATED COUNT: 19.7 % (ref 11.5–14.5)
EST. GFR  (AFRICAN AMERICAN): >60 ML/MIN/1.73 M^2
EST. GFR  (NON AFRICAN AMERICAN): >60 ML/MIN/1.73 M^2
GLUCOSE SERPL-MCNC: 116 MG/DL (ref 70–110)
HCT VFR BLD AUTO: 27.8 % (ref 40–54)
HGB BLD-MCNC: 8.1 G/DL (ref 14–18)
LYMPHOCYTES # BLD AUTO: 0.7 K/UL (ref 1–4.8)
LYMPHOCYTES NFR BLD: 12.2 % (ref 18–48)
MCH RBC QN AUTO: 22.9 PG (ref 27–31)
MCHC RBC AUTO-ENTMCNC: 29.1 G/DL (ref 32–36)
MCV RBC AUTO: 79 FL (ref 82–98)
MONOCYTES # BLD AUTO: 0.6 K/UL (ref 0.3–1)
MONOCYTES NFR BLD: 10 % (ref 4–15)
NEUTROPHILS # BLD AUTO: 4.1 K/UL (ref 1.8–7.7)
NEUTROPHILS NFR BLD: 74.9 % (ref 38–73)
PLATELET # BLD AUTO: 225 K/UL (ref 150–350)
PMV BLD AUTO: 8.6 FL (ref 9.2–12.9)
POTASSIUM SERPL-SCNC: 4 MMOL/L (ref 3.5–5.1)
PROT SERPL-MCNC: 5.7 G/DL (ref 6–8.4)
RBC # BLD AUTO: 3.53 M/UL (ref 4.6–6.2)
SODIUM SERPL-SCNC: 142 MMOL/L (ref 136–145)
WBC # BLD AUTO: 5.48 K/UL (ref 3.9–12.7)

## 2019-05-07 PROCEDURE — 99900035 HC TECH TIME PER 15 MIN (STAT)

## 2019-05-07 PROCEDURE — 11000004 HC SNF PRIVATE

## 2019-05-07 PROCEDURE — 80053 COMPREHEN METABOLIC PANEL: CPT

## 2019-05-07 PROCEDURE — 25000003 PHARM REV CODE 250: Performed by: INTERNAL MEDICINE

## 2019-05-07 PROCEDURE — 25000003 PHARM REV CODE 250: Performed by: NURSE PRACTITIONER

## 2019-05-07 PROCEDURE — 85025 COMPLETE CBC W/AUTO DIFF WBC: CPT

## 2019-05-07 PROCEDURE — 94761 N-INVAS EAR/PLS OXIMETRY MLT: CPT

## 2019-05-07 PROCEDURE — 25000003 PHARM REV CODE 250: Performed by: PSYCHIATRY & NEUROLOGY

## 2019-05-07 PROCEDURE — A4216 STERILE WATER/SALINE, 10 ML: HCPCS | Performed by: INTERNAL MEDICINE

## 2019-05-07 PROCEDURE — 25000003 PHARM REV CODE 250: Performed by: FAMILY MEDICINE

## 2019-05-07 PROCEDURE — 97530 THERAPEUTIC ACTIVITIES: CPT

## 2019-05-07 PROCEDURE — 36415 COLL VENOUS BLD VENIPUNCTURE: CPT

## 2019-05-07 PROCEDURE — 63600175 PHARM REV CODE 636 W HCPCS: Performed by: INTERNAL MEDICINE

## 2019-05-07 PROCEDURE — 97127 HC THERAPEUTIC INTVTN, COGN FUNCTION - ST: CPT

## 2019-05-07 RX ADMIN — AMPICILLIN SODIUM 2 G: 2 INJECTION, POWDER, FOR SOLUTION INTRAMUSCULAR; INTRAVENOUS at 08:05

## 2019-05-07 RX ADMIN — AMPICILLIN SODIUM 2 G: 2 INJECTION, POWDER, FOR SOLUTION INTRAMUSCULAR; INTRAVENOUS at 03:05

## 2019-05-07 RX ADMIN — LEVETIRACETAM 250 MG: 250 TABLET ORAL at 08:05

## 2019-05-07 RX ADMIN — TAMSULOSIN HYDROCHLORIDE 0.4 MG: 0.4 CAPSULE ORAL at 08:05

## 2019-05-07 RX ADMIN — CARVEDILOL 25 MG: 25 TABLET, FILM COATED ORAL at 08:05

## 2019-05-07 RX ADMIN — TRAMADOL HYDROCHLORIDE 100 MG: 50 TABLET, FILM COATED ORAL at 03:05

## 2019-05-07 RX ADMIN — Medication 10 ML: at 12:05

## 2019-05-07 RX ADMIN — Medication 10 ML: at 10:05

## 2019-05-07 RX ADMIN — ATORVASTATIN CALCIUM 10 MG: 10 TABLET, FILM COATED ORAL at 08:05

## 2019-05-07 RX ADMIN — DABIGATRAN ETEXILATE MESYLATE 150 MG: 75 CAPSULE ORAL at 08:05

## 2019-05-07 RX ADMIN — Medication 10 ML: at 05:05

## 2019-05-07 RX ADMIN — MICONAZOLE NITRATE: 20 CREAM TOPICAL at 09:05

## 2019-05-07 RX ADMIN — Medication 10 ML: at 06:05

## 2019-05-07 RX ADMIN — LIDOCAINE 1 PATCH: 50 PATCH TOPICAL at 05:05

## 2019-05-07 RX ADMIN — PANTOPRAZOLE SODIUM 40 MG: 40 TABLET, DELAYED RELEASE ORAL at 08:05

## 2019-05-07 RX ADMIN — CARVEDILOL 25 MG: 25 TABLET, FILM COATED ORAL at 05:05

## 2019-05-07 RX ADMIN — MICONAZOLE NITRATE: 20 CREAM TOPICAL at 08:05

## 2019-05-07 RX ADMIN — ASPIRIN 81 MG: 81 TABLET, COATED ORAL at 08:05

## 2019-05-07 NOTE — PLAN OF CARE
Recommendations    1. Add Boost Plus BID due to varied intake.  2. Obtain weekly weights.  3. Continue cardiac diet as tolerated encouraging/assisting with meals.     Goals: adequate po intake >=85% EEN by discharge  Nutrition Goal Status: goal not met  Communication of RD Recs: plan of care

## 2019-05-07 NOTE — PT/OT/SLP PROGRESS
Physical Therapy Treatment    Patient Name:  St Lacho Farooq   MRN:  [id number]    Recommendations:     Discharge Recommendations:  home with home health, home health OT, home health PT   Discharge Equipment Recommendations: cane, straight   Barriers to discharge: Inaccessible home    Assessment:     St Lacho Farooq is a 84 y.o. male admitted with a medical diagnosis of Bacteremia.  He presents with the following impairments/functional limitations:  impaired functional mobilty, pain, decreased safety awareness, impaired endurance. Pt appears to be doing well, eager to participate with therapy activity with patient able to complete bed mobility and sit to stand task with inc. Fannin.  Pt verbalized preference on using rolling walker versus straight cane for assistive device while ambulating. Performed mobility task with and without assistive device to promote inc. Fannin with mobility with patient able to complete task well. Plan to decrease PT intensity from daily to 5 x /wk due to improve functional mobility.    Rehab Prognosis: Good; patient would benefit from acute skilled PT services to address these deficits and reach maximum level of function.    Recent Surgery: * No surgery found *      Plan:     During this hospitalization, patient to be seen 5 x/week to address the identified rehab impairments via gait training, therapeutic activities, therapeutic exercises and progress toward the following goals:    · Plan of Care Expires:  05/10/19    Subjective     Chief Complaint: No c/o pain at this time  Patient/Family Comments/goals: I'm ready.  Pain/Comfort:  · Pain Rating 1: 0/10  · Pain Rating Post-Intervention 1: 0/10      Objective:     Communicated with patient, nursing and case management prior to session.  Patient found up in chair with peripheral IV, PICC line upon PT entry to room.     General Precautions: Standard, fall   Orthopedic Precautions:N/A   Braces: N/A     Functional  Mobility:  · Transfers:     · Sit to Stand:  modified independence and supervision with rolling walker  · Bed to Chair: modified independence and supervision with  rolling walker  using  Step Transfer  · Chair to mat: stand by assistance with  rolling walker  using  Step Transfer  · Gait: Pt able to ambulate x 250 ft with RW with SBA with fair stabililty. Presents with reciprocal gait pattern but will have occasional dec. foot clearance during swing phase requiring verbal cues.      AM-PAC 6 CLICK MOBILITY  Turning over in bed (including adjusting bedclothes, sheets and blankets)?: 4  Sitting down on and standing up from a chair with arms (e.g., wheelchair, bedside commode, etc.): 3  Moving from lying on back to sitting on the side of the bed?: 4  Moving to and from a bed to a chair (including a wheelchair)?: 3  Need to walk in hospital room?: 3  Climbing 3-5 steps with a railing?: 3  Basic Mobility Total Score: 20       Therapeutic Activities and Exercises:   Pt able to perform standing exercises with emphasis on foot clearance, hip flexion and retropulsion walking. Performed side stepping activity as well as mobility training without assistive device specifically on sit to stand and bed to chair transfers    Patient left up in chair with call button in reach and sitter present..    GOALS:   Multidisciplinary Problems     Physical Therapy Goals        Problem: Physical Therapy Goal    Goal Priority Disciplines Outcome Goal Variances Interventions   Physical Therapy Goal     PT, PT/OT Ongoing (interventions implemented as appropriate)     Description:  Goals to be met by:  5/10/19 (updated 19)    Patient will increase functional independence with mobility by performin. Supine to sit with Independent - met 19  2. Sit to supine with Independent - met 19  3. Bed to chair transfer with Modified Independent with or without R W or straight Cane  using Step Transfer TECHNIQUE  4. Gait  x 300  feet with  Modified Independent with or without straight cane or RW  5. Lower extremity exercise program x10 reps per handout, with assistance as needed. Met 5/5/19  6. Able to ascend/descend 12 stair steps using handrail/Straight  Cane with supervision. Met 5/3/19  7. Able to ascend/descend 20 statir steps using handrail/Straight Cane with Supervision.                          Time Tracking:     PT Received On: 05/07/19  PT Start Time: 1146     PT Stop Time: 1206  PT Total Time (min): 20 min     Billable Minutes: Therapeutic Activity 20    Treatment Type: Treatment  PT/PTA: PT     PTA Visit Number: 1     Gino Krause, PT  05/07/2019

## 2019-05-07 NOTE — CARE UPDATE
Left message for patient in regards to status about foot and when he plans to return to Phase II Cardiac Rehab. Department number left for patient to return call. Weekly skilled level of nursing meeting done to discuss patient and progression. Patient is working toward goals with therapy and is doing well with IV antibiotics. Here are the results:      PT recommends to do 5 days a weeks since patient is doing so well. Please see note for progress.    OT: please see note for progress and recommendations    SLP: Please see note for progress and recommendations.      Planned discharge date 5/30/2019.

## 2019-05-07 NOTE — PROGRESS NOTES
"[facility]  Adult Nutrition  Progress Note    SUMMARY       Recommendations    1. Add Boost Plus BID due to varied intake.  2. Obtain weekly weights.  3. Continue cardiac diet as tolerated encouraging/assisting with meals.     Goals: adequate po intake >=85% EEN by discharge  Nutrition Goal Status: goal not met  Communication of RD Recs: plan of care    Reason for Assessment    Reason For Assessment: length of stay  Diagnosis: infection/sepsis, stroke/CVA(Bacteremia)  Relevant Medical History: HTN, CAD, HLD, Anemia, Afib, CVA, PAD, MI, Cancer  Interdisciplinary Rounds: did not attend(Unable to attend meeting yesterday; discussed with )  General Information Comments: Pt with fair appetite consuming an average of 50% of meals over the last 24 hrs. Confusion has improved. SLP recs Regular texture with thin liquids. Expected d/c date 5/30. No NFPE at this time, RD covering remotely.   Nutrition Discharge Planning: Cardiac diet with ONS as needed to meet EEN/EPN    Nutrition Risk Screen    Nutrition Risk Screen: no indicators present    Nutrition/Diet History    Patient Reported Diet/Restrictions/Preferences: low salt  Food Preferences: likes strawberry boost  Spiritual, Cultural Beliefs, Jew Practices, Values that Affect Care: no  Food Allergies: NKFA  Factors Affecting Nutritional Intake: impaired cognitive status/motor control, behavioral (mealtime)    Anthropometrics    Temp: 97.5 °F (36.4 °C)  Height Method: Stated  Height: 5' 8" (172.7 cm)  Height (inches): 68 in  Weight Method: Standard Scale  Weight: 89.4 kg (197 lb)(RD reviewed)  Weight (lb): 197 lb  Ideal Body Weight (IBW), Male: 154 lb  % Ideal Body Weight, Male (lb): 127.92 lb  BMI (Calculated): 30  BMI Grade: 30 - 34.9- obesity - grade I     Lab/Procedures/Meds    Pertinent Labs Reviewed: reviewed  Pertinent Medications Reviewed: reviewed    Estimated/Assessed Needs    Weight Used For Calorie Calculations: 89.4 kg (197 " lb 1.5 oz)  Energy Calorie Requirements (kcal): 1882  Energy Need Method: [place]-St Jeor(x1.2)  Protein Requirements: (1.0-1.2)  Weight Used For Protein Calculations: 89.4 kg (197 lb 1.5 oz)  Estimated Fluid Requirement Method: RDA Method  RDA Method (mL): 1882     Nutrition Prescription Ordered    Current Diet Order: Cardiac    Evaluation of Received Nutrient/Fluid Intake    Energy Calories Required: meeting needs  Protein Required: not meeting needs  Fluid Required: meeting needs  Tolerance: tolerating  % Intake of Estimated Energy Needs: 50 - 75 %  % Meal Intake: 50 - 75 %    Nutrition Risk    Level of Risk/Frequency of Follow-up: low - moderate     Assessment and Plan     Cerebrovascular accident (CVA)  Contributing Nutrition Diagnosis  Inadequate energy intake     Related to (etiology):   Inability to feed self     Signs and Symptoms (as evidenced by):   Noted new CVA with decrease coordination, varied intake of meals      Interventions  Decreased Sodium Diet  Decreased Fat Diet  Commercial Beverage  Feeding Assistance     Recommendations (treatment strategy):  Cardiac Diet  Add Boost Plus BID  Assists with meals as needed due to coordination.     Nutrition Diagnosis Status:   Continues    Monitor and Evaluation    Food and Nutrient Intake: food and beverage intake  Food and Nutrient Adminstration: diet order  Physical Activity and Function: nutrition-related ADLs and IADLs  Anthropometric Measurements: weight, weight change  Biochemical Data, Medical Tests and Procedures: electrolyte and renal panel, gastrointestinal profile, glucose/endocrine profile, inflammatory profile, lipid profile  Nutrition-Focused Physical Findings: overall appearance     Nutrition Follow-Up    RD Follow-up?: Yes

## 2019-05-07 NOTE — PT/OT/SLP PROGRESS
Occupational Therapy      Patient Name:  St Lacho Farooq   MRN:  [id number]    Patient not seen today secondary to when given a choice of using the bathrom, doing exercises or taking a walk he says he does not wish to do any of those today as he has company (wife, friend and hired aid) he says he will tomorrow.  . Will follow-up tomorrow respecting patients wishes..    Zarina Gonzalez OT  5/7/2019

## 2019-05-07 NOTE — PT/OT/SLP PROGRESS
"Speech Language Pathology Treatment    Patient Name:  St Lacho Farooq   MRN:  [id number]  Admitting Diagnosis: Bacteremia    Recommendations:                 General Recommendations:  Speech/language therapy  Diet recommendations: Solid Diet Level: Regular, Liquid Diet Level: Thin   Aspiration Precautions: Standard aspiration precautions   General Precautions: Standard    Communication strategies:  none    Subjective     Pt found sitting in room with visitors upon initiation of session. Pt was agreeable to all tasks outlined at beginning of session.     Patient goals: none stated     Pain/Comfort:   0/10    Objective:     Has the patient been evaluated by SLP for swallowing?   No  Keep patient NPO? No   Current Respiratory Status: room air      Pt was given a television listing for a task involving reasoning and temporal concepts. Pt answered questions on 5/13 trials Independently, 8/13 trials with MOD verbal and visual cueing, and 11/13 trials with MAX verbal and visual cueing. Temporal concepts continue to be a source of difficulty pt as he consistently required MAX multi-modality cueing for questions related to timing. Questions would occasionally need to be repeated due to difficulty with mental manipulation of multi-part questions. Pt received clinician feedback well and noted, "Well that's pretty good" following cueing.      Assessment:     St Lacho Farooq is a 84 y.o. 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 He presents with deficits across reasoning, problem solving, and short term recall, affecting his ability to complete higher level tasks independently and efficiently. Recommend continuation of skilled speech services to target cognitive lingusitic deficits.     Goals:   Multidisciplinary Problems     SLP Goals        Problem: SLP Goal    Goal Priority Disciplines Outcome   SLP Goal     SLP Ongoing (interventions implemented as appropriate)   Description:  "   Long Term Goals:  1. Pt will increase cognitive linguistic skills to a functional level in order to promote safe and independent activities of choice in known and unknown environments                    Plan:     · Patient to be seen:  3 x/week   · Plan of Care expires:  05/30/19  · Plan of Care reviewed with:  patient   · SLP Follow-Up:  Yes       Discharge recommendations:  home with home health   Barriers to Discharge:  Level of Skilled Assistance Needed - pt continues to require skilled personnel for managmeent of medical diagnoses    Time Tracking:     SLP Treatment Date:   05/07/19  Speech Start Time:  0937  Speech Stop Time:  0958     Speech Total Time (min):  21 min    Billable Minutes: Speech Therapy Individual for cognitive skills- 21 minutes       Ame Gunn CCC-SLP  05/07/2019

## 2019-05-07 NOTE — PLAN OF CARE
Problem: Adult Inpatient Plan of Care  Goal: Plan of Care Review  Outcome: Ongoing (interventions implemented as appropriate)  Patient sitting up in chair with some complaints of a sore throat. Stated relief with lozenges. PICC line draws/flushes. IV antibiotics administered as ordered. Sitter at bedside. Patient ambulatory with walker free from falls/injury. VS stable, A&O. No acute changes noted. Plan of care reviewed and followed.

## 2019-05-07 NOTE — PLAN OF CARE
Problem: Adult Inpatient Plan of Care  Goal: Plan of Care Review  Outcome: Ongoing (interventions implemented as appropriate)  Patient is compliant with fluid and medication management.  Patient is compliant with with all lab testing and procedures.  Patient remains free from all falls and injury.  SWING patient here for IV antibiotics.  VSS. Plan of care reviewed and agreed upon with patient.  Will continue to monitor.

## 2019-05-08 LAB
ALBUMIN SERPL BCP-MCNC: 2.6 G/DL (ref 3.5–5.2)
ALP SERPL-CCNC: 56 U/L (ref 55–135)
ALT SERPL W/O P-5'-P-CCNC: 13 U/L (ref 10–44)
ANION GAP SERPL CALC-SCNC: 7 MMOL/L (ref 8–16)
AST SERPL-CCNC: 11 U/L (ref 10–40)
BASOPHILS # BLD AUTO: 0.02 K/UL (ref 0–0.2)
BASOPHILS NFR BLD: 0.4 % (ref 0–1.9)
BILIRUB SERPL-MCNC: 0.7 MG/DL (ref 0.1–1)
BUN SERPL-MCNC: 12 MG/DL (ref 8–23)
CALCIUM SERPL-MCNC: 10.1 MG/DL (ref 8.7–10.5)
CHLORIDE SERPL-SCNC: 110 MMOL/L (ref 95–110)
CO2 SERPL-SCNC: 25 MMOL/L (ref 23–29)
CREAT SERPL-MCNC: 0.9 MG/DL (ref 0.5–1.4)
DIFFERENTIAL METHOD: ABNORMAL
EOSINOPHIL # BLD AUTO: 0.2 K/UL (ref 0–0.5)
EOSINOPHIL NFR BLD: 3.1 % (ref 0–8)
ERYTHROCYTE [DISTWIDTH] IN BLOOD BY AUTOMATED COUNT: 19.8 % (ref 11.5–14.5)
EST. GFR  (AFRICAN AMERICAN): >60 ML/MIN/1.73 M^2
EST. GFR  (NON AFRICAN AMERICAN): >60 ML/MIN/1.73 M^2
GLUCOSE SERPL-MCNC: 96 MG/DL (ref 70–110)
HCT VFR BLD AUTO: 28.9 % (ref 40–54)
HGB BLD-MCNC: 8.4 G/DL (ref 14–18)
LYMPHOCYTES # BLD AUTO: 0.9 K/UL (ref 1–4.8)
LYMPHOCYTES NFR BLD: 16.8 % (ref 18–48)
MCH RBC QN AUTO: 22.8 PG (ref 27–31)
MCHC RBC AUTO-ENTMCNC: 29.1 G/DL (ref 32–36)
MCV RBC AUTO: 78 FL (ref 82–98)
MONOCYTES # BLD AUTO: 0.6 K/UL (ref 0.3–1)
MONOCYTES NFR BLD: 11.5 % (ref 4–15)
NEUTROPHILS # BLD AUTO: 3.6 K/UL (ref 1.8–7.7)
NEUTROPHILS NFR BLD: 68.2 % (ref 38–73)
PLATELET # BLD AUTO: 214 K/UL (ref 150–350)
PMV BLD AUTO: 8.4 FL (ref 9.2–12.9)
POTASSIUM SERPL-SCNC: 4 MMOL/L (ref 3.5–5.1)
PROT SERPL-MCNC: 5.8 G/DL (ref 6–8.4)
RBC # BLD AUTO: 3.69 M/UL (ref 4.6–6.2)
SODIUM SERPL-SCNC: 142 MMOL/L (ref 136–145)
WBC # BLD AUTO: 5.23 K/UL (ref 3.9–12.7)

## 2019-05-08 PROCEDURE — 63600175 PHARM REV CODE 636 W HCPCS: Performed by: INTERNAL MEDICINE

## 2019-05-08 PROCEDURE — 11000004 HC SNF PRIVATE

## 2019-05-08 PROCEDURE — 25000003 PHARM REV CODE 250: Performed by: NURSE PRACTITIONER

## 2019-05-08 PROCEDURE — 97530 THERAPEUTIC ACTIVITIES: CPT

## 2019-05-08 PROCEDURE — A4216 STERILE WATER/SALINE, 10 ML: HCPCS | Performed by: INTERNAL MEDICINE

## 2019-05-08 PROCEDURE — 99232 SBSQ HOSP IP/OBS MODERATE 35: CPT | Mod: ,,, | Performed by: FAMILY MEDICINE

## 2019-05-08 PROCEDURE — 80053 COMPREHEN METABOLIC PANEL: CPT

## 2019-05-08 PROCEDURE — 99900035 HC TECH TIME PER 15 MIN (STAT)

## 2019-05-08 PROCEDURE — 99232 PR SUBSEQUENT HOSPITAL CARE,LEVL II: ICD-10-PCS | Mod: ,,, | Performed by: FAMILY MEDICINE

## 2019-05-08 PROCEDURE — 36415 COLL VENOUS BLD VENIPUNCTURE: CPT

## 2019-05-08 PROCEDURE — 25000003 PHARM REV CODE 250: Performed by: INTERNAL MEDICINE

## 2019-05-08 PROCEDURE — 85025 COMPLETE CBC W/AUTO DIFF WBC: CPT

## 2019-05-08 PROCEDURE — 25000003 PHARM REV CODE 250: Performed by: FAMILY MEDICINE

## 2019-05-08 PROCEDURE — 97110 THERAPEUTIC EXERCISES: CPT

## 2019-05-08 PROCEDURE — 25000003 PHARM REV CODE 250: Performed by: PSYCHIATRY & NEUROLOGY

## 2019-05-08 RX ORDER — FUROSEMIDE 20 MG/1
20 TABLET ORAL DAILY
Status: DISCONTINUED | OUTPATIENT
Start: 2019-05-08 | End: 2019-05-13

## 2019-05-08 RX ADMIN — AMPICILLIN SODIUM 2 G: 2 INJECTION, POWDER, FOR SOLUTION INTRAMUSCULAR; INTRAVENOUS at 03:05

## 2019-05-08 RX ADMIN — TAMSULOSIN HYDROCHLORIDE 0.4 MG: 0.4 CAPSULE ORAL at 08:05

## 2019-05-08 RX ADMIN — LEVETIRACETAM 250 MG: 250 TABLET ORAL at 08:05

## 2019-05-08 RX ADMIN — Medication 10 ML: at 12:05

## 2019-05-08 RX ADMIN — Medication 10 ML: at 07:05

## 2019-05-08 RX ADMIN — FUROSEMIDE 20 MG: 20 TABLET ORAL at 09:05

## 2019-05-08 RX ADMIN — CARVEDILOL 25 MG: 25 TABLET, FILM COATED ORAL at 04:05

## 2019-05-08 RX ADMIN — Medication 10 ML: at 09:05

## 2019-05-08 RX ADMIN — AMPICILLIN SODIUM 2 G: 2 INJECTION, POWDER, FOR SOLUTION INTRAMUSCULAR; INTRAVENOUS at 08:05

## 2019-05-08 RX ADMIN — Medication 10 ML: at 05:05

## 2019-05-08 RX ADMIN — ATORVASTATIN CALCIUM 10 MG: 10 TABLET, FILM COATED ORAL at 08:05

## 2019-05-08 RX ADMIN — CARVEDILOL 25 MG: 25 TABLET, FILM COATED ORAL at 08:05

## 2019-05-08 RX ADMIN — LIDOCAINE 1 PATCH: 50 PATCH TOPICAL at 06:05

## 2019-05-08 RX ADMIN — MICONAZOLE NITRATE: 20 CREAM TOPICAL at 08:05

## 2019-05-08 RX ADMIN — DABIGATRAN ETEXILATE MESYLATE 150 MG: 75 CAPSULE ORAL at 08:05

## 2019-05-08 RX ADMIN — MICONAZOLE NITRATE: 20 CREAM TOPICAL at 09:05

## 2019-05-08 RX ADMIN — PANTOPRAZOLE SODIUM 40 MG: 40 TABLET, DELAYED RELEASE ORAL at 08:05

## 2019-05-08 RX ADMIN — ASPIRIN 81 MG: 81 TABLET, COATED ORAL at 08:05

## 2019-05-08 RX ADMIN — AMPICILLIN SODIUM 2 G: 2 INJECTION, POWDER, FOR SOLUTION INTRAMUSCULAR; INTRAVENOUS at 09:05

## 2019-05-08 NOTE — ASSESSMENT & PLAN NOTE
Noted 4/29: Cr up to 2.1  Reduce pradaxa  Hold lasix.  Ns 500ml bolus today slowly  Better today, hold hctz and lasix .  Creat stable   Change labs to M/W/f

## 2019-05-08 NOTE — PROGRESS NOTES
Spoke with Mr. Monk about the purpose of pharmacy education. We discussed his abx. Patient denies any GI side effects. States he feels great and has no complaints of pain. Discussed his pradaxa and signs/sx of bleeding. States hes aware and been taking it since 2004. Patient states he has no questions and expressed his satisfaction with the care he has received.

## 2019-05-08 NOTE — PROGRESS NOTES
[facility] Medicine  Progress Note    Patient Name: St L[facility] Farooq  MRN: [id number]  Patient Class: IP- Swing   Admission Date: 4/23/2019  Length of Stay: 15 days  Attending Physician: John Saeed MD  Primary Care Provider: Dylan Adam MD        Subjective:     Principal Problem:Bacteremia    HPI:  This 84 year old man with PMHX of CAD p CABG, [facility] 2/19; stable lesions, PAD, bilateral CVD, AAA,  PAF, HTN, hyperlipidemia, MALATHI, NYHA II chronic diastolic HF and non rheumatic critical AS with JAMAAL 0.8cm2 with preserved LVSFX EF55%,  per Echo underwent  + TAVR placed 3/18/19 per Dr. Babcock. Post op course was complicated by CVA ~ 2 weeks post TAVR; he presented to [facility] 4/16 with altered mental status and fever where he was Dx with CVA and gram + bacteremia.   He was  transferred to [facility] [place], [place] 4/19  for cardiology and ID eval ant tx; CT at that time was unrevealing for an acute intracranial process  His MRI was abnormal.  His blood cultures have grown Enterococcus faecalis sensitve to all tested drugs,  Pt initially given vancomycin and then changed to ampicillin IV.  He has clinically improved.  His INES shows a septic atrial thrombus. The cRP is 49 and the ESR is 15.  Pt is no longer febrile and he and daughter feel he is at base line physically and mentally.Pt transferred to [facility] yesterday for continued skilled care and prolonged IV ABX. Plan is 6 weeks of ABX; ampicillin 2gms IV q 6; day 1 with negative blood cultures was 4/19; so today is day 6/42  Requests DNR .    Hospital Course:  4/25/19 SKILLED NURSING for prolonged ABX   Pt sitting up in chair watching TV; reports feeling fine but did have urinary incontinence and noting frequency   No fever, WBC normal   Day 7/42 ABX for bacteremia/septic emboli     4/26/19 SKILLED NURSING for prolonged ABX for bacteremia, septic thrombus; day 8/42  NAD Overnight, still with night time urinary incontinence; will try trial of  flomax   PSA 1.4, repeat U/A ok; bladder scan ok     4/28 pt on SKILLED bed for prolonged abx day 10/42 for bacteremia from septic emboli. Ampicillin which cultures show sensitivity to. He also had a low grade temp last night. With the temp he had confusion and right sided weakness. Ct head done with no changes. Neuro was consulted for this am. He has this same type episode last visit with temp and mental status changes. This am he is back to normal. To note his H/H was lower and also creat bumped to 2.1.     4/29 pt on SKILL. Has some neuro changes on Sunday evening. CT without new strokes. MRI ordered per Dr shaw yesterday. Shows 3 new strokes. Facial droop this am . Neuro/cards and vascular surgery discussed pt case. He needs the anticoagulation with afib and thrombus in atrium. Will cont pradaxa, no heparin. Also remains on IV abx for 6 weeks. Day 12/42. No fever. No elevated WBC.     Creat was elevated yesterday 2.1. Lasix was stopped and 500ml IVF given. Creat better today 1.5. Will hold the hctz today as well as stopped the lisinopril and reduce amlodipine to have him a little higher with pressure due to acute strokes. Use LAUREL for swelling of lower extremity   R facial drop still apparent     5/2 pt doing well. he is sitting in day the day room. No complaints./ H.H stable. K a little low. Remains on pradaxa full dose now as kidneys have returned to baseline. Day 15/42 on ampicillin.     5/3 he is now on day 16/42 on enterococcus sensitive bacteremia to ampicillin. He is also on pradaxa full strength for atrial thrombus. Occult positive but h/h stable. Risk of discontinuing NOAC vs continuing discussed repetitively and family agrees to cont. Watching h/h daily.     Having episodes of mental status changes. Neuro following. Ct done again yesterday no acute changes. Keppra started for consideration of sz given the description of his events. No EEG available here, family does not wish for transfer.  "Otherwise all bp meds have been held to allow for permissive HTN. Bp still only 124/58 this am.    5/6/18 he is now on day 19/42 on enterococcus sensitive bacteremia to ampicillin. 5/2 BC NGTD x 4d  He is also on pradaxa full strength for atrial thrombus. Occult positive but h/h stable. NOAC  Risks/benefits reviewed  Keppra started for possible seizure activity with mental status changes( No EEG available here) . Pt is much improved with RX   H&H 8.4/28.9 , afebrile, WBC 6.7- VSS  Up in chair, "feeling good"    5/8/19 day 21/42 on enterococcus sensitive bacteremia to ampicillin. 5/2 BC NGTD x 5d  He is also on pradaxa full strength for atrial thrombus. Occult positive but h/h stable. NOAC  Risks/benefits reviewed  Keppra started for possible seizure activity with mental status changes( No EEG available here) . Pt is much improved with RX   H&H 8.4/28.9 , afebrile, WBC 5.23- VSS  Pt very alert and Oriented x 3; doing well with PT       Review of Systems   Constitutional: Negative.    HENT: Negative for congestion, ear pain, sinus pressure, sneezing and sore throat.    Eyes: Negative.    Respiratory: Negative for cough, chest tightness, shortness of breath and wheezing.    Cardiovascular: Negative.  Negative for chest pain.   Gastrointestinal: Negative for abdominal pain, constipation, diarrhea, nausea and vomiting.   Genitourinary: Negative for difficulty urinating, dysuria and flank pain.   Musculoskeletal: Negative.  Negative for joint swelling.   Skin: Negative.    Neurological: Negative for dizziness, facial asymmetry, weakness and headaches.   Hematological: Negative.    Psychiatric/Behavioral: Negative for behavioral problems and confusion.     Objective:     Vital Signs (Most Recent):  Temp: 97.8 °F (36.6 °C) (05/08/19 0701)  Pulse: 64 (05/08/19 0701)  Resp: 18 (05/08/19 0701)  BP: (!) 161/70 (05/08/19 0701)  SpO2: (!) 93 % (05/08/19 0701) Vital Signs (24h Range):  Temp:  [97.5 °F (36.4 °C)-98.2 °F (36.8 °C)] " 97.8 °F (36.6 °C)  Pulse:  [63-71] 64  Resp:  [18] 18  SpO2:  [92 %-97 %] 93 %  BP: (136-161)/(62-70) 161/70     Weight: 89.4 kg (197 lb)(No New since admit)  Body mass index is 29.95 kg/m².    Physical Exam   Constitutional: He is oriented to person, place, and time. He appears well-developed and well-nourished.   HENT:   Head: Normocephalic and atraumatic.   Right Ear: Tympanic membrane, external ear and ear canal normal.   Left Ear: Tympanic membrane, external ear and ear canal normal.   Mouth/Throat: Oropharynx is clear and moist.   Eyes: Pupils are equal, round, and reactive to light. Conjunctivae and EOM are normal.   Neck: Normal range of motion. Neck supple. No tracheal deviation present.   Cardiovascular: Normal rate, regular rhythm, intact distal pulses and normal pulses. Exam reveals no gallop and no friction rub.   Murmur heard.  Pulmonary/Chest: Effort normal and breath sounds normal.   Abdominal: Soft. Bowel sounds are normal. He exhibits no distension. There is no tenderness. Hernia confirmed negative in the right inguinal area and confirmed negative in the left inguinal area.   Musculoskeletal: Normal range of motion. He exhibits no edema.   Neurological: He is alert and oriented to person, place, and time. He has normal reflexes. No cranial nerve deficit.   Skin: Skin is warm and dry.   Psychiatric: He has a normal mood and affect. His behavior is normal. Judgment and thought content normal.   Nursing note and vitals reviewed.        CRANIAL NERVES     CN III, IV, VI   Pupils are equal, round, and reactive to light.  Extraocular motions are normal.        Significant Labs:   CMP:   Recent Labs   Lab 05/07/19  0630 05/08/19  0604    142   K 4.0 4.0   * 110   CO2 24 25   * 96   BUN 10 12   CREATININE 0.9 0.9   CALCIUM 9.9 10.1   PROT 5.7* 5.8*   ALBUMIN 2.7* 2.6*   BILITOT 0.6 0.7   ALKPHOS 57 56   AST 12 11   ALT 13 13   ANIONGAP 6* 7*   EGFRNONAA >60 >60     PSA 1.4,   Recent Labs    Lab 19  0604   WBC 5.23   RBC 3.69*   HGB 8.4*   HCT 28.9*      MCV 78*   MCH 22.8*   MCHC 29.1*      Urinalysis negative     Lab Results   Component Value Date    PSA 1.4 2019 Mag 2.2    Blood cultures  NGTD x 2     blood cultures   Enterococcus faecalis       CULTURE, BLOOD     Ampicillin <=2 mcg/mL Sensitive     Gentamicin Synergy Screen <=500 mcg/mL Sensitive     Tetracycline <=4 mcg/mL Sensitive     Vancomycin 2 mcg/mL Sensitive      Flu negative       Significant Imagin/2 CT head   1.  No CT evidence of an acute intracranial abnormality.    2.  Atrophy and small vessel ischemic changes of the periventricular white matter.     MRI   1.  Two tiny focal areas of increased signal intensity on diffusion-weighted images as described above likely representing artifact at the crux of the sulci.  A secondary consideration is tiny lacunar infarcts.  Dr. Laverne Shipman was telephoned with a verbal report at the time of this dictation.    2.  Atrophy and small vessel ischemic changes of the periventricular white matter.     CT head   1.  No CT evidence of an acute intracranial abnormality.    2.  Atrophy and small vessel ischemic changes of the periventricular white matter.  Old lacunar infarcts.     x ray hip right   No evidence of right hip fracture.     x ray hip left   No evidence of left hip fracture.     CXR   Right-sided PICC with tip projecting over superior vena cava.     Echo   · Mildly decreased left ventricular systolic function. The estimated ejection fraction is 45%  · Local segmental wall motion abnormalities.  · Eccentric left ventricular hypertrophy.  · Severe left atrial enlargement.  · Grade I (mild) left ventricular diastolic dysfunction consistent with impaired relaxation.  · Normal left atrial pressure.  · Normal right ventricular systolic function.  · Mild right atrial enlargement.  · There is a bioprosthetic aortic valve  "present. I did not see mention of AVR in encounter notes or March echo report or prior INES report but there appears to be a bioprosthesis. There is mild central aortic insufficiency present.  · Mild mitral regurgitation.  · Normal central venous pressure (3 mm Hg).  · The estimated PA systolic pressure is 31 mm Hg    4/18 INES  Final Impressions  1. The study quality is good.   2. 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  3. Bubble study is positive for Grade II Patent Foramen Ovale.  4. Moderate lipomatous atrial septal hypertrophy.  5. Normal functioning prosthetic aortic valve with trivial   paravalvular regurgitation.   6. Preserved left ventricular function. Ejection fraction around 55%.  7. Grade III atherosclerosis of aortic arch.    Assessment/Plan:      * Bacteremia  S/p recent TAVR now with septic emboli and concern for "flicking emboli"   ENTEROCOCCUS faecalis    His blood cultures have grown Enterococcus faecalis sensitve to all tested drugs  ID recommending total of 6 weeks IV ABX; Ampicillin 2gms IV q 6 hr; day 1 with negative blood cultures was 4/19/19; so today is day 6/42  Day 7/42 4/26/19 day 8/42 4/29 day 11/42 of IV abd. 4/19 was on vanc and 4/20 started on ampicillin  4/30 day 12/42.  4/19 was on vanc and 4/20 started on ampicillin    5/2 day 15/42 of IV abx- repeat blood cultures today  5/3 day 16/42 on ampicillin, no growth in repeat blood cultures from 5/2 5/6 Day 19/42 Ampicillin;     Skin ulcer of sacrum, limited to breakdown of skin  Barrier cream ; nizoral cream  Donut to sit ;already doing it       Encephalopathy  Somnolent today.  Considering emolic showers vs sz activity. CT negative, neuro started keppra.    5/6/19 much better with Keppra rx, no further mental status changes.     Renal insufficiency  Noted 4/29: Cr up to 2.1  Reduce pradaxa  Hold lasix.  Ns 500ml bolus today slowly  Better today, hold hctz and lasix .  Creat stable   Change labs to M/W/f    Nocturnal " enuresis  Check U.A , PSA, bladder scan; bladder scan 89ml   Still with incontinence issues   Gets HCTz 12.5 and lasix 20mg po daily in am   trial of flomax     Stop fluid pill, creat better with hydration.    Atrial thrombus  Large atrial thrombus found on INES ,Dr. Galicia deemed septic emboli; still no INES reporting thrombus   Continue Pradaxa; family plans to monitor meds once home  Will not consider failed therapy as patient has had lapses in therapy due to recent surgical interventions and non-compliance   as discussed above there is concern for infected thrombus.   Per Infectious Disease will treat bacteremia and possible infected thrombus for 6 weeks.    5/2 Discussed with family and cardiology. rec repeat INES 2 weeks consider changing NOAC at that time, not now as concern over anticoagulation is issue with new CVA     5/6: unsure will repeat INES at this point. Will do 6 weeks IV antibx regardless of repeat INES results. Will continue to discuss with cardiology need for repeat procedure      Cerebrovascular accident (CVA)  Subacute CVA with focal deficits noted on 4/14, with significant improvement in symptoms   MRI did not demonstrate acute findings but neuro reviewed and Dx with new CVA;   Was being tx with Pradaxa when he had acute stroke but Will not consider failed therapy as patient has had lapses in therapy due to recent surgical interventions and non-compliance  Per Dr. Fernandez's recommendations we will continue:  -Telemetry.  -Neurochecks.  -NO TPA as he presented initially on Sunday  -Physical Therapy and OT Consult, evaluation and treatment at [facility] once patient is admitted to  SNF  -Completed permissive HTN and will need strict BP control.   -Continue his current dose home meds otherwise including Asprin, Pradaxa and statin       4/29: Reduce pradaxa due to renal function today  Consult colin due to AMS and neuro changes overnight. Repeat CT head without acute changes. ? Watershed  injury; ? Keep BP a little higher? Dehydration playing a role    4/30 go back up on pradaxa due to kidneys now functioning. Reviewed MRI with Kel Fernandez and Dr Ventura. ST/OT/PT    5/2 pt doing well with gait training and speech. Up in day room today    5/3 had another episode of change in mental status yesterday evening. Started on keppra, ct negative. Doing better today  5/6 No further mental status changes since staring KEPPRA   5/8 Stable w Keppra    Hx of CABG  Asa, statin    CHF (congestive heart failure), NYHA class II, chronic, diastolic  Continue Lisinopril, HCTZ  4/29: Hold lasix for now. Already had dose this am. Give gentle hydration given rising Cr. Likely a little dry  4/30 cont to hold lasix, lisinopril and hctz LAUREL hose.    5/2 holding ace. Diuretics cont compression hose and BB    5/3 Cont BB and asa, no ace/arb/lasix due to permissive HTN.    5/6 /67. No signs of acute exacerbation today    Anemia  H/H stable without acute indications for transfusion   Continue to monitor    check anemia w/u from last visit ferritin was 26, retic was 1.5  Occult stool today, will not change our plan just give us answer to where blood is going. He needs the blood thinner for now due to septic emboli.     Cont to monitor occult pending  Transfuse as needed, needs blood thinner.    H/H stable.        Essential hypertension  Continue amlodpine, Lisinopril, HCTZ, , coreg  4/29: Lasix- will hold now that Creat bumped and follow renal fxn  4/30 stopped lasix and lisinopril yesterday, today stop hctz and reduce amlodipine  He needs a little high bp.    5/2 Bp still in 120/50- cut the amlodipine out today  5/3 cont to hold bp meds, allowing permissive HTN, bp still only 124/58. Can start to treat bp if needed over weekend per neuro recs.    5/6: overall remains stable. No medication changes today    Aortic valve stenosis  S/p TAVR       VTE Risk Mitigation (From admission, onward)        Ordered     dabigatran  etexilate capsule 150 mg  2 times daily      04/30/19 0907     heparin, porcine (PF) 100 unit/mL injection flush 500 Units  As needed (PRN)      04/25/19 1408              Ehsan West MD  Department of Hospital Medicine   [facility]

## 2019-05-08 NOTE — PROGRESS NOTES
[facility]  Cardiology  Progress Note    Patient Name: St Lacho Farooq  MRN: [id number]  Admission Date: 4/23/2019  Hospital Length of Stay: 15 days  Code Status: DNR   Attending Physician: John Saeed MD   Primary Care Physician: Dylan Adam MD  Expected Discharge Date: 5/30/2019  Principal Problem:Bacteremia    Subjective:     Interval History: Beginning to  fluid again, 2-3+ BLE  Prior to admission was on lasix 20mg po 40mg po MWF, 20mg all other days  BP in the 160's    Review of Systems   Constitution:        Gen weak     HENT: Negative.    Eyes: Negative.    Cardiovascular: Positive for leg swelling. Negative for chest pain, dyspnea on exertion, orthopnea and syncope.   Respiratory: Negative.    Endocrine: Negative.    Hematologic/Lymphatic: Negative.    Skin: Negative.    Musculoskeletal: Positive for muscle weakness.   Gastrointestinal: Negative.    Genitourinary: Negative.    Neurological: Negative.    Psychiatric/Behavioral: Negative.    Allergic/Immunologic: Negative.      Objective:     Vital Signs (Most Recent):  Temp: 97.8 °F (36.6 °C) (05/08/19 0701)  Pulse: 64 (05/08/19 0701)  Resp: 18 (05/08/19 0701)  BP: (!) 161/70 (05/08/19 0701)  SpO2: (!) 93 % (05/08/19 0705) Vital Signs (24h Range):  Temp:  [97.5 °F (36.4 °C)-98.2 °F (36.8 °C)] 97.8 °F (36.6 °C)  Pulse:  [63-71] 64  Resp:  [18] 18  SpO2:  [92 %-97 %] 93 %  BP: (136-161)/(62-70) 161/70     Weight: 89.4 kg (197 lb)(No New since admit)  Body mass index is 29.95 kg/m².    SpO2: (!) 93 %  O2 Device (Oxygen Therapy): room air      Intake/Output Summary (Last 24 hours) at 5/8/2019 0831  Last data filed at 5/8/2019 0600  Gross per 24 hour   Intake 1320 ml   Output --   Net 1320 ml       Lines/Drains/Airways     Peripherally Inserted Central Catheter Line                 PICC Double Lumen 04/22/19 1108 15 days                Physical Exam   Constitutional: He appears well-developed and well-nourished.    HENT:   Head: Normocephalic.   Neck: Normal range of motion. No JVD present.   Cardiovascular: Normal rate and regular rhythm.   Pulmonary/Chest: Effort normal and breath sounds normal.   Abdominal: Soft.   Musculoskeletal: Normal range of motion. He exhibits edema.   2-3+ BLE   Neurological: He is alert.   Skin: Skin is warm and dry.   Psychiatric: He has a normal mood and affect.   Nursing note and vitals reviewed.      Significant Labs:   BMP:   Recent Labs   Lab 05/07/19  0630 05/08/19  0604   * 96    142   K 4.0 4.0   * 110   CO2 24 25   BUN 10 12   CREATININE 0.9 0.9   CALCIUM 9.9 10.1   , CMP   Recent Labs   Lab 05/07/19  0630 05/08/19  0604    142   K 4.0 4.0   * 110   CO2 24 25   * 96   BUN 10 12   CREATININE 0.9 0.9   CALCIUM 9.9 10.1   PROT 5.7* 5.8*   ALBUMIN 2.7* 2.6*   BILITOT 0.6 0.7   ALKPHOS 57 56   AST 12 11   ALT 13 13   ANIONGAP 6* 7*   ESTGFRAFRICA >60 >60   EGFRNONAA >60 >60   , CBC   Recent Labs   Lab 05/07/19  0630 05/08/19  0604   WBC 5.48 5.23   HGB 8.1* 8.4*   HCT 27.8* 28.9*    214    and Troponin No results for input(s): TROPONINI in the last 48 hours.    Significant Imaging: Echocardiogram:   Transthoracic echo (TTE) complete (Cupid Only):   Results for orders placed or performed during the hospital encounter of 04/16/19   Transthoracic echo (TTE) complete (Cupid Only)   Result Value Ref Range    BSA 2.06 m2    LA WIDTH 4.45 cm    PV PEAK VELOCITY 1.38 cm/s    LVIDD 6.93 (A) 3.5 - 6.0 cm    IVS 1.13 (A) 0.6 - 1.1 cm    PW 1.17 (A) 0.6 - 1.1 cm    Ao root annulus 2.86 cm    LVIDS 4.76 (A) 2.1 - 4.0 cm    FS 31 28 - 44 %    LA volume 102.99 cm3    STJ 2.65 cm    LV mass 377.93 g    LA size 4.98 cm    RVDD 3.25 cm    Left Ventricle Relative Wall Thickness 0.34 cm    AV mean gradient 7.38 mmHg    AV valve area 2.16 cm2    AV Velocity Ratio 0.72     AV index (prosthetic) 0.70     E/A ratio 1.05     E wave decelartion time 305.25 msec    IVRT  0.11 msec    Pulm vein S/D ratio 1.26     LVOT diameter 1.99 cm    LVOT area 3.11 cm2    LVOT peak abdifatah 8.8196808771 m/s    LVOT peak VTI 28.16 cm    Ao peak abdifatah 1.62 m/s    Ao VTI 40.46 cm    LVOT stroke volume 87.54 cm3    AV peak gradient 10.50 mmHg    MV Peak E Abdifatah 1.16 m/s    TR Max Abdifatah 2.65 m/s    MV Peak A Abdifatah 1.11 m/s    PV Peak S Abdifatah 0.44 m/s    PV Peak D Abdifatah 0.35 m/s    LV Systolic Volume 105.45 mL    LV Systolic Volume Index 52.5 mL/m2    LV Diastolic Volume 249.54 mL    LV Diastolic Volume Index 124.20 mL/m2    LA Volume Index 51.3 mL/m2    LV Mass Index 188.1 g/m2    RA Major Axis 5.02 cm    Left Atrium Minor Axis 5.14 cm    Left Atrium Major Axis 5.84 cm    Triscuspid Valve Regurgitation Peak Gradient 28.09 mmHg    Right Atrial Pressure (from IVC) 3 mmHg    TV rest pulmonary artery pressure 31 mmHg     Assessment and Plan:     Recurrent CVA p interruption of NOAC, ?septic emboli  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  PAF  Hx NSVT at [facility]  HTN  DNR  Chronic stable anemia  Chronic diastolic CHF  CAD hx CABG  MALATHI    Plan:  Resume diuretic-lasix 20mg po qday for now  Follow renal function  Cont ASA, Pradaxa, coreg, statin  Consider resumption of lisinopril/norvasc as needed for HTN    Active Diagnoses:    Diagnosis Date Noted POA    PRINCIPAL PROBLEM:  Bacteremia [R78.81] 04/18/2019 Yes    Skin ulcer of sacrum, limited to breakdown of skin [L98.421] 05/03/2019 No    Encephalopathy [G93.40]  No    Renal insufficiency [N28.9] 04/29/2019 Yes    Nocturnal enuresis [N39.44] 04/25/2019 No    Atrial thrombus [I51.3] 04/19/2019 Yes    Cerebrovascular accident (CVA) [I63.9] 04/16/2019 Yes    Hx of CABG [Z95.1] 02/19/2019 Not Applicable    CHF (congestive heart failure), NYHA class II, chronic, diastolic [I50.32] 02/19/2019 Yes    Anemia [D64.9] 11/03/2016 Yes    Essential hypertension [I10] 10/28/2016 Yes    Aortic valve stenosis [I35.0] 06/28/2016 Yes      Problems Resolved During this Admission:       VTE  Risk Mitigation (From admission, onward)        Ordered     dabigatran etexilate capsule 150 mg  2 times daily      04/30/19 0907     heparin, porcine (PF) 100 unit/mL injection flush 500 Units  As needed (PRN)      04/25/19 1408          Divya Pereyra NP  Cardiology  [facility]  I attest that I have personally seen and examined this patient. I have reviewed and discussed the management in detail as outlined above.

## 2019-05-08 NOTE — PLAN OF CARE
Problem: Adult Inpatient Plan of Care  Goal: Plan of Care Review  Outcome: Ongoing (interventions implemented as appropriate)  Patient is compliant with fluid and medication management.  Patient is compliant with with all lab testing and procedures.  Patient remains free from all falls and injury. Swing patient.  VSS. Plan of care reviewed and agreed upon with patient.  Will continue to monitor.

## 2019-05-08 NOTE — PT/OT/SLP PROGRESS
Occupational Therapy   Treatment    Name: St Lacho Farooq  MRN: [id number]  Admitting Diagnosis:  Bacteremia       Recommendations:     Discharge Recommendations: home health PT, home health OT, home with home health  Discharge Equipment Recommendations:  cane, straight, walker, rolling, grab bar, toilet, grab bar, tub/shower, tub bench, shower chair  Barriers to discharge:  None    Assessment:     St Lacho Farooq is a 84 y.o. male with a medical diagnosis of Bacteremia.  He presents with generalized weakness and slight lag in the R side when performing transitional movements. SOB present with energy exersion. Patient presents with decreased stamina and low activity tolerance when performing therapeutic interventions and ADLs. He requires frequent rest breaks and cuing/reminders to take breaks to prevent unsafe performance. Performance deficits affecting function are weakness, impaired endurance, impaired self care skills, decreased lower extremity function, decreased ROM, impaired muscle length, abnormal tone, decreased upper extremity function, impaired balance, gait instability, decreased coordination, impaired fine motor, impaired joint extensibility, decreased safety awareness, impaired functional mobilty, impaired cognition, impaired coordination.     Rehab Prognosis:  Good; patient would benefit from acute skilled OT services to address these deficits and reach maximum level of function.       Plan:     Patient to be seen 5 x/week to address the above listed problems via self-care/home management, therapeutic activities, therapeutic exercises  · Plan of Care Expires: 05/31/19  · Plan of Care Reviewed with: patient, daughter    Subjective     Pain/Comfort:  · Pain Rating 1: 4/10  · Location - Side 1: Bilateral  · Location - Orientation 1: generalized  · Location 1: leg  · Pain Addressed 1: Reposition, Other (see comments)(frequent rest breaks when pain present)  · Pain Rating Post-Intervention 1:  0/10    Objective:     Communicated with: nurse and daughter prior to session.  Patient found up in chair with peripheral IV, PICC line upon OT entry to room.    General Precautions: Standard, fall   Orthopedic Precautions:N/A   Braces: N/A     Occupational Performance:     Bed Mobility:    · Patient completed Rolling/Turning to Left with  supervision  · Patient completed Rolling/Turning to Right with supervision  · Patient completed Scooting/Bridging with supervision  · Patient completed Supine to Sit with supervision  · Patient completed Sit to Supine with supervision     Functional Mobility/Transfers:  · Patient completed Sit <> Stand Transfer with stand by assistance  with  rolling walker   · Patient completed Chair <> Chair Transfer using Step Transfer technique with stand by assistance with rolling walker  · Patient completed Toilet Transfer Step Transfer technique with stand by assistance with  rolling walker  · Functional Mobility: Some deficits noted with balance and endurance during fxnal mobility. When moving outside COG, there is a slow reaction time with fxnal mobility, increasing fall risk.    Activities of Daily Living:  · Feeding:  independence no assistance needed  · Grooming: modified independence rest breaks required due to decrease stamina  · Bathing: contact guard assistance due to decreased endurance and deficits with dynmaic balance when moving outside COG and HESHAM.  · Upper Body Dressing: modified independence occasional need for help due to slightly limited FMC with buttoning and fasteners.  · Lower Body Dressing: stand by assistance due to deficits with dynamic balance with trunk flexion to thread legs and balance deficits when standing to manipulate clothing.  · Toileting: stand by assistance due to deficits with dynamic balance and decreased environmental/safety awareness      [facility] 6 Click ADL: 21    Treatment & Education:  1 set of 20 reps over head (shoulder flexion) reaching  1 set of  20 reps bicep curls with green theraband  1 set of 20 reps reaching in front (elbow extension) reaching  1 set of 20 shoulder retractions with green theraband  1 set of 20 shoulder internal rotation with green theraband  1 set of 20 shoulder external rotation with green theraband  1 set of 20 each D1 and D2 shoulder diagonal extension with green theraband    20 chair sit to stand  Practice transferring from one chair to another    Educated on the importance of rest breaks when fatigued to decreased improper form and poor technique with fxnal mobility. Demonstrated proper sit to stand technique with pushing off from the surface rather than grabbing/pulling on walker. Pt verbalized understanding.    Patient left up in chair with all lines intact, call button in reach and daughter presentEducation:      GOALS:   Multidisciplinary Problems     Occupational Therapy Goals        Problem: Occupational Therapy Goal    Goal Priority Disciplines Outcome Interventions   Occupational Therapy Goal     OT, PT/OT     Description:  Goals to be met by:6/10/2019     Patient will increase functional independence with ADLs by performing:    LE Dressing with Modified [place].  Grooming while standing at sink with Modified [place].  Toileting from toilet with Modified [place] for hygiene and clothing management.   Bathing from  shower chair/bench with Modified [place].  Upper extremity exercise program x10 reps per handout, with assistance as needed  Patient will consistently 3/3 trials at different times of the day demonstrate ability to be a) oriented x 4 (using watch if needed for time of day)  b) use RN call light appropriately before getting up c) complete a >10 piece puzzle d) complete a word search page with 90% accuracy d) add and subtract 2 digit numbers 5/5 tries with 90% accuracy e) color by number from coloring book attending to periphery of the page as well as center of the page without errors. E) fill  his medbox with the 13 pills he says he takes daily, twice per day with 100% accuracy f) fill out his medication list with WHAT he takes, WHY he takes it, WHEN he takes it, HOW and HOW often he takes it , and possible side effects of each medication  Complete the [employer] pre -  Driving cognitive screen without any errors.                      Time Tracking:     OT Date of Treatment: 05/08/19  OT Start Time: 0455  OT Stop Time: 0520  OT Total Time (min): 25 min    Billable Minutes:Therapeutic Activity 10 minutes- sit to stands, transfer technique practice safely  Therapeutic Exercise 15 minutes- resistance exercises with repetition and theraband    Yazmin Frankel, OT  5/8/2019

## 2019-05-08 NOTE — NURSING
Patient noted to be sitting up in chair.  Equal and strong hand  noted. No facial drooping.  See PT notes for previous episode.

## 2019-05-08 NOTE — SUBJECTIVE & OBJECTIVE
Review of Systems   Constitutional: Negative.    HENT: Negative for congestion, ear pain, sinus pressure, sneezing and sore throat.    Eyes: Negative.    Respiratory: Negative for cough, chest tightness, shortness of breath and wheezing.    Cardiovascular: Negative.  Negative for chest pain.   Gastrointestinal: Negative for abdominal pain, constipation, diarrhea, nausea and vomiting.   Genitourinary: Negative for difficulty urinating, dysuria and flank pain.   Musculoskeletal: Negative.  Negative for joint swelling.   Skin: Negative.    Neurological: Negative for dizziness, facial asymmetry, weakness and headaches.   Hematological: Negative.    Psychiatric/Behavioral: Negative for behavioral problems and confusion.     Objective:     Vital Signs (Most Recent):  Temp: 97.8 °F (36.6 °C) (05/08/19 0701)  Pulse: 64 (05/08/19 0701)  Resp: 18 (05/08/19 0701)  BP: (!) 161/70 (05/08/19 0701)  SpO2: (!) 93 % (05/08/19 0701) Vital Signs (24h Range):  Temp:  [97.5 °F (36.4 °C)-98.2 °F (36.8 °C)] 97.8 °F (36.6 °C)  Pulse:  [63-71] 64  Resp:  [18] 18  SpO2:  [92 %-97 %] 93 %  BP: (136-161)/(62-70) 161/70     Weight: 89.4 kg (197 lb)(No New since admit)  Body mass index is 29.95 kg/m².    Physical Exam   Constitutional: He is oriented to person, place, and time. He appears well-developed and well-nourished.   HENT:   Head: Normocephalic and atraumatic.   Right Ear: Tympanic membrane, external ear and ear canal normal.   Left Ear: Tympanic membrane, external ear and ear canal normal.   Mouth/Throat: Oropharynx is clear and moist.   Eyes: Pupils are equal, round, and reactive to light. Conjunctivae and EOM are normal.   Neck: Normal range of motion. Neck supple. No tracheal deviation present.   Cardiovascular: Normal rate, regular rhythm, intact distal pulses and normal pulses. Exam reveals no gallop and no friction rub.   Murmur heard.  Pulmonary/Chest: Effort normal and breath sounds normal.   Abdominal: Soft. Bowel sounds are  normal. He exhibits no distension. There is no tenderness. Hernia confirmed negative in the right inguinal area and confirmed negative in the left inguinal area.   Musculoskeletal: Normal range of motion. He exhibits no edema.   Neurological: He is alert and oriented to person, place, and time. He has normal reflexes. No cranial nerve deficit.   Skin: Skin is warm and dry.   Psychiatric: He has a normal mood and affect. His behavior is normal. Judgment and thought content normal.   Nursing note and vitals reviewed.        CRANIAL NERVES     CN III, IV, VI   Pupils are equal, round, and reactive to light.  Extraocular motions are normal.        Significant Labs:   CMP:   Recent Labs   Lab 19  0630 19  0604    142   K 4.0 4.0   * 110   CO2 24 25   * 96   BUN 10 12   CREATININE 0.9 0.9   CALCIUM 9.9 10.1   PROT 5.7* 5.8*   ALBUMIN 2.7* 2.6*   BILITOT 0.6 0.7   ALKPHOS 57 56   AST 12 11   ALT 13 13   ANIONGAP 6* 7*   EGFRNONAA >60 >60     PSA 1.4,   Recent Labs   Lab 19  0604   WBC 5.23   RBC 3.69*   HGB 8.4*   HCT 28.9*      MCV 78*   MCH 22.8*   MCHC 29.1*      Urinalysis negative     Lab Results   Component Value Date    PSA 1.4 2019 Mag 2.2    Blood cultures  NGTD x 2     blood cultures   Enterococcus faecalis       CULTURE, BLOOD     Ampicillin <=2 mcg/mL Sensitive     Gentamicin Synergy Screen <=500 mcg/mL Sensitive     Tetracycline <=4 mcg/mL Sensitive     Vancomycin 2 mcg/mL Sensitive      Flu negative       Significant Imagin/2 CT head   1.  No CT evidence of an acute intracranial abnormality.    2.  Atrophy and small vessel ischemic changes of the periventricular white matter.     MRI   1.  Two tiny focal areas of increased signal intensity on diffusion-weighted images as described above likely representing artifact at the crux of the sulci.  A secondary consideration is tiny lacunar infarcts.  Dr. Laverne Shipman was telephoned  with a verbal report at the time of this dictation.    2.  Atrophy and small vessel ischemic changes of the periventricular white matter.    4/29 CT head   1.  No CT evidence of an acute intracranial abnormality.    2.  Atrophy and small vessel ischemic changes of the periventricular white matter.  Old lacunar infarcts.    4/22 x ray hip right   No evidence of right hip fracture.    4/22 x ray hip left   No evidence of left hip fracture.    4/22 CXR   Right-sided PICC with tip projecting over superior vena cava.    4/18 Echo   · Mildly decreased left ventricular systolic function. The estimated ejection fraction is 45%  · Local segmental wall motion abnormalities.  · Eccentric left ventricular hypertrophy.  · Severe left atrial enlargement.  · Grade I (mild) left ventricular diastolic dysfunction consistent with impaired relaxation.  · Normal left atrial pressure.  · Normal right ventricular systolic function.  · Mild right atrial enlargement.  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 I did not see mention of AVR in encounter notes or March echo report or prior INES report but there appears to be a bioprosthesis. There is mild central aortic insufficiency present.  · Mild mitral regurgitation.  · Normal central venous pressure (3 mm Hg).  · The estimated PA systolic pressure is 31 mm Hg    4/18 INES  Final Impressions  1. The study quality is good.   2. 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  3. Bubble study is positive for Grade II Patent Foramen Ovale.  4. Moderate lipomatous atrial septal hypertrophy.  5. Normal functioning prosthetic aortic valve with trivial   paravalvular regurgitation.   6. Preserved left ventricular function. Ejection fraction around 55%.  7. Grade III atherosclerosis of aortic arch.

## 2019-05-08 NOTE — ASSESSMENT & PLAN NOTE
Subacute CVA with focal deficits noted on 4/14, with significant improvement in symptoms   MRI did not demonstrate acute findings but neuro reviewed and Dx with new CVA;   Was being tx with Pradaxa when he had acute stroke but Will not consider failed therapy as patient has had lapses in therapy due to recent surgical interventions and non-compliance  Per Dr. Fernandez's recommendations we will continue:  -Telemetry.  -Neurochecks.  -NO TPA as he presented initially on Sunday  -Physical Therapy and OT Consult, evaluation and treatment at [facility] once patient is admitted to  SNF  -Completed permissive HTN and will need strict BP control.   -Continue his current dose home meds otherwise including Asprin, Pradaxa and statin       4/29: Reduce pradaxa due to renal function today  Consult colin due to AMS and neuro changes overnight. Repeat CT head without acute changes. ? Watershed injury; ? Keep BP a little higher? Dehydration playing a role    4/30 go back up on pradaxa due to kidneys now functioning. Reviewed MRI with Kel Fernandez and Dr Ventura. ST/OT/PT    5/2 pt doing well with gait training and speech. Up in day room today    5/3 had another episode of change in mental status yesterday evening. Started on keppra, ct negative. Doing better today  5/6 No further mental status changes since staring KEPPRA   5/8 Stable w Keppra

## 2019-05-08 NOTE — NURSING
Walking rounding.  Report received from Mandy Fam RN.  Patient awake and oriented.  Up in recliner.   Family and sitter at bedside.  Call bell in reach.  Voicing no complaints.

## 2019-05-08 NOTE — PLAN OF CARE
"Problem: Adult Inpatient Plan of Care  Goal: Plan of Care Review  Outcome: Ongoing (interventions implemented as appropriate)  Plan of Care discussed with patient at start of shift.  Re-enforced throughout shift.  Antibiotic regimen in place.  PICC double lumen pink port flushed with saline for maintenance and pre/post IVPBs.  Site right AC without complications.  Afebrile.  Uses walker as aid.  Sitter in room.  Call bell in reach. Oriented x4.  Speech clear.  No complaints of discomfort except "ache" to posterior neck.  Refuses medication.  Swallows medication and water without difficulty. Sleeping in recliner. Legs elevated on foot rest.  Indicates bed uncomfortable.            "

## 2019-05-08 NOTE — PT/OT/SLP PROGRESS
Physical Therapy Treatment    Patient Name:  St Lacho Farooq   MRN:  [id number]    Recommendations:     Discharge Recommendations:  home health PT, home health OT, home with home health   Discharge Equipment Recommendations: cane, straight   Barriers to discharge: Inaccessible home    Assessment:     St Lacho Farooq is a 84 y.o. male admitted with a medical diagnosis of Bacteremia.  He presents with the following impairments/functional limitations:  impaired self care skills, impaired functional mobilty, impaired cognition. Ambulated patient to the Therapy Room using RW x 150 feet with Supervision/Contact Guard Assistance and cues. Noted patient with increase leaning on Right side and decrease step coordination on Right foot. Took patient's blood pressure with 133/64 mm HG and Heart Rate : 91 after walking. Notified nursing(Mandy) and daughter(Mitzy)/jossueter about patient leaning more on Right side and advised patient to be closely monitor and further observation.. Nurse and caregiver verbalized understanding.    Rehab Prognosis: Good; patient would benefit from acute skilled PT services to address these deficits and reach maximum level of function.    Recent Surgery: * No surgery found *      Plan:     During this hospitalization, patient to be seen 5 x/week to address the identified rehab impairments via gait training, therapeutic activities, therapeutic exercises and progress toward the following goals:    · Plan of Care Expires:  05/10/19    Subjective     Chief Complaint: Noted increase leaning on Right side and incoordinated right leg steps during gait functions using RW after rested from shower activity today.  Patient/Family Comments/goals: To be consistent with good performance with mobility and gait functions  Pain/Comfort:  · Pain Rating 1: 0/10  · Pain Rating Post-Intervention 1: 0/10      Objective:     Communicated with patient,daughter(Mitzy) rose marie Man prior to session.  Patient found up  in chair with peripheral IV, PICC line upon PT entry to room.     General Precautions: Standard, fall   Orthopedic Precautions:N/A   Braces: N/A     Functional Mobility:  · Transfers:     · Sit to Stand:  supervision with rolling walker  · Bed to Chair: supervision with  rolling walker  using  Step Transfer  · Gait: RW Ambulation x 150 feet at the hallway with close Supervision/Contact Guard Assistance for increase safety. Noted increase leaning on Right side and incoordinated on right foot steps. Decrease step sequence, dec estefanía and dec right foot/floor clearance.   · Balance: Dynamic Standing balance with Fair+ grade.      AM-PAC 6 CLICK MOBILITY  Turning over in bed (including adjusting bedclothes, sheets and blankets)?: 4  Sitting down on and standing up from a chair with arms (e.g., wheelchair, bedside commode, etc.): 3  Moving from lying on back to sitting on the side of the bed?: 4  Moving to and from a bed to a chair (including a wheelchair)?: 3  Need to walk in hospital room?: 3  Climbing 3-5 steps with a railing?: 3  Basic Mobility Total Score: 20       Therapeutic Activities and Exercises:  GAIT: Pt able to ambulate with rolling walker with Supervision or Set-up Assistance x 150 ft with the following gait deviation(s): decreased estefanía, decreased step length, decreased toe-to-floor clearance, decreased weight-shifting ability and inccordnated Right Foot steps and leaning to Right side. Performed LE therapeutic ex x 10 reps on each such as Tip Toe ex, LAQ alternately, side steps alternately, marching at sitting and arm push ups on the chair. No sign of distress and fatigue provided patient with rest periods.  Implemented Safety Educations to nursing and caregivers to closely monitor patient from changes of condition and use RW for safe ambulation tasks with assistance.     Patient left up in chair with all lines intact, call button in reach, nurse Mandy notified and Sitter present..    GOALS:    Multidisciplinary Problems     Physical Therapy Goals        Problem: Physical Therapy Goal    Goal Priority Disciplines Outcome Goal Variances Interventions   Physical Therapy Goal     PT, PT/OT Ongoing (interventions implemented as appropriate)     Description:  Goals to be met by:  5/10/19 (updated 19)    Patient will increase functional independence with mobility by performin. Supine to sit with Independent - met 19  2. Sit to supine with Independent - met 19  3. Bed to chair transfer with Modified Independent with or without R W or straight Cane  using Step Transfer TECHNIQUE  4. Gait  x 300  feet with Modified Independent with or without straight cane or RW  5. Lower extremity exercise program x10 reps per handout, with assistance as needed. Met 19  6. Able to ascend/descend 12 stair steps using handrail/Straight  Cane with supervision. Met 5/3/19  7. Able to ascend/descend 20 statir steps using handrail/Straight Cane with Supervision.                          Time Tracking:     PT Received On: 19  PT Start Time: 1319     PT Stop Time: 1405  PT Total Time (min): 46 min     Billable Minutes: Therapeutic Activity 35 mins    Treatment Type: Treatment  PT/PTA: PT     PTA Visit Number: 1     Keven Goldstein, PT  2019

## 2019-05-08 NOTE — PLAN OF CARE
05/08/19 1008   Discharge Reassessment   Assessment Type Discharge Planning Reassessment           Patient will remain for continued treatment today. No needs or concerns voiced at this time. CM will continue to follow and assist as needed.

## 2019-05-09 PROCEDURE — 25000003 PHARM REV CODE 250: Performed by: NURSE PRACTITIONER

## 2019-05-09 PROCEDURE — 25000003 PHARM REV CODE 250: Performed by: INTERNAL MEDICINE

## 2019-05-09 PROCEDURE — 97535 SELF CARE MNGMENT TRAINING: CPT

## 2019-05-09 PROCEDURE — 99900035 HC TECH TIME PER 15 MIN (STAT)

## 2019-05-09 PROCEDURE — 63600175 PHARM REV CODE 636 W HCPCS: Performed by: INTERNAL MEDICINE

## 2019-05-09 PROCEDURE — 25000003 PHARM REV CODE 250: Performed by: PSYCHIATRY & NEUROLOGY

## 2019-05-09 PROCEDURE — 97530 THERAPEUTIC ACTIVITIES: CPT

## 2019-05-09 PROCEDURE — 11000004 HC SNF PRIVATE

## 2019-05-09 PROCEDURE — 63600175 PHARM REV CODE 636 W HCPCS: Performed by: NURSE PRACTITIONER

## 2019-05-09 PROCEDURE — 25000003 PHARM REV CODE 250: Performed by: FAMILY MEDICINE

## 2019-05-09 PROCEDURE — A4216 STERILE WATER/SALINE, 10 ML: HCPCS | Performed by: INTERNAL MEDICINE

## 2019-05-09 RX ORDER — BETAMETHASONE SODIUM PHOSPHATE AND BETAMETHASONE ACETATE 3; 3 MG/ML; MG/ML
6 INJECTION, SUSPENSION INTRA-ARTICULAR; INTRALESIONAL; INTRAMUSCULAR; SOFT TISSUE ONCE
Status: COMPLETED | OUTPATIENT
Start: 2019-05-09 | End: 2019-05-09

## 2019-05-09 RX ADMIN — AMPICILLIN SODIUM 2 G: 2 INJECTION, POWDER, FOR SOLUTION INTRAMUSCULAR; INTRAVENOUS at 04:05

## 2019-05-09 RX ADMIN — ATORVASTATIN CALCIUM 10 MG: 10 TABLET, FILM COATED ORAL at 09:05

## 2019-05-09 RX ADMIN — MICONAZOLE NITRATE: 20 CREAM TOPICAL at 09:05

## 2019-05-09 RX ADMIN — CARVEDILOL 25 MG: 25 TABLET, FILM COATED ORAL at 08:05

## 2019-05-09 RX ADMIN — AMPICILLIN SODIUM 2 G: 2 INJECTION, POWDER, FOR SOLUTION INTRAMUSCULAR; INTRAVENOUS at 03:05

## 2019-05-09 RX ADMIN — BETAMETHASONE SODIUM PHOSPHATE AND BETAMETHASONE ACETATE 6 MG: 3; 3 INJECTION, SUSPENSION INTRA-ARTICULAR; INTRALESIONAL; INTRAMUSCULAR at 11:05

## 2019-05-09 RX ADMIN — LIDOCAINE 1 PATCH: 50 PATCH TOPICAL at 05:05

## 2019-05-09 RX ADMIN — AMPICILLIN SODIUM 2 G: 2 INJECTION, POWDER, FOR SOLUTION INTRAMUSCULAR; INTRAVENOUS at 08:05

## 2019-05-09 RX ADMIN — AMPICILLIN SODIUM 2 G: 2 INJECTION, POWDER, FOR SOLUTION INTRAMUSCULAR; INTRAVENOUS at 09:05

## 2019-05-09 RX ADMIN — Medication 10 ML: at 05:05

## 2019-05-09 RX ADMIN — TAMSULOSIN HYDROCHLORIDE 0.4 MG: 0.4 CAPSULE ORAL at 08:05

## 2019-05-09 RX ADMIN — TRAMADOL HYDROCHLORIDE 100 MG: 50 TABLET, FILM COATED ORAL at 05:05

## 2019-05-09 RX ADMIN — Medication 10 ML: at 09:05

## 2019-05-09 RX ADMIN — DABIGATRAN ETEXILATE MESYLATE 150 MG: 75 CAPSULE ORAL at 09:05

## 2019-05-09 RX ADMIN — FUROSEMIDE 20 MG: 20 TABLET ORAL at 08:05

## 2019-05-09 RX ADMIN — MICONAZOLE NITRATE: 20 CREAM TOPICAL at 08:05

## 2019-05-09 RX ADMIN — LEVETIRACETAM 250 MG: 250 TABLET ORAL at 09:05

## 2019-05-09 RX ADMIN — LEVETIRACETAM 250 MG: 250 TABLET ORAL at 08:05

## 2019-05-09 RX ADMIN — Medication 10 ML: at 11:05

## 2019-05-09 RX ADMIN — TRAMADOL HYDROCHLORIDE 100 MG: 50 TABLET, FILM COATED ORAL at 02:05

## 2019-05-09 RX ADMIN — ASPIRIN 81 MG: 81 TABLET, COATED ORAL at 08:05

## 2019-05-09 RX ADMIN — PANTOPRAZOLE SODIUM 40 MG: 40 TABLET, DELAYED RELEASE ORAL at 08:05

## 2019-05-09 RX ADMIN — TRAMADOL HYDROCHLORIDE 100 MG: 50 TABLET, FILM COATED ORAL at 08:05

## 2019-05-09 RX ADMIN — CARVEDILOL 25 MG: 25 TABLET, FILM COATED ORAL at 04:05

## 2019-05-09 RX ADMIN — Medication 10 ML: at 04:05

## 2019-05-09 RX ADMIN — DABIGATRAN ETEXILATE MESYLATE 150 MG: 75 CAPSULE ORAL at 08:05

## 2019-05-09 NOTE — PLAN OF CARE
Problem: Adult Inpatient Plan of Care  Goal: Plan of Care Review  Outcome: Ongoing (interventions implemented as appropriate)  Patient with c/o neck and back pain today. tramadol prn given for pain. Betamethasone  given in Left hip IM as ordered once today for pain. Patient more sleepy at times of tramadol. Safety and comfort maintained. IV ampicillin Q 6 hours.  Patient experienced some relief later in afternoon. Patient sitting up in chair. Needs assistance to safely get out of chair today due to discomfort. Patient watching baseball on television and sports channel for IP-activity. Will continue to monitor. Agrees with plan of care.

## 2019-05-09 NOTE — NURSING
Bedside report received from Daya.  Vs stable.  No complaints of pain.  PICC line intact.   Call bell in reach.  Will continue to monitor.

## 2019-05-09 NOTE — PT/OT/SLP PROGRESS
Physical Therapy Treatment    Patient Name:  St Lacho Farooq   MRN:  [id number]    Recommendations:     Discharge Recommendations:  home health PT, home health OT, home with home health   Discharge Equipment Recommendations: cane, straight   Barriers to discharge: Inaccessible home    Assessment:     St Lacho Farooq is a 84 y.o. male admitted with a medical diagnosis of Bacteremia.  He presents with the following impairments/functional limitations:  weakness, pain, impaired self care skills, impaired functional mobilty, gait instability, impaired balance, impaired cognition, impaired coordination, decreased lower extremity function. Patient seen on reclining chair with legs elevated and wife and Sitter at bedside. Noted non Pitting edema on bilateral lower leg & feet > on Right side. Physical Therapist provided patient  with  therapeutic massage using one way stroke tech for edema mgt.    Rehab Prognosis: Good; patient would benefit from acute skilled PT services to address these deficits and reach maximum level of function.    Recent Surgery: * No surgery found *      Plan:     During this hospitalization, patient to be seen 5 x/week to address the identified rehab impairments via gait training, therapeutic activities, therapeutic exercises and progress toward the following goals:    · Plan of Care Expires:  05/10/19    Subjective     Chief Complaint: Neck Pain  Patient/Family Comments/goals: to get better and move better  Pain/Comfort:  · Pain Rating 2: 6/10  · Location 2: neck  · Pain Addressed 2: Reposition, Cessation of Activity  · Pain Rating Post-Intervention 2: 6/10      Objective:     Communicated with patient, daughter, sitter and patient's wife prior to session.  Patient found up in chair with PICC line, peripheral IV upon PT entry to room.     General Precautions: Standard, fall   Orthopedic Precautions:N/A   Braces: N/A     Functional Mobility:  · Transfers:     · Sit to Stand:  stand by  assistance with rolling walker  · Bed to Chair: stand by assistance with  rolling walker  using  Step Transfer  · Gait: RW ambulation to the bathroom X 60 feet with SBA. Reciprocal gait  and no leaning on Right side as compred yesterday.  · Balance: Dynamic Standing using RW with Fair+ grade      AM-PAC 6 CLICK MOBILITY  Turning over in bed (including adjusting bedclothes, sheets and blankets)?: 4  Sitting down on and standing up from a chair with arms (e.g., wheelchair, bedside commode, etc.): 3  Moving from lying on back to sitting on the side of the bed?: 4  Moving to and from a bed to a chair (including a wheelchair)?: 3  Need to walk in hospital room?: 3  Climbing 3-5 steps with a railing?: 3  Basic Mobility Total Score: 20       Therapeutic Activities and Exercises:   Provided patient with Therapeutic massage on bilat Legs/Feet non pitting edema. Educated sitter and daughter the proper tech with therapeutic massage(One Way stroke ) on bilateral lower legs/feet indicating good understanding. Standby Assisted patient to ambulate to the bathroom for bladder activity. Worked on dynamic standing balance with reaching items such as paper towels and soap in the bathroom during bladder activity.    Patient left Reclined in a chair with legs elevated with all lines intact, call button in reach and nurse notified..    GOALS:   Multidisciplinary Problems     Physical Therapy Goals        Problem: Physical Therapy Goal    Goal Priority Disciplines Outcome Goal Variances Interventions   Physical Therapy Goal     PT, PT/OT Ongoing (interventions implemented as appropriate)     Description:  Goals to be met by:  5/10/19 (updated 19)    Patient will increase functional independence with mobility by performin. Supine to sit with Independent - met 19  2. Sit to supine with Independent - met 19  3. Bed to chair transfer with Modified Independent with or without R W or straight Cane  using Step Transfer  TECHNIQUE  4. Gait  x 300  feet with Modified Independent with or without straight cane or RW  5. Lower extremity exercise program x10 reps per handout, with assistance as needed. Met 5/5/19  6. Able to ascend/descend 12 stair steps using handrail/Straight  Cane with supervision. Met 5/3/19  7. Able to ascend/descend 20 statir steps using handrail/Straight Cane with Supervision.                          Time Tracking:     PT Received On: 05/09/19  PT Start Time: 1300     PT Stop Time: 1330  PT Total Time (min): 30 min     Billable Minutes: Therapeutic Activity 30 mins    Treatment Type: Treatment  PT/PTA: PT     PTA Visit Number: 1     Keven Goldstein, PT  05/09/2019

## 2019-05-09 NOTE — PLAN OF CARE
Problem: Adult Inpatient Plan of Care  Goal: Plan of Care Review  Outcome: Ongoing (interventions implemented as appropriate)  Pt with plan of care.  VS stable.  PO pain med given this shift due to neck pain.  IV antibiotics contined as ordered.  PICC line in place.  Family sitter at bedside.  Call bell in reach.  Will continue to monitor.

## 2019-05-09 NOTE — PT/OT/SLP PROGRESS
Occupational Therapy   Treatment    Name: St Lacho Farooq  MRN: [id number]  Admitting Diagnosis:  Bacteremia       Recommendations:     Discharge Recommendations: home health PT, home health OT, home with home health  Discharge Equipment Recommendations:  cane, straight, walker, rolling, grab bar, toilet, grab bar, tub/shower, shower chair, tub bench  Barriers to discharge:  None    Assessment:     St Lacho Farooq is a 84 y.o. male with a medical diagnosis of Bacteremia.  He presents with mild forgetfulness, impaired peripheral vision, mild balance difficulty - fall risk. Performance deficits affecting function are impaired endurance, impaired self care skills, gait instability, visual deficits, decreased lower extremity function, weakness, decreased safety awareness, impaired cognition, impaired functional mobilty.     Rehab Prognosis:  Fair; patient would benefit from acute skilled OT services to address these deficits and reach maximum level of function.       Plan:     Patient to be seen 5 x/week to address the above listed problems via self-care/home management, therapeutic activities, therapeutic exercises  · Plan of Care Expires: 05/31/19  · Plan of Care Reviewed with: patient, daughter    Subjective     Pain/Comfort:  · Pain Rating 1: 3/10  · Location - Side 1: Bilateral  · Location - Orientation 1: generalized  · Location 1: back  · Pain Addressed 1: Reposition, Pre-medicate for activity  · Pain Rating Post-Intervention 1: 0/10    Objective:     Communicated with: nursing prior to session and informed dtr who works here in  that he had not used the walker and was found in the bathroom without it (but did not demonstrate any loss of balance when observed outside the cracked open bathroom door). NOTE: there was nobody in the room with him at this time.   Patient found up in chair with PICC line, peripheral IV upon OT entry to room.    General Precautions: Standard, fall   Orthopedic  Precautions:N/A   Braces: N/A     Occupational Performance:     Bed Mobility:    NT - remains in recliner chair and sleeps in chair due to chronic back pain  Functional Mobility/Transfers:  · Patient completed Sit <> Stand Transfer with modified independence  with  rolling walker   · Patient completed Bed <> Chair Transfer using Stand Pivot technique with modified independence with rolling walker  · Patient completed Toilet Transfer Stand Pivot technique with modified independence with  rolling walker  · Functional Mobility: although he had gotten himself to the bathroom without using the RN call light , and without taking the walker with him, it is safer for him to use the walker, with his mild memory impairment- impaired peripheral vision-  it is important to get in the habit of using the walker before his memory declines further and he will not remember to use it = fall risk, especially in new and different surroundings.    Activities of Daily Living:  · Feeding:  independence .  · Grooming: modified independence standing at sink to wash hands, no loss of balance, able to let go of the walker  · Bathing: NT  · Upper Body Dressing: supervision shirt  · Lower Body Dressing: supervision socks and shoes, able to fasten pants standing up after using the toilet  · Toileting: modified independence (forgot to take walker in to bathroom- reminded it is safer to use RN call light and use walker.      [facility] 6 Click ADL: 22    Treatment & Education:  Toilelting for self care and completed 20 reps dowel therex using his cane, for OH press, chest press, elbow flexion/ extension, side to side (needed cue to go all the way to LEFT and RIGHT due to impaired peripheral vision tended to keep cane in midline) , followed by march and kick against yellow theraband resistance. Family aware that he will need max A help with medication management, IaDL such as bill paying, transportation.    Patient left up in chair with all lines intact  and call button in reachEducation:      GOALS:   Multidisciplinary Problems     Occupational Therapy Goals        Problem: Occupational Therapy Goal    Goal Priority Disciplines Outcome Interventions   Occupational Therapy Goal     OT, PT/OT     Description:  Goals to be met by:6/10/2019     Patient will increase functional independence with ADLs by performing:    LE Dressing with Modified [place].  Grooming while standing at sink with Modified [place].  Toileting from toilet with Modified [place] for hygiene and clothing management.   Bathing from  shower chair/bench with Modified [place].  Upper extremity exercise program x10 reps per handout, with assistance as needed  Patient will consistently 3/3 trials at different times of the day demonstrate ability to be a) oriented x 4 (using watch if needed for time of day)  b) use RN call light appropriately before getting up c) complete a >10 piece puzzle d) complete a word search page with 90% accuracy d) add and subtract 2 digit numbers 5/5 tries with 90% accuracy e) color by number from coloring book attending to periphery of the page as well as center of the page without errors. E) fill his medbox with the 13 pills he says he takes daily, twice per day with 100% accuracy f) fill out his medication list with WHAT he takes, WHY he takes it, WHEN he takes it, HOW and HOW often he takes it , and possible side effects of each medication  Complete the [employer] pre -  Driving cognitive screen without any errors.                      Time Tracking:     OT Date of Treatment: 05/09/19  OT Start Time: 1415  OT Stop Time: 1435  OT Total Time (min): 20 min    Billable Minutes:Self Care/Home Management 10  Therapeutic Activity 10    Zarina Gonzalez OT  5/9/2019

## 2019-05-09 NOTE — PROGRESS NOTES
Nursing Notes  Bedside handoff completed with Deanna RIVERS. Patient awake sitting in chair. Sitter  in view of patient. Call bell within reach of patient.  Patient denies needs.  Will continue to follow.       Huddle Comments
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
## 2019-05-10 PROBLEM — I95.1 ORTHOSTATIC HYPOTENSION: Status: ACTIVE | Noted: 2019-05-10

## 2019-05-10 LAB
ALBUMIN SERPL BCP-MCNC: 2.8 G/DL (ref 3.5–5.2)
ALP SERPL-CCNC: 60 U/L (ref 55–135)
ALT SERPL W/O P-5'-P-CCNC: 11 U/L (ref 10–44)
ANION GAP SERPL CALC-SCNC: 8 MMOL/L (ref 8–16)
AST SERPL-CCNC: 11 U/L (ref 10–40)
BASOPHILS # BLD AUTO: 0.01 K/UL (ref 0–0.2)
BASOPHILS NFR BLD: 0.2 % (ref 0–1.9)
BILIRUB SERPL-MCNC: 0.7 MG/DL (ref 0.1–1)
BUN SERPL-MCNC: 14 MG/DL (ref 8–23)
CALCIUM SERPL-MCNC: 10.4 MG/DL (ref 8.7–10.5)
CHLORIDE SERPL-SCNC: 107 MMOL/L (ref 95–110)
CO2 SERPL-SCNC: 25 MMOL/L (ref 23–29)
CREAT SERPL-MCNC: 0.9 MG/DL (ref 0.5–1.4)
DIFFERENTIAL METHOD: ABNORMAL
EOSINOPHIL # BLD AUTO: 0 K/UL (ref 0–0.5)
EOSINOPHIL NFR BLD: 0 % (ref 0–8)
ERYTHROCYTE [DISTWIDTH] IN BLOOD BY AUTOMATED COUNT: 19.3 % (ref 11.5–14.5)
EST. GFR  (AFRICAN AMERICAN): >60 ML/MIN/1.73 M^2
EST. GFR  (NON AFRICAN AMERICAN): >60 ML/MIN/1.73 M^2
GLUCOSE SERPL-MCNC: 127 MG/DL (ref 70–110)
HCT VFR BLD AUTO: 28.4 % (ref 40–54)
HGB BLD-MCNC: 8.5 G/DL (ref 14–18)
LYMPHOCYTES # BLD AUTO: 0.7 K/UL (ref 1–4.8)
LYMPHOCYTES NFR BLD: 13.4 % (ref 18–48)
MCH RBC QN AUTO: 23.1 PG (ref 27–31)
MCHC RBC AUTO-ENTMCNC: 29.9 G/DL (ref 32–36)
MCV RBC AUTO: 77 FL (ref 82–98)
MONOCYTES # BLD AUTO: 0.4 K/UL (ref 0.3–1)
MONOCYTES NFR BLD: 8 % (ref 4–15)
NEUTROPHILS # BLD AUTO: 3.9 K/UL (ref 1.8–7.7)
NEUTROPHILS NFR BLD: 78.4 % (ref 38–73)
PLATELET # BLD AUTO: 248 K/UL (ref 150–350)
PMV BLD AUTO: 9.4 FL (ref 9.2–12.9)
POTASSIUM SERPL-SCNC: 4.1 MMOL/L (ref 3.5–5.1)
PROT SERPL-MCNC: 6.3 G/DL (ref 6–8.4)
RBC # BLD AUTO: 3.68 M/UL (ref 4.6–6.2)
SODIUM SERPL-SCNC: 140 MMOL/L (ref 136–145)
WBC # BLD AUTO: 5.01 K/UL (ref 3.9–12.7)

## 2019-05-10 PROCEDURE — 80053 COMPREHEN METABOLIC PANEL: CPT

## 2019-05-10 PROCEDURE — 25000003 PHARM REV CODE 250: Performed by: NURSE PRACTITIONER

## 2019-05-10 PROCEDURE — 25000003 PHARM REV CODE 250: Performed by: INTERNAL MEDICINE

## 2019-05-10 PROCEDURE — 25000003 PHARM REV CODE 250: Performed by: FAMILY MEDICINE

## 2019-05-10 PROCEDURE — 94761 N-INVAS EAR/PLS OXIMETRY MLT: CPT

## 2019-05-10 PROCEDURE — 99232 SBSQ HOSP IP/OBS MODERATE 35: CPT | Mod: ,,, | Performed by: FAMILY MEDICINE

## 2019-05-10 PROCEDURE — 63600175 PHARM REV CODE 636 W HCPCS: Performed by: INTERNAL MEDICINE

## 2019-05-10 PROCEDURE — 99900035 HC TECH TIME PER 15 MIN (STAT)

## 2019-05-10 PROCEDURE — 85025 COMPLETE CBC W/AUTO DIFF WBC: CPT

## 2019-05-10 PROCEDURE — 99232 PR SUBSEQUENT HOSPITAL CARE,LEVL II: ICD-10-PCS | Mod: ,,, | Performed by: FAMILY MEDICINE

## 2019-05-10 PROCEDURE — 11000004 HC SNF PRIVATE

## 2019-05-10 PROCEDURE — A4216 STERILE WATER/SALINE, 10 ML: HCPCS | Performed by: INTERNAL MEDICINE

## 2019-05-10 PROCEDURE — 25000003 PHARM REV CODE 250: Performed by: PSYCHIATRY & NEUROLOGY

## 2019-05-10 PROCEDURE — 97530 THERAPEUTIC ACTIVITIES: CPT

## 2019-05-10 PROCEDURE — 97127 HC THERAPEUTIC INTVTN, COGN FUNCTION - ST: CPT

## 2019-05-10 PROCEDURE — 92526 ORAL FUNCTION THERAPY: CPT

## 2019-05-10 RX ADMIN — MICONAZOLE NITRATE: 20 CREAM TOPICAL at 09:05

## 2019-05-10 RX ADMIN — Medication 10 ML: at 06:05

## 2019-05-10 RX ADMIN — TAMSULOSIN HYDROCHLORIDE 0.4 MG: 0.4 CAPSULE ORAL at 09:05

## 2019-05-10 RX ADMIN — ASPIRIN 81 MG: 81 TABLET, COATED ORAL at 09:05

## 2019-05-10 RX ADMIN — AMPICILLIN SODIUM 2 G: 2 INJECTION, POWDER, FOR SOLUTION INTRAMUSCULAR; INTRAVENOUS at 03:05

## 2019-05-10 RX ADMIN — Medication 10 ML: at 03:05

## 2019-05-10 RX ADMIN — Medication 10 ML: at 12:05

## 2019-05-10 RX ADMIN — LEVETIRACETAM 250 MG: 250 TABLET ORAL at 08:05

## 2019-05-10 RX ADMIN — Medication 10 ML: at 09:05

## 2019-05-10 RX ADMIN — DABIGATRAN ETEXILATE MESYLATE 150 MG: 75 CAPSULE ORAL at 08:05

## 2019-05-10 RX ADMIN — LIDOCAINE 1 PATCH: 50 PATCH TOPICAL at 06:05

## 2019-05-10 RX ADMIN — DABIGATRAN ETEXILATE MESYLATE 150 MG: 75 CAPSULE ORAL at 09:05

## 2019-05-10 RX ADMIN — RAMELTEON 8 MG: 8 TABLET, FILM COATED ORAL at 08:05

## 2019-05-10 RX ADMIN — PANTOPRAZOLE SODIUM 40 MG: 40 TABLET, DELAYED RELEASE ORAL at 09:05

## 2019-05-10 RX ADMIN — AMPICILLIN SODIUM 2 G: 2 INJECTION, POWDER, FOR SOLUTION INTRAMUSCULAR; INTRAVENOUS at 09:05

## 2019-05-10 RX ADMIN — CARVEDILOL 25 MG: 25 TABLET, FILM COATED ORAL at 09:05

## 2019-05-10 RX ADMIN — FUROSEMIDE 20 MG: 20 TABLET ORAL at 09:05

## 2019-05-10 RX ADMIN — MICONAZOLE NITRATE: 20 CREAM TOPICAL at 08:05

## 2019-05-10 RX ADMIN — ATORVASTATIN CALCIUM 10 MG: 10 TABLET, FILM COATED ORAL at 08:05

## 2019-05-10 RX ADMIN — AMPICILLIN SODIUM 2 G: 2 INJECTION, POWDER, FOR SOLUTION INTRAMUSCULAR; INTRAVENOUS at 08:05

## 2019-05-10 RX ADMIN — LEVETIRACETAM 250 MG: 250 TABLET ORAL at 09:05

## 2019-05-10 RX ADMIN — CARVEDILOL 25 MG: 25 TABLET, FILM COATED ORAL at 06:05

## 2019-05-10 NOTE — ASSESSMENT & PLAN NOTE
Now on flomax and diuretics resumed. + LE edema   Will order LE compression socks  Consider decreasing diuretic frequency if orthostasis continues   Monitor renal fx

## 2019-05-10 NOTE — PT/OT/SLP PROGRESS
"Physical Therapy Treatment    Patient Name:  St Lacho Farooq   MRN:  [id number]    Recommendations:     Discharge Recommendations:  home health PT, home health OT, home with home health   Discharge Equipment Recommendations: cane, straight, walker, rolling, other (see comments), commode, grab bar, toilet, grab bar, tub/shower, tub bench(Urinal Bottle)   Barriers to discharge: Inaccessible home and Decreased caregiver support    Assessment:     St Lacho Farooq is a 84 y.o. male admitted with a medical diagnosis of Bacteremia.  He presents with the following impairments/functional limitations:  weakness, impaired self care skills, impaired functional mobilty, gait instability, impaired balance, impaired cognition, impaired coordination, decreased lower extremity function. Physical Therapist discussed the fall incidence this morning with patient and caregiver. Educated patient, Sitter and Daughter with fall precautions.    Rehab Prognosis: Good; patient would benefit from acute skilled PT services to address these deficits and reach maximum level of function.    Recent Surgery: * No surgery found *      Plan:     During this hospitalization, patient to be seen 5 x/week to address the identified rehab impairments via gait training, therapeutic activities, therapeutic exercises and progress toward the following goals:    · Plan of Care Expires:  05/10/19    Subjective     Chief Complaint: Patient had a fall in the bathroom this morning. Patient reported after Urinating, Patient turned around to reach for the RW and left hand slipped off  the walker and Right foot tripped on something falling backwards. Patient sustained right hand laceration and minimal bruise and swelling on posterior head.   Patient/Family Comments/goals: "Be extra careful next time and not to fall again.".  Pain/Comfort:  · Pain Rating 1: 0/10  · Pain Rating 2: 0/10  · Pain Rating Post-Intervention 2: 0/10      Objective:     Communicated " with patient , Sitter and Daughter prior to session.  Patient found up in chair with PICC line, peripheral IV upon PT entry to room.     General Precautions: Standard, fall   Orthopedic Precautions:N/A   Braces: N/A     Functional Mobility:  · Transfers:     · Sit to Stand:  stand by assistance with rolling walker  · Bed to Chair: stand by assistance with  rolling walker  using  Step Transfer  · Gait: RW Ambulation at room distances with SBA. Reciprocal gait with decrease Right Foot/Floor clearance      AM-PAC 6 CLICK MOBILITY  Turning over in bed (including adjusting bedclothes, sheets and blankets)?: 4  Sitting down on and standing up from a chair with arms (e.g., wheelchair, bedside commode, etc.): 3  Moving from lying on back to sitting on the side of the bed?: 4  Moving to and from a bed to a chair (including a wheelchair)?: 3  Need to walk in hospital room?: 3  Climbing 3-5 steps with a railing?: 3  Basic Mobility Total Score: 20       Therapeutic Activities and Exercises:  Educated/advised  patient and caregivers(Sitter, daughter and nursing staff) for patient to be closely monitor, use urinal bottle for bladder activity and bedside commode for bowel activity for increase safety. Avoid patient in staying too long in the bathroom and try to finish quick toilet activity to avoid muscle fatigue due to prolong stay in one position. Patient/Cargivers verbalized understanding.    Patient left up in chair with all lines intact, call button in reach, nurse notified and Sitter present..    GOALS:   Multidisciplinary Problems     Physical Therapy Goals        Problem: Physical Therapy Goal    Goal Priority Disciplines Outcome Goal Variances Interventions   Physical Therapy Goal     PT, PT/OT Ongoing (interventions implemented as appropriate)     Description:  Goals to be met by:  5/10/19 (updated 19)    Patient will increase functional independence with mobility by performin. Supine to sit with Independent -  met 4/26/19  2. Sit to supine with Independent - met 4/26/19  3. Bed to chair transfer with Modified Independent with or without R W or straight Cane  using Step Transfer TECHNIQUE  4. Gait  x 300  feet with Modified Independent with or without straight cane or RW  5. Lower extremity exercise program x10 reps per handout, with assistance as needed. Met 5/5/19  6. Able to ascend/descend 12 stair steps using handrail/Straight  Cane with supervision. Met 5/3/19  7. Able to ascend/descend 20 statir steps using handrail/Straight Cane with Supervision.                          Time Tracking:     PT Received On: 05/10/19  PT Start Time: 1301     PT Stop Time: 1318  PT Total Time (min): 17 min     Billable Minutes: Therapeutic Activity 15 mins    Treatment Type: Treatment  PT/PTA: PT     PTA Visit Number: 1     Keven Goldstein, PT  05/10/2019

## 2019-05-10 NOTE — ASSESSMENT & PLAN NOTE
Check U.A , PSA, bladder scan; bladder scan 89ml   Still with incontinence issues   Gets HCTz 12.5 and lasix 20mg po daily in am   trial of flomax     Stop fluid pill, creat better with hydration.    5/10 Diuretic resumed per cards on 5/8;   Urine symptoms have been better with flomax but this am fell backwards while urinating; will check orthostatic vitals

## 2019-05-10 NOTE — SUBJECTIVE & OBJECTIVE
Review of Systems   Constitutional: Negative.    HENT: Negative for congestion, ear pain, sinus pressure, sneezing and sore throat.    Eyes: Negative.    Respiratory: Negative for cough, chest tightness, shortness of breath and wheezing.    Cardiovascular: Negative.  Negative for chest pain.   Gastrointestinal: Negative for abdominal pain, constipation, diarrhea, nausea and vomiting.   Genitourinary: Negative for difficulty urinating, dysuria and flank pain.   Musculoskeletal: Negative.  Negative for joint swelling.   Skin: Negative.    Neurological: Negative for dizziness, facial asymmetry, weakness and headaches.   Hematological: Negative.    Psychiatric/Behavioral: Negative for behavioral problems and confusion.     Objective:     Vital Signs (Most Recent):  Temp: 97.7 °F (36.5 °C) (05/10/19 0615)  Pulse: 83 (05/10/19 0615)  Resp: 18 (05/10/19 0615)  BP: (!) 169/75 (05/10/19 0615)  SpO2: 97 % (05/10/19 0655) Vital Signs (24h Range):  Temp:  [97.3 °F (36.3 °C)-98.8 °F (37.1 °C)] 97.7 °F (36.5 °C)  Pulse:  [59-83] 83  Resp:  [16-18] 18  SpO2:  [95 %-97 %] 97 %  BP: (138-169)/(65-76) 169/75     Weight: 89.4 kg (197 lb)(No New since admit)  Body mass index is 29.95 kg/m².    Physical Exam   Constitutional: He is oriented to person, place, and time. He appears well-developed and well-nourished.   HENT:   Head: Normocephalic and atraumatic.   Right Ear: Tympanic membrane, external ear and ear canal normal.   Left Ear: Tympanic membrane, external ear and ear canal normal.   Mouth/Throat: Oropharynx is clear and moist.   Eyes: Pupils are equal, round, and reactive to light. Conjunctivae and EOM are normal.   Neck: Normal range of motion. Neck supple. No tracheal deviation present.   Cardiovascular: Normal rate, regular rhythm, intact distal pulses and normal pulses. Exam reveals no gallop and no friction rub.   Murmur heard.  Pulmonary/Chest: Effort normal and breath sounds normal.   Abdominal: Soft. Bowel sounds are  normal. He exhibits no distension. There is no tenderness. Hernia confirmed negative in the right inguinal area and confirmed negative in the left inguinal area.   Musculoskeletal: Normal range of motion. He exhibits edema.   2 plus    Neurological: He is alert and oriented to person, place, and time. He has normal reflexes. No cranial nerve deficit.   Skin: Skin is warm and dry.   Psychiatric: He has a normal mood and affect. His behavior is normal. Judgment and thought content normal.   Nursing note and vitals reviewed.        CRANIAL NERVES     CN III, IV, VI   Pupils are equal, round, and reactive to light.  Extraocular motions are normal.        Significant Labs:   CMP:   Recent Labs   Lab 05/10/19  0558      K 4.1      CO2 25   *   BUN 14   CREATININE 0.9   CALCIUM 10.4   PROT 6.3   ALBUMIN 2.8*   BILITOT 0.7   ALKPHOS 60   AST 11   ALT 11   ANIONGAP 8   EGFRNONAA >60     PSA 1.4,   Recent Labs   Lab 05/10/19  0558   WBC 5.01   RBC 3.68*   HGB 8.5*   HCT 28.4*      MCV 77*   MCH 23.1*   MCHC 29.9*      Urinalysis negative     Lab Results   Component Value Date    PSA 1.4 2019 Mag 2.2    Blood cultures  NGTD x 2     blood cultures   Enterococcus faecalis       CULTURE, BLOOD     Ampicillin <=2 mcg/mL Sensitive     Gentamicin Synergy Screen <=500 mcg/mL Sensitive     Tetracycline <=4 mcg/mL Sensitive     Vancomycin 2 mcg/mL Sensitive      Flu negative       Significant Imagin/2 CT head   1.  No CT evidence of an acute intracranial abnormality.    2.  Atrophy and small vessel ischemic changes of the periventricular white matter.     MRI   1.  Two tiny focal areas of increased signal intensity on diffusion-weighted images as described above likely representing artifact at the crux of the sulci.  A secondary consideration is tiny lacunar infarcts.  Dr. Laverne Shipman was telephoned with a verbal report at the time of this dictation.    2.  Atrophy and  small vessel ischemic changes of the periventricular white matter.    4/29 CT head   1.  No CT evidence of an acute intracranial abnormality.    2.  Atrophy and small vessel ischemic changes of the periventricular white matter.  Old lacunar infarcts.    4/22 x ray hip right   No evidence of right hip fracture.    4/22 x ray hip left   No evidence of left hip fracture.    4/22 CXR   Right-sided PICC with tip projecting over superior vena cava.    4/18 Echo   · Mildly decreased left ventricular systolic function. The estimated ejection fraction is 45%  · Local segmental wall motion abnormalities.  · Eccentric left ventricular hypertrophy.  · Severe left atrial enlargement.  · Grade I (mild) left ventricular diastolic dysfunction consistent with impaired relaxation.  · Normal left atrial pressure.  · Normal right ventricular systolic function.  · Mild right atrial enlargement.  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 I did not see mention of AVR in encounter notes or March echo report or prior INES report but there appears to be a bioprosthesis. There is mild central aortic insufficiency present.  · Mild mitral regurgitation.  · Normal central venous pressure (3 mm Hg).  · The estimated PA systolic pressure is 31 mm Hg    4/18 INES  Final Impressions  1. The study quality is good.   2. 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  3. Bubble study is positive for Grade II Patent Foramen Ovale.  4. Moderate lipomatous atrial septal hypertrophy.  5. Normal functioning prosthetic aortic valve with trivial   paravalvular regurgitation.   6. Preserved left ventricular function. Ejection fraction around 55%.  7. Grade III atherosclerosis of aortic arch.

## 2019-05-10 NOTE — PT/OT/SLP PROGRESS
"Occupational Therapy      Patient Name:  [facility] Abdiel Farooq   MRN:  [id number]    Patient not seen today secondary to he fell earlier last night or this morning and patient and daughter in room said Dr West had told them not to do "anything" today including therapy. Several options that he could accomplish from his chair were given but he prefers to rest until Monday and will respect his and his families wishes. He reports that his right hand was on the grab bar in the bathoom (but normally, when he faces the toilet his left hand is on the grab bar)  and that when he went to turn his right foot got "stuck" and he fell and hit the back of his head and scraped his right wrist  . Will follow-up next week as able..    Zarina Gonzalez OT  5/10/2019  "

## 2019-05-10 NOTE — NURSING
Bedside report received from Daya.  VS stable.  No complaints of pain noted. Call bell in reach.  Family sitter at bedside.  Will continue to monitor.

## 2019-05-10 NOTE — PLAN OF CARE
05/10/19 1038   Discharge Reassessment   Assessment Type Discharge Planning Reassessment         Patient will remain for continued treatment today. Patient did have a fall during the PM shift. Patient re-educated on fall precautions. No needs or concerns voiced at this time. CM will continue to follow and assist as needed.

## 2019-05-10 NOTE — PT/OT/SLP PROGRESS
"Speech Language Pathology Treatment    Patient Name:  St Lacho Farooq   MRN:  [id number]  Admitting Diagnosis: Bacteremia    Recommendations:                 General Recommendations:  Speech/language therapy  Diet recommendations: Solid Diet Level: Regular, Liquid Diet Level: Thin   Aspiration Precautions: Standard aspiration precautions   General Precautions: Standard    Communication strategies:  none    Subjective     Pt found sitting in bedside chair upon SLP entry into room. Pt appeared fatigued and noted that he "had a rough day since I feel last night." MOD verbal prompting was needed for pt to agree to initiate therapy session but he was an active participant throughout all therapeutic tasks.    Patient goals: to go home     Pain/Comfort:  · Pain Rating 1: 0/10    Objective:     Has the patient been evaluated by SLP for swallowing?   No  Keep patient NPO? No   Current Respiratory Status: room air      Pt was given a cafeteria menu to target organization and reasoning skills. 6/9 questions were completed given MIN verbal and visual cueing and the remainder of trials were completed given MAX verbal and visual cueing. Decreased short term memory skills were observed as he frequently asked for verbal directions to be repeated. Pt responded well to clinician praise and cueing across all trials.     Assessment:     St Lacho Farooq is a 84 y.o. 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 He presents with deficits across reasoning, problem solving, and short term recall, affecting his ability to complete higher level tasks independently and efficiently. Recommend continuation of skilled speech services to target cognitive lingusitic deficits.     Goals:   Multidisciplinary Problems     SLP Goals        Problem: SLP Goal    Goal Priority Disciplines Outcome   SLP Goal     SLP Ongoing (interventions implemented as appropriate)   Description:    Long Term Goals:  1. Pt will " increase cognitive linguistic skills to a functional level in order to promote safe and independent activities of choice in known and unknown environments                    Plan:     · Patient to be seen:  3 x/week   · Plan of Care expires:  05/30/19  · Plan of Care reviewed with:  patient   · SLP Follow-Up:  Yes       Discharge recommendations:  home with home health   Barriers to Discharge:  Level of Skilled Assistance Needed - pt continues to require skilled personnel for managmeent of medical diagnoses    Time Tracking:     SLP Treatment Date:   05/10/19  Speech Start Time:  1545  Speech Stop Time:  1600     Speech Total Time (min):  15 min    Billable Minutes: Treatment of cognitive linguistic skills - 15 minutes       Ame Gunn CCC-SLP  05/10/2019

## 2019-05-10 NOTE — PROGRESS NOTES
[facility]  Cardiology  Progress Note    Patient Name: St Lacho Farooq 1935    MRN: [id number]  Admission Date: 4/23/2019  Hospital Length of Stay: 17 days  Code Status: DNR   Attending Physician: John Saeed MD   Primary Care Physician: Dylan Adam MD  Expected Discharge Date: 5/30/2019  Principal Problem:Bacteremia    Subjective:     Interval History: had a mechanical fall in the bathroom  Still with chronic 2+ BLE edema    Review of Systems   Constitution:        Gen weak   HENT: Negative.    Eyes: Negative.    Cardiovascular: Negative.    Respiratory: Negative.    Endocrine: Negative.    Skin: Negative.    Musculoskeletal: Negative.    Gastrointestinal: Negative.    Genitourinary: Negative.    Neurological: Negative.    Psychiatric/Behavioral: Negative.    Allergic/Immunologic: Negative.      Objective:     Vital Signs (Most Recent):  Temp: 97.7 °F (36.5 °C) (05/10/19 0615)  Pulse: 73 (05/10/19 0747)  Resp: 18 (05/10/19 0615)  BP: (!) 161/67 (05/10/19 0747)  SpO2: 97 % (05/10/19 0655) Vital Signs (24h Range):  Temp:  [97.3 °F (36.3 °C)-98.8 °F (37.1 °C)] 97.7 °F (36.5 °C)  Pulse:  [59-83] 73  Resp:  [16-18] 18  SpO2:  [95 %-97 %] 97 %  BP: (138-169)/(64-76) 161/67     Weight: 89.4 kg (197 lb)(No New since admit)  Body mass index is 29.95 kg/m².    SpO2: 97 %  O2 Device (Oxygen Therapy): room air      Intake/Output Summary (Last 24 hours) at 5/10/2019 0836  Last data filed at 5/10/2019 0316  Gross per 24 hour   Intake 350 ml   Output --   Net 350 ml       Lines/Drains/Airways     Peripherally Inserted Central Catheter Line                 PICC Double Lumen 04/22/19 1108 17 days              Current Facility-Administered Medications   Medication    acetaminophen tablet 650 mg    albuterol-ipratropium 2.5 mg-0.5 mg/3 mL nebulizer solution 3 mL    ampicillin 2 g in sodium chloride 0.9 % 100 mL IVPB (ready to mix system)    aspirin EC tablet 81 mg    atorvastatin  tablet 10 mg    carvedilol tablet 25 mg    dabigatran etexilate capsule 150 mg    furosemide tablet 20 mg    heparin, porcine (PF) 100 unit/mL injection flush 500 Units    HYDROmorphone injection 0.5 mg    levETIRAcetam tablet 250 mg    lidocaine 5 % patch 1 patch    miconazole 2 % cream    ondansetron injection 4 mg    pantoprazole EC tablet 40 mg    ramelteon tablet 8 mg    sodium chloride 0.9% flush 10 mL    And    sodium chloride 0.9% flush 10 mL    tamsulosin 24 hr capsule 0.4 mg    traMADol tablet 100 mg         Physical Exam   Constitutional: He is oriented to person, place, and time. He appears well-developed.   frail   Cardiovascular: Normal rate.   Pulmonary/Chest: Effort normal.   Musculoskeletal: Normal range of motion.   Neurological: He is alert and oriented to person, place, and time.   Skin: Skin is warm.   Psychiatric: He has a normal mood and affect.   Nursing note and vitals reviewed.      Significant Labs:   BMP:   Recent Labs   Lab 05/10/19  0558   *      K 4.1      CO2 25   BUN 14   CREATININE 0.9   CALCIUM 10.4   , CBC   Recent Labs   Lab 05/10/19  0558   WBC 5.01   HGB 8.5*   HCT 28.4*       and Troponin No results for input(s): TROPONINI in the last 48 hours.    Significant Imaging: Echocardiogram:   Transthoracic echo (TTE) complete (Cupid Only):   Results for orders placed or performed during the hospital encounter of 04/16/19   Transthoracic echo (TTE) complete (Cupid Only)   Result Value Ref Range    BSA 2.06 m2    LA WIDTH 4.45 cm    PV PEAK VELOCITY 1.38 cm/s    LVIDD 6.93 (A) 3.5 - 6.0 cm    IVS 1.13 (A) 0.6 - 1.1 cm    PW 1.17 (A) 0.6 - 1.1 cm    Ao root annulus 2.86 cm    LVIDS 4.76 (A) 2.1 - 4.0 cm    FS 31 28 - 44 %    LA volume 102.99 cm3    STJ 2.65 cm    LV mass 377.93 g    LA size 4.98 cm    RVDD 3.25 cm    Left Ventricle Relative Wall Thickness 0.34 cm    AV mean gradient 7.38 mmHg    AV valve area 2.16 cm2    AV Velocity Ratio 0.72      AV index (prosthetic) 0.70     E/A ratio 1.05     E wave decelartion time 305.25 msec    IVRT 0.11 msec    Pulm vein S/D ratio 1.26     LVOT diameter 1.99 cm    LVOT area 3.11 cm2    LVOT peak abdifatah 5.0704810461 m/s    LVOT peak VTI 28.16 cm    Ao peak abdifatah 1.62 m/s    Ao VTI 40.46 cm    LVOT stroke volume 87.54 cm3    AV peak gradient 10.50 mmHg    MV Peak E Abdifatah 1.16 m/s    TR Max Abdifatah 2.65 m/s    MV Peak A Abdifatah 1.11 m/s    PV Peak S Abdifatah 0.44 m/s    PV Peak D Abdifatah 0.35 m/s    LV Systolic Volume 105.45 mL    LV Systolic Volume Index 52.5 mL/m2    LV Diastolic Volume 249.54 mL    LV Diastolic Volume Index 124.20 mL/m2    LA Volume Index 51.3 mL/m2    LV Mass Index 188.1 g/m2    RA Major Axis 5.02 cm    Left Atrium Minor Axis 5.14 cm    Left Atrium Major Axis 5.84 cm    Triscuspid Valve Regurgitation Peak Gradient 28.09 mmHg    Right Atrial Pressure (from IVC) 3 mmHg    TV rest pulmonary artery pressure 31 mmHg     Assessment and Plan:     Recent recurrent CVA with interruption of NOAC. Now stable. Chronic edema.  Pt with multiple other comorbidities which puts him at risk for CVA including multiple calcified vessels of the carotid and vertebral system. ?septic emoboli  May consider repeat INES in the coming weeks (as he has recently had one roughly 3 weeks ago) to reassess left atrial thrombus.   CAF  CAD [facility] 2/19/2019 S/P CABG patent LIMA to LAD, Patent SVG to first Diag,  of CFX,  of RCA.      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 During that admission developed fever with +blood cultures and was subsequently transferred to [facility] for INES. INES on 4/18/19 revealed large thrombus burden in EDELMIRA, EF 55%, bubble study positive for grade II PFO, prosthetic AV functioning normally. No valvular vegetation noted. There was an episode of NSVT while at [facility] and patient was  evaluated by EP consider EF study of continued episodes. Patient has had no further episodes. Patient was seen by ID while at [facility] and it was deteremined that given recent valve replacement LA thrombus, and bacteremia patient should be treated with endocarditis ABX regimen. Patient was started on 6 wk regiment of IV ABX and transferred back to [facility] for rehab.      On evening of 4/28/19 patient developed fever, confusion, and rt sided weakness with hypotension. MRI reveals acute punctate infarctions. Suggestive of further cardio-embolic stroke. NOAC been continued per neurology recommendation. Mental status has since improved. Permissive HTN has been allowed given acute CVAs.   Patient is a DNR    Plan: stable from CV standpoint  May consider INES in the future to reassess LA thrombus  Cont Pradaxa/ASA/coreg/lasix 20mg po qday/PPI    Active Diagnoses:    Diagnosis Date Noted POA    PRINCIPAL PROBLEM:  Bacteremia [R78.81] 04/18/2019 Yes    Falls [W19.XXXA] 05/10/2019 Yes    Skin ulcer of sacrum, limited to breakdown of skin [L98.421] 05/03/2019 No    Encephalopathy [G93.40]  No    Renal insufficiency [N28.9] 04/29/2019 Yes    Nocturnal enuresis [N39.44] 04/25/2019 No    Atrial thrombus [I51.3] 04/19/2019 Yes    Cerebrovascular accident (CVA) [I63.9] 04/16/2019 Yes    Hx of CABG [Z95.1] 02/19/2019 Not Applicable    CHF (congestive heart failure), NYHA class II, chronic, diastolic [I50.32] 02/19/2019 Yes    Anemia [D64.9] 11/03/2016 Yes    Essential hypertension [I10] 10/28/2016 Yes    Aortic valve stenosis [I35.0] 06/28/2016 Yes      Problems Resolved During this Admission:       VTE Risk Mitigation (From admission, onward)        Ordered     dabigatran etexilate capsule 150 mg  2 times daily      04/30/19 0907     heparin, porcine (PF) 100 unit/mL injection flush 500 Units  As needed (PRN)      04/25/19 1408          Divya Pereyra NP  Cardiology  [facility]    I attest that I  have personally seen and examined this patient. I have reviewed and discussed the management in detail as outlined above.

## 2019-05-10 NOTE — PROGRESS NOTES
[facility] Medicine  Progress Note    Patient Name: St Lacho Farooq  MRN: [id number]  Patient Class: IP- Swing   Admission Date: 4/23/2019  Length of Stay: 17 days  Attending Physician: John Saeed MD  Primary Care Provider: Dylan Adam MD        Subjective:     Principal Problem:Bacteremia    HPI:  This 84 year old man with PMHX of CAD p CABG, [facility] 2/19; stable lesions, PAD, bilateral CVD, AAA,  PAF, HTN, hyperlipidemia, MALATHI, NYHA II chronic diastolic HF and non rheumatic critical AS with JAMAAL 0.8cm2 with preserved LVSFX EF55%,  per Echo underwent  + TAVR placed 3/18/19 per Dr. Babcock. Post op course was complicated by CVA ~ 2 weeks post TAVR; he presented to [place] 4/16 with altered mental status and fever where he was Dx with CVA and gram + bacteremia.   [place]  [place] [facility] [place] cardiology and ID eval ant tx; CT at that time was unrevealing for an acute intracranial process  His MRI was abnormal.  His blood cultures have grown Enterococcus faecalis sensitve to all tested drugs,  Pt initially given vancomycin and then changed to ampicillin IV.  He has clinically improved.  His INES shows a septic atrial thrombus. The cRP is 49 and the ESR is 15.  Pt is no longer febrile and he and daughter feel he is at base line physically and mentally.Pt transferred to [place] yesterday for continued skilled care and prolonged IV ABX. Plan is 6 weeks of ABX; ampicillin 2gms IV q 6; day 1 with negative blood cultures was 4/19; so today is day 6/42  Requests DNR .    Hospital Course:  4/25/19 SKILLED NURSING for prolonged ABX   Pt sitting up in chair watching TV; reports feeling fine but did have urinary incontinence and noting frequency   No fever, WBC normal   Day 7/42 ABX for bacteremia/septic emboli     4/26/19 SKILLED NURSING for prolonged ABX for bacteremia, septic thrombus; day 8/42  NAD Overnight, still with night time urinary incontinence; will try trial of  flomax   PSA 1.4, repeat U/A ok; bladder scan ok     4/28 pt on SKILLED bed for prolonged abx day 10/42 for bacteremia from septic emboli. Ampicillin which cultures show sensitivity to. He also had a low grade temp last night. With the temp he had confusion and right sided weakness. Ct head done with no changes. Neuro was consulted for this am. He has this same type episode last visit with temp and mental status changes. This am he is back to normal. To note his H/H was lower and also creat bumped to 2.1.     4/29 pt on SKILL. Has some neuro changes on Sunday evening. CT without new strokes. MRI ordered per Dr shaw yesterday. Shows 3 new strokes. Facial droop this am . Neuro/cards and vascular surgery discussed pt case. He needs the anticoagulation with afib and thrombus in atrium. Will cont pradaxa, no heparin. Also remains on IV abx for 6 weeks. Day 12/42. No fever. No elevated WBC.     Creat was elevated yesterday 2.1. Lasix was stopped and 500ml IVF given. Creat better today 1.5. Will hold the hctz today as well as stopped the lisinopril and reduce amlodipine to have him a little higher with pressure due to acute strokes. Use LAUREL for swelling of lower extremity   R facial drop still apparent     5/2 pt doing well. he is sitting in day the day room. No complaints./ H.H stable. K a little low. Remains on pradaxa full dose now as kidneys have returned to baseline. Day 15/42 on ampicillin.     5/3 he is now on day 16/42 on enterococcus sensitive bacteremia to ampicillin. He is also on pradaxa full strength for atrial thrombus. Occult positive but h/h stable. Risk of discontinuing NOAC vs continuing discussed repetitively and family agrees to cont. Watching h/h daily.     Having episodes of mental status changes. Neuro following. Ct done again yesterday no acute changes. Keppra started for consideration of sz given the description of his events. No EEG available here, family does not wish for transfer.  "Otherwise all bp meds have been held to allow for permissive HTN. Bp still only 124/58 this am.    5/6/18 he is now on day 19/42 on enterococcus sensitive bacteremia to ampicillin. 5/2 BC NGTD x 4d  He is also on pradaxa full strength for atrial thrombus. Occult positive but h/h stable. NOAC  Risks/benefits reviewed  Keppra started for possible seizure activity with mental status changes( No EEG available here) . Pt is much improved with RX   H&H 8.4/28.9 , afebrile, WBC 6.7- VSS  Up in chair, "feeling good"    5/8/19 day 21/42 on enterococcus sensitive bacteremia to ampicillin. 5/2 BC NGTD x 5d  He is also on pradaxa full strength for atrial thrombus. Occult positive but h/h stable. NOAC  Risks/benefits reviewed  Keppra started for possible seizure activity with mental status changes( No EEG available here) . Pt is much improved with RX   H&H 8.4/28.9 , afebrile, WBC 5.23- VSS  Pt very alert and Oriented x 3; doing well with PT     5/10/19 Day 23/42 IV ampicillin for enterococcus sensitive bacteremia 5/2/19 BC Negative, Continue Pradaxa,    much improved with Keppra Rx; however this am he had a fall; fell backwards while urinating; he states he slipped; denies dizziness   Diuretics resumed per cards 2 days ago, also getting flomax; will check orthostatics   + recent c/o neck pain; has chronic neck pain  and gets epidural injections ; given IM celestone yesterday. Notes neck is better today.   Noting LE edema; discussed LE compression hose   BP sitting 161/67, Standing 121/56 ; so some orthostatic hypotension.   Discussed no tramadol today. He "feels good".       Review of Systems   Constitutional: Negative.    HENT: Negative for congestion, ear pain, sinus pressure, sneezing and sore throat.    Eyes: Negative.    Respiratory: Negative for cough, chest tightness, shortness of breath and wheezing.    Cardiovascular: Negative.  Negative for chest pain.   Gastrointestinal: Negative for abdominal pain, constipation, " diarrhea, nausea and vomiting.   Genitourinary: Negative for difficulty urinating, dysuria and flank pain.   Musculoskeletal: Negative.  Negative for joint swelling.   Skin: Negative.    Neurological: Negative for dizziness, facial asymmetry, weakness and headaches.   Hematological: Negative.    Psychiatric/Behavioral: Negative for behavioral problems and confusion.     Objective:     Vital Signs (Most Recent):  Temp: 97.7 °F (36.5 °C) (05/10/19 0615)  Pulse: 83 (05/10/19 0615)  Resp: 18 (05/10/19 0615)  BP: (!) 169/75 (05/10/19 0615)  SpO2: 97 % (05/10/19 0655) Vital Signs (24h Range):  Temp:  [97.3 °F (36.3 °C)-98.8 °F (37.1 °C)] 97.7 °F (36.5 °C)  Pulse:  [59-83] 83  Resp:  [16-18] 18  SpO2:  [95 %-97 %] 97 %  BP: (138-169)/(65-76) 169/75     Weight: 89.4 kg (197 lb)(No New since admit)  Body mass index is 29.95 kg/m².    Physical Exam   Constitutional: He is oriented to person, place, and time. He appears well-developed and well-nourished.   HENT:   Head: Normocephalic and atraumatic.   Right Ear: Tympanic membrane, external ear and ear canal normal.   Left Ear: Tympanic membrane, external ear and ear canal normal.   Mouth/Throat: Oropharynx is clear and moist.   Eyes: Pupils are equal, round, and reactive to light. Conjunctivae and EOM are normal.   Neck: Normal range of motion. Neck supple. No tracheal deviation present.   Cardiovascular: Normal rate, regular rhythm, intact distal pulses and normal pulses. Exam reveals no gallop and no friction rub.   Murmur heard.  Pulmonary/Chest: Effort normal and breath sounds normal.   Abdominal: Soft. Bowel sounds are normal. He exhibits no distension. There is no tenderness. Hernia confirmed negative in the right inguinal area and confirmed negative in the left inguinal area.   Musculoskeletal: Normal range of motion. He exhibits edema.   2 plus    Neurological: He is alert and oriented to person, place, and time. He has normal reflexes. No cranial nerve deficit.    Skin: Skin is warm and dry.   Psychiatric: He has a normal mood and affect. His behavior is normal. Judgment and thought content normal.   Nursing note and vitals reviewed.        CRANIAL NERVES     CN III, IV, VI   Pupils are equal, round, and reactive to light.  Extraocular motions are normal.        Significant Labs:   CMP:   Recent Labs   Lab 05/10/19  05      K 4.1      CO2 25   *   BUN 14   CREATININE 0.9   CALCIUM 10.4   PROT 6.3   ALBUMIN 2.8*   BILITOT 0.7   ALKPHOS 60   AST 11   ALT 11   ANIONGAP 8   EGFRNONAA >60     PSA 1.4,   Recent Labs   Lab 05/10/19  0558   WBC 5.01   RBC 3.68*   HGB 8.5*   HCT 28.4*      MCV 77*   MCH 23.1*   MCHC 29.9*      Urinalysis negative     Lab Results   Component Value Date    PSA 1.4 2019 Mag 2.2    Blood cultures  NGTD x 2     blood cultures   Enterococcus faecalis       CULTURE, BLOOD     Ampicillin <=2 mcg/mL Sensitive     Gentamicin Synergy Screen <=500 mcg/mL Sensitive     Tetracycline <=4 mcg/mL Sensitive     Vancomycin 2 mcg/mL Sensitive      Flu negative       Significant Imagin/2 CT head   1.  No CT evidence of an acute intracranial abnormality.    2.  Atrophy and small vessel ischemic changes of the periventricular white matter.     MRI   1.  Two tiny focal areas of increased signal intensity on diffusion-weighted images as described above likely representing artifact at the crux of the sulci.  A secondary consideration is tiny lacunar infarcts.  Dr. Laverne Shipman was telephoned with a verbal report at the time of this dictation.    2.  Atrophy and small vessel ischemic changes of the periventricular white matter.     CT head   1.  No CT evidence of an acute intracranial abnormality.    2.  Atrophy and small vessel ischemic changes of the periventricular white matter.  Old lacunar infarcts.     x ray hip right   No evidence of right hip fracture.     x ray hip left   No evidence of  "left hip fracture.    4/22 CXR   Right-sided PICC with tip projecting over superior vena cava.    4/18 Echo   · Mildly decreased left ventricular systolic function. The estimated ejection fraction is 45%  · Local segmental wall motion abnormalities.  · Eccentric left ventricular hypertrophy.  · Severe left atrial enlargement.  · Grade I (mild) left ventricular diastolic dysfunction consistent with impaired relaxation.  · Normal left atrial pressure.  · Normal right ventricular systolic function.  · Mild right atrial enlargement.  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 I did not see mention of AVR in encounter notes or March echo report or prior INES report but there appears to be a bioprosthesis. There is mild central aortic insufficiency present.  · Mild mitral regurgitation.  · Normal central venous pressure (3 mm Hg).  · The estimated PA systolic pressure is 31 mm Hg    4/18 INES  Final Impressions  1. The study quality is good.   2. 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  3. Bubble study is positive for Grade II Patent Foramen Ovale.  4. Moderate lipomatous atrial septal hypertrophy.  5. Normal functioning prosthetic aortic valve with trivial   paravalvular regurgitation.   6. Preserved left ventricular function. Ejection fraction around 55%.  7. Grade III atherosclerosis of aortic arch.    Assessment/Plan:      * Bacteremia  S/p recent TAVR now with septic emboli and concern for "flicking emboli"   ENTEROCOCCUS faecalis    His blood cultures have grown Enterococcus faecalis sensitve to all tested drugs  ID recommending total of 6 weeks IV ABX; Ampicillin 2gms IV q 6 hr; day 1 with negative blood cultures was 4/19/19; so today is day 6/42  Day 7/42 4/26/19 day 8/42 4/29 day 11/42 of IV abd. 4/19 was on vanc and 4/20 started on ampicillin  4/30 day 12/42.  4/19 was on vanc and 4/20 started on ampicillin    5/2 day 15/42 of IV abx- repeat blood cultures today  5/3 day 16/42 on ampicillin, no " growth in repeat blood cultures from 5/2 5/6 Day 19/42 Ampicillin;     Orthostatic hypotension  Now on flomax and diuretics resumed. + LE edema   Will order LE compression socks  Consider decreasing diuretic frequency if orthostasis continues   Monitor renal fx     Falls  Frequent falls; prior falls at home; right leg is weaker and his right foot gets stuck   Getting PT  Ambulating with walker; have to remind  Has sitter       Skin ulcer of sacrum, limited to breakdown of skin  Barrier cream ; nizoral cream  Donut to sit ;already doing it       Encephalopathy  Somnolent today.  Considering emolic showers vs sz activity. CT negative, neuro started keppra.    5/6/19 much better with Keppra rx, no further mental status changes.     Renal insufficiency  Noted 4/29: Cr up to 2.1  Creat stable   Change labs to M/W/f    Nocturnal enuresis  Check U.A , PSA, bladder scan; bladder scan 89ml   Still with incontinence issues   Gets HCTz 12.5 and lasix 20mg po daily in am   trial of flomax     Stop fluid pill, creat better with hydration.    5/10 Diuretic resumed per cards on 5/8;   Urine symptoms have been better with flomax but this am fell backwards while urinating; will check orthostatic vitals     Atrial thrombus  Large atrial thrombus found on INES ,Dr. Galicia deemed septic emboli; still no INES reporting thrombus   Continue Pradaxa; family plans to monitor meds once home  Will not consider failed therapy as patient has had lapses in therapy due to recent surgical interventions and non-compliance   as discussed above there is concern for infected thrombus.   Per Infectious Disease will treat bacteremia and possible infected thrombus for 6 weeks.    5/2 Discussed with family and cardiology. rec repeat INES 2 weeks consider changing NOAC at that time, not now as concern over anticoagulation is issue with new CVA     5/6: unsure will repeat INES at this point. Will do 6 weeks IV antibx regardless of repeat INES results. Will  continue to discuss with cardiology need for repeat procedure      Cerebrovascular accident (CVA)  Subacute CVA with focal deficits noted on 4/14, with significant improvement in symptoms   MRI did not demonstrate acute findings but neuro reviewed and Dx with new CVA;   Was being tx with Pradaxa when he had acute stroke but Will not consider failed therapy as patient has had lapses in therapy due to recent surgical interventions and non-compliance  Per Dr. Fernandez's recommendations we will continue:  -Telemetry.  -Neurochecks.  -NO TPA as he presented initially on Sunday  -Physical Therapy and OT Consult, evaluation and treatment at [place] once patient is admitted to  SNF  -Completed permissive HTN and will need strict BP control.   -Continue his current dose home meds otherwise including Asprin, Pradaxa and statin       4/29: Reduce pradaxa due to renal function today  Consult colin due to AMS and neuro changes overnight. Repeat CT head without acute changes. ? Watershed injury; ? Keep BP a little higher? Dehydration playing a role    4/30 go back up on pradaxa due to kidneys now functioning. Reviewed MRI with Kel Fernandez and Dr Ventura. ST/OT/PT    5/2 pt doing well with gait training and speech. Up in day room today    5/3 had another episode of change in mental status yesterday evening. Started on keppra, ct negative. Doing better today  5/6 No further mental status changes since staring KEPPRA   5/8 Stable w Keppra    Hx of CABG  Asa, statin    CHF (congestive heart failure), NYHA class II, chronic, diastolic  Continue Lisinopril, HCTZ  4/29: Hold lasix for now. Already had dose this am. Give gentle hydration given rising Cr. Likely a little dry  4/30 cont to hold lasix, lisinopril and hctz LAUREL hose.    5/2 holding ace. Diuretics cont compression hose and BB    5/3 Cont BB and asa, no ace/arb/lasix due to permissive HTN.    5/6 /67. No signs of acute exacerbation today    Anemia  H/H stable  without acute indications for transfusion   Continue to monitor    check anemia w/u from last visit ferritin was 26, retic was 1.5  Occult stool today, will not change our plan just give us answer to where blood is going. He needs the blood thinner for now due to septic emboli.     Cont to monitor occult pending  Transfuse as needed, needs blood thinner.    H/H stable.        Essential hypertension  Continue amlodpine, Lisinopril, HCTZ, , coreg  4/29: Lasix- will hold now that Creat bumped and follow renal fxn  4/30 stopped lasix and lisinopril yesterday, today stop hctz and reduce amlodipine  He needs a little high bp.    5/2 Bp still in 120/50- cut the amlodipine out today  5/3 cont to hold bp meds, allowing permissive HTN, bp still only 124/58. Can start to treat bp if needed over weekend per neuro recs.    5/6: overall remains stable. No medication changes today    Aortic valve stenosis  S/p TAVR       VTE Risk Mitigation (From admission, onward)        Ordered     dabigatran etexilate capsule 150 mg  2 times daily      04/30/19 0907     heparin, porcine (PF) 100 unit/mL injection flush 500 Units  As needed (PRN)      04/25/19 1408              Ehsan West MD  Department of Hospital Medicine   [facility]

## 2019-05-10 NOTE — ASSESSMENT & PLAN NOTE
Frequent falls; prior falls at home; right leg is weaker and his right foot gets stuck   Getting PT  Ambulating with walker; have to remind  Has sitter

## 2019-05-10 NOTE — ASSESSMENT & PLAN NOTE
Subacute CVA with focal deficits noted on 4/14, with significant improvement in symptoms   MRI did not demonstrate acute findings but neuro reviewed and Dx with new CVA;   Was being tx with Pradaxa when he had acute stroke but Will not consider failed therapy as patient has had lapses in therapy due to recent surgical interventions and non-compliance  Per Dr. Fernandez's recommendations we will continue:  -Telemetry.  -Neurochecks.  -NO TPA as he presented initially on Sunday  -Physical Therapy and OT Consult, evaluation and treatment at [place] once patient is admitted to  SNF  -Completed permissive HTN and will need strict BP control.   -Continue his current dose home meds otherwise including Asprin, Pradaxa and statin       4/29: Reduce pradaxa due to renal function today  Consult colin due to AMS and neuro changes overnight. Repeat CT head without acute changes. ? Watershed injury; ? Keep BP a little higher? Dehydration playing a role    4/30 go back up on pradaxa due to kidneys now functioning. Reviewed MRI with Kel Fernandez and Dr Ventura. ST/OT/PT    5/2 pt doing well with gait training and speech. Up in day room today    5/3 had another episode of change in mental status yesterday evening. Started on keppra, ct negative. Doing better today  5/6 No further mental status changes since staring KEPPRA   5/8 Stable w Keppra

## 2019-05-10 NOTE — NURSING
Pt sitter called  and notified nurse pt fell in bathroom.  Pt did get himself up and was in bathroom when I entered room.  VS stable.   Right hand skin tear noted.  Pressure dressing applied.   Dr. Figueroa notified of fall.  No new orders noted.  Pt alert and oriented.  Pt had private sitter with him at time of fall.

## 2019-05-10 NOTE — NURSING
Report received from Deanna. Pt. Is a swing patient. Patient fell this AM on previous shift which resulted in a Skin Tear to right hand. Dressing intact. No drainage at this time. Patient states that he has no pain. PICC line dressing dry and intact. PICC line flushed and patent. Patient has bilateral Lower leg edema at 2+. Compression stockings applied by SILVESTRE Sharma. Patient receiving IV Ampicillin once every 6 hours. Caregiver at bedside.

## 2019-05-10 NOTE — PLAN OF CARE
Problem: Adult Inpatient Plan of Care  Goal: Plan of Care Review  Outcome: Ongoing (interventions implemented as appropriate)  Plan of care reviewed with patient and caregiver. Patient and caregiver verbalized understanding.

## 2019-05-11 PROCEDURE — A4216 STERILE WATER/SALINE, 10 ML: HCPCS | Performed by: FAMILY MEDICINE

## 2019-05-11 PROCEDURE — 25000003 PHARM REV CODE 250: Performed by: NURSE PRACTITIONER

## 2019-05-11 PROCEDURE — 63600175 PHARM REV CODE 636 W HCPCS: Performed by: INTERNAL MEDICINE

## 2019-05-11 PROCEDURE — 25000003 PHARM REV CODE 250: Performed by: INTERNAL MEDICINE

## 2019-05-11 PROCEDURE — 25000003 PHARM REV CODE 250: Performed by: FAMILY MEDICINE

## 2019-05-11 PROCEDURE — A4216 STERILE WATER/SALINE, 10 ML: HCPCS | Performed by: INTERNAL MEDICINE

## 2019-05-11 PROCEDURE — 94761 N-INVAS EAR/PLS OXIMETRY MLT: CPT

## 2019-05-11 PROCEDURE — 25000003 PHARM REV CODE 250: Performed by: PSYCHIATRY & NEUROLOGY

## 2019-05-11 PROCEDURE — 11000004 HC SNF PRIVATE

## 2019-05-11 PROCEDURE — 99900035 HC TECH TIME PER 15 MIN (STAT)

## 2019-05-11 RX ORDER — SODIUM CHLORIDE 0.9 % (FLUSH) 0.9 %
10 SYRINGE (ML) INJECTION EVERY 6 HOURS
Status: DISCONTINUED | OUTPATIENT
Start: 2019-05-11 | End: 2019-05-13

## 2019-05-11 RX ORDER — SODIUM CHLORIDE 0.9 % (FLUSH) 0.9 %
10 SYRINGE (ML) INJECTION
Status: DISCONTINUED | OUTPATIENT
Start: 2019-05-11 | End: 2019-05-13

## 2019-05-11 RX ADMIN — PANTOPRAZOLE SODIUM 40 MG: 40 TABLET, DELAYED RELEASE ORAL at 09:05

## 2019-05-11 RX ADMIN — AMPICILLIN SODIUM 2 G: 2 INJECTION, POWDER, FOR SOLUTION INTRAMUSCULAR; INTRAVENOUS at 08:05

## 2019-05-11 RX ADMIN — Medication 10 ML: at 06:05

## 2019-05-11 RX ADMIN — Medication 10 ML: at 12:05

## 2019-05-11 RX ADMIN — ATORVASTATIN CALCIUM 10 MG: 10 TABLET, FILM COATED ORAL at 08:05

## 2019-05-11 RX ADMIN — AMPICILLIN SODIUM 2 G: 2 INJECTION, POWDER, FOR SOLUTION INTRAMUSCULAR; INTRAVENOUS at 04:05

## 2019-05-11 RX ADMIN — FUROSEMIDE 20 MG: 20 TABLET ORAL at 09:05

## 2019-05-11 RX ADMIN — MICONAZOLE NITRATE: 20 CREAM TOPICAL at 08:05

## 2019-05-11 RX ADMIN — DABIGATRAN ETEXILATE MESYLATE 150 MG: 75 CAPSULE ORAL at 09:05

## 2019-05-11 RX ADMIN — LEVETIRACETAM 250 MG: 250 TABLET ORAL at 09:05

## 2019-05-11 RX ADMIN — TAMSULOSIN HYDROCHLORIDE 0.4 MG: 0.4 CAPSULE ORAL at 09:05

## 2019-05-11 RX ADMIN — LIDOCAINE 1 PATCH: 50 PATCH TOPICAL at 06:05

## 2019-05-11 RX ADMIN — AMPICILLIN SODIUM 2 G: 2 INJECTION, POWDER, FOR SOLUTION INTRAMUSCULAR; INTRAVENOUS at 09:05

## 2019-05-11 RX ADMIN — CARVEDILOL 25 MG: 25 TABLET, FILM COATED ORAL at 07:05

## 2019-05-11 RX ADMIN — MICONAZOLE NITRATE: 20 CREAM TOPICAL at 10:05

## 2019-05-11 RX ADMIN — CARVEDILOL 25 MG: 25 TABLET, FILM COATED ORAL at 04:05

## 2019-05-11 RX ADMIN — Medication 10 ML: at 11:05

## 2019-05-11 RX ADMIN — DABIGATRAN ETEXILATE MESYLATE 150 MG: 75 CAPSULE ORAL at 08:05

## 2019-05-11 RX ADMIN — AMPICILLIN SODIUM 2 G: 2 INJECTION, POWDER, FOR SOLUTION INTRAMUSCULAR; INTRAVENOUS at 03:05

## 2019-05-11 RX ADMIN — LEVETIRACETAM 250 MG: 250 TABLET ORAL at 08:05

## 2019-05-11 RX ADMIN — ASPIRIN 81 MG: 81 TABLET, COATED ORAL at 09:05

## 2019-05-11 RX ADMIN — RAMELTEON 8 MG: 8 TABLET, FILM COATED ORAL at 08:05

## 2019-05-11 NOTE — NURSING
Patient is a swing patient receiving IV ampicillin Q6H in PICC line. PICC line flushed throughout shift and is patent. Bilateral swelling noted to lower extremities  LAUREL hose on both. PERRLA at 2mm round and brisk. Patient states that he is no pain. Daughter and wife at bedside. Will continue to monitor.

## 2019-05-11 NOTE — PLAN OF CARE
Problem: Adult Inpatient Plan of Care  Goal: Plan of Care Review  Outcome: Ongoing (interventions implemented as appropriate)  Plan of care reviewed with Patient and family. Patient and family verbalized understanding

## 2019-05-12 PROCEDURE — 25000003 PHARM REV CODE 250: Performed by: INTERNAL MEDICINE

## 2019-05-12 PROCEDURE — 94761 N-INVAS EAR/PLS OXIMETRY MLT: CPT

## 2019-05-12 PROCEDURE — 99900035 HC TECH TIME PER 15 MIN (STAT)

## 2019-05-12 PROCEDURE — 25000003 PHARM REV CODE 250: Performed by: FAMILY MEDICINE

## 2019-05-12 PROCEDURE — 63600175 PHARM REV CODE 636 W HCPCS: Performed by: INTERNAL MEDICINE

## 2019-05-12 PROCEDURE — 25000003 PHARM REV CODE 250: Performed by: NURSE PRACTITIONER

## 2019-05-12 PROCEDURE — 25000003 PHARM REV CODE 250: Performed by: PSYCHIATRY & NEUROLOGY

## 2019-05-12 PROCEDURE — 11000004 HC SNF PRIVATE

## 2019-05-12 PROCEDURE — A4216 STERILE WATER/SALINE, 10 ML: HCPCS | Performed by: FAMILY MEDICINE

## 2019-05-12 RX ADMIN — ASPIRIN 81 MG: 81 TABLET, COATED ORAL at 09:05

## 2019-05-12 RX ADMIN — ATORVASTATIN CALCIUM 10 MG: 10 TABLET, FILM COATED ORAL at 08:05

## 2019-05-12 RX ADMIN — Medication 10 ML: at 05:05

## 2019-05-12 RX ADMIN — RAMELTEON 8 MG: 8 TABLET, FILM COATED ORAL at 08:05

## 2019-05-12 RX ADMIN — MICONAZOLE NITRATE: 20 CREAM TOPICAL at 08:05

## 2019-05-12 RX ADMIN — AMPICILLIN SODIUM 2 G: 2 INJECTION, POWDER, FOR SOLUTION INTRAMUSCULAR; INTRAVENOUS at 03:05

## 2019-05-12 RX ADMIN — AMPICILLIN SODIUM 2 G: 2 INJECTION, POWDER, FOR SOLUTION INTRAMUSCULAR; INTRAVENOUS at 09:05

## 2019-05-12 RX ADMIN — LEVETIRACETAM 250 MG: 250 TABLET ORAL at 08:05

## 2019-05-12 RX ADMIN — AMPICILLIN SODIUM 2 G: 2 INJECTION, POWDER, FOR SOLUTION INTRAMUSCULAR; INTRAVENOUS at 08:05

## 2019-05-12 RX ADMIN — CARVEDILOL 25 MG: 25 TABLET, FILM COATED ORAL at 05:05

## 2019-05-12 RX ADMIN — MICONAZOLE NITRATE: 20 CREAM TOPICAL at 09:05

## 2019-05-12 RX ADMIN — CARVEDILOL 25 MG: 25 TABLET, FILM COATED ORAL at 07:05

## 2019-05-12 RX ADMIN — LIDOCAINE 1 PATCH: 50 PATCH TOPICAL at 05:05

## 2019-05-12 RX ADMIN — TAMSULOSIN HYDROCHLORIDE 0.4 MG: 0.4 CAPSULE ORAL at 08:05

## 2019-05-12 RX ADMIN — PANTOPRAZOLE SODIUM 40 MG: 40 TABLET, DELAYED RELEASE ORAL at 08:05

## 2019-05-12 RX ADMIN — FUROSEMIDE 20 MG: 20 TABLET ORAL at 08:05

## 2019-05-12 RX ADMIN — Medication 10 ML: at 08:05

## 2019-05-12 RX ADMIN — DABIGATRAN ETEXILATE MESYLATE 150 MG: 75 CAPSULE ORAL at 08:05

## 2019-05-12 RX ADMIN — Medication 10 ML: at 11:05

## 2019-05-12 NOTE — NURSING
Pt. Is a swing patient. IV Ampicillin given every Q6H. PICC line clean dry, and intact. PICC line flushed Q6H. Line is patent. Patient ambulates to bathroom with monitoring/assistance. No complaints of pain. No signs of distress noted. Will Continue to monitor.

## 2019-05-12 NOTE — PLAN OF CARE
Problem: Adult Inpatient Plan of Care  Goal: Plan of Care Review  Outcome: Ongoing (interventions implemented as appropriate)  Patient rested well during shift. Room air. Alert and oriented. Sitter at bedside. IV antibiotics continued. PICC line dressing dry and intact. Both lines saline locked and flush without difficulty. Free from fall or injury. Plan of care reviewed with patient.

## 2019-05-12 NOTE — PROGRESS NOTES
Staff Handoff  Bedside report per PETEY Esqudea. No distress noted. Room air. Awake, alert, oriented. Family at bedside. Patient denies pain. Call bell in reach. Encouraged to call for assistance.     Resident Handoff

## 2019-05-12 NOTE — PLAN OF CARE
Problem: Adult Inpatient Plan of Care  Goal: Plan of Care Review  Outcome: Ongoing (interventions implemented as appropriate)  Plan of care reviewed with patient and family. Patient and family verbalized understanding.

## 2019-05-13 LAB
ALBUMIN SERPL BCP-MCNC: 2.7 G/DL (ref 3.5–5.2)
ALP SERPL-CCNC: 58 U/L (ref 55–135)
ALT SERPL W/O P-5'-P-CCNC: 14 U/L (ref 10–44)
ANION GAP SERPL CALC-SCNC: 8 MMOL/L (ref 8–16)
AST SERPL-CCNC: 12 U/L (ref 10–40)
BASOPHILS # BLD AUTO: 0.03 K/UL (ref 0–0.2)
BASOPHILS NFR BLD: 0.4 % (ref 0–1.9)
BILIRUB SERPL-MCNC: 0.6 MG/DL (ref 0.1–1)
BUN SERPL-MCNC: 12 MG/DL (ref 8–23)
CALCIUM SERPL-MCNC: 9.7 MG/DL (ref 8.7–10.5)
CHLORIDE SERPL-SCNC: 111 MMOL/L (ref 95–110)
CO2 SERPL-SCNC: 24 MMOL/L (ref 23–29)
CREAT SERPL-MCNC: 0.9 MG/DL (ref 0.5–1.4)
DIFFERENTIAL METHOD: ABNORMAL
EOSINOPHIL # BLD AUTO: 0.2 K/UL (ref 0–0.5)
EOSINOPHIL NFR BLD: 1.9 % (ref 0–8)
ERYTHROCYTE [DISTWIDTH] IN BLOOD BY AUTOMATED COUNT: 19.2 % (ref 11.5–14.5)
EST. GFR  (AFRICAN AMERICAN): >60 ML/MIN/1.73 M^2
EST. GFR  (NON AFRICAN AMERICAN): >60 ML/MIN/1.73 M^2
GLUCOSE SERPL-MCNC: 106 MG/DL (ref 70–110)
HCT VFR BLD AUTO: 29 % (ref 40–54)
HGB BLD-MCNC: 8.6 G/DL (ref 14–18)
LYMPHOCYTES # BLD AUTO: 1.1 K/UL (ref 1–4.8)
LYMPHOCYTES NFR BLD: 13.7 % (ref 18–48)
MCH RBC QN AUTO: 23.2 PG (ref 27–31)
MCHC RBC AUTO-ENTMCNC: 29.7 G/DL (ref 32–36)
MCV RBC AUTO: 78 FL (ref 82–98)
MONOCYTES # BLD AUTO: 0.6 K/UL (ref 0.3–1)
MONOCYTES NFR BLD: 8.1 % (ref 4–15)
NEUTROPHILS # BLD AUTO: 6 K/UL (ref 1.8–7.7)
NEUTROPHILS NFR BLD: 75.9 % (ref 38–73)
PLATELET # BLD AUTO: 246 K/UL (ref 150–350)
PMV BLD AUTO: 8.7 FL (ref 9.2–12.9)
POTASSIUM SERPL-SCNC: 3.8 MMOL/L (ref 3.5–5.1)
PROT SERPL-MCNC: 5.8 G/DL (ref 6–8.4)
RBC # BLD AUTO: 3.7 M/UL (ref 4.6–6.2)
SODIUM SERPL-SCNC: 143 MMOL/L (ref 136–145)
WBC # BLD AUTO: 7.89 K/UL (ref 3.9–12.7)

## 2019-05-13 PROCEDURE — 99232 PR SUBSEQUENT HOSPITAL CARE,LEVL II: ICD-10-PCS | Mod: ,,, | Performed by: INTERNAL MEDICINE

## 2019-05-13 PROCEDURE — 97530 THERAPEUTIC ACTIVITIES: CPT

## 2019-05-13 PROCEDURE — 97110 THERAPEUTIC EXERCISES: CPT

## 2019-05-13 PROCEDURE — 63600175 PHARM REV CODE 636 W HCPCS: Performed by: FAMILY MEDICINE

## 2019-05-13 PROCEDURE — 25000003 PHARM REV CODE 250: Performed by: PSYCHIATRY & NEUROLOGY

## 2019-05-13 PROCEDURE — 97127 HC THERAPEUTIC INTVTN, COGN FUNCTION - ST: CPT

## 2019-05-13 PROCEDURE — A4216 STERILE WATER/SALINE, 10 ML: HCPCS | Performed by: FAMILY MEDICINE

## 2019-05-13 PROCEDURE — 25000003 PHARM REV CODE 250: Performed by: FAMILY MEDICINE

## 2019-05-13 PROCEDURE — 80053 COMPREHEN METABOLIC PANEL: CPT

## 2019-05-13 PROCEDURE — 25000003 PHARM REV CODE 250: Performed by: INTERNAL MEDICINE

## 2019-05-13 PROCEDURE — 63600175 PHARM REV CODE 636 W HCPCS: Performed by: INTERNAL MEDICINE

## 2019-05-13 PROCEDURE — 36415 COLL VENOUS BLD VENIPUNCTURE: CPT

## 2019-05-13 PROCEDURE — 25000003 PHARM REV CODE 250: Performed by: NURSE PRACTITIONER

## 2019-05-13 PROCEDURE — 99900035 HC TECH TIME PER 15 MIN (STAT)

## 2019-05-13 PROCEDURE — 99232 SBSQ HOSP IP/OBS MODERATE 35: CPT | Mod: ,,, | Performed by: INTERNAL MEDICINE

## 2019-05-13 PROCEDURE — 11000004 HC SNF PRIVATE

## 2019-05-13 PROCEDURE — 85025 COMPLETE CBC W/AUTO DIFF WBC: CPT

## 2019-05-13 PROCEDURE — 94761 N-INVAS EAR/PLS OXIMETRY MLT: CPT

## 2019-05-13 PROCEDURE — A4216 STERILE WATER/SALINE, 10 ML: HCPCS | Performed by: INTERNAL MEDICINE

## 2019-05-13 RX ORDER — LISINOPRIL 10 MG/1
10 TABLET ORAL DAILY
Qty: 30 TABLET | Refills: 1 | Status: SHIPPED | OUTPATIENT
Start: 2019-05-13 | End: 2020-05-12

## 2019-05-13 RX ORDER — FUROSEMIDE 20 MG/1
40 TABLET ORAL DAILY
Qty: 30 TABLET | Refills: 0 | Status: SHIPPED | OUTPATIENT
Start: 2019-05-13

## 2019-05-13 RX ORDER — LISINOPRIL 10 MG/1
10 TABLET ORAL DAILY
Status: DISCONTINUED | OUTPATIENT
Start: 2019-05-13 | End: 2019-05-16 | Stop reason: HOSPADM

## 2019-05-13 RX ORDER — FUROSEMIDE 40 MG/1
40 TABLET ORAL DAILY
Status: DISCONTINUED | OUTPATIENT
Start: 2019-05-13 | End: 2019-05-16 | Stop reason: HOSPADM

## 2019-05-13 RX ORDER — LEVETIRACETAM 250 MG/1
250 TABLET ORAL 2 TIMES DAILY
Qty: 60 TABLET | Refills: 11 | Status: SHIPPED | OUTPATIENT
Start: 2019-05-13 | End: 2020-05-12

## 2019-05-13 RX ORDER — SODIUM CHLORIDE 0.9 % (FLUSH) 0.9 %
10 SYRINGE (ML) INJECTION
Status: DISCONTINUED | OUTPATIENT
Start: 2019-05-13 | End: 2019-05-16 | Stop reason: HOSPADM

## 2019-05-13 RX ORDER — LIDOCAINE 50 MG/G
1 PATCH TOPICAL DAILY
Qty: 30 PATCH | Refills: 0 | Status: SHIPPED | OUTPATIENT
Start: 2019-05-13 | End: 2019-05-27

## 2019-05-13 RX ORDER — TAMSULOSIN HYDROCHLORIDE 0.4 MG/1
0.4 CAPSULE ORAL DAILY
Qty: 30 CAPSULE | Refills: 11 | Status: SHIPPED | OUTPATIENT
Start: 2019-05-14 | End: 2020-05-13

## 2019-05-13 RX ORDER — SODIUM CHLORIDE 0.9 % (FLUSH) 0.9 %
10 SYRINGE (ML) INJECTION EVERY 6 HOURS
Status: DISCONTINUED | OUTPATIENT
Start: 2019-05-13 | End: 2019-05-16 | Stop reason: HOSPADM

## 2019-05-13 RX ADMIN — FUROSEMIDE 40 MG: 40 TABLET ORAL at 09:05

## 2019-05-13 RX ADMIN — AMPICILLIN SODIUM 2 G: 2 INJECTION, POWDER, FOR SOLUTION INTRAMUSCULAR; INTRAVENOUS at 09:05

## 2019-05-13 RX ADMIN — ASPIRIN 81 MG: 81 TABLET, COATED ORAL at 09:05

## 2019-05-13 RX ADMIN — MICONAZOLE NITRATE: 20 CREAM TOPICAL at 08:05

## 2019-05-13 RX ADMIN — AMPICILLIN SODIUM 2 G: 2 INJECTION, POWDER, FOR SOLUTION INTRAMUSCULAR; INTRAVENOUS at 11:05

## 2019-05-13 RX ADMIN — PANTOPRAZOLE SODIUM 40 MG: 40 TABLET, DELAYED RELEASE ORAL at 09:05

## 2019-05-13 RX ADMIN — AMPICILLIN SODIUM 2 G: 2 INJECTION, POWDER, FOR SOLUTION INTRAMUSCULAR; INTRAVENOUS at 04:05

## 2019-05-13 RX ADMIN — MICONAZOLE NITRATE: 20 CREAM TOPICAL at 09:05

## 2019-05-13 RX ADMIN — Medication 10 ML: at 05:05

## 2019-05-13 RX ADMIN — APIXABAN 5 MG: 5 TABLET, FILM COATED ORAL at 09:05

## 2019-05-13 RX ADMIN — LEVETIRACETAM 250 MG: 250 TABLET ORAL at 08:05

## 2019-05-13 RX ADMIN — CARVEDILOL 25 MG: 25 TABLET, FILM COATED ORAL at 09:05

## 2019-05-13 RX ADMIN — Medication 10 ML: at 01:05

## 2019-05-13 RX ADMIN — Medication 10 ML: at 04:05

## 2019-05-13 RX ADMIN — RAMELTEON 8 MG: 8 TABLET, FILM COATED ORAL at 08:05

## 2019-05-13 RX ADMIN — AMPICILLIN SODIUM 2 G: 2 INJECTION, POWDER, FOR SOLUTION INTRAMUSCULAR; INTRAVENOUS at 02:05

## 2019-05-13 RX ADMIN — TAMSULOSIN HYDROCHLORIDE 0.4 MG: 0.4 CAPSULE ORAL at 09:05

## 2019-05-13 RX ADMIN — LEVETIRACETAM 250 MG: 250 TABLET ORAL at 09:05

## 2019-05-13 RX ADMIN — ATORVASTATIN CALCIUM 10 MG: 10 TABLET, FILM COATED ORAL at 08:05

## 2019-05-13 RX ADMIN — HEPARIN 500 UNITS: 100 SYRINGE at 09:05

## 2019-05-13 RX ADMIN — APIXABAN 5 MG: 5 TABLET, FILM COATED ORAL at 08:05

## 2019-05-13 RX ADMIN — CARVEDILOL 25 MG: 25 TABLET, FILM COATED ORAL at 04:05

## 2019-05-13 RX ADMIN — Medication 10 ML: at 11:05

## 2019-05-13 RX ADMIN — LISINOPRIL 10 MG: 10 TABLET ORAL at 09:05

## 2019-05-13 NOTE — ASSESSMENT & PLAN NOTE
Continue amlodpine, Lisinopril, HCTZ, , coreg  4/29: Lasix- will hold now that Creat bumped and follow renal fxn  4/30 stopped lasix and lisinopril yesterday, today stop hctz and reduce amlodipine  He needs a little high bp.    5/2 Bp still in 120/50- cut the amlodipine out today  5/3 cont to hold bp meds, allowing permissive HTN, bp still only 124/58. Can start to treat bp if needed over weekend per neuro recs.    5/6: overall remains stable. No medication changes today    5/13 lisinopril resumed per cards bp 160s

## 2019-05-13 NOTE — PT/OT/SLP PROGRESS
"Physical Therapy Treatment    Patient Name:  St Lacho Farooq   MRN:  [id number]    Recommendations:     Discharge Recommendations:  home with home health, home health PT, home health OT   Discharge Equipment Recommendations: walker, rolling, commode, grab bar, tub/shower, tub bench, cane, straight   Barriers to discharge: Inaccessible home    Assessment:     St Lacho Farooq is a 84 y.o. male admitted with a medical diagnosis of Bacteremia.  He presents with the following impairments/functional limitations:  weakness, impaired self care skills, gait instability, impaired functional mobilty, impaired balance, impaired cognition. Patient reported sleep well last night, no pain complain and no falls incidence over the weekend. Patient has a new personal Rolling Walker. Physical Therapist adjusted patients R W proper height and educated the safety measures. No leaning on right side and able to  well Right foot during gait functions.    Rehab Prognosis:; patient would benefit from acute skilled PT services to address these deficits and reach maximum level of function.    Recent Surgery: * No surgery found *      Plan:     During this hospitalization, patient to be seen 5 x/week to address the identified rehab impairments via therapeutic activities, therapeutic exercises and progress toward the following goals:    · Plan of Care Expires:  05/10/19    Subjective     Chief Complaint: Patient stated," I did my walking exercise with my walker yesterday with no problem".  Patient/Family Comments/goals: " To walk better with my new Rolling Walker".  Pain/Comfort:  · Pain Rating 1: 0/10  · Pain Rating Post-Intervention 1: 0/10      Objective:     Communicated with patient, daughter and sitter prior to session.  Patient found up in chair with PICC line, peripheral IV upon PT entry to room.     General Precautions: Standard, fall   Orthopedic Precautions:N/A   Braces: N/A     Functional Mobility:  · Transfers:   "   · Sit to Stand:  stand by assistance with rolling walker  · Bed to Chair: stand by assistance with  rolling walker  using  Step Transfer  · Gait: RW Ambulation x 300 feet SBA with rest period in between distance. Reciprocal gait with slight dec Right Foot/Floor clearance. Increased body alignment and increased estefanía.  · Balance: Dynamic standing with Good- grade using RW  · Stairs:  Pt ascended/descended 20 steps stair(s) with No Assistive Device with bilateral handrails with Stand-by Assistance.       AM-PAC 6 CLICK MOBILITY  Turning over in bed (including adjusting bedclothes, sheets and blankets)?: 4  Sitting down on and standing up from a chair with arms (e.g., wheelchair, bedside commode, etc.): 3  Moving from lying on back to sitting on the side of the bed?: 4  Moving to and from a bed to a chair (including a wheelchair)?: 3  Need to walk in hospital room?: 3  Climbing 3-5 steps with a railing?: 3  Basic Mobility Total Score: 20       Therapeutic Activities and Exercises:   Educated patient with safety measures and management in using a new RW for gait functions.Pt performed bilat LE exercises consisting of Active range of motion exercises for  strengthening and coordination  x 20 reps consisting of Ankle DF, Ankle PF, Side steps alternately at sitting, LAQ, alternately , marching in  place at Sitting and Arm push ups on the Chair with pt able to tolerate activities well with rest periods.   PT discussed importance of patient's performance of Home Exercise Program (HEP) with pt verbalizing understanding. Implemented Gait trng using RW x 300 feet with SBA and Ascending/Descending Stair x 20 steps using bilateral handrails with SBA and occasional cues for correct step sequence.    Patient left up in chair with all lines intact, call button in reach, nurse notified and Sitter present..    GOALS:   Multidisciplinary Problems     Physical Therapy Goals        Problem: Physical Therapy Goal    Goal Priority  Disciplines Outcome Goal Variances Interventions   Physical Therapy Goal     PT, PT/OT Ongoing (interventions implemented as appropriate)     Description:  Goals to be met by:  5/10/19 (updated 19)    Patient will increase functional independence with mobility by performin. Supine to sit with Independent - met 19  2. Sit to supine with Independent - met 19  3. Bed to chair transfer with Modified Independent with or without R W or straight Cane  using Step Transfer TECHNIQUE  4. Gait  x 300  feet with Modified Independent with or without straight cane or RW  5. Lower extremity exercise program x10 reps per handout, with assistance as needed. Met 19  6. Able to ascend/descend 12 stair steps using handrail/Straight  Cane with supervision. Met 5/3/19  7. Able to ascend/descend 20 statir steps using handrail/Straight Cane with Supervision.                          Time Tracking:     PT Received On: 19  PT Start Time: 1230     PT Stop Time: 1310  PT Total Time (min): 40 min     Billable Minutes: Therapeutic Activity 35 mins    Treatment Type: Treatment  PT/PTA: PT     PTA Visit Number: 1     Keven Goldstein, PT  2019

## 2019-05-13 NOTE — PROGRESS NOTES
Staff Handoff  Bedside report per PETEY Esqueda. No distress noted. Room air. Awake, alert, oriented. Family at bedside. Patient denies pain. Gian hose in use. Chair wheels locked. Call bell in reach. Encouraged to call for assistance.     Resident Handoff

## 2019-05-13 NOTE — ASSESSMENT & PLAN NOTE
Noted 4/29: Cr up to 2.1  Creat stable   Changed labs to M/W/f  Will go back to daily with the changes made today

## 2019-05-13 NOTE — PLAN OF CARE
05/13/19 1106   Discharge Reassessment   Assessment Type Discharge Planning Reassessment   Post-Acute Status   Post-Acute Authorization Home Health/Hospice;Medications   Home Health/Hospice Status Referrals Sent   Medication Status Rx fund Referral       Patient would like to finish IV infusion therapy at home. He will discharge to his daughter Mitzy's house where she will do the infusions for him. Home Health referral sent to [facility] as per patient request. Choice paper signed. Awaiting order for medication for home therapy. Once order is in, I will send to [facility] per patient and family request. Will continue to follow up.

## 2019-05-13 NOTE — SUBJECTIVE & OBJECTIVE
Review of Systems   Constitutional: Negative.    HENT: Negative for congestion, ear pain, sinus pressure, sneezing and sore throat.    Eyes: Negative.    Respiratory: Negative for cough, chest tightness, shortness of breath and wheezing.    Cardiovascular: Negative.  Negative for chest pain.   Gastrointestinal: Negative for abdominal pain, constipation, diarrhea, nausea and vomiting.   Genitourinary: Negative for difficulty urinating, dysuria and flank pain.   Musculoskeletal: Negative.  Negative for joint swelling.   Skin: Negative.    Neurological: Negative for dizziness, facial asymmetry, weakness and headaches.   Hematological: Negative.    Psychiatric/Behavioral: Negative for behavioral problems and confusion.     Objective:     Vital Signs (Most Recent):  Temp: 97.5 °F (36.4 °C) (05/13/19 0727)  Pulse: 63 (05/13/19 0727)  Resp: 18 (05/13/19 0727)  BP: (!) 165/71 (05/13/19 0727)  SpO2: 95 % (05/13/19 0727) Vital Signs (24h Range):  Temp:  [96.8 °F (36 °C)-98 °F (36.7 °C)] 97.5 °F (36.4 °C)  Pulse:  [58-65] 63  Resp:  [16-20] 18  SpO2:  [95 %-97 %] 95 %  BP: (154-167)/(67-72) 165/71     Weight: 89.4 kg (197 lb)(No New since admit)  Body mass index is 29.95 kg/m².    Physical Exam   Constitutional: He is oriented to person, place, and time. He appears well-developed and well-nourished.   HENT:   Head: Normocephalic and atraumatic.   Right Ear: Tympanic membrane, external ear and ear canal normal.   Left Ear: Tympanic membrane, external ear and ear canal normal.   Mouth/Throat: Oropharynx is clear and moist.   Eyes: Pupils are equal, round, and reactive to light. Conjunctivae and EOM are normal.   Neck: Normal range of motion. Neck supple. No tracheal deviation present.   Cardiovascular: Normal rate, regular rhythm, intact distal pulses and normal pulses. Exam reveals no gallop and no friction rub.   Murmur heard.  Pulmonary/Chest: Effort normal and breath sounds normal.   Abdominal: Soft. Bowel sounds are  normal. He exhibits no distension. There is no tenderness. Hernia confirmed negative in the right inguinal area and confirmed negative in the left inguinal area.   Musculoskeletal: Normal range of motion. He exhibits edema.   Neurological: He is alert and oriented to person, place, and time. He has normal reflexes. No cranial nerve deficit.   Skin: Skin is warm and dry.   Psychiatric: He has a normal mood and affect. His behavior is normal. Judgment and thought content normal.   Nursing note and vitals reviewed.        CRANIAL NERVES     CN III, IV, VI   Pupils are equal, round, and reactive to light.  Extraocular motions are normal.        Significant Labs:   CMP:   Recent Labs   Lab 19  0415      K 3.8   *   CO2 24      BUN 12   CREATININE 0.9   CALCIUM 9.7   PROT 5.8*   ALBUMIN 2.7*   BILITOT 0.6   ALKPHOS 58   AST 12   ALT 14   ANIONGAP 8   EGFRNONAA >60     PSA 1.4,   Recent Labs   Lab 19  0415   WBC 7.89   RBC 3.70*   HGB 8.6*   HCT 29.0*      MCV 78*   MCH 23.2*   MCHC 29.7*      Urinalysis negative     Lab Results   Component Value Date    PSA 1.4 2019 Mag 2.2    Blood cultures  NGTD x 2  Blood cultures reordered  No Growth to date      blood cultures   Enterococcus faecalis       CULTURE, BLOOD     Ampicillin <=2 mcg/mL Sensitive     Gentamicin Synergy Screen <=500 mcg/mL Sensitive     Tetracycline <=4 mcg/mL Sensitive     Vancomycin 2 mcg/mL Sensitive      Flu negative       Significant Imagin/2 CT head   1.  No CT evidence of an acute intracranial abnormality.    2.  Atrophy and small vessel ischemic changes of the periventricular white matter.     MRI   1.  Two tiny focal areas of increased signal intensity on diffusion-weighted images as described above likely representing artifact at the crux of the sulci.  A secondary consideration is tiny lacunar infarcts.  Dr. Laverne Shipman was telephoned with a verbal report at the time  of this dictation.    2.  Atrophy and small vessel ischemic changes of the periventricular white matter.    4/29 CT head   1.  No CT evidence of an acute intracranial abnormality.    2.  Atrophy and small vessel ischemic changes of the periventricular white matter.  Old lacunar infarcts.    4/22 x ray hip right   No evidence of right hip fracture.    4/22 x ray hip left   No evidence of left hip fracture.    4/22 CXR   Right-sided PICC with tip projecting over superior vena cava.    4/18 Echo   · Mildly decreased left ventricular systolic function. The estimated ejection fraction is 45%  · Local segmental wall motion abnormalities.  · Eccentric left ventricular hypertrophy.  · Severe left atrial enlargement.  · Grade I (mild) left ventricular diastolic dysfunction consistent with impaired relaxation.  · Normal left atrial pressure.  · Normal right ventricular systolic function.  · Mild right atrial enlargement.  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 I did not see mention of AVR in encounter notes or March echo report or prior [facility] report but there appears to be a bioprosthesis. There is mild central aortic insufficiency present.  · Mild mitral regurgitation.  · Normal central venous pressure (3 mm Hg).  · The estimated PA systolic pressure is 31 mm Hg    4/18 INES  Final Impressions  1. The study quality is good.   2. 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  3. Bubble study is positive for Grade II Patent Foramen Ovale.  4. Moderate lipomatous atrial septal hypertrophy.  5. Normal functioning prosthetic aortic valve with trivial   paravalvular regurgitation.   6. Preserved left ventricular function. Ejection fraction around 55%.  7. Grade III atherosclerosis of aortic arch.

## 2019-05-13 NOTE — PROGRESS NOTES
[facility] Medicine  Progress Note    Patient Name: St Lacho Farooq  MRN: [id number]  Patient Class: IP- Swing   Admission Date: 4/23/2019  Length of Stay: 20 days  Attending Physician: John Saeed MD  Primary Care Provider: Dylan Adam MD        Subjective:     Principal Problem:Bacteremia    HPI:  This 84 year old man with PMHX of CAD p CABG, [facility] 2/19; stable lesions, PAD, bilateral CVD, AAA,  PAF, HTN, hyperlipidemia, MALATHI, NYHA II chronic diastolic HF and non rheumatic critical AS with JAMAAL 0.8cm2 with preserved LVSFX EF55%,  per Echo underwent  + TAVR placed 3/18/19 per Dr. Babcock. Post op course was complicated by CVA ~ 2 weeks post TAVR; he presented to [facility] 4/16 with altered mental status and fever where he was Dx with CVA and gram + bacteremia.   He was  transferred to [facility] 4/19  for cardiology and ID eval ant tx; CT at that time was unrevealing for an acute intracranial process  His MRI was abnormal.  His blood cultures have grown Enterococcus faecalis sensitve to all tested drugs,  Pt initially given vancomycin and then changed to ampicillin IV.  He has clinically improved.  His INES shows a septic atrial thrombus. The cRP is 49 and the ESR is 15.  Pt is no longer febrile and he and daughter feel he is at base line physically and mentally.Pt transferred to [facility] yesterday for continued skilled care and prolonged IV ABX. Plan is 6 weeks of ABX; ampicillin 2gms IV q 6; day 1 with negative blood cultures was 4/19; so today is day 6/42  Requests DNR .    Hospital Course:  4/25/19 SKILLED NURSING for prolonged ABX   Pt sitting up in chair watching TV; reports feeling fine but did have urinary incontinence and noting frequency   No fever, WBC normal   Day 7/42 ABX for bacteremia/septic emboli     4/26/19 SKILLED NURSING for prolonged ABX for bacteremia, septic thrombus; day 8/42  NAD Overnight, still with night time urinary incontinence; will try trial of  flomax   PSA 1.4, repeat U/A ok; bladder scan ok     4/28 pt on SKILLED bed for prolonged abx day 10/42 for bacteremia from septic emboli. Ampicillin which cultures show sensitivity to. He also had a low grade temp last night. With the temp he had confusion and right sided weakness. Ct head done with no changes. Neuro was consulted for this am. He has this same type episode last visit with temp and mental status changes. This am he is back to normal. To note his H/H was lower and also creat bumped to 2.1.     4/29 pt on SKILL. Has some neuro changes on Sunday evening. CT without new strokes. MRI ordered per Dr shaw yesterday. Shows 3 new strokes. Facial droop this am . Neuro/cards and vascular surgery discussed pt case. He needs the anticoagulation with afib and thrombus in atrium. Will cont pradaxa, no heparin. Also remains on IV abx for 6 weeks. Day 12/42. No fever. No elevated WBC.     Creat was elevated yesterday 2.1. Lasix was stopped and 500ml IVF given. Creat better today 1.5. Will hold the hctz today as well as stopped the lisinopril and reduce amlodipine to have him a little higher with pressure due to acute strokes. Use LAUREL for swelling of lower extremity   R facial drop still apparent     5/2 pt doing well. he is sitting in day the day room. No complaints./ H.H stable. K a little low. Remains on pradaxa full dose now as kidneys have returned to baseline. Day 15/42 on ampicillin.     5/3 he is now on day 16/42 on enterococcus sensitive bacteremia to ampicillin. He is also on pradaxa full strength for atrial thrombus. Occult positive but h/h stable. Risk of discontinuing NOAC vs continuing discussed repetitively and family agrees to cont. Watching h/h daily.     Having episodes of mental status changes. Neuro following. Ct done again yesterday no acute changes. Keppra started for consideration of sz given the description of his events. No EEG available here, family does not wish for transfer.  "Otherwise all bp meds have been held to allow for permissive HTN. Bp still only 124/58 this am.    5/6/18 he is now on day 19/42 on enterococcus sensitive bacteremia to ampicillin. 5/2 BC NGTD x 4d  He is also on pradaxa full strength for atrial thrombus. Occult positive but h/h stable. NOAC  Risks/benefits reviewed  Keppra started for possible seizure activity with mental status changes( No EEG available here) . Pt is much improved with RX   H&H 8.4/28.9 , afebrile, WBC 6.7- VSS  Up in chair, "feeling good"    5/8/19 day 21/42 on enterococcus sensitive bacteremia to ampicillin. 5/2 BC NGTD x 5d  He is also on pradaxa full strength for atrial thrombus. Occult positive but h/h stable. NOAC  Risks/benefits reviewed  Keppra started for possible seizure activity with mental status changes( No EEG available here) . Pt is much improved with RX   H&H 8.4/28.9 , afebrile, WBC 5.23- VSS  Pt very alert and Oriented x 3; doing well with PT     5/10/19 Day 23/42 IV ampicillin for enterococcus sensitive bacteremia 5/2/19 BC Negative, Continue Pradaxa,    much improved with Keppra Rx; however this am he had a fall; fell backwards while urinating; he states he slipped; denies dizziness   Diuretics resumed per cards 2 days ago, also getting flomax; will check orthostatics   + recent c/o neck pain; has chronic neck pain  and gets epidural injections ; given IM celestone yesterday. Notes neck is better today.   Noting LE edema; discussed LE compression hose   BP sitting 161/67, Standing 121/56 ; so some orthostatic hypotension.   Discussed no tramadol today. He "feels good".     5/13 he is doing well. Has not had any more "spells". AAOx3. Day 26/42 of  IV ampicillin for enterococcus sensitive bacteremia 5/2/19 BC Negative, Continuing Pradaxa. H/H remains stable. Legs still swollen with LAUREL hose. Lasix was increased per Dr Begum today. Kidney function stable. Also pressure has been staying up. 150-160. Cards also added back lisinopril. " He does well with increased BP.    We discussed the possibility of repeating INES to check on clot. He result would give info on if we need to change NOAC as he has only been on pradaxa and coumadin. The concern is that he has had multiple strokes and developed this clot on the pradaxa (although did hold a couple days for valve repair)       Review of Systems   Constitutional: Negative.    HENT: Negative for congestion, ear pain, sinus pressure, sneezing and sore throat.    Eyes: Negative.    Respiratory: Negative for cough, chest tightness, shortness of breath and wheezing.    Cardiovascular: Negative.  Negative for chest pain.   Gastrointestinal: Negative for abdominal pain, constipation, diarrhea, nausea and vomiting.   Genitourinary: Negative for difficulty urinating, dysuria and flank pain.   Musculoskeletal: Negative.  Negative for joint swelling.   Skin: Negative.    Neurological: Negative for dizziness, facial asymmetry, weakness and headaches.   Hematological: Negative.    Psychiatric/Behavioral: Negative for behavioral problems and confusion.     Objective:     Vital Signs (Most Recent):  Temp: 97.5 °F (36.4 °C) (05/13/19 0727)  Pulse: 63 (05/13/19 0727)  Resp: 18 (05/13/19 0727)  BP: (!) 165/71 (05/13/19 0727)  SpO2: 95 % (05/13/19 0727) Vital Signs (24h Range):  Temp:  [96.8 °F (36 °C)-98 °F (36.7 °C)] 97.5 °F (36.4 °C)  Pulse:  [58-65] 63  Resp:  [16-20] 18  SpO2:  [95 %-97 %] 95 %  BP: (154-167)/(67-72) 165/71     Weight: 89.4 kg (197 lb)(No New since admit)  Body mass index is 29.95 kg/m².    Physical Exam   Constitutional: He is oriented to person, place, and time. He appears well-developed and well-nourished.   HENT:   Head: Normocephalic and atraumatic.   Right Ear: Tympanic membrane, external ear and ear canal normal.   Left Ear: Tympanic membrane, external ear and ear canal normal.   Mouth/Throat: Oropharynx is clear and moist.   Eyes: Pupils are equal, round, and reactive to light. Conjunctivae  and EOM are normal.   Neck: Normal range of motion. Neck supple. No tracheal deviation present.   Cardiovascular: Normal rate, regular rhythm, intact distal pulses and normal pulses. Exam reveals no gallop and no friction rub.   Murmur heard.  Pulmonary/Chest: Effort normal and breath sounds normal.   Abdominal: Soft. Bowel sounds are normal. He exhibits no distension. There is no tenderness. Hernia confirmed negative in the right inguinal area and confirmed negative in the left inguinal area.   Musculoskeletal: Normal range of motion. He exhibits edema.   Neurological: He is alert and oriented to person, place, and time. He has normal reflexes. No cranial nerve deficit.   Skin: Skin is warm and dry.   Psychiatric: He has a normal mood and affect. His behavior is normal. Judgment and thought content normal.   Nursing note and vitals reviewed.        CRANIAL NERVES     CN III, IV, VI   Pupils are equal, round, and reactive to light.  Extraocular motions are normal.        Significant Labs:   CMP:   Recent Labs   Lab 19  0415      K 3.8   *   CO2 24      BUN 12   CREATININE 0.9   CALCIUM 9.7   PROT 5.8*   ALBUMIN 2.7*   BILITOT 0.6   ALKPHOS 58   AST 12   ALT 14   ANIONGAP 8   EGFRNONAA >60     PSA 1.4,   Recent Labs   Lab 19  0415   WBC 7.89   RBC 3.70*   HGB 8.6*   HCT 29.0*      MCV 78*   MCH 23.2*   MCHC 29.7*      Urinalysis negative     Lab Results   Component Value Date    PSA 1.4 2019 Mag 2.2    Blood cultures  NGTD x 2  Blood cultures reordered  No Growth to date      blood cultures   Enterococcus faecalis       CULTURE, BLOOD     Ampicillin <=2 mcg/mL Sensitive     Gentamicin Synergy Screen <=500 mcg/mL Sensitive     Tetracycline <=4 mcg/mL Sensitive     Vancomycin 2 mcg/mL Sensitive      Flu negative       Significant Imagin/2 CT head   1.  No CT evidence of an acute intracranial abnormality.    2.  Atrophy and small vessel ischemic  changes of the periventricular white matter.    4/29 MRI   1.  Two tiny focal areas of increased signal intensity on diffusion-weighted images as described above likely representing artifact at the crux of the sulci.  A secondary consideration is tiny lacunar infarcts.  Dr. Laverne Shipman was telephoned with a verbal report at the time of this dictation.    2.  Atrophy and small vessel ischemic changes of the periventricular white matter.    4/29 CT head   1.  No CT evidence of an acute intracranial abnormality.    2.  Atrophy and small vessel ischemic changes of the periventricular white matter.  Old lacunar infarcts.    4/22 x ray hip right   No evidence of right hip fracture.    4/22 x ray hip left   No evidence of left hip fracture.    4/22 CXR   Right-sided PICC with tip projecting over superior vena cava.    4/18 Echo   · Mildly decreased left ventricular systolic function. The estimated ejection fraction is 45%  · Local segmental wall motion abnormalities.  · Eccentric left ventricular hypertrophy.  · Severe left atrial enlargement.  · Grade I (mild) left ventricular diastolic dysfunction consistent with impaired relaxation.  · Normal left atrial pressure.  · Normal right ventricular systolic function.  · Mild right atrial enlargement.  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 I did not see mention of AVR in encounter notes or March echo report or prior INES report but there appears to be a bioprosthesis. There is mild central aortic insufficiency present.  · Mild mitral regurgitation.  · Normal central venous pressure (3 mm Hg).  · The estimated PA systolic pressure is 31 mm Hg    4/18 INES  Final Impressions  1. The study quality is good.   2. 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  3. Bubble study is positive for Grade II Patent Foramen Ovale.  4. Moderate lipomatous atrial septal hypertrophy.  5. Normal functioning prosthetic aortic valve with trivial   paravalvular regurgitation.   6.  "Preserved left ventricular function. Ejection fraction around 55%.  7. Grade III atherosclerosis of aortic arch.    Assessment/Plan:      * Bacteremia  S/p recent TAVR now with septic emboli and concern for "flicking emboli"   ENTEROCOCCUS faecalis    His blood cultures have grown Enterococcus faecalis sensitve to all tested drugs  ID recommending total of 6 weeks IV ABX; Ampicillin 2gms IV q 6 hr; day 1 with negative blood cultures was 4/19/19; so today is day 6/42  Day 7/42 4/26/19 day 8/42 4/29 day 11/42 of IV abd. 4/19 was on vanc and 4/20 started on ampicillin  4/30 day 12/42.  4/19 was on vanc and 4/20 started on ampicillin    5/2 day 15/42 of IV abx- repeat blood cultures today  5/3 day 16/42 on ampicillin, no growth in repeat blood cultures from 5/2 5/6 Day 19/42 Ampicillin;   5/13 day 26/42 ampicillin repeat cultures with no growth    Orthostatic hypotension  Now on flomax and diuretics resumed. + LE edema   Will order LE compression socks  Consider decreasing diuretic frequency if orthostasis continues   Monitor renal fx     Falls  Frequent falls; prior falls at home; right leg is weaker and his right foot gets stuck   Getting PT  Ambulating with walker; have to remind  Has sitter       Skin ulcer of sacrum, limited to breakdown of skin  Barrier cream ; nizoral cream  Donut to sit ;already doing it       Encephalopathy  Somnolent today.  Considering emolic showers vs sz activity. CT negative, neuro started keppra.    5/6/19 much better with Keppra rx, no further mental status changes.   5/13 cont keppra    Renal insufficiency  Noted 4/29: Cr up to 2.1  Creat stable   Changed labs to M/W/f  Will go back to daily with the changes made today    Nocturnal enuresis  Check U.A , PSA, bladder scan; bladder scan 89ml   Still with incontinence issues   Gets HCTz 12.5 and lasix 20mg po daily in am   trial of flomax     Stop fluid pill, creat better with hydration.    5/10 Diuretic resumed per cards on 5/8; "   Urine symptoms have been better with flomax but this am fell backwards while urinating; will check orthostatic vitals     5/13 Cont lasix and increase dose per cards today. Give in am    Atrial thrombus  Large atrial thrombus found on INES ,Dr. Galicia deemed septic emboli; still no INES reporting thrombus   Continue Pradaxa; family plans to monitor meds once home  Will not consider failed therapy as patient has had lapses in therapy due to recent surgical interventions and non-compliance   as discussed above there is concern for infected thrombus.   Per Infectious Disease will treat bacteremia and possible infected thrombus for 6 weeks.    5/2 Discussed with family and cardiology. rec repeat INES 2 weeks consider changing NOAC at that time, not now as concern over anticoagulation is issue with new CVA     5/6: unsure will repeat INES at this point. Will do 6 weeks IV antibx regardless of repeat INES results. Will continue to discuss with cardiology need for repeat procedure    5/13 Discussed with family repeat INES. The results would push us to either cont with pradaxa as the clot is resolving or change the NOAC. We have decided to change the NOAC. Consider procedure in future but for now this is the less invasive choice. Checking price. Monitor H/H with this change. Has been 2 week from last documented CVA    Cerebrovascular accident (CVA)  Subacute CVA with focal deficits noted on 4/14, with significant improvement in symptoms   MRI did not demonstrate acute findings but neuro reviewed and Dx with new CVA;   Was being tx with Pradaxa when he had acute stroke but Will not consider failed therapy as patient has had lapses in therapy due to recent surgical interventions and non-compliance  Per Dr. Fernandez's recommendations we will continue:  -Telemetry.  -Neurochecks.  -NO TPA as he presented initially on Sunday  -Physical Therapy and OT Consult, evaluation and treatment at [facility] once patient is admitted to   SNF  -Completed permissive HTN and will need strict BP control.   -Continue his current dose home meds otherwise including Asprin, Pradaxa and statin       4/29: Reduce pradaxa due to renal function today  Consult colin due to AMS and neuro changes overnight. Repeat CT head without acute changes. ? Watershed injury; ? Keep BP a little higher? Dehydration playing a role    4/30 go back up on pradaxa due to kidneys now functioning. Reviewed MRI with Kel Fernandez and Dr Ventura. ST/OT/PT    5/2 pt doing well with gait training and speech. Up in day room today    5/3 had another episode of change in mental status yesterday evening. Started on keppra, ct negative. Doing better today  5/6 No further mental status changes since staring KEPPRA   5/8 Stable w Keppra  5/13 cont keppra tolerating well- no further episode    Hx of CABG  Asa, statin    CHF (congestive heart failure), NYHA class II, chronic, diastolic  Continue Lisinopril, HCTZ  4/29: Hold lasix for now. Already had dose this am. Give gentle hydration given rising Cr. Likely a little dry  4/30 cont to hold lasix, lisinopril and hctz LAUREL hose.    5/2 holding ace. Diuretics cont compression hose and BB    5/3 Cont BB and asa, no ace/arb/lasix due to permissive HTN.    5/6 /67. No signs of acute exacerbation today    5/13 resumed ACE, lasix per cards increased today watch renal function    Anemia  H/H stable without acute indications for transfusion   Continue to monitor    check anemia w/u from last visit ferritin was 26, retic was 1.5  Occult stool today, will not change our plan just give us answer to where blood is going. He needs the blood thinner for now due to septic emboli.     Cont to monitor occult pending  Transfuse as needed, needs blood thinner.    H/H stable. Watch daily especially with NOAC change        Essential hypertension  Continue amlodpine, Lisinopril, HCTZ, , coreg  4/29: Lasix- will hold now that Creat bumped and follow renal  fxn  4/30 stopped lasix and lisinopril yesterday, today stop hctz and reduce amlodipine  He needs a little high bp.    5/2 Bp still in 120/50- cut the amlodipine out today  5/3 cont to hold bp meds, allowing permissive HTN, bp still only 124/58. Can start to treat bp if needed over weekend per neuro recs.    5/6: overall remains stable. No medication changes today    5/13 lisinopril resumed per cards bp 160s    Aortic valve stenosis  S/p TAVR       VTE Risk Mitigation (From admission, onward)        Ordered     apixaban tablet 5 mg  2 times daily      05/13/19 0914     heparin, porcine (PF) 100 unit/mL injection flush 500 Units  As needed (PRN)      04/25/19 1408              Laverne Shipman MD  Department of Hospital Medicine   [facility]

## 2019-05-13 NOTE — ASSESSMENT & PLAN NOTE
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 4/19 was on vanc and 4/20 started on ampicillin  4/30 day 12/42.  4/19 was on vanc and 4/20 started on ampicillin    5/2 day 15/42 of IV abx- repeat blood cultures today  5/3 day 16/42 on ampicillin, no growth in repeat blood cultures from 5/2 5/6 Day 19/42 Ampicillin;   5/13 day 26/42 ampicillin repeat cultures with no growth  "

## 2019-05-13 NOTE — ASSESSMENT & PLAN NOTE
Somnolent today.  Considering emolic showers vs sz activity. CT negative, neuro started keppra.    5/6/19 much better with Keppra rx, no further mental status changes.   5/13 cont keppra

## 2019-05-13 NOTE — PT/OT/SLP PROGRESS
Occupational Therapy   Treatment    Name: St Lacho Farooq  MRN: [id number]  Admitting Diagnosis:  Bacteremia       Recommendations:     Discharge Recommendations: home with home health  Discharge Equipment Recommendations:  walker, rolling, commode, grab bar, toilet, grab bar, tub/shower, tub bench  Barriers to discharge:  None    Assessment:     St Lacho Farooq is a 84 y.o. male with a medical diagnosis of Bacteremia.  He presents with improved strength today, impaired memory and coordination baseline. Performance deficits affecting function are impaired endurance, impaired self care skills, gait instability, visual deficits, decreased lower extremity function, weakness, decreased safety awareness, impaired cognition, impaired functional mobilty.     Rehab Prognosis:  Fair; patient would benefit from acute skilled OT services to address these deficits and reach maximum level of function.       Plan:     Patient to be seen 5 x/week to address the above listed problems via self-care/home management, therapeutic activities, therapeutic exercises  · Plan of Care Expires: 05/31/19  · Plan of Care Reviewed with: patient, daughter, caregiver    Subjective     Pain/Comfort:  · Pain Rating 1: 0/10  · Pain Rating Post-Intervention 1: 0/10    Objective:     Communicated with: patient, daughter prior to session.  Patient found up in chair with PICC line, peripheral IV upon OT entry to room.    General Precautions: Standard, fall   Orthopedic Precautions:N/A   Braces: N/A     Occupational Performance:     Bed Mobility:    NT  Functional Mobility/Transfers:  · Patient completed Sit <> Stand Transfer with supervision  with  rolling walker   · Functional Mobility: reminded him of importance of using walker at all times for safety    Activities of Daily Living:  · Feeding:  independence .  · Grooming: modified independence stands at sink to wash hands  · Bathing: NT- reliable source nursing says they provide min A for  showering for safety in wet environment and for thouroghness  · Upper Body Dressing: supervision shirt  · Lower Body Dressing: minimum assistance to thread pants seated, pull them up standing and don / doff/tie shoes  · Toileting: supervision using urinal and needs supervision as he gets tired standing too long if it takes a long time to urinate.      [facility] 6 Click ADL: 22    Treatment & Education:  Completed 20 reps bicep/ tricep march/ kick against red theraband resistance with good effort/ strength. Gets RIGHT and LEFT confused and when asked to lift LEFT knee repeatedly lifted LEFT arm instead when asked to grasp the band with the RIGHT hand, often grabbed it with the LEFT hand and again, needed tactile cues to initiate tasks appropriately & cues to get the correct limb doing what was requested.    Patient left up in chair with all lines intact and call button in reachEducation:   1;1 sitter in room. Plan is for him to d/c home to his daughter Rachel home Thursday with 1;1 sitters available to provide safety.    GOALS:   Multidisciplinary Problems     Occupational Therapy Goals        Problem: Occupational Therapy Goal    Goal Priority Disciplines Outcome Interventions   Occupational Therapy Goal     OT, PT/OT     Description:  Goals to be met by:6/10/2019     Patient will increase functional independence with ADLs by performing:    LE Dressing with Modified [place].  Grooming while standing at sink with Modified [place].  Toileting from toilet with Modified [place] for hygiene and clothing management.   Bathing from  shower chair/bench with Modified [place].  Upper extremity exercise program x10 reps per handout, with assistance as needed  Patient will consistently 3/3 trials at different times of the day demonstrate ability to be a) oriented x 4 (using watch if needed for time of day)  b) use RN call light appropriately before getting up c) complete a >10 piece puzzle d) complete a word  search page with 90% accuracy d) add and subtract 2 digit numbers 5/5 tries with 90% accuracy e) color by number from coloring book attending to periphery of the page as well as center of the page without errors. E) fill his medbox with the 13 pills he says he takes daily, twice per day with 100% accuracy f) fill out his medication list with WHAT he takes, WHY he takes it, WHEN he takes it, HOW and HOW often he takes it , and possible side effects of each medication  [facility] the [facility]  Driving cognitive screen without any errors.                      Time Tracking:     OT Date of Treatment: 05/13/19  OT Start Time: 1530  OT Stop Time: 1545  OT Total Time (min): 15 min    Billable Minutes:Therapeutic Exercise 15    Zarina Gonzalez OT  5/13/2019

## 2019-05-13 NOTE — PT/OT/SLP PROGRESS
Speech Language Pathology Treatment    Patient Name:  St Lacho Farooq   MRN:  [id number]  Admitting Diagnosis: Bacteremia    Recommendations:                 General Recommendations: Speech/language therapy  Diet recommendations: Solid Diet Level: Regular, Liquid Diet Level: Thin   Aspiration Precautions: Standard aspiration precautions   General Precautions: Standard    Communication strategies:  none    Subjective     Pt found sitting in bedside chair upon SLP entry into room with personal sitter present at bedside. Pt was agreeable to all aspects of therapy and required consistent encouragement throughout session.     Patient goals: to go home     Pain/Comfort:  Pain Rating 1: 0/10    Objective:     Has the patient been evaluated by SLP for swallowing?   No  Keep patient NPO? No   Current Respiratory Status: room air      Pt and SLP reviewed 10 related word pairs to target short term recall skills. All word pairs in list were reviewed and visualization techniques were implemented with MAX assist. Pt demonstrated some initial difficulty with visualization task as he attempted to implement unrelated themes (i.e. If pt was asked to recall benjamin following the word blue, he wanted to recall clouds). Initial word list was recalled following a 2 minute time delay without distractions; pt recalled word pairs on 7/10 trials Independently and 9/10 trials with MIN semantic cueing. Second word list was recalled following mild distractions as pt's IV line alarmed for 1 of 2 minute time delay. Second list was recalled on 9/10 trials Independently and on final trial with MIN verbal cueing. Following termination of recall tasks, pt requested to walk down hallway. MAX verbal and visual cues were provided to remind pt of need for assistive devices for ambulation. Pt was unable to identify safety risk regarding his connected IV despite MAX verbal and visual cueing. Pt's sitter and nurse entered room following all recall and  safety tasks.     Assessment:     St Lacho Farooq is a 84 y.o. 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 He presents with deficits across reasoning, problem solving, and short term recall, affecting his ability to complete higher level tasks independently and efficiently. Recommend continuation of skilled speech services to target cognitive lingusitic deficits.     Goals:   Multidisciplinary Problems     SLP Goals        Problem: SLP Goal    Goal Priority Disciplines Outcome   SLP Goal     SLP Ongoing (interventions implemented as appropriate)   Description:    Long Term Goals:  1. Pt will increase cognitive linguistic skills to a functional level in order to promote safe and independent activities of choice in known and unknown environments                    Plan:     · Patient to be seen:  3 x/week   · Plan of Care expires:  05/30/19  · Plan of Care reviewed with:  patient   · SLP Follow-Up:  Yes       Discharge recommendations:  home with home health   Barriers to Discharge:  Level of Skilled Assistance Needed - pt continues to require skilled personnel for managmeent of medical diagnoses     Time Tracking:     SLP Treatment Date:   05/13/19  Speech Start Time:  1010  Speech Stop Time:  1034     Speech Total Time (min):  24 min    Billable Minutes: Treatment Swallowing Dysfunction - 24 minutes       Ame Gunn CCC-SLP  05/13/2019

## 2019-05-13 NOTE — ASSESSMENT & PLAN NOTE
Subacute CVA with focal deficits noted on 4/14, with significant improvement in symptoms   MRI did not demonstrate acute findings but neuro reviewed and Dx with new CVA;   Was being tx with Pradaxa when he had acute stroke but Will not consider failed therapy as patient has had lapses in therapy due to recent surgical interventions and non-compliance  Per Dr. Fernandez's recommendations we will continue:  -Telemetry.  -Neurochecks.  -NO TPA as he presented initially on Sunday  -Physical Therapy and OT Consult, evaluation and treatment at [place] once patient is admitted to  SNF  -Completed permissive HTN and will need strict BP control.   -Continue his current dose home meds otherwise including Asprin, Pradaxa and statin       4/29: Reduce pradaxa due to renal function today  Consult colin due to AMS and neuro changes overnight. Repeat CT head without acute changes. ? Watershed injury; ? Keep BP a little higher? Dehydration playing a role    4/30 go back up on pradaxa due to kidneys now functioning. Reviewed MRI with Kel Fernandez and Dr Ventura. ST/OT/PT    5/2 pt doing well with gait training and speech. Up in day room today    5/3 had another episode of change in mental status yesterday evening. Started on keppra, ct negative. Doing better today  5/6 No further mental status changes since staring KEPPRA   5/8 Stable w Keppra  5/13 cont keppra tolerating well- no further episode

## 2019-05-13 NOTE — PROGRESS NOTES
[facility]  Cardiology  Progress Note    Patient Name: St Lacho Farooq  MRN: [id number]  Admission Date: 4/23/2019  Hospital Length of Stay: 20 days  Code Status: DNR   Attending Physician: John Saeed MD   Primary Care Physician: Dylan Adam MD  Expected Discharge Date: 5/30/2019  Principal Problem:Bacteremia    Subjective:     Hospital Course: BP elevating mild fluid retention.     Interval History: had a mechanical fall in the bathroom  Still with chronic 2+ BLE edema           ROS   Constitution:        Gen weak   HENT: Negative.    Eyes: Negative.    Cardiovascular: Negative.    Respiratory: Negative.    Endocrine: Negative.    Skin: Negative.    Musculoskeletal: Negative.    Gastrointestinal: Negative.    Genitourinary: Negative.    Neurological: Negative.    Psychiatric/Behavioral: Negative.    Allergic/Immunologic: Negative.           Objective:     Vital Signs (Most Recent):  Temp: 97.5 °F (36.4 °C) (05/13/19 0727)  Pulse: 63 (05/13/19 0727)  Resp: 18 (05/13/19 0727)  BP: (!) 165/71 (05/13/19 0727)  SpO2: 95 % (05/13/19 0727) Vital Signs (24h Range):  Temp:  [96.8 °F (36 °C)-98 °F (36.7 °C)] 97.5 °F (36.4 °C)  Pulse:  [58-65] 63  Resp:  [16-20] 18  SpO2:  [95 %-97 %] 95 %  BP: (154-167)/(67-72) 165/71     Weight: 89.4 kg (197 lb)(No New since admit)  Body mass index is 29.95 kg/m².    SpO2: 95 %  O2 Device (Oxygen Therapy): room air      Intake/Output Summary (Last 24 hours) at 5/13/2019 0814  Last data filed at 5/13/2019 0613  Gross per 24 hour   Intake 880 ml   Output --   Net 880 ml       Lines/Drains/Airways     Peripherally Inserted Central Catheter Line                 PICC Double Lumen 04/22/19 1108 20 days                Physical Exam  Constitutional: He is oriented to person, place, and time. He appears well-developed.   frail   Cardiovascular: Normal rate.   Pulmonary/Chest: Effort normal.   Musculoskeletal: Normal range of motion.   Neurological: He is  alert and oriented to person, place, and time.   Skin: Skin is warm.   Psychiatric: He has a normal mood and affect.   Nursing note and vitals reviewed.        Significant Labs:   ABG: No results for input(s): PH, PCO2, HCO3, POCSATURATED, BE in the last 48 hours., Blood Culture: No results for input(s): LABBLOO in the last 48 hours., BMP:   Recent Labs   Lab 05/13/19  0415         K 3.8   *   CO2 24   BUN 12   CREATININE 0.9   CALCIUM 9.7   , CMP   Recent Labs   Lab 05/13/19  0415      K 3.8   *   CO2 24      BUN 12   CREATININE 0.9   CALCIUM 9.7   PROT 5.8*   ALBUMIN 2.7*   BILITOT 0.6   ALKPHOS 58   AST 12   ALT 14   ANIONGAP 8   ESTGFRAFRICA >60   EGFRNONAA >60   , CBC   Recent Labs   Lab 05/13/19  0415   WBC 7.89   HGB 8.6*   HCT 29.0*      , INR No results for input(s): INR, PROTIME in the last 48 hours., Lipid Panel No results for input(s): CHOL, HDL, LDLCALC, TRIG, CHOLHDL in the last 48 hours.,   Pathology Results  (Last 10 years)    None       and Troponin No results for input(s): TROPONINI in the last 48 hours.    Significant Imaging: Cardiac Cath: , CT scan: CT ABDOMEN PELVIS WITH CONTRAST: No results found for this visit on 04/23/19., Echocardiogram:   2D echo with color flow doppler: No results found for this or any previous visit., Transthoracic echo (TTE) complete (Cupid Only):   Results for orders placed or performed during the hospital encounter of 04/16/19   Transthoracic echo (TTE) complete (Cupid Only)   Result Value Ref Range    BSA 2.06 m2    LA WIDTH 4.45 cm    PV PEAK VELOCITY 1.38 cm/s    LVIDD 6.93 (A) 3.5 - 6.0 cm    IVS 1.13 (A) 0.6 - 1.1 cm    PW 1.17 (A) 0.6 - 1.1 cm    Ao root annulus 2.86 cm    LVIDS 4.76 (A) 2.1 - 4.0 cm    FS 31 28 - 44 %    LA volume 102.99 cm3    STJ 2.65 cm    LV mass 377.93 g    LA size 4.98 cm    RVDD 3.25 cm    Left Ventricle Relative Wall Thickness 0.34 cm    AV mean gradient 7.38 mmHg    AV valve area 2.16 cm2     AV Velocity Ratio 0.72     AV index (prosthetic) 0.70     E/A ratio 1.05     E wave decelartion time 305.25 msec    IVRT 0.11 msec    Pulm vein S/D ratio 1.26     LVOT diameter 1.99 cm    LVOT area 3.11 cm2    LVOT peak abdifatah 3.7230801581 m/s    LVOT peak VTI 28.16 cm    Ao peak abdifatah 1.62 m/s    Ao VTI 40.46 cm    LVOT stroke volume 87.54 cm3    AV peak gradient 10.50 mmHg    MV Peak E Abdifatah 1.16 m/s    TR Max Abdifatah 2.65 m/s    MV Peak A Abdifatah 1.11 m/s    PV Peak S Abdifatah 0.44 m/s    PV Peak D Abdifatah 0.35 m/s    LV Systolic Volume 105.45 mL    LV Systolic Volume Index 52.5 mL/m2    LV Diastolic Volume 249.54 mL    LV Diastolic Volume Index 124.20 mL/m2    LA Volume Index 51.3 mL/m2    LV Mass Index 188.1 g/m2    RA Major Axis 5.02 cm    Left Atrium Minor Axis 5.14 cm    Left Atrium Major Axis 5.84 cm    Triscuspid Valve Regurgitation Peak Gradient 28.09 mmHg    Right Atrial Pressure (from IVC) 3 mmHg    TV rest pulmonary artery pressure 31 mmHg    and , EKG: , Stress Test: , X-Ray: CXR: X-Ray Chest 1 View (CXR): No results found for this visit on 04/23/19., X-Ray Chest PA and Lateral (CXR): No results found for this visit on 04/23/19. and , KUB: X-Ray Abdomen AP 1 View (KUB): No results found for this visit on 04/23/19. and  and  and   Assessment and Plan:     Active Diagnoses:    Diagnosis Date Noted POA    PRINCIPAL PROBLEM:  Bacteremia [R78.81] 04/18/2019 Yes    Falls [W19.XXXA] 05/10/2019 Yes    Orthostatic hypotension [I95.1] 05/10/2019 Yes    Skin ulcer of sacrum, limited to breakdown of skin [L98.421] 05/03/2019 No    Encephalopathy [G93.40]  No    Renal insufficiency [N28.9] 04/29/2019 Yes    Nocturnal enuresis [N39.44] 04/25/2019 No    Atrial thrombus [I51.3] 04/19/2019 Yes    Cerebrovascular accident (CVA) [I63.9] 04/16/2019 Yes    Hx of CABG [Z95.1] 02/19/2019 Not Applicable    CHF (congestive heart failure), NYHA class II, chronic, diastolic [I50.32] 02/19/2019 Yes    Anemia [D64.9] 11/03/2016 Yes     Essential hypertension [I10] 10/28/2016 Yes    Aortic valve stenosis [I35.0] 06/28/2016 Yes      Problems Resolved During this Admission:       VTE Risk Mitigation (From admission, onward)        Ordered     dabigatran etexilate capsule 150 mg  2 times daily      04/30/19 0907     heparin, porcine (PF) 100 unit/mL injection flush 500 Units  As needed (PRN)      04/25/19 1408        Current Facility-Administered Medications   Medication    acetaminophen tablet 650 mg    albuterol-ipratropium 2.5 mg-0.5 mg/3 mL nebulizer solution 3 mL    ampicillin 2 g in sodium chloride 0.9 % 100 mL IVPB (ready to mix system)    aspirin EC tablet 81 mg    atorvastatin tablet 10 mg    carvedilol tablet 25 mg    dabigatran etexilate capsule 150 mg    furosemide tablet 40 mg    heparin, porcine (PF) 100 unit/mL injection flush 500 Units    HYDROmorphone injection 0.5 mg    levETIRAcetam tablet 250 mg    lidocaine 5 % patch 1 patch    lisinopril tablet 10 mg    miconazole 2 % cream    ondansetron injection 4 mg    pantoprazole EC tablet 40 mg    ramelteon tablet 8 mg    sodium chloride 0.9% flush 10 mL    And    sodium chloride 0.9% flush 10 mL    tamsulosin 24 hr capsule 0.4 mg    traMADol tablet 100 mg   Recent recurrent CVA with interruption of NOAC. Now stable. Chronic edema.  Pt with multiple other comorbidities which puts him at risk for CVA including multiple calcified vessels of the carotid and vertebral system. ?septic emoboli  May consider repeat INES in the coming weeks (as he has recently had one roughly 3 weeks ago) to reassess left atrial thrombus.   CAF  CAD [facility] 2/19/2019 S/P CABG patent LIMA to LAD, Patent SVG to first Diag,  of CFX,  of RCA.      TAVR on 3/18/19  Admitted to [facility] of 4/18/19 with confusion found to have subacute CVA thought to be cardioembolic with Hx of A fib, anticoagulation interuption and recent valve replacement. During that admission developed fever with +blood cultures  and was subsequently transferred to [facility] for INES. INES on 4/18/19 revealed large thrombus burden in EDELMIRA, EF 55%, bubble study positive for grade II PFO, prosthetic AV functioning normally. No valvular vegetation noted. There was an episode of NSVT while at [facility] and patient was evaluated by EP consider EF study of continued episodes. Patient has had no further episodes. Patient was seen by ID while at [facility] and it was deteremined that given recent valve replacement LA thrombus, and bacteremia patient should be treated with endocarditis ABX regimen. Patient was started on 6 wk regiment of IV ABX and transferred back to [facility] for rehab.      On evening of 4/28/19 patient developed fever, confusion, and rt sided weakness with hypotension. MRI reveals acute punctate infarctions. Suggestive of further cardio-embolic stroke. NOAC been continued per neurology recommendation. Mental status has since improved. Permissive HTN has been allowed given acute CVAs.   Patient is a DNR     Plan: stable from CV standpoint, some insomnia, still 2-3+ edema and BP now high  Renal function stable at baseline  May consider INES in the future to reassess LA thrombus  Cont Pradaxa/ASA/coreg/lasix 20mg po qday/PPI  Restart Melatonin for insomnia  Restart lisinopril at 10 mg qd for BP and watch BMP  Increase lasix to 40 mg qam for swelling        Vik Blue, DANK  Cardiology  [facility]  I attest that I have personally seen and examined this patient. I have reviewed and discussed the management in detail as outlined above.

## 2019-05-13 NOTE — ASSESSMENT & PLAN NOTE
H/H stable without acute indications for transfusion   Continue to monitor    check anemia w/u from last visit ferritin was 26, retic was 1.5  Occult stool today, will not change our plan just give us answer to where blood is going. He needs the blood thinner for now due to septic emboli.     Cont to monitor occult pending  Transfuse as needed, needs blood thinner.    H/H stable. Watch daily especially with NOAC change

## 2019-05-13 NOTE — PLAN OF CARE
Problem: Adult Inpatient Plan of Care  Goal: Plan of Care Review  Outcome: Ongoing (interventions implemented as appropriate)  Patient rested throughout shift. Room air. Sitter at bedside. Neuro intact. Alert and oriented. PICC dressing intact and dry. PICC flushing without difficulty. Urinal in reach. Patient in recliner, wheels locked. Iv antibiotics continued. Gian hose in place. Free from fall or injury. Plan of care reviewed with patient.

## 2019-05-13 NOTE — PLAN OF CARE
05/13/19 1308   Post-Acute Status   Post-Acute Authorization Medications   Medication Status Rx fund Referral         Spoke with patient and family about the potential cost of medication and services. Daughter states they understand and agree. Ankita RN-teacher for [facility] will be here tomorrow at 4PM to teach patient and daughter medication administration.       Divya Go RN, BSN  [facility]   Case Management/Utilization Review  [phone] (Phone)  [phone] (Fax)

## 2019-05-13 NOTE — ASSESSMENT & PLAN NOTE
Continue Lisinopril, HCTZ  4/29: Hold lasix for now. Already had dose this am. Give gentle hydration given rising Cr. Likely a little dry  4/30 cont to hold lasix, lisinopril and hctz LAUREL hose.    5/2 holding ace. Diuretics cont compression hose and BB    5/3 Cont BB and asa, no ace/arb/lasix due to permissive HTN.    5/6 /67. No signs of acute exacerbation today    5/13 resumed ACE, lasix per cards increased today watch renal function

## 2019-05-13 NOTE — ASSESSMENT & PLAN NOTE
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 Galicia deemed septic emboli; still no INES reporting thrombus   Continue Pradaxa; family plans to monitor meds once home  Will not consider failed therapy as patient has had lapses in therapy due to recent surgical interventions and non-compliance   as discussed above there is concern for infected thrombus.   Per Infectious Disease will treat bacteremia and possible infected thrombus for 6 weeks.    5/2 Discussed with family and cardiology. rec repeat INES 2 weeks consider changing NOAC at that time, not now as concern over anticoagulation is issue with new CVA     5/6: unsure will repeat INES at this point. Will do 6 weeks IV antibx regardless of repeat INES results. Will continue to discuss with cardiology need for repeat procedure    5/13 Discussed with family repeat INES. The results would push us to either cont with pradaxa as the clot is resolving or change the NOAC. We have decided to change the NOAC. Consider procedure in future but for now this is the less invasive choice. Checking price. Monitor H/H with this change. Has been 2 week from last documented CVA

## 2019-05-13 NOTE — PLAN OF CARE
05/13/19 1142   Post-Acute Status   Post-Acute Authorization Medications   Medication Status Rx fund Referral         Sent medication prescription, face sheet, and H&P to [facility] for home IV infusion therapy, awaiting response.

## 2019-05-13 NOTE — PLAN OF CARE
05/13/19 1128   Post-Acute Status   Post-Acute Authorization HME   HME Status Set-up Complete         Spoke to Corie with [facility] who gave permission to pull rolling walker for patient. Walker delivered to patient room, and education page for rolling walker along with [employer] contact information given to patient and daughter. Check list, face sheet, walker order, and signed delivery ticket brought back to designated space in depot.       Divya Go RN, BSN  [facility]   Case Management/Utilization Review  [phone] (Phone)  [phone] (Fax)

## 2019-05-14 LAB
ALBUMIN SERPL BCP-MCNC: 2.8 G/DL (ref 3.5–5.2)
ALP SERPL-CCNC: 57 U/L (ref 55–135)
ALT SERPL W/O P-5'-P-CCNC: 13 U/L (ref 10–44)
ANION GAP SERPL CALC-SCNC: 8 MMOL/L (ref 8–16)
AST SERPL-CCNC: 11 U/L (ref 10–40)
BASOPHILS # BLD AUTO: 0.03 K/UL (ref 0–0.2)
BASOPHILS NFR BLD: 0.5 % (ref 0–1.9)
BILIRUB SERPL-MCNC: 0.8 MG/DL (ref 0.1–1)
BUN SERPL-MCNC: 12 MG/DL (ref 8–23)
CALCIUM SERPL-MCNC: 10 MG/DL (ref 8.7–10.5)
CHLORIDE SERPL-SCNC: 109 MMOL/L (ref 95–110)
CO2 SERPL-SCNC: 25 MMOL/L (ref 23–29)
CREAT SERPL-MCNC: 0.9 MG/DL (ref 0.5–1.4)
DIFFERENTIAL METHOD: ABNORMAL
EOSINOPHIL # BLD AUTO: 0.1 K/UL (ref 0–0.5)
EOSINOPHIL NFR BLD: 2.1 % (ref 0–8)
ERYTHROCYTE [DISTWIDTH] IN BLOOD BY AUTOMATED COUNT: 19.2 % (ref 11.5–14.5)
EST. GFR  (AFRICAN AMERICAN): >60 ML/MIN/1.73 M^2
EST. GFR  (NON AFRICAN AMERICAN): >60 ML/MIN/1.73 M^2
GLUCOSE SERPL-MCNC: 100 MG/DL (ref 70–110)
HCT VFR BLD AUTO: 28.1 % (ref 40–54)
HGB BLD-MCNC: 8.3 G/DL (ref 14–18)
LYMPHOCYTES # BLD AUTO: 0.8 K/UL (ref 1–4.8)
LYMPHOCYTES NFR BLD: 12.4 % (ref 18–48)
MCH RBC QN AUTO: 22.9 PG (ref 27–31)
MCHC RBC AUTO-ENTMCNC: 29.5 G/DL (ref 32–36)
MCV RBC AUTO: 78 FL (ref 82–98)
MONOCYTES # BLD AUTO: 0.7 K/UL (ref 0.3–1)
MONOCYTES NFR BLD: 10.9 % (ref 4–15)
NEUTROPHILS # BLD AUTO: 4.5 K/UL (ref 1.8–7.7)
NEUTROPHILS NFR BLD: 74.1 % (ref 38–73)
PLATELET # BLD AUTO: 233 K/UL (ref 150–350)
PMV BLD AUTO: 9.1 FL (ref 9.2–12.9)
POTASSIUM SERPL-SCNC: 3.5 MMOL/L (ref 3.5–5.1)
PROT SERPL-MCNC: 5.8 G/DL (ref 6–8.4)
RBC # BLD AUTO: 3.62 M/UL (ref 4.6–6.2)
SODIUM SERPL-SCNC: 142 MMOL/L (ref 136–145)
WBC # BLD AUTO: 6.06 K/UL (ref 3.9–12.7)

## 2019-05-14 PROCEDURE — A4216 STERILE WATER/SALINE, 10 ML: HCPCS | Performed by: INTERNAL MEDICINE

## 2019-05-14 PROCEDURE — 97110 THERAPEUTIC EXERCISES: CPT

## 2019-05-14 PROCEDURE — 97127 HC THERAPEUTIC INTVTN, COGN FUNCTION - ST: CPT

## 2019-05-14 PROCEDURE — 25000003 PHARM REV CODE 250: Performed by: NURSE PRACTITIONER

## 2019-05-14 PROCEDURE — 25000003 PHARM REV CODE 250: Performed by: INTERNAL MEDICINE

## 2019-05-14 PROCEDURE — 63600175 PHARM REV CODE 636 W HCPCS: Performed by: INTERNAL MEDICINE

## 2019-05-14 PROCEDURE — 25000003 PHARM REV CODE 250: Performed by: PSYCHIATRY & NEUROLOGY

## 2019-05-14 PROCEDURE — 99900035 HC TECH TIME PER 15 MIN (STAT)

## 2019-05-14 PROCEDURE — 97530 THERAPEUTIC ACTIVITIES: CPT

## 2019-05-14 PROCEDURE — 11000004 HC SNF PRIVATE

## 2019-05-14 PROCEDURE — 25000003 PHARM REV CODE 250: Performed by: FAMILY MEDICINE

## 2019-05-14 PROCEDURE — 85025 COMPLETE CBC W/AUTO DIFF WBC: CPT

## 2019-05-14 PROCEDURE — 97803 MED NUTRITION INDIV SUBSEQ: CPT

## 2019-05-14 PROCEDURE — 63600175 PHARM REV CODE 636 W HCPCS: Performed by: NURSE PRACTITIONER

## 2019-05-14 PROCEDURE — 94761 N-INVAS EAR/PLS OXIMETRY MLT: CPT

## 2019-05-14 PROCEDURE — 80053 COMPREHEN METABOLIC PANEL: CPT

## 2019-05-14 RX ORDER — POTASSIUM CHLORIDE 20 MEQ/1
20 TABLET, EXTENDED RELEASE ORAL ONCE
Status: COMPLETED | OUTPATIENT
Start: 2019-05-14 | End: 2019-05-14

## 2019-05-14 RX ADMIN — LEVETIRACETAM 250 MG: 250 TABLET ORAL at 08:05

## 2019-05-14 RX ADMIN — PANTOPRAZOLE SODIUM 40 MG: 40 TABLET, DELAYED RELEASE ORAL at 08:05

## 2019-05-14 RX ADMIN — AMPICILLIN SODIUM 2 G: 2 INJECTION, POWDER, FOR SOLUTION INTRAMUSCULAR; INTRAVENOUS at 10:05

## 2019-05-14 RX ADMIN — APIXABAN 5 MG: 5 TABLET, FILM COATED ORAL at 09:05

## 2019-05-14 RX ADMIN — Medication 10 ML: at 05:05

## 2019-05-14 RX ADMIN — ALTEPLASE 2 MG: 2.2 INJECTION, POWDER, LYOPHILIZED, FOR SOLUTION INTRAVENOUS at 11:05

## 2019-05-14 RX ADMIN — ATORVASTATIN CALCIUM 10 MG: 10 TABLET, FILM COATED ORAL at 09:05

## 2019-05-14 RX ADMIN — TAMSULOSIN HYDROCHLORIDE 0.4 MG: 0.4 CAPSULE ORAL at 08:05

## 2019-05-14 RX ADMIN — POTASSIUM CHLORIDE 20 MEQ: 1500 TABLET, EXTENDED RELEASE ORAL at 08:05

## 2019-05-14 RX ADMIN — AMPICILLIN SODIUM 2 G: 2 INJECTION, POWDER, FOR SOLUTION INTRAMUSCULAR; INTRAVENOUS at 11:05

## 2019-05-14 RX ADMIN — CARVEDILOL 25 MG: 25 TABLET, FILM COATED ORAL at 08:05

## 2019-05-14 RX ADMIN — MICONAZOLE NITRATE: 20 CREAM TOPICAL at 09:05

## 2019-05-14 RX ADMIN — RAMELTEON 8 MG: 8 TABLET, FILM COATED ORAL at 09:05

## 2019-05-14 RX ADMIN — AMPICILLIN SODIUM 2 G: 2 INJECTION, POWDER, FOR SOLUTION INTRAMUSCULAR; INTRAVENOUS at 05:05

## 2019-05-14 RX ADMIN — Medication 10 ML: at 11:05

## 2019-05-14 RX ADMIN — FUROSEMIDE 40 MG: 40 TABLET ORAL at 08:05

## 2019-05-14 RX ADMIN — CARVEDILOL 25 MG: 25 TABLET, FILM COATED ORAL at 05:05

## 2019-05-14 RX ADMIN — ASPIRIN 81 MG: 81 TABLET, COATED ORAL at 08:05

## 2019-05-14 RX ADMIN — MICONAZOLE NITRATE: 20 CREAM TOPICAL at 08:05

## 2019-05-14 RX ADMIN — LEVETIRACETAM 250 MG: 250 TABLET ORAL at 09:05

## 2019-05-14 RX ADMIN — Medication 10 ML: at 09:05

## 2019-05-14 RX ADMIN — LISINOPRIL 10 MG: 10 TABLET ORAL at 08:05

## 2019-05-14 RX ADMIN — APIXABAN 5 MG: 5 TABLET, FILM COATED ORAL at 08:05

## 2019-05-14 NOTE — PT/OT/SLP PROGRESS
Occupational Therapy      Patient Name:  St Lacho Farooq   MRN:  [id number]    Patient not seen today secondary to busy with nursing team in room   . Will follow-up later as able..    Zarina Gonzalez OT  5/14/2019

## 2019-05-14 NOTE — PLAN OF CARE
Problem: Adult Inpatient Plan of Care  Goal: Plan of Care Review  Outcome: Ongoing (interventions implemented as appropriate)  Patient did not rest much throughout shift. Room air. Sitter at bedside. Up in chair, wheels locked. IV antibiotics continued. Alert and oriented. Neuro intact. PICC dressing intact and dry. Patient denies pain. Free from fall or injury. Plan of care reviewed with patient and caregiver.

## 2019-05-14 NOTE — PLAN OF CARE
Problem: Adult Inpatient Plan of Care  Goal: Plan of Care Review  Outcome: Ongoing (interventions implemented as appropriate)  Patient aware of plan of care. Resumed care from SILVESTRE Gal[facility]. [facility] care nurse educated patient and family on administration of antibiotic and care of PICC line while at home. Plan to discharge home on Thursday. Free from falls/injuries. No questions or concerns at this time. Agrees with plan of care.

## 2019-05-14 NOTE — PT/OT/SLP PROGRESS
Occupational Therapy   Treatment    Name: St Lacho Farooq  MRN: [id number]  Admitting Diagnosis:  Bacteremia       Recommendations:     Discharge Recommendations: home with home health  Discharge Equipment Recommendations:  walker, rolling, commode, grab bar, toilet, grab bar, tub/shower, tub bench  Barriers to discharge:  None    Assessment:     St Lacho Farooq is a 84 y.o. male with a medical diagnosis of Bacteremia.  He presents with overall weakness, baseline mild dementia which waxes and wanes, fatique, peripheral vision impairment, Right inattention/ weakness (mild) = fall risk Performance deficits affecting function are impaired endurance, impaired self care skills, gait instability, visual deficits, decreased lower extremity function, weakness, decreased safety awareness, impaired cognition, impaired functional mobilty.     Rehab Prognosis:  Fair; patient would benefit from acute skilled OT services to address these deficits and reach maximum level of function.       Plan:     Patient to be seen 5 x/week to address the above listed problems via self-care/home management, therapeutic activities, therapeutic exercises  · Plan of Care Expires: 05/31/19  · Plan of Care Reviewed with: patient, spouse, daughter    Subjective     Pain/Comfort:  · Pain Rating 1: 0/10  · Pain Rating Post-Intervention 1: 0/10    Objective:     Communicated with: nursing team and dtr now done with d/c education and patient ready to complete his exercises before evening meal, wife in room prior to session.  Patient found up in chair with PICC line, peripheral IV upon OT entry to room.    General Precautions: Standard, fall   Orthopedic Precautions:N/A   Braces: N/A     Occupational Performance:     Bed Mobility:    · NT    Functional Mobility/Transfers:  · Patient completed Sit <> Stand Transfer with supervision  with  rolling walker   ·     Activities of Daily Living:  · Feeding:  independence eats evening meal without  spilling, issues  · Grooming: NT  · Bathing: NT - min A seated for showering per reliable source, nursing  · Upper Body Dressing: NT  · Lower Body Dressing: NT  · Toileting: NT      AMPAC 6 Click ADL: 22    Treatment & Education:  Completed 10 reps against red theraband resistance bicep , tricep, march / kick-- has mild inattention to the right hand needed theraband placed into it to initiate the task on the right as he at first grasps it with his left hand . (imporves awareness of right side and periphery and ability to distinguish left from right with rote practice.    Patient left up in chair with all lines intact and call button in reachEducation:      GOALS:   Multidisciplinary Problems     Occupational Therapy Goals        Problem: Occupational Therapy Goal    Goal Priority Disciplines Outcome Interventions   Occupational Therapy Goal     OT, PT/OT     Description:  Goals to be met by:6/10/2019     Patient will increase functional independence with ADLs by performing:    LE Dressing with Modified [place].  Grooming while standing at sink with Modified [place].  Toileting from toilet with Modified [place] for hygiene and clothing management.   Bathing from  shower chair/bench with Modified [place].  Upper extremity exercise program x10 reps per handout, with assistance as needed  Patient will consistently 3/3 trials at different times of the day demonstrate ability to be a) oriented x 4 (using watch if needed for time of day)  b) use RN call light appropriately before getting up c) complete a >10 piece puzzle d) complete a word search page with 90% accuracy d) add and subtract 2 digit numbers 5/5 tries with 90% accuracy e) color by number from coloring book attending to periphery of the page as well as center of the page without errors. E) fill his medbox with the 13 pills he says he takes daily, twice per day with 100% accuracy f) fill out his medication list with WHAT he takes, WHY he  takes it, WHEN he takes it, HOW and HOW often he takes it , and possible side effects of each medication  Complete the [employer] Blessed pre -  Driving cognitive screen without any errors.                      Time Tracking:     OT Date of Treatment: 05/14/19  OT Start Time: 1620  OT Stop Time: 1630  OT Total Time (min): 10 min    Billable Minutes:Therapeutic Exercise 10    Zarina Gonzalez OT  5/14/2019

## 2019-05-14 NOTE — PLAN OF CARE
Skilled level of nursing weekly progress meeting. Patient working toward goal with PT, OT, and SLP. Patient on day 27/42 for IV antibiotics. Patient requesting to finish IV antibiotic therapy at home. Planned discharge date Thursday 5/16/2019.       PT recommendations: Please see latest PT note.    OT recommendations: Please see latest OT note.    SLP recommendations: Please see latest SLP note.        Divya Go RN, BSN  [facility]   Case Management/Utilization Review  [phone] (Phone)  [phone] (Fax)

## 2019-05-14 NOTE — PLAN OF CARE
Problem: Adult Inpatient Plan of Care  Goal: Plan of Care Review  Outcome: Ongoing (interventions implemented as appropriate)  Nutrition Recommendation/Intervention: Continue current diet as tolerated encouraging continued good itnake. Please obtain updated weight  Goals: adequate po intake >=85% EEN by discharge  Nutrition Goal Status: goal met  Communication of RD Recs: discussed on rounds    Nutrition Discharge Planning: Cardiac Diet with adequate intake to meet EEN/EPN

## 2019-05-14 NOTE — PROGRESS NOTES
"[facility]  Adult Nutrition  Progress Note    SUMMARY       Recommendations    Recommendation/Intervention: Continue current diet as tolerated encouraging continued good itnake. Please obtain updated weight  Goals: adequate po intake >=85% EEN by discharge  Nutrition Goal Status: goal met  Communication of RD Recs: discussed on rounds    Nutrition Discharge Planning: Cardiac Diet with adequate intake to meet EEN/EPN    Reason for Assessment    Reason For Assessment: RD follow-up  Diagnosis: infection/sepsis, stroke/CVA(Bacteremia)  Relevant Medical History: HTN, CAD, HLD, Anemia, Afib, CVA, PAD, MI, Cancer  Interdisciplinary Rounds: attended  General Information Comments: Pt continues with great appetite with no isues reported; family broaght hamburger for pt today for lunch. Possible D/C on Thursday    Nutrition Risk Screen    Nutrition Risk Screen: no indicators present    Nutrition/Diet History    Patient Reported Diet/Restrictions/Preferences: low salt  Food Preferences: likes strawberry boost  Spiritual, Cultural Beliefs, Protestant Practices, Values that Affect Care: no  Food Allergies: NKFA  Factors Affecting Nutritional Intake: impaired cognitive status/motor control, behavioral (mealtime)    Anthropometrics    Temp: 98.1 °F (36.7 °C)  Height Method: Stated  Height: 5' 8" (172.7 cm)  Height (inches): 68 in  Weight Method: Standard Scale  Weight: 89.4 kg (197 lb)(NO NEW SINCE ADMIT)  Weight (lb): 197 lb  Ideal Body Weight (IBW), Male: 154 lb  % Ideal Body Weight, Male (lb): 127.92 lb  BMI (Calculated): 30  BMI Grade: 30 - 34.9- obesity - grade I       Lab/Procedures/Meds    Pertinent Labs Reviewed: reviewed  Pertinent Medications Reviewed: reviewed    Scheduled Meds:   ampicillin IVPB  2 g Intravenous Q6H    apixaban  5 mg Oral BID    aspirin  81 mg Oral Daily    atorvastatin  10 mg Oral QHS    carvedilol  25 mg Oral BID WM    furosemide  40 mg Oral Daily    levETIRAcetam  250 mg " Oral BID    lidocaine  1 patch Transdermal Q24H    lisinopril  10 mg Oral Daily    miconazole   Topical (Top) BID    pantoprazole  40 mg Oral Daily    sodium chloride 0.9%  10 mL Intravenous Q6H    tamsulosin  0.4 mg Oral Daily     Continuous Infusions:  PRN Meds:.acetaminophen, albuterol-ipratropium, heparin, porcine (PF), HYDROmorphone, ondansetron, ramelteon, Flushing PICC Protocol **AND** sodium chloride 0.9% **AND** sodium chloride 0.9%, traMADol    Recent Labs   Lab 05/14/19  0551   CALCIUM 10.0   PROT 5.8*      K 3.5   CO2 25      BUN 12   CREATININE 0.9   ALKPHOS 57   ALT 13   AST 11   BILITOT 0.8     Estimated/Assessed Needs    Weight Used For Calorie Calculations: 89.4 kg (197 lb 1.5 oz)  Energy Calorie Requirements (kcal): 1882  Energy Need Method: [place]-St Jeor(x1.2)  Protein Requirements: (1.0-1.2)  Weight Used For Protein Calculations: 89.4 kg (197 lb 1.5 oz)     Estimated Fluid Requirement Method: RDA Method  RDA Method (mL): 1882         Nutrition Prescription Ordered    Current Diet Order: Cardiac    Evaluation of Received Nutrient/Fluid Intake    Energy Calories Required: meeting needs  Protein Required: meeting needs  Fluid Required: meeting needs  Tolerance: tolerating  % Intake of Estimated Energy Needs: 75 - 100 %  % Meal Intake: 50 - 75 %    Nutrition Risk    Level of Risk/Frequency of Follow-up: low     Assessment and Plan    Cerebrovascular accident (CVA)  Contributing Nutrition Diagnosis  Inadequate energy intake    Related to (etiology):   Inability to feed self    Signs and Symptoms (as evidenced by):   Noted new CVA with decrease coordination, varied intake of meals     Interventions  Decreased Sodium Diet  Decreased Fat Diet  Commercial Beverage  Feeding Assistance    Recommendations (treatment strategy):  Cardiac Diet  Add Boost Plus BID  Assists with meals as needed due to coordination.    Nutrition Diagnosis Status:   Resolved         Monitor and  Evaluation    Food and Nutrient Intake: food and beverage intake  Food and Nutrient Adminstration: diet order  Knowledge/Beliefs/Attitudes: food and nutrition knowledge/skill, beliefs and attitudes  Physical Activity and Function: nutrition-related ADLs and IADLs  Anthropometric Measurements: weight, weight change  Biochemical Data, Medical Tests and Procedures: electrolyte and renal panel  Nutrition-Focused Physical Findings: overall appearance     Nutrition Follow-Up    RD Follow-up?: Yes

## 2019-05-14 NOTE — ASSESSMENT & PLAN NOTE
Contributing Nutrition Diagnosis  Inadequate energy intake    Related to (etiology):   Inability to feed self    Signs and Symptoms (as evidenced by):   Noted new CVA with decrease coordination, varied intake of meals     Interventions  Decreased Sodium Diet  Decreased Fat Diet  Commercial Beverage  Feeding Assistance    Recommendations (treatment strategy):  Cardiac Diet  Add Boost Plus BID  Assists with meals as needed due to coordination.    Nutrition Diagnosis Status:   Resolved

## 2019-05-14 NOTE — PT/OT/SLP PROGRESS
"Physical Therapy Treatment    Patient Name:  St Lacho Farooq   MRN:  [id number]    Recommendations:     Discharge Recommendations:  home with home health   Discharge Equipment Recommendations: walker, rolling, commode, grab bar, tub/shower, tub bench   Barriers to discharge: Inaccessible home    Assessment:     St Lacho Farooq is a 84 y.o. male admitted with a medical diagnosis of Bacteremia.  He presents with the following impairments/functional limitations:  weakness, impaired self care skills, impaired functional mobilty, impaired cognition, impaired balance, gait instability. Patient reported did not sleep good last night but still motivated to perform Physical Therapy. Ambulated patient to Therapy room using personal RW and performed ascending and descending 20 stair steps using  bilateral handrails with SBA. Noted 50% lesser verbal cues for safer gait functions.  Noted good dynamic standing balance and no sign of leaning to sides. No sign of distress and fatigue provided with rest periods.    Rehab Prognosis: Good; patient would benefit from acute skilled PT services to address these deficits and reach maximum level of function.    Recent Surgery: * No surgery found *      Plan:     During this hospitalization, patient to be seen 5 x/week to address the identified rehab impairments via gait training, therapeutic activities, therapeutic exercises and progress toward the following goals:    · Plan of Care Expires:  05/10/19    Subjective     Chief Complaint: Patient did'nt sleep good last night  Patient/Family Comments/goals: "Able to go home".  Pain/Comfort:  · Pain Rating 1: 0/10  · Pain Rating Post-Intervention 1: 0/10      Objective:     Communicated with patient and brother prior to session.  Patient found up in chair with PICC line, peripheral IV upon PT entry to room.     General Precautions: Standard, fall   Orthopedic Precautions:N/A   Braces: N/A     Functional Mobility:  · Transfers:   "   · Sit to Stand:  supervision with rolling walker  · Bed to Chair: supervision with  rolling walker  using  Step Transfer  · Gait: RW Ambulation x 300 feet with SBA. Reciprocal gait with minmal increased Right Foot/Floor clearance and Stride Length .  · Stairs:  Pt ascended/descended 20 steps stair(s) with No Assistive Device with bilateral handrails with Stand-by Assistance.       AM-PAC 6 CLICK MOBILITY  Turning over in bed (including adjusting bedclothes, sheets and blankets)?: 4  Sitting down on and standing up from a chair with arms (e.g., wheelchair, bedside commode, etc.): 3  Moving from lying on back to sitting on the side of the bed?: 4  Moving to and from a bed to a chair (including a wheelchair)?: 3  Need to walk in hospital room?: 3  Climbing 3-5 steps with a railing?: 3  Basic Mobility Total Score: 20       Therapeutic Activities and Exercises:   GAIT: Pt able to ambulate with rolling walker with Standby Assistance x 300 ft with the following gait deviation(s): Reciprocal gait, min increased Right Foot/Floor Clearance and Right LE stride length. Implemented stairs activity x 20 steps using bilateral handrails with SBA. Pt performed bilat LE exercises consisting of Active range of motion exercises for strengthening an coordination ex x 20 reps consisting of Tip Toe, Side Steps at Sitting Alternately, Marching at Sitting, , LAQ Alternately, and Arm Push Ups on the Chair with pt able to tolerate activities very well with rest periods.   PT discussed importance of patient's performance of Home Exercise Program (HEP) with pt verbalizing understanding.      Patient left up in chair at the Lobby with all lines intact, nurse Mandy notified and brother and friend present..    GOALS:   Multidisciplinary Problems     Physical Therapy Goals        Problem: Physical Therapy Goal    Goal Priority Disciplines Outcome Goal Variances Interventions   Physical Therapy Goal     PT, PT/OT Ongoing (interventions implemented  as appropriate)     Description:  Goals to be met by:  5/10/19 (updated 19)    Patient will increase functional independence with mobility by performin. Supine to sit with Independent - met 19  2. Sit to supine with Independent - met 19  3. Bed to chair transfer with Modified Independent with or without R W or straight Cane  using Step Transfer TECHNIQUE  4. Gait  x 300  feet with Modified Independent with or without straight cane or RW  5. Lower extremity exercise program x10 reps per handout, with assistance as needed. Met 19  6. Able to ascend/descend 12 stair steps using handrail/Straight  Cane with supervision. Met 5/3/19  7. Able to ascend/descend 20 statir steps using handrail/Straight Cane with Supervision.                          Time Tracking:     PT Received On: 19  PT Start Time: 1215     PT Stop Time: 1300  PT Total Time (min): 45 min     Billable Minutes: Therapeutic Activity 45 mins    Treatment Type: Treatment  PT/PTA: PT     PTA Visit Number: 1     Keven Goldstein, PT  2019

## 2019-05-14 NOTE — PT/OT/SLP PROGRESS
"Speech Language Pathology Treatment    Patient Name:  St Lacho Farooq   MRN:  [id number]  Admitting Diagnosis: Bacteremia    Recommendations:                 General Recommendations: Speech/language therapy  Diet recommendations: Solid Diet Level: Regular, Liquid Diet Level: Thin   Aspiration Precautions: Standard aspiration precautions   General Precautions: Standard    Communication strategies:  none    Subjective     Pt found sitting in bedside chair upon SLP entry into room with personal sitter present at bedside. Pt was agreeable to complete therapy tasks after MOD verbal encouragement but stated he didn't sleep well last night and "not really in the mood for it today."     Patient goals: to go home     Pain/Comfort:  Pain Rating 1: 0/10    Objective:     Has the patient been evaluated by SLP for swallowing?   No  Keep patient NPO? No   Current Respiratory Status: room air      Pt and SLP reviewed 10 related word pairs to target short term recall skills. All word pairs in list were reviewed and visualization techniques were implemented with MAX assist. Pt demonstrated increased difficulty with visualization techniques: word pair would be reviewed several times with pt requiring total assistance to create visual images relating given word pairings on majority of pairs. Word list was recalled following a 2 minute delay with no distractions on 8/10 pairs with Modified [place] and 9/10 with MAX verbal cueing. Pt noted to have frustration with task since previous session so additional word lists were deferred.     Assessment:     St Lacho Farooq is a 84 y.o. 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 He presents with deficits across reasoning, problem solving, and short term recall, affecting his ability to complete higher level tasks independently and efficiently. Recommend continuation of skilled speech services to target cognitive lingusitic deficits.     Goals: "   Multidisciplinary Problems     SLP Goals        Problem: SLP Goal    Goal Priority Disciplines Outcome   SLP Goal     SLP Ongoing (interventions implemented as appropriate)   Description:    Long Term Goals:  1. Pt will increase cognitive linguistic skills to a functional level in order to promote safe and independent activities of choice in known and unknown environments                    Plan:     · Patient to be seen:  3 x/week   · Plan of Care expires:  05/30/19  · Plan of Care reviewed with:  patient   · SLP Follow-Up:  Yes       Discharge recommendations:  home with home health   Barriers to Discharge:  Level of Skilled Assistance Needed - pt continues to require skilled personnel for managmeent of medical diagnoses     Time Tracking:     SLP Treatment Date:   05/14/19  Speech Start Time:  0924  Speech Stop Time:  0941     Speech Total Time (min):  17 min    Billable Minutes: Treatment of cognitive linguistic skills- 17 minutes       Ame Gunn CCC-SLP  05/14/2019

## 2019-05-15 LAB
ALBUMIN SERPL BCP-MCNC: 2.7 G/DL (ref 3.5–5.2)
ALP SERPL-CCNC: 56 U/L (ref 55–135)
ALT SERPL W/O P-5'-P-CCNC: 11 U/L (ref 10–44)
ANION GAP SERPL CALC-SCNC: 8 MMOL/L (ref 8–16)
AST SERPL-CCNC: 10 U/L (ref 10–40)
BASOPHILS # BLD AUTO: 0.03 K/UL (ref 0–0.2)
BASOPHILS NFR BLD: 0.6 % (ref 0–1.9)
BILIRUB SERPL-MCNC: 0.8 MG/DL (ref 0.1–1)
BUN SERPL-MCNC: 13 MG/DL (ref 8–23)
CALCIUM SERPL-MCNC: 9.8 MG/DL (ref 8.7–10.5)
CHLORIDE SERPL-SCNC: 109 MMOL/L (ref 95–110)
CO2 SERPL-SCNC: 26 MMOL/L (ref 23–29)
CREAT SERPL-MCNC: 1 MG/DL (ref 0.5–1.4)
DIFFERENTIAL METHOD: ABNORMAL
EOSINOPHIL # BLD AUTO: 0.2 K/UL (ref 0–0.5)
EOSINOPHIL NFR BLD: 3 % (ref 0–8)
ERYTHROCYTE [DISTWIDTH] IN BLOOD BY AUTOMATED COUNT: 19.1 % (ref 11.5–14.5)
EST. GFR  (AFRICAN AMERICAN): >60 ML/MIN/1.73 M^2
EST. GFR  (NON AFRICAN AMERICAN): >60 ML/MIN/1.73 M^2
GLUCOSE SERPL-MCNC: 98 MG/DL (ref 70–110)
HCT VFR BLD AUTO: 27 % (ref 40–54)
HGB BLD-MCNC: 8.1 G/DL (ref 14–18)
LYMPHOCYTES # BLD AUTO: 0.7 K/UL (ref 1–4.8)
LYMPHOCYTES NFR BLD: 14 % (ref 18–48)
MCH RBC QN AUTO: 23.3 PG (ref 27–31)
MCHC RBC AUTO-ENTMCNC: 30 G/DL (ref 32–36)
MCV RBC AUTO: 78 FL (ref 82–98)
MONOCYTES # BLD AUTO: 0.7 K/UL (ref 0.3–1)
MONOCYTES NFR BLD: 13.2 % (ref 4–15)
NEUTROPHILS # BLD AUTO: 3.5 K/UL (ref 1.8–7.7)
NEUTROPHILS NFR BLD: 69.2 % (ref 38–73)
PLATELET # BLD AUTO: 214 K/UL (ref 150–350)
PMV BLD AUTO: 9.1 FL (ref 9.2–12.9)
POTASSIUM SERPL-SCNC: 3.4 MMOL/L (ref 3.5–5.1)
PROT SERPL-MCNC: 5.7 G/DL (ref 6–8.4)
RBC # BLD AUTO: 3.47 M/UL (ref 4.6–6.2)
SODIUM SERPL-SCNC: 143 MMOL/L (ref 136–145)
WBC # BLD AUTO: 5.06 K/UL (ref 3.9–12.7)

## 2019-05-15 PROCEDURE — 25000003 PHARM REV CODE 250: Performed by: FAMILY MEDICINE

## 2019-05-15 PROCEDURE — 25000003 PHARM REV CODE 250: Performed by: NURSE PRACTITIONER

## 2019-05-15 PROCEDURE — A4216 STERILE WATER/SALINE, 10 ML: HCPCS | Performed by: INTERNAL MEDICINE

## 2019-05-15 PROCEDURE — 80053 COMPREHEN METABOLIC PANEL: CPT

## 2019-05-15 PROCEDURE — 85025 COMPLETE CBC W/AUTO DIFF WBC: CPT

## 2019-05-15 PROCEDURE — 25000003 PHARM REV CODE 250: Performed by: INTERNAL MEDICINE

## 2019-05-15 PROCEDURE — 97535 SELF CARE MNGMENT TRAINING: CPT

## 2019-05-15 PROCEDURE — 97110 THERAPEUTIC EXERCISES: CPT

## 2019-05-15 PROCEDURE — 36415 COLL VENOUS BLD VENIPUNCTURE: CPT

## 2019-05-15 PROCEDURE — 25000003 PHARM REV CODE 250: Performed by: PSYCHIATRY & NEUROLOGY

## 2019-05-15 PROCEDURE — 11000004 HC SNF PRIVATE

## 2019-05-15 PROCEDURE — 94761 N-INVAS EAR/PLS OXIMETRY MLT: CPT

## 2019-05-15 PROCEDURE — 97530 THERAPEUTIC ACTIVITIES: CPT

## 2019-05-15 PROCEDURE — 63600175 PHARM REV CODE 636 W HCPCS: Performed by: INTERNAL MEDICINE

## 2019-05-15 RX ORDER — POTASSIUM CHLORIDE 20 MEQ/1
40 TABLET, EXTENDED RELEASE ORAL DAILY
Status: DISCONTINUED | OUTPATIENT
Start: 2019-05-15 | End: 2019-05-16 | Stop reason: HOSPADM

## 2019-05-15 RX ADMIN — MICONAZOLE NITRATE: 20 CREAM TOPICAL at 11:05

## 2019-05-15 RX ADMIN — Medication 10 ML: at 11:05

## 2019-05-15 RX ADMIN — Medication 10 ML: at 07:05

## 2019-05-15 RX ADMIN — AMPICILLIN SODIUM 2 G: 2 INJECTION, POWDER, FOR SOLUTION INTRAMUSCULAR; INTRAVENOUS at 04:05

## 2019-05-15 RX ADMIN — ASPIRIN 81 MG: 81 TABLET, COATED ORAL at 09:05

## 2019-05-15 RX ADMIN — Medication 10 ML: at 04:05

## 2019-05-15 RX ADMIN — PANTOPRAZOLE SODIUM 40 MG: 40 TABLET, DELAYED RELEASE ORAL at 09:05

## 2019-05-15 RX ADMIN — Medication 10 ML: at 05:05

## 2019-05-15 RX ADMIN — LEVETIRACETAM 250 MG: 250 TABLET ORAL at 09:05

## 2019-05-15 RX ADMIN — LISINOPRIL 10 MG: 10 TABLET ORAL at 09:05

## 2019-05-15 RX ADMIN — APIXABAN 5 MG: 5 TABLET, FILM COATED ORAL at 09:05

## 2019-05-15 RX ADMIN — LIDOCAINE 1 PATCH: 50 PATCH TOPICAL at 05:05

## 2019-05-15 RX ADMIN — ATORVASTATIN CALCIUM 10 MG: 10 TABLET, FILM COATED ORAL at 09:05

## 2019-05-15 RX ADMIN — AMPICILLIN SODIUM 2 G: 2 INJECTION, POWDER, FOR SOLUTION INTRAMUSCULAR; INTRAVENOUS at 11:05

## 2019-05-15 RX ADMIN — CARVEDILOL 25 MG: 25 TABLET, FILM COATED ORAL at 07:05

## 2019-05-15 RX ADMIN — TAMSULOSIN HYDROCHLORIDE 0.4 MG: 0.4 CAPSULE ORAL at 09:05

## 2019-05-15 RX ADMIN — AMPICILLIN SODIUM 2 G: 2 INJECTION, POWDER, FOR SOLUTION INTRAMUSCULAR; INTRAVENOUS at 10:05

## 2019-05-15 RX ADMIN — AMPICILLIN SODIUM 2 G: 2 INJECTION, POWDER, FOR SOLUTION INTRAMUSCULAR; INTRAVENOUS at 05:05

## 2019-05-15 RX ADMIN — CARVEDILOL 25 MG: 25 TABLET, FILM COATED ORAL at 05:05

## 2019-05-15 RX ADMIN — POTASSIUM CHLORIDE 40 MEQ: 1500 TABLET, EXTENDED RELEASE ORAL at 09:05

## 2019-05-15 RX ADMIN — RAMELTEON 8 MG: 8 TABLET, FILM COATED ORAL at 09:05

## 2019-05-15 RX ADMIN — FUROSEMIDE 40 MG: 40 TABLET ORAL at 09:05

## 2019-05-15 NOTE — PT/OT/SLP PROGRESS
"Physical Therapy Treatment    Patient Name:  St Lacho Farooq   MRN:  [id number]    Recommendations:     Discharge Recommendations:  home with home health, home health PT   Discharge Equipment Recommendations: walker, rolling, commode, grab bar, tub/shower, tub bench   Barriers to discharge: Inaccessible home    Assessment:     St Lacho Farooq is a 84 y.o. male admitted with a medical diagnosis of Bacteremia.  He presents with the following impairments/functional limitations:  weakness, impaired self care skills, impaired functional mobilty, impaired cognition, impaired balance, gait instability, pain. Patient seen at bedside chair with Sitter present. Patient reported minimal generalized aching pain on left groin area upon waking up this morning. No sign of tenderness and no sign of inc pain upon movt. Explained to Patient and Sitter upon  sleeping on the reclining chair with limited body repositioning that possibly causes body ache, discomfort and impinging blood circulation due to body pressure.     Rehab Prognosis: Good; patient would benefit from acute skilled PT services to address these deficits and reach maximum level of function.    Recent Surgery: * No surgery found *      Plan:     During this hospitalization, patient to be seen 5 x/week to address the identified rehab impairments via therapeutic exercises, therapeutic activities, gait training and progress toward the following goals:    · Plan of Care Expires:  05/10/19    Subjective     Chief Complaint: Left groin aching pain upon waking up this morning on the reclining chair.  Patient/Family Comments/goals: "To go home at daughter's house and do his routine exercise".  Pain/Comfort:  · Pain Rating 1: 3/10  · Location - Side 1: Left  · Location - Orientation 1: generalized  · Location 1: groin  · Pain Addressed 1: Cessation of Activity, Reposition  · Pain Rating Post-Intervention 1: 0/10      Objective:     Communicated with patient and sitter "  prior to session.  Patient found up in chair with peripheral IV, PICC line upon PT entry to room.     General Precautions: Standard, fall   Orthopedic Precautions:N/A   Braces: N/A     Functional Mobility:  · Transfers:     · Sit to Stand:  supervision with rolling walker  · Bed to Chair: supervision with  rolling walker  using  Step Transfer  · Toilet Transfer: supervision with  rolling walker  using  Step Transfer  · Gait: RW Ambulation x 300 feet with Supervision. Reciprocal Gait with increased stride and floor clearance. Normal body alignment during gait functions.  · Balance: Dynamic Standing balance with Good grade using RW.  · Stairs:  Pt ascended/descended 16 steps stair(s) with No Assistive Device with bilateral handrails with Supervision or Set-up Assistance.       AM-PAC 6 CLICK MOBILITY  Turning over in bed (including adjusting bedclothes, sheets and blankets)?: 4  Sitting down on and standing up from a chair with arms (e.g., wheelchair, bedside commode, etc.): 3  Moving from lying on back to sitting on the side of the bed?: 4  Moving to and from a bed to a chair (including a wheelchair)?: 3  Need to walk in hospital room?: 3  Climbing 3-5 steps with a railing?: 3  Basic Mobility Total Score: 20       Therapeutic Activities and Exercises:   GAIT: Pt able to ambulate with rolling walker with Supervision or Set-up Assistance x 300 ft with the following gait deviation(s): Reciprocal gait with increased stride length, increased foot/floor clearance and normal body alignment. Implemented Stairs Activity 16 steps ascending/descending with bilateral handrails with Supervsion.  Pt performed Bilateral  LE exercises consisting of Active range of motion exercises for strengthening and coordination ex x 20 reps consisting of Tip Toe Ex, Side Steps Alternately,   LAQ Alternately, Marching at Sitting and Arm Push Ups with pt able to tolerate activities very well with rest periods.   PT discussed importance of  patient's performance of Home Exercise Program (HEP) with pt verbalizing understanding.      Patient left up in chair with all lines intact, call button in reach, nurse notified and Sitter present..    GOALS:   Multidisciplinary Problems     Physical Therapy Goals        Problem: Physical Therapy Goal    Goal Priority Disciplines Outcome Goal Variances Interventions   Physical Therapy Goal     PT, PT/OT Ongoing (interventions implemented as appropriate)     Description:  Goals to be met by:  5/10/19 (updated 19)    Patient will increase functional independence with mobility by performin. Supine to sit with Independent - met 19  2. Sit to supine with Independent - met 19  3. Bed to chair transfer with Modified Independent with or without R W or straight Cane  using Step Transfer TECHNIQUE  4. Gait  x 300  feet with Modified Independent with or without straight cane or RW  5. Lower extremity exercise program x10 reps per handout, with assistance as needed. Met 19  6. Able to ascend/descend 12 stair steps using handrail/Straight  Cane with supervision. Met 5/3/19  7. Able to ascend/descend 20 statir steps using handrail/Straight Cane with Supervision.                          Time Tracking:     PT Received On: 05/15/19  PT Start Time: 1302     PT Stop Time: 1350  PT Total Time (min): 48 min     Billable Minutes: Gait Training 15 and Therapeutic Activity 30    Treatment Type: Treatment  PT/PTA: PT     PTA Visit Number: 1     Keven Goldstein, PT  05/15/2019

## 2019-05-15 NOTE — PT/OT/SLP PROGRESS
Occupational Therapy   Treatment    Name: St Lacho Farooq  MRN: [id number]  Admitting Diagnosis:  Bacteremia       Recommendations:     Discharge Recommendations: home with home health, home health PT  Discharge Equipment Recommendations:  walker, rolling, commode, grab bar, toilet, grab bar, tub/shower, tub bench  Barriers to discharge:  None    Assessment:     St Lacho Farooq is a 84 y.o. male with a medical diagnosis of Bacteremia.  He presents with overall weakness. A staff member had given him a large print word search book and he wants to take it home, but felt he was too sleepy to work on it in hospital or on his own.. Performance deficits affecting function are impaired endurance, impaired self care skills, gait instability, visual deficits, decreased lower extremity function, weakness, decreased safety awareness, impaired cognition, impaired functional mobilty.     Rehab Prognosis:  Fair; patient would benefit from acute skilled OT services to address these deficits and reach maximum level of function.       Plan:     Patient to be seen 5 x/week to address the above listed problems via self-care/home management, therapeutic activities, therapeutic exercises  · Plan of Care Expires: 05/31/19  · Plan of Care Reviewed with: patient, daughter    Subjective     Pain/Comfort:  · Pain Rating 1: 2/10  · Location - Side 1: Left  · Location - Orientation 1: generalized  · Location 1: groin  · Pain Addressed 1: Reposition, Pre-medicate for activity  · Pain Rating Post-Intervention 1: 0/10  · Pain Rating 2: 1/10  · Location - Orientation 2: generalized  · Location 2: back  · Pain Addressed 2: Pre-medicate for activity, Reposition  · Pain Rating Post-Intervention 2: 0/10    Objective:     Communicated with: nursing prior to session.  Patient found up in chair with peripheral IV, PICC line upon OT entry to room.    General Precautions: Standard, fall   Orthopedic Precautions:N/A   Braces: N/A     Occupational  Performance:     Bed Mobility:    · NT    Functional Mobility/Transfers:  · NT    Activities of Daily Living:  · Feeding:  independence with set up able to use utensils and feed himself ( GOAL MET)  · Grooming: modified independence able to stand at sink with walker and wash hands and face, cleanse dentures (GOAL MET)  · Bathing: minimum assistance with min A for safety in wet environment, and seated for safety, able to bath himself with cues for throughness in groin area  · Upper Body Dressing: modified independence able to lift arms and put in sleeves, able to fasten if he takes extra time (sometimes family and aids do this for him) GOAL MET  · Lower Body Dressing: supervision to don socks and shoes and tie them, often family and aids do this for him in the interest of time due to LE swelling/ edema. GOAL MET  · Toileting: supervision - reminded him of importance of always using the walker a) for stability b) due to peripheral vision impairment so he does not bump into things c) for support as if it takes a long time to uriniate standing up he needs the walker for support due to fatique = fall risk. Since his condition has been waxing/ waning from day to day and at different times of the day, he still needs supervision for safety  GOAL MET  · Speech therapy has been working with him on cognitive tasks  · Short blessed pre driving cognitive screen of 11/30 indicates that it would NOT be safe for him to drive a car, family will provide transportation for him and he is discharging to his daughters home and will have 24/7 caregiver support.  · Family will take full responsibility for his medication management.      [facility] 6 Click ADL: 23    Treatment & Education:  Self care to review progress to goals and safety education for d/c tomorrow.    Patient left up in chair with all lines intact and call button in reachEducation:      GOALS:   Multidisciplinary Problems     Occupational Therapy Goals        Problem:  Occupational Therapy Goal    Goal Priority Disciplines Outcome Interventions   Occupational Therapy Goal     OT, PT/OT     Description:  Goals to be met by:6/10/2019     Patient will increase functional independence with ADLs by performing:    LE Dressing with Modified [place].  Grooming while standing at sink with Modified [place].  Toileting from toilet with Modified [place] for hygiene and clothing management.   Bathing from  shower chair/bench with Modified [place].  Upper extremity exercise program x10 reps per handout, with assistance as needed  Patient will consistently 3/3 trials at different times of the day demonstrate ability to be a) oriented x 4 (using watch if needed for time of day)  b) use RN call light appropriately before getting up c) complete a >10 piece puzzle d) complete a word search page with 90% accuracy d) add and subtract 2 digit numbers 5/5 tries with 90% accuracy e) color by number from coloring book attending to periphery of the page as well as center of the page without errors. E) fill his medbox with the 13 pills he says he takes daily, twice per day with 100% accuracy f) fill out his medication list with WHAT he takes, WHY he takes it, WHEN he takes it, HOW and HOW often he takes it , and possible side effects of each medication  Complete the Short Blessed pre -  Driving cognitive screen without any errors.                      Time Tracking:     OT Date of Treatment: 05/15/19  OT Start Time: 1545  OT Stop Time: 1600  OT Total Time (min): 15 min    Billable Minutes:Self Care/Home Management 15    Zarina Gonzalez OT  5/15/2019

## 2019-05-15 NOTE — PLAN OF CARE
05/15/19 0937   Discharge Reassessment   Assessment Type Discharge Planning Reassessment   Post-Acute Status   Post-Acute Authorization Home Health/Hospice;Medications   Home Health/Hospice Status Set-up Complete   Medication Status Set-up Complete         June with [facility] came yesterday and educated patient and daughter on the use of equipment for medication administration. Everything will be brought to daughter's house, where patient will discharge to, upon discharge.      [facility] represetative came and see patient today. They are aware discharge is tomorrow. They will admit him to home health once discharged.    CM will continue to follow and assist as needed.

## 2019-05-15 NOTE — NURSING
Bedside report received Hanny.  VS stable. No complaint of pain at this time.  Call bell in reach.  Will continue to monitor.

## 2019-05-15 NOTE — PLAN OF CARE
Problem: Adult Inpatient Plan of Care  Goal: Plan of Care Review  Outcome: Ongoing (interventions implemented as appropriate)  Pt and sitter agrees with plan of care. VS stable.  Sitter at bedside.  Call bell in reach.  IV antibiotics continued as ordered.  Right Picc line intact with no signs of infection noted.  Will continue to monitor.

## 2019-05-15 NOTE — PT/OT/SLP DISCHARGE
Occupational Therapy Discharge Summary    St Lacho Farooq  MRN: [id number]   Principal Problem: Bacteremia      Patient Discharged from acute Occupational Therapy on 5/15/2019.  Please refer to prior OT note dated 5/15/2019 for functional status.    Assessment:      Patient has met all goals and is not appropriate for therapy.    Objective:     GOALS:   Multidisciplinary Problems     Occupational Therapy Goals        Problem: Occupational Therapy Goal    Goal Priority Disciplines Outcome Interventions   Occupational Therapy Goal     OT, PT/OT     Description:  Goals to be met by:6/10/2019     Patient will increase functional independence with ADLs by performing:    LE Dressing with Modified [place].  Grooming while standing at sink with Modified [place].  Toileting from toilet with Modified [place] for hygiene and clothing management.   Bathing from  shower chair/bench with Modified [place].  Upper extremity exercise program x10 reps per handout, with assistance as needed  Patient will consistently 3/3 trials at different times of the day demonstrate ability to be a) oriented x 4 (using watch if needed for time of day)  b) use RN call light appropriately before getting up c) complete a >10 piece puzzle d) complete a word search page with 90% accuracy d) add and subtract 2 digit numbers 5/5 tries with 90% accuracy e) color by number from coloring book attending to periphery of the page as well as center of the page without errors. E) fill his medbox with the 13 pills he says he takes daily, twice per day with 100% accuracy f) fill out his medication list with WHAT he takes, WHY he takes it, WHEN he takes it, HOW and HOW often he takes it , and possible side effects of each medication  Complete the Short Blessed pre -  Driving cognitive screen without any errors.                      Reasons for Discontinuation of Therapy Services  Transfer to alternate level of care.      Plan:     Patient  Discharged to: Home with Home Health Service and [facility] plans to d/c home tomorrow around lunchtime - to dtrs home    Zarina Gonzalez OT  5/15/2019

## 2019-05-15 NOTE — NURSING
Pt is a swing patient. Report given by SILVESTRE Huerta. No complaints of pain. No signs of distress noted. IV Ampicillin given. Patient ambulating with walker. Will continue to monitor

## 2019-05-15 NOTE — PLAN OF CARE
Problem: Adult Inpatient Plan of Care  Goal: Plan of Care Review  Outcome: Ongoing (interventions implemented as appropriate)  Pt. Received 2 administrations of IV Ampicillin 2g/100mL. Tolerated well. Lidocaine patch applied to sacral area. Patient ambulated to bathroom. No signs of distress noted. Will continue to monitor. Plan of care reviewed with patient. Patient verbalized understanding.

## 2019-05-15 NOTE — PROGRESS NOTES
[facility]  Cardiology  Progress Note    Patient Name: St Lacho Farooq  MRN: [id number]  Admission Date: 4/23/2019  Hospital Length of Stay: 22 days  Code Status: DNR   Attending Physician: John Saeed MD   Primary Care Physician: Dylan Adam MD  Expected Discharge Date: 5/30/2019  Principal Problem:Bacteremia    Subjective:     Hospital Course: plans for d/c from [facility] today.       ROS   Constitution:        Gen weak   HENT: Negative.    Eyes: Negative.    Cardiovascular: Negative.    Respiratory: Negative.    Endocrine: Negative.    Skin: Negative.    Musculoskeletal: Negative.    Gastrointestinal: Negative.    Genitourinary: Negative.    Neurological: Negative.    Psychiatric/Behavioral: Negative.    Allergic/Immunologic: Negative.              Objective:     Vital Signs (Most Recent):  Temp: 97.7 °F (36.5 °C) (05/15/19 0735)  Pulse: 79 (05/15/19 0735)  Resp: 18 (05/15/19 0735)  BP: (!) 140/63 (05/15/19 0735)  SpO2: 96 % (05/15/19 0735) Vital Signs (24h Range):  Temp:  [97.6 °F (36.4 °C)-97.7 °F (36.5 °C)] 97.7 °F (36.5 °C)  Pulse:  [64-79] 79  Resp:  [18] 18  SpO2:  [96 %-97 %] 96 %  BP: (140-151)/(63-69) 140/63     Weight: 89.4 kg (197 lb)(NO NEW SINCE ADMIT)  Body mass index is 29.95 kg/m².    SpO2: 96 %  O2 Device (Oxygen Therapy): room air      Intake/Output Summary (Last 24 hours) at 5/15/2019 0826  Last data filed at 5/15/2019 0413  Gross per 24 hour   Intake 380 ml   Output --   Net 380 ml       Lines/Drains/Airways     Peripherally Inserted Central Catheter Line                 PICC Double Lumen 04/22/19 1108 22 days                Physical Exam  Constitutional: He is oriented to person, place, and time. He appears well-developed.   frail   Cardiovascular: Normal rate.   Pulmonary/Chest: Effort normal.   Musculoskeletal: Normal range of motion.   Neurological: He is alert and oriented to person, place, and time.   Skin: Skin is warm.   Psychiatric: He has a normal  mood and affect.   Nursing note and vitals reviewed.         Significant Labs:   ABG: No results for input(s): PH, PCO2, HCO3, POCSATURATED, BE in the last 48 hours., Blood Culture: No results for input(s): LABBLOO in the last 48 hours., BMP:   Recent Labs   Lab 05/14/19  0551 05/15/19  0516    98    143   K 3.5 3.4*    109   CO2 25 26   BUN 12 13   CREATININE 0.9 1.0   CALCIUM 10.0 9.8   , CMP   Recent Labs   Lab 05/14/19  0551 05/15/19  0516    143   K 3.5 3.4*    109   CO2 25 26    98   BUN 12 13   CREATININE 0.9 1.0   CALCIUM 10.0 9.8   PROT 5.8* 5.7*   ALBUMIN 2.8* 2.7*   BILITOT 0.8 0.8   ALKPHOS 57 56   AST 11 10   ALT 13 11   ANIONGAP 8 8   ESTGFRAFRICA >60 >60   EGFRNONAA >60 >60   , CBC   Recent Labs   Lab 05/14/19  0551 05/15/19  0516   WBC 6.06 5.06   HGB 8.3* 8.1*   HCT 28.1* 27.0*    214   , INR No results for input(s): INR, PROTIME in the last 48 hours., Lipid Panel No results for input(s): CHOL, HDL, LDLCALC, TRIG, CHOLHDL in the last 48 hours.,   Pathology Results  (Last 10 years)    None      , Troponin No results for input(s): TROPONINI in the last 48 hours., All pertinent lab results from the last 24 hours have been reviewed.,   Recent Lab Results       05/15/19  0516        Albumin 2.7     Alkaline Phosphatase 56     ALT 11     Anion Gap 8     AST 10     Baso # 0.03     Basophil% 0.6     BILIRUBIN TOTAL 0.8  Comment:  For infants and newborns, interpretation of results should be based  on gestational age, weight and in agreement with clinical  observations.  Premature Infant recommended reference ranges:  Up to 24 hours.............<8.0 mg/dL  Up to 48 hours............<12.0 mg/dL  3-5 days..................<15.0 mg/dL  6-29 days.................<15.0 mg/dL       BUN, Bld 13     Calcium 9.8     Chloride 109     CO2 26     Creatinine 1.0     Differential Method Automated     eGFR if  >60     eGFR if non   >60  Comment:  Calculation used to obtain the estimated glomerular filtration  rate (eGFR) is the CKD-EPI equation.        Eos # 0.2     Eosinophil% 3.0     Glucose 98     Gran # (ANC) 3.5     Gran% 69.2     Hematocrit 27.0     Hemoglobin 8.1     Lymph # 0.7     Lymph% 14.0     MCH 23.3     MCHC 30.0     MCV 78     Mono # 0.7     Mono% 13.2     MPV 9.1     Platelets 214     Potassium 3.4     PROTEIN TOTAL 5.7     RBC 3.47     RDW 19.1     Sodium 143     WBC 5.06        and None    Significant Imaging: Cardiac Cath: , CT scan: CT ABDOMEN PELVIS WITH CONTRAST: No results found for this visit on 04/23/19., CT ABDOMEN PELVIS WITHOUT CONTRAST: No results found for this visit on 04/23/19. and , Echocardiogram:   2D echo with color flow doppler: No results found for this or any previous visit., Transthoracic echo (TTE) complete (Cupid Only):   Results for orders placed or performed during the hospital encounter of 04/16/19   Transthoracic echo (TTE) complete (Cupid Only)   Result Value Ref Range    BSA 2.06 m2    LA WIDTH 4.45 cm    PV PEAK VELOCITY 1.38 cm/s    LVIDD 6.93 (A) 3.5 - 6.0 cm    IVS 1.13 (A) 0.6 - 1.1 cm    PW 1.17 (A) 0.6 - 1.1 cm    Ao root annulus 2.86 cm    LVIDS 4.76 (A) 2.1 - 4.0 cm    FS 31 28 - 44 %    LA volume 102.99 cm3    STJ 2.65 cm    LV mass 377.93 g    LA size 4.98 cm    RVDD 3.25 cm    Left Ventricle Relative Wall Thickness 0.34 cm    AV mean gradient 7.38 mmHg    AV valve area 2.16 cm2    AV Velocity Ratio 0.72     AV index (prosthetic) 0.70     E/A ratio 1.05     E wave decelartion time 305.25 msec    IVRT 0.11 msec    Pulm vein S/D ratio 1.26     LVOT diameter 1.99 cm    LVOT area 3.11 cm2    LVOT peak abdifatah 3.3289799507 m/s    LVOT peak VTI 28.16 cm    Ao peak abdifatah 1.62 m/s    Ao VTI 40.46 cm    LVOT stroke volume 87.54 cm3    AV peak gradient 10.50 mmHg    MV Peak E Abdifatah 1.16 m/s    TR Max Abdifatah 2.65 m/s    MV Peak A Abdifatah 1.11 m/s    PV Peak S Abdifatah 0.44 m/s    PV Peak D Abdifatah 0.35 m/s    LV  Systolic Volume 105.45 mL    LV Systolic Volume Index 52.5 mL/m2    LV Diastolic Volume 249.54 mL    LV Diastolic Volume Index 124.20 mL/m2    LA Volume Index 51.3 mL/m2    LV Mass Index 188.1 g/m2    RA Major Axis 5.02 cm    Left Atrium Minor Axis 5.14 cm    Left Atrium Major Axis 5.84 cm    Triscuspid Valve Regurgitation Peak Gradient 28.09 mmHg    Right Atrial Pressure (from IVC) 3 mmHg    TV rest pulmonary artery pressure 31 mmHg    and , EKG: , Stress Test: , X-Ray: CXR: X-Ray Chest 1 View (CXR): No results found for this visit on 04/23/19., X-Ray Chest PA and Lateral (CXR): No results found for this visit on 04/23/19. and , KUB: X-Ray Abdomen AP 1 View (KUB): No results found for this visit on 04/23/19. and  and  and   Assessment and Plan:       Active Diagnoses:    Diagnosis Date Noted POA    PRINCIPAL PROBLEM:  Bacteremia [R78.81] 04/18/2019 Yes    Falls [W19.XXXA] 05/10/2019 Yes    Orthostatic hypotension [I95.1] 05/10/2019 Yes    Skin ulcer of sacrum, limited to breakdown of skin [L98.421] 05/03/2019 No    Encephalopathy [G93.40]  No    Renal insufficiency [N28.9] 04/29/2019 Yes    Nocturnal enuresis [N39.44] 04/25/2019 No    Atrial thrombus [I51.3] 04/19/2019 Yes    Cerebrovascular accident (CVA) [I63.9] 04/16/2019 Yes    Hx of CABG [Z95.1] 02/19/2019 Not Applicable    CHF (congestive heart failure), NYHA class II, chronic, diastolic [I50.32] 02/19/2019 Yes    Anemia [D64.9] 11/03/2016 Yes    Essential hypertension [I10] 10/28/2016 Yes    Aortic valve stenosis [I35.0] 06/28/2016 Yes      Problems Resolved During this Admission:       VTE Risk Mitigation (From admission, onward)        Ordered     apixaban tablet 5 mg  2 times daily      05/13/19 0914     heparin, porcine (PF) 100 unit/mL injection flush 500 Units  As needed (PRN)      04/25/19 1408        Current Facility-Administered Medications   Medication    acetaminophen tablet 650 mg    albuterol-ipratropium 2.5 mg-0.5 mg/3 mL  nebulizer solution 3 mL    ampicillin 2 g in sodium chloride 0.9 % 100 mL IVPB (ready to mix system)    apixaban tablet 5 mg    aspirin EC tablet 81 mg    atorvastatin tablet 10 mg    carvedilol tablet 25 mg    furosemide tablet 40 mg    heparin, porcine (PF) 100 unit/mL injection flush 500 Units    HYDROmorphone injection 0.5 mg    levETIRAcetam tablet 250 mg    lidocaine 5 % patch 1 patch    lisinopril tablet 10 mg    miconazole 2 % cream    ondansetron injection 4 mg    pantoprazole EC tablet 40 mg    ramelteon tablet 8 mg    sodium chloride 0.9% flush 10 mL    And    sodium chloride 0.9% flush 10 mL    tamsulosin 24 hr capsule 0.4 mg    traMADol tablet 100 mg   Recent recurrent CVA with interruption of NOAC. Now stable. Chronic edema.  Pt with multiple other comorbidities which puts him at risk for CVA including multiple calcified vessels of the carotid and vertebral system. ?septic emoboli  May consider repeat INES in the coming weeks (as he has recently had one roughly 3 weeks ago) to reassess left atrial thrombus.   CAF  CAD [facility] 2/19/2019 S/P CABG patent LIMA to LAD, Patent SVG to first Diag,  of CFX,  of RCA.      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 During that admission developed fever with +blood cultures and was subsequently transferred to [facility] for INES. INES on 4/18/19 revealed large thrombus burden in EDELMIRA, EF 55%, bubble study positive for grade II PFO, prosthetic AV functioning normally. No valvular vegetation noted. There was an episode of NSVT while at [facility] and patient was evaluated by EP consider EF study of continued episodes. Patient has had no further episodes. Patient was seen by ID while at [facility] and it was deteremined that given recent valve replacement LA thrombus, and bacteremia patient should be treated with  endocarditis ABX regimen. Patient was started on 6 wk regiment of IV ABX and transferred back to [facility] for rehab.      On evening of 4/28/19 patient developed fever, confusion, and rt sided weakness with hypotension. MRI reveals acute punctate infarctions. Suggestive of further cardio-embolic stroke. NOAC been continued per neurology recommendation. Mental status has since improved. Permissive HTN has been allowed given acute CVAs.   Patient is a DNR     Plan: stable from CV standpoint, some insomnia, still 2+ edema but improving, and BP improved  Renal function stable at baseline  May consider INES in the future to reassess LA thrombus  Cont Pradaxa/ASA/coreg/lasix  po qday/PPI  Restart Melatonin for insomnia  Consider increasing lisinopril for BP and watch BMP  Increased lasix to 40 mg qam for swelling; K low; needs 40 meq KCL qd  May go home on iv ABX         Vik Blue, DANK  Cardiology  [facility]    I attest that I have personally seen and examined this patient. I have reviewed and discussed the management in detail as outlined above.

## 2019-05-16 VITALS
DIASTOLIC BLOOD PRESSURE: 56 MMHG | OXYGEN SATURATION: 97 % | BODY MASS INDEX: 29.86 KG/M2 | TEMPERATURE: 97 F | WEIGHT: 197 LBS | HEIGHT: 68 IN | SYSTOLIC BLOOD PRESSURE: 121 MMHG | RESPIRATION RATE: 18 BRPM | HEART RATE: 63 BPM

## 2019-05-16 LAB
ALBUMIN SERPL BCP-MCNC: 2.6 G/DL (ref 3.5–5.2)
ALP SERPL-CCNC: 54 U/L (ref 55–135)
ALT SERPL W/O P-5'-P-CCNC: 9 U/L (ref 10–44)
ANION GAP SERPL CALC-SCNC: 7 MMOL/L (ref 8–16)
AST SERPL-CCNC: 9 U/L (ref 10–40)
BASOPHILS # BLD AUTO: 0.04 K/UL (ref 0–0.2)
BASOPHILS NFR BLD: 0.8 % (ref 0–1.9)
BILIRUB SERPL-MCNC: 0.7 MG/DL (ref 0.1–1)
BUN SERPL-MCNC: 12 MG/DL (ref 8–23)
CALCIUM SERPL-MCNC: 9.5 MG/DL (ref 8.7–10.5)
CHLORIDE SERPL-SCNC: 110 MMOL/L (ref 95–110)
CO2 SERPL-SCNC: 26 MMOL/L (ref 23–29)
CREAT SERPL-MCNC: 1 MG/DL (ref 0.5–1.4)
DIFFERENTIAL METHOD: ABNORMAL
EOSINOPHIL # BLD AUTO: 0.2 K/UL (ref 0–0.5)
EOSINOPHIL NFR BLD: 4.4 % (ref 0–8)
ERYTHROCYTE [DISTWIDTH] IN BLOOD BY AUTOMATED COUNT: 19.1 % (ref 11.5–14.5)
EST. GFR  (AFRICAN AMERICAN): >60 ML/MIN/1.73 M^2
EST. GFR  (NON AFRICAN AMERICAN): >60 ML/MIN/1.73 M^2
GLUCOSE SERPL-MCNC: 106 MG/DL (ref 70–110)
HCT VFR BLD AUTO: 27.7 % (ref 40–54)
HGB BLD-MCNC: 8.2 G/DL (ref 14–18)
LYMPHOCYTES # BLD AUTO: 0.8 K/UL (ref 1–4.8)
LYMPHOCYTES NFR BLD: 16 % (ref 18–48)
MCH RBC QN AUTO: 22.9 PG (ref 27–31)
MCHC RBC AUTO-ENTMCNC: 29.6 G/DL (ref 32–36)
MCV RBC AUTO: 77 FL (ref 82–98)
MONOCYTES # BLD AUTO: 0.6 K/UL (ref 0.3–1)
MONOCYTES NFR BLD: 13.3 % (ref 4–15)
NEUTROPHILS # BLD AUTO: 3.1 K/UL (ref 1.8–7.7)
NEUTROPHILS NFR BLD: 65.5 % (ref 38–73)
PLATELET # BLD AUTO: 202 K/UL (ref 150–350)
PMV BLD AUTO: 8.6 FL (ref 9.2–12.9)
POTASSIUM SERPL-SCNC: 3.7 MMOL/L (ref 3.5–5.1)
PROT SERPL-MCNC: 5.5 G/DL (ref 6–8.4)
RBC # BLD AUTO: 3.58 M/UL (ref 4.6–6.2)
SODIUM SERPL-SCNC: 143 MMOL/L (ref 136–145)
WBC # BLD AUTO: 4.8 K/UL (ref 3.9–12.7)

## 2019-05-16 PROCEDURE — 97530 THERAPEUTIC ACTIVITIES: CPT

## 2019-05-16 PROCEDURE — 99239 PR HOSPITAL DISCHARGE DAY,>30 MIN: ICD-10-PCS | Mod: ,,, | Performed by: FAMILY MEDICINE

## 2019-05-16 PROCEDURE — 80053 COMPREHEN METABOLIC PANEL: CPT

## 2019-05-16 PROCEDURE — 36415 COLL VENOUS BLD VENIPUNCTURE: CPT

## 2019-05-16 PROCEDURE — 25000003 PHARM REV CODE 250: Performed by: PSYCHIATRY & NEUROLOGY

## 2019-05-16 PROCEDURE — 25000003 PHARM REV CODE 250: Performed by: INTERNAL MEDICINE

## 2019-05-16 PROCEDURE — 97127 HC THERAPEUTIC INTVTN, COGN FUNCTION - ST: CPT

## 2019-05-16 PROCEDURE — 80177 DRUG SCRN QUAN LEVETIRACETAM: CPT

## 2019-05-16 PROCEDURE — 99239 HOSP IP/OBS DSCHRG MGMT >30: CPT | Mod: ,,, | Performed by: FAMILY MEDICINE

## 2019-05-16 PROCEDURE — 25000003 PHARM REV CODE 250: Performed by: NURSE PRACTITIONER

## 2019-05-16 PROCEDURE — 63600175 PHARM REV CODE 636 W HCPCS: Performed by: INTERNAL MEDICINE

## 2019-05-16 PROCEDURE — 85025 COMPLETE CBC W/AUTO DIFF WBC: CPT

## 2019-05-16 PROCEDURE — A4216 STERILE WATER/SALINE, 10 ML: HCPCS | Performed by: INTERNAL MEDICINE

## 2019-05-16 RX ORDER — RAMELTEON 8 MG/1
8 TABLET ORAL NIGHTLY PRN
Qty: 30 TABLET | Refills: 0 | Status: SHIPPED | OUTPATIENT
Start: 2019-05-16 | End: 2019-05-16 | Stop reason: HOSPADM

## 2019-05-16 RX ORDER — MIRTAZAPINE 7.5 MG/1
7.5 TABLET, FILM COATED ORAL NIGHTLY
Qty: 30 TABLET | Refills: 11 | Status: SHIPPED | OUTPATIENT
Start: 2019-05-16 | End: 2020-05-15

## 2019-05-16 RX ADMIN — LISINOPRIL 10 MG: 10 TABLET ORAL at 09:05

## 2019-05-16 RX ADMIN — POTASSIUM CHLORIDE 40 MEQ: 1500 TABLET, EXTENDED RELEASE ORAL at 09:05

## 2019-05-16 RX ADMIN — AMPICILLIN SODIUM 2 G: 2 INJECTION, POWDER, FOR SOLUTION INTRAMUSCULAR; INTRAVENOUS at 05:05

## 2019-05-16 RX ADMIN — TAMSULOSIN HYDROCHLORIDE 0.4 MG: 0.4 CAPSULE ORAL at 09:05

## 2019-05-16 RX ADMIN — FUROSEMIDE 40 MG: 40 TABLET ORAL at 09:05

## 2019-05-16 RX ADMIN — LEVETIRACETAM 250 MG: 250 TABLET ORAL at 09:05

## 2019-05-16 RX ADMIN — PANTOPRAZOLE SODIUM 40 MG: 40 TABLET, DELAYED RELEASE ORAL at 09:05

## 2019-05-16 RX ADMIN — APIXABAN 5 MG: 5 TABLET, FILM COATED ORAL at 09:05

## 2019-05-16 RX ADMIN — CARVEDILOL 25 MG: 25 TABLET, FILM COATED ORAL at 07:05

## 2019-05-16 RX ADMIN — MICONAZOLE NITRATE: 20 CREAM TOPICAL at 09:05

## 2019-05-16 RX ADMIN — ASPIRIN 81 MG: 81 TABLET, COATED ORAL at 09:05

## 2019-05-16 RX ADMIN — AMPICILLIN SODIUM 2 G: 2 INJECTION, POWDER, FOR SOLUTION INTRAMUSCULAR; INTRAVENOUS at 11:05

## 2019-05-16 RX ADMIN — Medication 10 ML: at 11:05

## 2019-05-16 NOTE — NURSING
Bedside report received from Jeana.  Vs stable. No complaints of pain.  Family sitter at bedside.  PICC  Line intact.  Call bell in reach.  Will continue to monitor.

## 2019-05-16 NOTE — PT/OT/SLP DISCHARGE
Speech Language Pathology Discharge Summary    St Lacho Farooq  MRN: [id number]   Bacteremia     Date of Last Treatment Session: 5/16/19    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    S/P 2-vessel coronary artery bypass        Treatment Area(s):  Cognition    Goals:   Multidisciplinary Problems     SLP Goals        Problem: SLP Goal    Goal Priority Disciplines Outcome   SLP Goal     SLP Unable to achieve outcome(s) by discharge   Description:    Long Term Goals:  1. Pt will increase cognitive linguistic skills to a functional level in order to promote safe and independent activities of choice in known and unknown environments                    Participation in Treatment (at discharge):  Cooperative given SLP encouragement    Functional Status at time of Discharge:    Cognition: Patient demonstrates mild-moderate cognitive linguistic deficits. He presented with deficits across reasoning, problem solving, and short term recall, affecting his ability to complete higher level tasks independently and efficiently. Tasks completed during acute stay included recall of word pairs to increase short term memory skills, review of assistive devices to increase safety awareness, and picture analogies and category exclusion to increase reasoning skills.     Patient is discharged to daughter's home with assistance from family      Ame Gunn CCC-SLP  5/16/2019

## 2019-05-16 NOTE — ASSESSMENT & PLAN NOTE
Continue Lisinopril, HCTZ  4/29: Hold lasix for now. Already had dose this am. Give gentle hydration given rising Cr. Likely a little dry  4/30 cont to hold lasix, lisinopril and hctz LAUREL hose.    5/2 holding ace. Diuretics cont compression hose and BB    5/3 Cont BB and asa, no ace/arb/lasix due to permissive HTN.    5/6 /67. No signs of acute exacerbation today    5/13 resumed ACE, lasix per cards increased today watch renal function    5/16 152/97 cont ace, lasix kidneys stable, also cont K replacement

## 2019-05-16 NOTE — PT/OT/SLP PROGRESS
Speech Language Pathology Treatment    Patient Name:  St Lacho Farooq   MRN:  [id number]  Admitting Diagnosis: Bacteremia    Recommendations:                 General Recommendations: Speech/language therapy  Diet recommendations: Solid Diet Level: Regular, Liquid Diet Level: Thin   Aspiration Precautions: Standard aspiration precautions   General Precautions: Standard    Communication strategies:  none    Subjective     Pt found sitting in bedside chair upon SLP entry into room with visitors present at bedside. Pt was agreeable to complete all therapy tasks following MIN verbal encouragement. He was happy over the prospect of leaving this evening.     Patient goals: to go home     Pain/Comfort:   0/10    Objective:     Has the patient been evaluated by SLP for swallowing?   No  Keep patient NPO? No   Current Respiratory Status: room air      Pt completed x4 trials of visual analogies with 100% accuracy with Modified [place]. Category exclusion task in a FO4 was implemented to target reasoning and short term recall skills. Pt was able to identify unlike item on 6/8 trials given MIN verbal and visual cueing and 8/8 trials given MAX verbal and visual cueing. Category was identified on all trials given MAX verbal and visual cueing. Initial trials were read aloud to target recall skills; however, accurate mental manipulation was unable to be consistently achieved. Task complexity was decreased and pt allowed to visualize words across trials.    Assessment:     St Lacho Farooq is a 84 y.o. 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 He presents with deficits across reasoning, problem solving, and short term recall, affecting his ability to complete higher level tasks independently and efficiently. Pt to be discharged to daughter's house this evening. Recommend assistance for higher level ADLs including medication, appointment, and money management tasks among others upon  discharge. Pt will have assistance from daughter. No additional skilled speech services upon discharge as pt shows decreased motivation to complete cognitive linguistic tasks despite consistent education regarding services.    Goals:   Multidisciplinary Problems     SLP Goals        Problem: SLP Goal    Goal Priority Disciplines Outcome   SLP Goal     SLP Unable to achieve outcome(s) by discharge   Description:    Long Term Goals:  1. Pt will increase cognitive linguistic skills to a functional level in order to promote safe and independent activities of choice in known and unknown environments                    Plan:     · Patient to be seen:  3 x/week   · Plan of Care expires:  05/30/19  · Plan of Care reviewed with:  patient   · SLP Follow-Up:  Yes       Discharge recommendations:  home with home health   Barriers to Discharge:  Level of Skilled Assistance Needed - pt continues to require skilled personnel for managmeent of medical diagnoses     Time Tracking:     SLP Treatment Date:   05/16/19  Speech Start Time:  0945  Speech Stop Time:  1000     Speech Total Time (min):  15 min    Billable Minutes: Treatment of cognitive linguistic skills- 15 minutes       Ame Gunn CCC-SLP  05/16/2019

## 2019-05-16 NOTE — PT/OT/SLP DISCHARGE
Physical Therapy Discharge Summary    Name: St Lacho Farooq  MRN: [id number]   Principal Problem: Bacteremia     Patient Discharged from acute Physical Therapy on 19.  Please refer to prior PT noted date on 19 for functional status.     Assessment:     Patient was discharged unexpectedly.  Information required to complete an accurate discharge summary is unknown.  Refer to therapy initial evaluation and last progress note for initial and most recent functional status and goal achievement.  Recommendations made may be found in medical record.    Objective:     GOALS:   Multidisciplinary Problems     Physical Therapy Goals     Not on file          Multidisciplinary Problems (Resolved)        Problem: Physical Therapy Goal    Goal Priority Disciplines Outcome Goal Variances Interventions   Physical Therapy Goal   (Resolved)     PT, PT/OT Outcome(s) achieved     Description:  Goals to be met by:  5/10/19 (updated 19)    Patient will increase functional independence with mobility by performin. Supine to sit with Independent - met 19  2. Sit to supine with Independent - met 19  3. Bed to chair transfer with Modified Independent with or without R W or straight Cane  using Step Transfer TECHNIQUE  4. Gait  x 300  feet with Modified Independent with or without straight cane or RW  5. Lower extremity exercise program x10 reps per handout, with assistance as needed. Met 19  6. Able to ascend/descend 12 stair steps using handrail/Straight  Cane with supervision. Met 5/3/19  7. Able to ascend/descend 20 statir steps using handrail/Straight Cane with Supervision.                          Reasons for Discontinuation of Therapy Services  Transfer to alternate level of care. and Satisfactory goal achievement.      Plan:     Patient Discharged to: Home with Home Health Service.    Keven Goldstein, PT  2019

## 2019-05-16 NOTE — PLAN OF CARE
05/16/19 0927   Medicare Message   Important Message from Medicare regarding Discharge Appeal Rights Given to patient/caregiver;Explained to patient/caregiver;Signed/date by patient/caregiver   Date IMM was signed 05/16/19   Time IMM was signed 0927         NOMNOC signed for discharge by daughter, Mitzy.

## 2019-05-16 NOTE — ASSESSMENT & PLAN NOTE
Subacute CVA with focal deficits noted on 4/14, with significant improvement in symptoms   MRI did not demonstrate acute findings but neuro reviewed and Dx with new CVA;   Was being tx with Pradaxa when he had acute stroke but Will not consider failed therapy as patient has had lapses in therapy due to recent surgical interventions and non-compliance  Per Dr. Fernandez's recommendations we will continue:  -Telemetry.  -Neurochecks.  -NO TPA as he presented initially on Sunday  -Physical Therapy and OT Consult, evaluation and treatment at [place] once patient is admitted to  SNF  -Completed permissive HTN and will need strict BP control.   -Continue his current dose home meds otherwise including Asprin, Pradaxa and statin       4/29: Reduce pradaxa due to renal function today  Consult colin due to AMS and neuro changes overnight. Repeat CT head without acute changes. ? Watershed injury; ? Keep BP a little higher? Dehydration playing a role    4/30 go back up on pradaxa due to kidneys now functioning. Reviewed MRI with Kel Fernandez and Dr Ventura. ST/OT/PT    5/2 pt doing well with gait training and speech. Up in day room today    5/3 had another episode of change in mental status yesterday evening. Started on keppra, ct negative. Doing better today  5/6 No further mental status changes since staring KEPPRA   5/8 Stable w Keppra  5/13 cont keppra tolerating well- no further episode  5/16 add to am labs check level

## 2019-05-16 NOTE — NURSING
Report received from SILVESTRE Huerta. Pt. Scheduled for discharge today. Keppra level was added on to today's lab. Patient ambulates to bathroom with walker. No complaints of pain. No signs of distress noted. Patient receives IV ampicillin Q6H. PICC line clean, dry, and intact. Dressing change due 5/20/2019. Pt. To go home with PICC line to receive two more weeks of IV antibiotics. Moderate swelling noted Bilaterally to lower extremities. Will continue to monitor.

## 2019-05-16 NOTE — ASSESSMENT & PLAN NOTE
Somnolent today.  Considering emolic showers vs sz activity. CT negative, neuro started keppra.    5/6/19 much better with Keppra rx, no further mental status changes.   5/13 cont keppra  5/16 keppra level today

## 2019-05-16 NOTE — ASSESSMENT & PLAN NOTE
Continue amlodpine, Lisinopril, HCTZ, , coreg  4/29: Lasix- will hold now that Creat bumped and follow renal fxn  4/30 stopped lasix and lisinopril yesterday, today stop hctz and reduce amlodipine  He needs a little high bp.    5/2 Bp still in 120/50- cut the amlodipine out today  5/3 cont to hold bp meds, allowing permissive HTN, bp still only 124/58. Can start to treat bp if needed over weekend per neuro recs.    5/6: overall remains stable. No medication changes today    No HCTZ to d/c    5/13 lisinopril resumed per cards bp 160s

## 2019-05-16 NOTE — PT/OT/SLP PROGRESS
"Physical Therapy Treatment    Patient Name:  St Lacho Farooq   MRN:  [id number]    Recommendations:     Discharge Recommendations:  home with home health, home health PT   Discharge Equipment Recommendations: walker, rolling, commode, grab bar, tub/shower, tub bench   Barriers to discharge: Inaccessible home    Assessment:     St Lacho Farooq is a 84 y.o. male admitted with a medical diagnosis of Bacteremia.  He presents with the following impairments/functional limitations:  weakness, gait instability, impaired self care skills, impaired functional mobilty, impaired cognition, pain, impaired balance. Patient sleep well  last night - per patient 8 hours sleep. Patient exhibits Modified independent in transfers and gait functions using RW on level terrain. Able to perform ascending/descending 20 stair steps using bilateral handrails with Supervision. Provided patient with home exercise program with good understanding. Patient will be discharge to home with home health today. Patient will benefit for home health PT to maximized patient safety and independent with mobility and self care tasks.    Rehab Prognosis: Good; patient would benefit from acute skilled PT services to address these deficits and reach maximum level of function.    Recent Surgery: * No surgery found *      Plan:     During this hospitalization, patient to be seen 5 x/week to address the identified rehab impairments via gait training, therapeutic activities, therapeutic exercises and progress toward the following goals:    · Plan of Care Expires:  05/10/19    Subjective     Chief Complaint: Nothing to complain. Slept well last night.  Patient/Family Comments/goals: "To go home to my daughter and do my own exercise".  Pain/Comfort:  · Pain Rating 1: 0/10  · Pain Rating Post-Intervention 1: 0/10      Objective:     Communicated with patient, daughter and sitter prior to session.  Patient found up in chair with peripheral IV upon PT entry to " room.     General Precautions: Standard, fall   Orthopedic Precautions:N/A   Braces: N/A     Functional Mobility:  · Transfers:     · Sit to Stand:  modified independence with rolling walker  · Bed to Chair: modified independence with  rolling walker  using  Step Transfer  · Gait: RW ambulation x 300 feet with modified Independent. Reciprocal gait with increased Right Foot/Floor clearance and stride length.  · Balance: Dynamic Stand using RW wioth Good grade.  · Stairs:  Pt ascended/descended 20 steps stair(s) with No Assistive Device with bilateral handrails with Supervision or Set-up Assistance.       AM-PAC 6 CLICK MOBILITY  Turning over in bed (including adjusting bedclothes, sheets and blankets)?: 4  Sitting down on and standing up from a chair with arms (e.g., wheelchair, bedside commode, etc.): 4  Moving from lying on back to sitting on the side of the bed?: 4  Moving to and from a bed to a chair (including a wheelchair)?: 4  Need to walk in hospital room?: 4  Climbing 3-5 steps with a railing?: 3  Basic Mobility Total Score: 23       Therapeutic Activities and Exercises:   Reviewed and performed/established Home Exercise Program for strengthening and coordination ex on bilateral LE x 20 reps on each such as Tip Toe Ex, Marching at Sitting, LAQ Alternately, Side Steps Alternately and Arm Push Ups in the Chair. Recommended frequency: 2-3 X / Day including walking  Exercise using RW for 5 - 10 minutes daily.     Patient left up in chair with all lines intact, call button in reach, Nurse Sandra notified and Sitter present..    GOALS:   Multidisciplinary Problems     Physical Therapy Goals     Not on file          Multidisciplinary Problems (Resolved)        Problem: Physical Therapy Goal    Goal Priority Disciplines Outcome Goal Variances Interventions   Physical Therapy Goal   (Resolved)     PT, PT/OT Outcome(s) achieved     Description:  Goals to be met by:  5/10/19 (updated 5/5/19)    Patient will increase  functional independence with mobility by performin. Supine to sit with Independent - met 19  2. Sit to supine with Independent - met 19  3. Bed to chair transfer with Modified Independent with or without R W or straight Cane  using Step Transfer TECHNIQUE  4. Gait  x 300  feet with Modified Independent with or without straight cane or RW  5. Lower extremity exercise program x10 reps per handout, with assistance as needed. Met 19  6. Able to ascend/descend 12 stair steps using handrail/Straight  Cane with supervision. Met 5/3/19  7. Able to ascend/descend 20 statir steps using handrail/Straight Cane with Supervision.                          Time Tracking:     PT Received On: 19  PT Start Time: 1225     PT Stop Time: 1248  PT Total Time (min): 23 min     Billable Minutes: Therapeutic Activity 20 mins    Treatment Type: Treatment  PT/PTA: PT     PTA Visit Number: 1     Keven Goldstein, PT  2019

## 2019-05-16 NOTE — ASSESSMENT & PLAN NOTE
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 4/19 was on vanc and 4/20 started on ampicillin  4/30 day 12/42.  4/19 was on vanc and 4/20 started on ampicillin    5/2 day 15/42 of IV abx- repeat blood cultures today  5/3 day 16/42 on ampicillin, no growth in repeat blood cultures from 5/2 5/6 Day 19/42 Ampicillin;   5/13 day 26/42 ampicillin repeat cultures with no growth  5/16 day 29/42 ampicillin repeat cultures with no growth  "

## 2019-05-16 NOTE — ASSESSMENT & PLAN NOTE
H/H stable without acute indications for transfusion   Continue to monitor    check anemia w/u from last visit ferritin was 26, retic was 1.5  Occult stool today, will not change our plan just give us answer to where blood is going. He needs the blood thinner for now due to septic emboli.     Cont to monitor occult pending  Transfuse as needed, needs blood thinner.    H/H stable. Watch daily especially with NOAC change    Twice weekly blood work with h/h

## 2019-05-16 NOTE — PLAN OF CARE
05/16/19 0928   Final Note   Assessment Type Final Discharge Note   Anticipated Discharge Disposition Home-Health   What phone number can be called within the next 1-3 days to see how you are doing after discharge? [phone]   Hospital Follow Up  Appt(s) scheduled? Yes   Discharge plans and expectations educations in teach back method with documentation complete? Yes   Right Care Referral Info   Post Acute Recommendation Home-care   Referral Type Home-Care   [facility] [address]         Patient will discharge home today with daughter, Mitzy. He will have [facility] services and [facility] will provide medication. Patient reminded about what signs and symptoms to look for when discharged, and educated that if any symptoms return, make a same day appointment with PCP or come back to the ED. Patient states understanding and agreement. Patient denies any other needs or concerns for discharge.

## 2019-05-16 NOTE — PLAN OF CARE
Problem: Adult Inpatient Plan of Care  Goal: Plan of Care Review  Outcome: Outcome(s) achieved Date Met: 05/16/19  Pt. Receives IV Ampicillin Q6H. Pt. Ambulates with walker. Swelling noted bilaterally to lower legs/Ankles. Patient is wearing compression stockings. Pt. States he has no pain. PICC line Saline locked with NO swelling, No redness. PICC line dressing Clean dry and intact. Patient is to be discharged to home with daughter.

## 2019-05-16 NOTE — ASSESSMENT & PLAN NOTE
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 Galicia deemed septic emboli; still no INES reporting thrombus   Continue Pradaxa; family plans to monitor meds once home  Will not consider failed therapy as patient has had lapses in therapy due to recent surgical interventions and non-compliance   as discussed above there is concern for infected thrombus.   Per Infectious Disease will treat bacteremia and possible infected thrombus for 6 weeks.    5/2 Discussed with family and cardiology. rec repeat INES 2 weeks consider changing NOAC at that time, not now as concern over anticoagulation is issue with new CVA     5/6: unsure will repeat INES at this point. Will do 6 weeks IV antibx regardless of repeat INES results. Will continue to discuss with cardiology need for repeat procedure    5/13 Discussed with family repeat INES. The results would push us to either cont with pradaxa as the clot is resolving or change the NOAC. We have decided to change the NOAC. Consider procedure in future but for now this is the less invasive choice. Checking price. Monitor H/H with this change. Has been 2 week from last documented CVA    5/16 h/h stable with NOAC change. Have home health cont labs 2 times a week

## 2019-05-16 NOTE — PROGRESS NOTES
[facility] Medicine  Progress Note    Patient Name: St Lacho Farooq  MRN: [id number]  Patient Class: IP- Swing   Admission Date: 4/23/2019  Length of Stay: 23 days  Attending Physician: John Saeed MD  Primary Care Provider: Dylan Adam MD        Subjective:     Principal Problem:Bacteremia    HPI:  This 84 year old man with PMHX of CAD p CABG, [facility] 2/19; stable lesions, PAD, bilateral CVD, AAA,  PAF, HTN, hyperlipidemia, MALATHI, NYHA II chronic diastolic HF and non rheumatic critical AS with JAMAAL 0.8cm2 with preserved LVSFX EF55%,  per Echo underwent  + TAVR placed 3/18/19 per Dr. Babcock. Post op course was complicated by CVA ~ 2 weeks post TAVR; he presented to [facility] 4/16 with altered mental status and fever where he was Dx with CVA and gram + bacteremia.   He was  transferred to [facility] 4/19  for cardiology and ID eval ant tx; CT at that time was unrevealing for an acute intracranial process  His MRI was abnormal.  His blood cultures have grown Enterococcus faecalis sensitve to all tested drugs,  Pt initially given vancomycin and then changed to ampicillin IV.  He has clinically improved.  His INES shows a septic atrial thrombus. The cRP is 49 and the ESR is 15.  Pt is no longer febrile and he and daughter feel he is at base line physically and mentally.Pt transferred to [facility] yesterday for continued skilled care and prolonged IV ABX. Plan is 6 weeks of ABX; ampicillin 2gms IV q 6; day 1 with negative blood cultures was 4/19; so today is day 6/42  Requests DNR .    Hospital Course:  4/25/19 SKILLED NURSING for prolonged ABX   Pt sitting up in chair watching TV; reports feeling fine but did have urinary incontinence and noting frequency   No fever, WBC normal   Day 7/42 ABX for bacteremia/septic emboli     4/26/19 SKILLED NURSING for prolonged ABX for bacteremia, septic thrombus; day 8/42  NAD Overnight, still with night time urinary incontinence; will try trial of  flomax   PSA 1.4, repeat U/A ok; bladder scan ok     4/28 pt on SKILLED bed for prolonged abx day 10/42 for bacteremia from septic emboli. Ampicillin which cultures show sensitivity to. He also had a low grade temp last night. With the temp he had confusion and right sided weakness. Ct head done with no changes. Neuro was consulted for this am. He has this same type episode last visit with temp and mental status changes. This am he is back to normal. To note his H/H was lower and also creat bumped to 2.1.     4/29 pt on SKILL. Has some neuro changes on Sunday evening. CT without new strokes. MRI ordered per Dr shaw yesterday. Shows 3 new strokes. Facial droop this am . Neuro/cards and vascular surgery discussed pt case. He needs the anticoagulation with afib and thrombus in atrium. Will cont pradaxa, no heparin. Also remains on IV abx for 6 weeks. Day 12/42. No fever. No elevated WBC.     Creat was elevated yesterday 2.1. Lasix was stopped and 500ml IVF given. Creat better today 1.5. Will hold the hctz today as well as stopped the lisinopril and reduce amlodipine to have him a little higher with pressure due to acute strokes. Use LAUREL for swelling of lower extremity   R facial drop still apparent     5/2 pt doing well. he is sitting in day the day room. No complaints./ H.H stable. K a little low. Remains on pradaxa full dose now as kidneys have returned to baseline. Day 15/42 on ampicillin.     5/3 he is now on day 16/42 on enterococcus sensitive bacteremia to ampicillin. He is also on pradaxa full strength for atrial thrombus. Occult positive but h/h stable. Risk of discontinuing NOAC vs continuing discussed repetitively and family agrees to cont. Watching h/h daily.     Having episodes of mental status changes. Neuro following. Ct done again yesterday no acute changes. Keppra started for consideration of sz given the description of his events. No EEG available here, family does not wish for transfer.  "Otherwise all bp meds have been held to allow for permissive HTN. Bp still only 124/58 this am.    5/6/18 he is now on day 19/42 on enterococcus sensitive bacteremia to ampicillin. 5/2 BC NGTD x 4d  He is also on pradaxa full strength for atrial thrombus. Occult positive but h/h stable. NOAC  Risks/benefits reviewed  Keppra started for possible seizure activity with mental status changes( No EEG available here) . Pt is much improved with RX   H&H 8.4/28.9 , afebrile, WBC 6.7- VSS  Up in chair, "feeling good"    5/8/19 day 21/42 on enterococcus sensitive bacteremia to ampicillin. 5/2 BC NGTD x 5d  He is also on pradaxa full strength for atrial thrombus. Occult positive but h/h stable. NOAC  Risks/benefits reviewed  Keppra started for possible seizure activity with mental status changes( No EEG available here) . Pt is much improved with RX   H&H 8.4/28.9 , afebrile, WBC 5.23- VSS  Pt very alert and Oriented x 3; doing well with PT     5/10/19 Day 23/42 IV ampicillin for enterococcus sensitive bacteremia 5/2/19 BC Negative, Continue Pradaxa,    much improved with Keppra Rx; however this am he had a fall; fell backwards while urinating; he states he slipped; denies dizziness   Diuretics resumed per cards 2 days ago, also getting flomax; will check orthostatics   + recent c/o neck pain; has chronic neck pain  and gets epidural injections ; given IM celestone yesterday. Notes neck is better today.   Noting LE edema; discussed LE compression hose   BP sitting 161/67, Standing 121/56 ; so some orthostatic hypotension.   Discussed no tramadol today. He "feels good".     5/13 he is doing well. Has not had any more "spells". AAOx3. Day 26/42 of  IV ampicillin for enterococcus sensitive bacteremia 5/2/19 BC Negative, Continuing Pradaxa. H/H remains stable. Legs still swollen with LAUREL hose. Lasix was increased per Dr Begum today. Kidney function stable. Also pressure has been staying up. 150-160. Cards also added back lisinopril. " He does well with increased BP.    We discussed the possibility of repeating INES to check on clot. He result would give info on if we need to change NOAC as he has only been on pradaxa and coumadin. The concern is that he has had multiple strokes and developed this clot on the pradaxa (although did hold a couple days for valve repair)     5/16 he is doing well, Plan is d/c today and complete 2 more weeks of iv abx. On day 29/42 of  IV ampicillin for enterococcus sensitive bacteremia 5/2/19 BC Negative, pradaxa changed to eliquis (more cost effective) and H/H stable.     Also on keppra for sz precautions. Checking level today.     Doing well with PT/OT      Review of Systems   Constitutional: Negative.    HENT: Negative for congestion, ear pain, sinus pressure, sneezing and sore throat.    Eyes: Negative.    Respiratory: Negative for cough, chest tightness, shortness of breath and wheezing.    Cardiovascular: Negative.  Negative for chest pain.   Gastrointestinal: Negative for abdominal pain, constipation, diarrhea, nausea and vomiting.   Genitourinary: Negative for difficulty urinating, dysuria and flank pain.        +nocturia   Musculoskeletal: Negative.  Negative for joint swelling.   Skin: Negative.    Neurological: Negative for dizziness, facial asymmetry, weakness and headaches.   Hematological: Negative.    Psychiatric/Behavioral: Positive for sleep disturbance. Negative for behavioral problems and confusion.     Objective:     Vital Signs (Most Recent):  Temp: 97.3 °F (36.3 °C) (05/16/19 0700)  Pulse: 64 (05/16/19 0700)  Resp: 18 (05/16/19 0700)  BP: (!) 152/97 (05/16/19 0700)  SpO2: 97 % (05/16/19 0705) Vital Signs (24h Range):  Temp:  [96.5 °F (35.8 °C)-98.1 °F (36.7 °C)] 97.3 °F (36.3 °C)  Pulse:  [61-65] 64  Resp:  [18] 18  SpO2:  [94 %-97 %] 97 %  BP: (120-152)/(58-97) 152/97     Weight: 89.4 kg (197 lb)(NO NEW SINCE ADMIT)  Body mass index is 29.95 kg/m².    Physical Exam   Constitutional: He is  oriented to person, place, and time. He appears well-developed and well-nourished.   HENT:   Head: Normocephalic and atraumatic.   Right Ear: Tympanic membrane, external ear and ear canal normal.   Left Ear: Tympanic membrane, external ear and ear canal normal.   Mouth/Throat: Oropharynx is clear and moist.   Eyes: Pupils are equal, round, and reactive to light. Conjunctivae and EOM are normal.   Neck: Normal range of motion. Neck supple. No tracheal deviation present.   Cardiovascular: Normal rate, regular rhythm, intact distal pulses and normal pulses. Exam reveals no gallop and no friction rub.   Murmur heard.  Pulmonary/Chest: Effort normal and breath sounds normal.   Abdominal: Soft. Bowel sounds are normal. He exhibits no distension. There is no tenderness. Hernia confirmed negative in the right inguinal area and confirmed negative in the left inguinal area.   Musculoskeletal: Normal range of motion. He exhibits edema (3+ edema).   Neurological: He is alert and oriented to person, place, and time. He has normal reflexes. No cranial nerve deficit.   Skin: Skin is warm and dry.   Psychiatric: He has a normal mood and affect. His behavior is normal. Judgment and thought content normal.   Nursing note and vitals reviewed.        CRANIAL NERVES     CN III, IV, VI   Pupils are equal, round, and reactive to light.  Extraocular motions are normal.        Significant Labs:   CMP:   Recent Labs   Lab 05/15/19  0516 05/16/19  0629    143   K 3.4* 3.7    110   CO2 26 26   GLU 98 106   BUN 13 12   CREATININE 1.0 1.0   CALCIUM 9.8 9.5   PROT 5.7* 5.5*   ALBUMIN 2.7* 2.6*   BILITOT 0.8 0.7   ALKPHOS 56 54*   AST 10 9*   ALT 11 9*   ANIONGAP 8 7*   EGFRNONAA >60 >60     PSA 1.4,   Recent Labs   Lab 05/16/19  0629   WBC 4.80   RBC 3.58*   HGB 8.2*   HCT 27.7*      MCV 77*   MCH 22.9*   MCHC 29.6*     4/25 Urinalysis negative     Lab Results   Component Value Date    PSA 1.4 04/25/2019 4/23 Mag  2.2    Blood cultures  NGTD x 2  Blood cultures reordered  No Growth to date      blood cultures   Enterococcus faecalis       CULTURE, BLOOD     Ampicillin <=2 mcg/mL Sensitive     Gentamicin Synergy Screen <=500 mcg/mL Sensitive     Tetracycline <=4 mcg/mL Sensitive     Vancomycin 2 mcg/mL Sensitive      Flu negative       Significant Imagin/2 CT head   1.  No CT evidence of an acute intracranial abnormality.    2.  Atrophy and small vessel ischemic changes of the periventricular white matter.     MRI   1.  Two tiny focal areas of increased signal intensity on diffusion-weighted images as described above likely representing artifact at the crux of the sulci.  A secondary consideration is tiny lacunar infarcts.  Dr. Laverne Shipman was telephoned with a verbal report at the time of this dictation.    2.  Atrophy and small vessel ischemic changes of the periventricular white matter.     CT head   1.  No CT evidence of an acute intracranial abnormality.    2.  Atrophy and small vessel ischemic changes of the periventricular white matter.  Old lacunar infarcts.     x ray hip right   No evidence of right hip fracture.     x ray hip left   No evidence of left hip fracture.     CXR   Right-sided PICC with tip projecting over superior vena cava.     Echo   · Mildly decreased left ventricular systolic function. The estimated ejection fraction is 45%  · Local segmental wall motion abnormalities.  · Eccentric left ventricular hypertrophy.  · Severe left atrial enlargement.  · Grade I (mild) left ventricular diastolic dysfunction consistent with impaired relaxation.  · Normal left atrial pressure.  · Normal right ventricular systolic function.  · Mild right atrial enlargement.  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 I did not see mention of AVR in encounter notes or March echo report or prior INES report but there appears to be a bioprosthesis. There is mild central aortic  "insufficiency present.  · Mild mitral regurgitation.  · Normal central venous pressure (3 mm Hg).  · The estimated PA systolic pressure is 31 mm Hg    4/18 INES  Final Impressions  1. The study quality is good.   2. 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  3. Bubble study is positive for Grade II Patent Foramen Ovale.  4. Moderate lipomatous atrial septal hypertrophy.  5. Normal functioning prosthetic aortic valve with trivial   paravalvular regurgitation.   6. Preserved left ventricular function. Ejection fraction around 55%.  7. Grade III atherosclerosis of aortic arch.    Assessment/Plan:      * Bacteremia  S/p recent TAVR now with septic emboli and concern for "flicking emboli"   ENTEROCOCCUS faecalis    His blood cultures have grown Enterococcus faecalis sensitve to all tested drugs  ID recommending total of 6 weeks IV ABX; Ampicillin 2gms IV q 6 hr; day 1 with negative blood cultures was 4/19/19; so today is day 6/42  Day 7/42 4/26/19 day 8/42 4/29 day 11/42 of IV abd. 4/19 was on vanc and 4/20 started on ampicillin  4/30 day 12/42.  4/19 was on vanc and 4/20 started on ampicillin    5/2 day 15/42 of IV abx- repeat blood cultures today  5/3 day 16/42 on ampicillin, no growth in repeat blood cultures from 5/2 5/6 Day 19/42 Ampicillin;   5/13 day 26/42 ampicillin repeat cultures with no growth  5/16 day 29/42 ampicillin repeat cultures with no growth    Orthostatic hypotension  Now on flomax and diuretics resumed. + LE edema   Will order LE compression socks  Consider decreasing diuretic frequency if orthostasis continues   Monitor renal fx     Falls  Frequent falls; prior falls at home; right leg is weaker and his right foot gets stuck   Getting PT  Ambulating with walker; have to remind  Has sitter       Skin ulcer of sacrum, limited to breakdown of skin  Barrier cream ; nizoral cream  Donut to sit ;already doing it       Encephalopathy  Somnolent today.  Considering emolic showers vs sz activity. " CT negative, neuro started keppra.    5/6/19 much better with Keppra rx, no further mental status changes.   5/13 cont keppra  5/16 keppra level today    Renal insufficiency  Noted 4/29: Cr up to 2.1  Creat stable   Changed labs to M/W/f  Will go back to daily with the changes made today  Home health to monitor twice weekly    Nocturnal enuresis  Check U.A , PSA, bladder scan; bladder scan 89ml   Still with incontinence issues   Gets HCTz 12.5 and lasix 20mg po daily in am   trial of flomax     Stop fluid pill, creat better with hydration.    5/10 Diuretic resumed per cards on 5/8;   Urine symptoms have been better with flomax but this am fell backwards while urinating; will check orthostatic vitals     5/13 Cont lasix and increase dose per cards today. Give in am    Atrial thrombus  Large atrial thrombus found on INES ,Dr. Galicia deemed septic emboli; still no INES reporting thrombus   Continue Pradaxa; family plans to monitor meds once home  Will not consider failed therapy as patient has had lapses in therapy due to recent surgical interventions and non-compliance   as discussed above there is concern for infected thrombus.   Per Infectious Disease will treat bacteremia and possible infected thrombus for 6 weeks.    5/2 Discussed with family and cardiology. rec repeat INES 2 weeks consider changing NOAC at that time, not now as concern over anticoagulation is issue with new CVA     5/6: unsure will repeat INES at this point. Will do 6 weeks IV antibx regardless of repeat INES results. Will continue to discuss with cardiology need for repeat procedure    5/13 Discussed with family repeat INES. The results would push us to either cont with pradaxa as the clot is resolving or change the NOAC. We have decided to change the NOAC. Consider procedure in future but for now this is the less invasive choice. Checking price. Monitor H/H with this change. Has been 2 week from last documented CVA    5/16 h/h stable with NOAC  change. Have home health cont labs 2 times a week    Cerebrovascular accident (CVA)  Subacute CVA with focal deficits noted on 4/14, with significant improvement in symptoms   MRI did not demonstrate acute findings but neuro reviewed and Dx with new CVA;   Was being tx with Pradaxa when he had acute stroke but Will not consider failed therapy as patient has had lapses in therapy due to recent surgical interventions and non-compliance  Per Dr. Fernandez's recommendations we will continue:  -Telemetry.  -Neurochecks.  -NO TPA as he presented initially on Sunday  -Physical Therapy and OT Consult, evaluation and treatment at [facility] once patient is admitted to  SNF  -Completed permissive HTN and will need strict BP control.   -Continue his current dose home meds otherwise including Asprin, Pradaxa and statin       4/29: Reduce pradaxa due to renal function today  Consult colin due to AMS and neuro changes overnight. Repeat CT head without acute changes. ? Watershed injury; ? Keep BP a little higher? Dehydration playing a role    4/30 go back up on pradaxa due to kidneys now functioning. Reviewed MRI with Kel Fernandez and Dr Ventura. ST/OT/PT    5/2 pt doing well with gait training and speech. Up in day room today    5/3 had another episode of change in mental status yesterday evening. Started on keppra, ct negative. Doing better today  5/6 No further mental status changes since staring KEPPRA   5/8 Stable w Keppra  5/13 cont keppra tolerating well- no further episode  5/16 add to am labs check level    Hx of CABG  Asa, statin    CHF (congestive heart failure), NYHA class II, chronic, diastolic  Continue Lisinopril, HCTZ  4/29: Hold lasix for now. Already had dose this am. Give gentle hydration given rising Cr. Likely a little dry  4/30 cont to hold lasix, lisinopril and hctz LAUREL hose.    5/2 holding ace. Diuretics cont compression hose and BB    5/3 Cont BB and asa, no ace/arb/lasix due to permissive  HTN.    5/6 /67. No signs of acute exacerbation today    5/13 resumed ACE, lasix per cards increased today watch renal function    5/16 152/97 cont ace, lasix kidneys stable, also cont K replacement    Anemia  H/H stable without acute indications for transfusion   Continue to monitor    check anemia w/u from last visit ferritin was 26, retic was 1.5  Occult stool today, will not change our plan just give us answer to where blood is going. He needs the blood thinner for now due to septic emboli.     Cont to monitor occult pending  Transfuse as needed, needs blood thinner.    H/H stable. Watch daily especially with NOAC change    Twice weekly blood work with h/h        Essential hypertension  Continue amlodpine, Lisinopril, HCTZ, , coreg  4/29: Lasix- will hold now that Creat bumped and follow renal fxn  4/30 stopped lasix and lisinopril yesterday, today stop hctz and reduce amlodipine  He needs a little high bp.    5/2 Bp still in 120/50- cut the amlodipine out today  5/3 cont to hold bp meds, allowing permissive HTN, bp still only 124/58. Can start to treat bp if needed over weekend per neuro recs.    5/6: overall remains stable. No medication changes today    No HCTZ to d/c    5/13 lisinopril resumed per cards bp 160s    Aortic valve stenosis  S/p TAVR       VTE Risk Mitigation (From admission, onward)        Ordered     apixaban tablet 5 mg  2 times daily      05/13/19 0914     heparin, porcine (PF) 100 unit/mL injection flush 500 Units  As needed (PRN)      04/25/19 1408              Gill Figueroa MD  Department of Hospital Medicine   [facility]

## 2019-05-16 NOTE — DISCHARGE SUMMARY
[facility] Medicine  Discharge Summary      Patient Name: St Lacho Farooq  MRN: [id number]  Admission Date: 4/23/2019  Hospital Length of Stay: 23 days  Discharge Date and Time:  05/16/2019 9:44 AM  Attending Physician: John Saeed MD   Discharging Provider: Marta Song NP  Primary Care Provider: Dylan Adam MD      HPI:   This 84 year old man with PMHX of CAD p CABG, [facility] 2/19; stable lesions, PAD, bilateral CVD, AAA,  PAF, HTN, hyperlipidemia, MALATHI, NYHA II chronic diastolic HF and non rheumatic critical AS with JAMAAL 0.8cm2 with preserved LVSFX EF55%,  per Echo underwent  + TAVR placed 3/18/19 per Dr. Babcock. Post op course was complicated by CVA ~ 2 weeks post TAVR; he presented to [facility] 4/16 with altered mental status and fever where he was Dx with CVA and gram + bacteremia.   He was  transferred to [facility] 4/19  for cardiology and ID eval ant tx; CT at that time was unrevealing for an acute intracranial process  His MRI was abnormal.  His blood cultures have grown Enterococcus faecalis sensitve to all tested drugs,  Pt initially given vancomycin and then changed to ampicillin IV.  He has clinically improved.  His INES shows a septic atrial thrombus. The cRP is 49 and the ESR is 15.  Pt is no longer febrile and he and daughter feel he is at base line physically and mentally.Pt transferred to [facility] yesterday for continued skilled care and prolonged IV ABX. Plan is 6 weeks of ABX; ampicillin 2gms IV q 6; day 1 with negative blood cultures was 4/19; so today is day 6/42  Requests DNR .    * No surgery found *      Hospital Course:   4/25/19 SKILLED NURSING for prolonged ABX   Pt sitting up in chair watching TV; reports feeling fine but did have urinary incontinence and noting frequency   No fever, WBC normal   Day 7/42 ABX for bacteremia/septic emboli     4/26/19 SKILLED NURSING for prolonged ABX for bacteremia, septic thrombus; day 8/42  NAD Overnight, still with  night time urinary incontinence; will try trial of flomax   PSA 1.4, repeat U/A ok; bladder scan ok     4/28 pt on SKILLED bed for prolonged abx day 10/42 for bacteremia from septic emboli. Ampicillin which cultures show sensitivity to. He also had a low grade temp last night. With the temp he had confusion and right sided weakness. Ct head done with no changes. Neuro was consulted for this am. He has this same type episode last visit with temp and mental status changes. This am he is back to normal. To note his H/H was lower and also creat bumped to 2.1.     4/29 pt on SKILL. Has some neuro changes on Sunday evening. CT without new strokes. MRI ordered per Dr shaw yesterday. Shows 3 new strokes. Facial droop this am . Neuro/cards and vascular surgery discussed pt case. He needs the anticoagulation with afib and thrombus in atrium. Will cont pradaxa, no heparin. Also remains on IV abx for 6 weeks. Day 12/42. No fever. No elevated WBC.     Creat was elevated yesterday 2.1. Lasix was stopped and 500ml IVF given. Creat better today 1.5. Will hold the hctz today as well as stopped the lisinopril and reduce amlodipine to have him a little higher with pressure due to acute strokes. Use LAUREL for swelling of lower extremity   R facial drop still apparent     5/2 pt doing well. he is sitting in day the day room. No complaints./ H.H stable. K a little low. Remains on pradaxa full dose now as kidneys have returned to baseline. Day 15/42 on ampicillin.     5/3 he is now on day 16/42 on enterococcus sensitive bacteremia to ampicillin. He is also on pradaxa full strength for atrial thrombus. Occult positive but h/h stable. Risk of discontinuing NOAC vs continuing discussed repetitively and family agrees to cont. Watching h/h daily.     Having episodes of mental status changes. Neuro following. Ct done again yesterday no acute changes. Keppra started for consideration of sz given the description of his events. No EEG  "available here, family does not wish for transfer. Otherwise all bp meds have been held to allow for permissive HTN. Bp still only 124/58 this am.    5/6/18 he is now on day 19/42 on enterococcus sensitive bacteremia to ampicillin. 5/2 BC NGTD x 4d  He is also on pradaxa full strength for atrial thrombus. Occult positive but h/h stable. NOAC  Risks/benefits reviewed  Keppra started for possible seizure activity with mental status changes( No EEG available here) . Pt is much improved with RX   H&H 8.4/28.9 , afebrile, WBC 6.7- VSS  Up in chair, "feeling good"    5/8/19 day 21/42 on enterococcus sensitive bacteremia to ampicillin. 5/2 BC NGTD x 5d  He is also on pradaxa full strength for atrial thrombus. Occult positive but h/h stable. NOAC  Risks/benefits reviewed  Keppra started for possible seizure activity with mental status changes( No EEG available here) . Pt is much improved with RX   H&H 8.4/28.9 , afebrile, WBC 5.23- VSS  Pt very alert and Oriented x 3; doing well with PT     5/10/19 Day 23/42 IV ampicillin for enterococcus sensitive bacteremia 5/2/19 BC Negative, Continue Pradaxa,    much improved with Keppra Rx; however this am he had a fall; fell backwards while urinating; he states he slipped; denies dizziness   Diuretics resumed per cards 2 days ago, also getting flomax; will check orthostatics   + recent c/o neck pain; has chronic neck pain  and gets epidural injections ; given IM celestone yesterday. Notes neck is better today.   Noting LE edema; discussed LE compression hose   BP sitting 161/67, Standing 121/56 ; so some orthostatic hypotension.   Discussed no tramadol today. He "feels good".     5/13 he is doing well. Has not had any more "spells". AAOx3. Day 26/42 of  IV ampicillin for enterococcus sensitive bacteremia 5/2/19 BC Negative, Continuing Pradaxa. H/H remains stable. Legs still swollen with LAUREL hose. Lasix was increased per Dr Begum today. Kidney function stable. Also pressure has been " "staying up. 150-160. Cards also added back lisinopril. He does well with increased BP.    We discussed the possibility of repeating INES to check on clot. He result would give info on if we need to change NOAC as he has only been on pradaxa and coumadin. The concern is that he has had multiple strokes and developed this clot on the pradaxa (although did hold a couple days for valve repair)     5/16 he is doing well, Plan is d/c today and complete 2 more weeks of iv abx. On day 29/42 of  IV ampicillin for enterococcus sensitive bacteremia 5/2/19 BC Negative, pradaxa changed to eliquis (more cost effective) and H/H stable.     Also on keppra for sz precautions. Checking level today.     Doing well with PT/OT     Consults:   Consults (From admission, onward)        Status Ordering Provider     Inpatient consult to Cardiology-[facility]  Once     Provider:  Hugh Begum MD    Completed RON LAINEZ     Inpatient consult to Neurology  Once     Provider:  Ron Lainez MD    Completed MARISOL HERR          * Bacteremia  S/p recent TAVR now with septic emboli and concern for "flicking emboli"   ENTEROCOCCUS faecalis    His blood cultures have grown Enterococcus faecalis sensitve to all tested drugs  ID recommending total of 6 weeks IV ABX; Ampicillin 2gms IV q 6 hr; day 1 with negative blood cultures was 4/19/19; so today is day 6/42  Day 7/42 4/26/19 day 8/42 4/29 day 11/42 of IV abd. 4/19 was on vanc and 4/20 started on ampicillin  4/30 day 12/42.  4/19 was on vanc and 4/20 started on ampicillin    5/2 day 15/42 of IV abx- repeat blood cultures today  5/3 day 16/42 on ampicillin, no growth in repeat blood cultures from 5/2 5/6 Day 19/42 Ampicillin;   5/13 day 26/42 ampicillin repeat cultures with no growth  5/16 day 29/42 ampicillin repeat cultures with no growth    Orthostatic hypotension  Now on flomax and diuretics resumed. + LE edema   Will order LE compression socks  Consider decreasing diuretic " frequency if orthostasis continues   Monitor renal fx     Falls  Frequent falls; prior falls at home; right leg is weaker and his right foot gets stuck   Getting PT  Ambulating with walker; have to remind  Has sitter       Skin ulcer of sacrum, limited to breakdown of skin  Barrier cream ; nizoral cream  Donut to sit ;already doing it       Encephalopathy  Somnolent today.  Considering emolic showers vs sz activity. CT negative, neuro started keppra.    5/6/19 much better with Keppra rx, no further mental status changes.   5/13 cont keppra  5/16 keppra level today    Renal insufficiency  Noted 4/29: Cr up to 2.1  Creat stable   Changed labs to M/W/f  Will go back to daily with the changes made today  Home health to monitor twice weekly    Nocturnal enuresis  Check U.A , PSA, bladder scan; bladder scan 89ml   Still with incontinence issues   Gets HCTz 12.5 and lasix 20mg po daily in am   trial of flomax     Stop fluid pill, creat better with hydration.    5/10 Diuretic resumed per cards on 5/8;   Urine symptoms have been better with flomax but this am fell backwards while urinating; will check orthostatic vitals     5/13 Cont lasix and increase dose per cards today. Give in am    Atrial thrombus  Large atrial thrombus found on INES ,Dr. Galicia deemed septic emboli; still no INES reporting thrombus   Continue Pradaxa; family plans to monitor meds once home  Will not consider failed therapy as patient has had lapses in therapy due to recent surgical interventions and non-compliance   as discussed above there is concern for infected thrombus.   Per Infectious Disease will treat bacteremia and possible infected thrombus for 6 weeks.    5/2 Discussed with family and cardiology. rec repeat INES 2 weeks consider changing NOAC at that time, not now as concern over anticoagulation is issue with new CVA     5/6: unsure will repeat INES at this point. Will do 6 weeks IV antibx regardless of repeat INES results. Will continue to  discuss with cardiology need for repeat procedure    5/13 Discussed with family repeat INES. The results would push us to either cont with pradaxa as the clot is resolving or change the NOAC. We have decided to change the NOAC. Consider procedure in future but for now this is the less invasive choice. Checking price. Monitor H/H with this change. Has been 2 week from last documented CVA    5/16 h/h stable with NOAC change. Have home health cont labs 2 times a week    Cerebrovascular accident (CVA)  Subacute CVA with focal deficits noted on 4/14, with significant improvement in symptoms   MRI did not demonstrate acute findings but neuro reviewed and Dx with new CVA;   Was being tx with Pradaxa when he had acute stroke but Will not consider failed therapy as patient has had lapses in therapy due to recent surgical interventions and non-compliance  Per Dr. Fernandez's recommendations we will continue:  -Telemetry.  -Neurochecks.  -NO TPA as he presented initially on Sunday  -Physical Therapy and OT Consult, evaluation and treatment at [facility] once patient is admitted to  SNF  -Completed permissive HTN and will need strict BP control.   -Continue his current dose home meds otherwise including Asprin, Pradaxa and statin       4/29: Reduce pradaxa due to renal function today  Consult colin due to AMS and neuro changes overnight. Repeat CT head without acute changes. ? Watershed injury; ? Keep BP a little higher? Dehydration playing a role    4/30 go back up on pradaxa due to kidneys now functioning. Reviewed MRI with Kel Fernandez and Dr Ventura. ST/OT/PT    5/2 pt doing well with gait training and speech. Up in day room today    5/3 had another episode of change in mental status yesterday evening. Started on keppra, ct negative. Doing better today  5/6 No further mental status changes since staring KEPPRA   5/8 Stable w Keppra  5/13 cont keppra tolerating well- no further episode  5/16 add to am labs check  level    Hx of CABG  Asa, statin    CHF (congestive heart failure), NYHA class II, chronic, diastolic  Continue Lisinopril, HCTZ  4/29: Hold lasix for now. Already had dose this am. Give gentle hydration given rising Cr. Likely a little dry  4/30 cont to hold lasix, lisinopril and hctz LAUREL hose.    5/2 holding ace. Diuretics cont compression hose and BB    5/3 Cont BB and asa, no ace/arb/lasix due to permissive HTN.    5/6 /67. No signs of acute exacerbation today    5/13 resumed ACE, lasix per cards increased today watch renal function    5/16 152/97 cont ace, lasix kidneys stable, also cont K replacement    Anemia  H/H stable without acute indications for transfusion   Continue to monitor    check anemia w/u from last visit ferritin was 26, retic was 1.5  Occult stool today, will not change our plan just give us answer to where blood is going. He needs the blood thinner for now due to septic emboli.     Cont to monitor occult pending  Transfuse as needed, needs blood thinner.    H/H stable. Watch daily especially with NOAC change    Twice weekly blood work with h/h        Essential hypertension  Continue amlodpine, Lisinopril, HCTZ, , coreg  4/29: Lasix- will hold now that Creat bumped and follow renal fxn  4/30 stopped lasix and lisinopril yesterday, today stop hctz and reduce amlodipine  He needs a little high bp.    5/2 Bp still in 120/50- cut the amlodipine out today  5/3 cont to hold bp meds, allowing permissive HTN, bp still only 124/58. Can start to treat bp if needed over weekend per neuro recs.    5/6: overall remains stable. No medication changes today    No HCTZ to d/c    5/13 lisinopril resumed per cards bp 160s    Aortic valve stenosis  S/p TAVR       Final Active Diagnoses:    Diagnosis Date Noted POA    PRINCIPAL PROBLEM:  Bacteremia [R78.81] 04/18/2019 Yes    Falls [W19.XXXA] 05/10/2019 Yes    Orthostatic hypotension [I95.1] 05/10/2019 Yes    Skin ulcer of sacrum, limited to breakdown of  "skin [L98.421] 05/03/2019 No    Encephalopathy [G93.40]  No    Renal insufficiency [N28.9] 04/29/2019 Yes    Nocturnal enuresis [N39.44] 04/25/2019 No    Atrial thrombus [I51.3] 04/19/2019 Yes    Cerebrovascular accident (CVA) [I63.9] 04/16/2019 Yes    Hx of CABG [Z95.1] 02/19/2019 Not Applicable    CHF (congestive heart failure), NYHA class II, chronic, diastolic [I50.32] 02/19/2019 Yes    Anemia [D64.9] 11/03/2016 Yes    Essential hypertension [I10] 10/28/2016 Yes    Aortic valve stenosis [I35.0] 06/28/2016 Yes      Problems Resolved During this Admission:       Discharged Condition: good    Disposition: Home-Health Care Svc    Follow Up:  Follow-up Information     Mitchel Fernandez MD.    Specialty:  Neurology  Why:  as scheduled  Contact information:  [address] [id number] [phone]             Please follow up.    Why:  Or keep appointment scheduled in late may               Patient Instructions:      WALKER FOR HOME USE     Order Specific Question Answer Comments   Type of Walker: Adult (5'4"-6'6")    With wheels? Yes    Height: 5' 8" (1.727 m)    Weight: 89.4 kg (197 lb) No New since admit   Length of need (1-99 months): 99    Does patient have medical equipment at home? walker, rolling    Please check all that apply: Patient's condition impairs ambulation.      Ambulatory referral to Home Health   Referral Priority: Routine Referral Type: Home Health   Referral Reason: Specialty Services Required   Requested Specialty: Home Health Services   Number of Visits Requested: 1       Significant Diagnostic Studies:     CMP:        Recent Labs   Lab 05/15/19  0516 05/16/19  0629    143   K 3.4* 3.7    110   CO2 26 26   GLU 98 106   BUN 13 12   CREATININE 1.0 1.0   CALCIUM 9.8 9.5   PROT 5.7* 5.5*   ALBUMIN 2.7* 2.6*   BILITOT 0.8 0.7   ALKPHOS 56 54*   AST 10 9*   ALT 11 9*   ANIONGAP 8 7*   EGFRNONAA >60 >60      PSA 1.4,       Recent Labs   Lab 05/16/19  0629   WBC 4.80   RBC " 3.58*   HGB 8.2*   HCT 27.7*      MCV 77*   MCH 22.9*   MCHC 29.6*       Urinalysis negative            Lab Results   Component Value Date     PSA 1.4 2019 Mag 2.2     Blood cultures  NGTD x 2  Blood cultures reordered  No Growth to date       blood cultures          Enterococcus faecalis           CULTURE, BLOOD       Ampicillin <=2 mcg/mL Sensitive       Gentamicin Synergy Screen <=500 mcg/mL Sensitive       Tetracycline <=4 mcg/mL Sensitive       Vancomycin 2 mcg/mL Sensitive        Flu negative         Significant Imagin/2 CT head   1.  No CT evidence of an acute intracranial abnormality.    2.  Atrophy and small vessel ischemic changes of the periventricular white matter.      MRI   1.  Two tiny focal areas of increased signal intensity on diffusion-weighted images as described above likely representing artifact at the crux of the sulci.  A secondary consideration is tiny lacunar infarcts.  Dr. Laverne Shipman was telephoned with a verbal report at the time of this dictation.    2.  Atrophy and small vessel ischemic changes of the periventricular white matter.      CT head   1.  No CT evidence of an acute intracranial abnormality.    2.  Atrophy and small vessel ischemic changes of the periventricular white matter.  Old lacunar infarcts.      x ray hip right   No evidence of right hip fracture.      x ray hip left   No evidence of left hip fracture.      CXR   Right-sided PICC with tip projecting over superior vena cava.      Echo   · Mildly decreased left ventricular systolic function. The estimated ejection fraction is 45%  · Local segmental wall motion abnormalities.  · Eccentric left ventricular hypertrophy.  · Severe left atrial enlargement.  · Grade I (mild) left ventricular diastolic dysfunction consistent with impaired relaxation.  · Normal left atrial pressure.  · Normal right ventricular systolic function.  · Mild right atrial  enlargement.  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 I did not see mention of AVR in encounter notes or March echo report or prior INES report but there appears to be a bioprosthesis. There is mild central aortic insufficiency present.  · Mild mitral regurgitation.  · Normal central venous pressure (3 mm Hg).  · The estimated PA systolic pressure is 31 mm Hg     4/18 INES  Final Impressions  1. The study quality is good.   2. 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  3. Bubble study is positive for Grade II Patent Foramen Ovale.  4. Moderate lipomatous atrial septal hypertrophy.  5. Normal functioning prosthetic aortic valve with trivial   paravalvular regurgitation.   6. Preserved left ventricular function. Ejection fraction around 55%.  7. Grade III atherosclerosis of aortic arch.          Pending Diagnostic Studies:     Procedure Component Value Units Date/Time    Levetiracetam level [[id number]] Collected:  05/16/19 0913    Order Status:  Sent Lab Status:  In process Updated:  05/16/19 0913    Specimen:  Blood          Medications:  Reconciled Home Medications:      Medication List      START taking these medications    AMPICILLIN 2 G/100 ML NS (READY TO MIX SYSTEM)  Inject 100 mLs (2 g total) into the vein every 6 (six) hours. 1st dose 6pm 5/16/19 and last dose 6pm 5/30/19     * apixaban 5 mg Tab  Commonly known as:  ELIQUIS  Take 1 tablet (5 mg total) by mouth 2 (two) times daily.     * apixaban 5 mg Tab  Commonly known as:  ELIQUIS  Take 1 tablet (5 mg total) by mouth 2 (two) times daily.     levETIRAcetam 250 MG Tab  Commonly known as:  KEPPRA  Take 1 tablet (250 mg total) by mouth 2 (two) times daily.     lidocaine 5 %  Commonly known as:  LIDODERM  Place 1 patch onto the skin once daily. Remove & Discard patch within 12 hours or as directed by MD     mirtazapine 7.5 MG Tab  Commonly known as:  REMERON  Take 1 tablet (7.5 mg total) by mouth every evening.     tamsulosin 0.4 mg Cap  Commonly  known as:  FLOMAX  Take 1 capsule (0.4 mg total) by mouth once daily.         * This list has 2 medication(s) that are the same as other medications prescribed for you. Read the directions carefully, and ask your doctor or other care provider to review them with you.            CHANGE how you take these medications    furosemide 20 MG tablet  Commonly known as:  LASIX  Take 2 tablets (40 mg total) by mouth once daily.  What changed:    · how much to take  · when to take this     lisinopril 10 MG tablet  Take 1 tablet (10 mg total) by mouth once daily.  What changed:    · medication strength  · how much to take        CONTINUE taking these medications    ANORO ELLIPTA 62.5-25 mcg/actuation Dsdv  Generic drug:  umeclidinium-vilanterol  INHALE 1 PUFF INTO LUNGS ONCE DAILY     aspirin 81 MG EC tablet  Commonly known as:  ECOTRIN  Take 81 mg by mouth once daily.     atorvastatin 10 MG tablet  Commonly known as:  LIPITOR  Take 10 mg by mouth every evening.     carvedilol 12.5 MG tablet  Commonly known as:  COREG  Take 25 mg by mouth 2 (two) times daily with meals.     nitroGLYCERIN 0.4 MG SL tablet  Commonly known as:  NITROSTAT  Place 0.4 mg under the tongue every 5 (five) minutes as needed for Chest pain.     omeprazole 40 MG capsule  Commonly known as:  PRILOSEC  Take 40 mg by mouth once daily.        STOP taking these medications    amLODIPine 2.5 MG tablet  Commonly known as:  NORVASC     PRADAXA 150 mg Cap  Generic drug:  dabigatran etexilate     ranolazine 500 MG Tb12  Commonly known as:  RANEXA            Indwelling Lines/Drains at time of discharge:   Lines/Drains/Airways     Peripherally Inserted Central Catheter Line                 PICC Double Lumen 04/22/19 1108 23 days                Time spent on the discharge of patient: 20 minutes  Patient was seen and examined on the date of discharge and determined to be suitable for discharge.         Marta Song NP  Department of Hospital Medicine  [facility]

## 2019-05-16 NOTE — ASSESSMENT & PLAN NOTE
Check U.A , PSA, bladder scan; bladder scan 89ml   Still with incontinence issues   Gets HCTz 12.5 and lasix 20mg po daily in am   trial of flomax     Stop fluid pill, creat better with hydration.    5/10 Diuretic resumed per cards on 5/8;   Urine symptoms have been better with flomax but this am fell backwards while urinating; will check orthostatic vitals     5/13 Cont lasix and increase dose per cards today. Give in am

## 2019-05-16 NOTE — ASSESSMENT & PLAN NOTE
Subacute CVA with focal deficits noted on 4/14, with significant improvement in symptoms   MRI did not demonstrate acute findings but neuro reviewed and Dx with new CVA;   Was being tx with Pradaxa when he had acute stroke but Will not consider failed therapy as patient has had lapses in therapy due to recent surgical interventions and non-compliance  Per Dr. Fernandez's recommendations we will continue:  -Telemetry.  -Neurochecks.  -NO TPA as he presented initially on Sunday  -Physical Therapy and OT Consult, evaluation and treatment at [place] once patient is admitted to  SNF  -Completed permissive HTN and will need strict BP control.   -Continue his current dose home meds otherwise including Asprin, Pradaxa and statin       4/29: Reduce pradaxa due to renal function today  Consult colin due to AMS and neuro changes overnight. Repeat CT head without acute changes. ? Watershed injury; ? Keep BP a little higher? Dehydration playing a role    4/30 go back up on pradaxa due to kidneys now functioning. Reviewed MRI with Kel Fernandez and Dr Ventura. ST/OT/PT    5/2 pt doing well with gait training and speech. Up in day room today    5/3 had another episode of change in mental status yesterday evening. Started on keppra, ct negative. Doing better today  5/6 No further mental status changes since staring KEPPRA   5/8 Stable w Keppra  5/13 cont keppra tolerating well- no further episode  5/16 add to am labs check level

## 2019-05-16 NOTE — SUBJECTIVE & OBJECTIVE
Review of Systems   Constitutional: Negative.    HENT: Negative for congestion, ear pain, sinus pressure, sneezing and sore throat.    Eyes: Negative.    Respiratory: Negative for cough, chest tightness, shortness of breath and wheezing.    Cardiovascular: Negative.  Negative for chest pain.   Gastrointestinal: Negative for abdominal pain, constipation, diarrhea, nausea and vomiting.   Genitourinary: Negative for difficulty urinating, dysuria and flank pain.        +nocturia   Musculoskeletal: Negative.  Negative for joint swelling.   Skin: Negative.    Neurological: Negative for dizziness, facial asymmetry, weakness and headaches.   Hematological: Negative.    Psychiatric/Behavioral: Positive for sleep disturbance. Negative for behavioral problems and confusion.     Objective:     Vital Signs (Most Recent):  Temp: 97.3 °F (36.3 °C) (05/16/19 0700)  Pulse: 64 (05/16/19 0700)  Resp: 18 (05/16/19 0700)  BP: (!) 152/97 (05/16/19 0700)  SpO2: 97 % (05/16/19 0705) Vital Signs (24h Range):  Temp:  [96.5 °F (35.8 °C)-98.1 °F (36.7 °C)] 97.3 °F (36.3 °C)  Pulse:  [61-65] 64  Resp:  [18] 18  SpO2:  [94 %-97 %] 97 %  BP: (120-152)/(58-97) 152/97     Weight: 89.4 kg (197 lb)(NO NEW SINCE ADMIT)  Body mass index is 29.95 kg/m².    Physical Exam   Constitutional: He is oriented to person, place, and time. He appears well-developed and well-nourished.   HENT:   Head: Normocephalic and atraumatic.   Right Ear: Tympanic membrane, external ear and ear canal normal.   Left Ear: Tympanic membrane, external ear and ear canal normal.   Mouth/Throat: Oropharynx is clear and moist.   Eyes: Pupils are equal, round, and reactive to light. Conjunctivae and EOM are normal.   Neck: Normal range of motion. Neck supple. No tracheal deviation present.   Cardiovascular: Normal rate, regular rhythm, intact distal pulses and normal pulses. Exam reveals no gallop and no friction rub.   Murmur heard.  Pulmonary/Chest: Effort normal and breath sounds  normal.   Abdominal: Soft. Bowel sounds are normal. He exhibits no distension. There is no tenderness. Hernia confirmed negative in the right inguinal area and confirmed negative in the left inguinal area.   Musculoskeletal: Normal range of motion. He exhibits edema (3+ edema).   Neurological: He is alert and oriented to person, place, and time. He has normal reflexes. No cranial nerve deficit.   Skin: Skin is warm and dry.   Psychiatric: He has a normal mood and affect. His behavior is normal. Judgment and thought content normal.   Nursing note and vitals reviewed.        CRANIAL NERVES     CN III, IV, VI   Pupils are equal, round, and reactive to light.  Extraocular motions are normal.        Significant Labs:   CMP:   Recent Labs   Lab 05/15/19  0516 19  0629    143   K 3.4* 3.7    110   CO2 26 26   GLU 98 106   BUN 13 12   CREATININE 1.0 1.0   CALCIUM 9.8 9.5   PROT 5.7* 5.5*   ALBUMIN 2.7* 2.6*   BILITOT 0.8 0.7   ALKPHOS 56 54*   AST 10 9*   ALT 11 9*   ANIONGAP 8 7*   EGFRNONAA >60 >60     PSA 1.4,   Recent Labs   Lab 19  0629   WBC 4.80   RBC 3.58*   HGB 8.2*   HCT 27.7*      MCV 77*   MCH 22.9*   MCHC 29.6*      Urinalysis negative     Lab Results   Component Value Date    PSA 1.4 2019 Mag 2.2    Blood cultures  NGTD x 2  Blood cultures reordered  No Growth to date      blood cultures   Enterococcus faecalis       CULTURE, BLOOD     Ampicillin <=2 mcg/mL Sensitive     Gentamicin Synergy Screen <=500 mcg/mL Sensitive     Tetracycline <=4 mcg/mL Sensitive     Vancomycin 2 mcg/mL Sensitive      Flu negative       Significant Imagin/2 CT head   1.  No CT evidence of an acute intracranial abnormality.    2.  Atrophy and small vessel ischemic changes of the periventricular white matter.     MRI   1.  Two tiny focal areas of increased signal intensity on diffusion-weighted images as described above likely representing artifact at the crux of  the sulci.  A secondary consideration is tiny lacunar infarcts.  Dr. Laverne Shipman was telephoned with a verbal report at the time of this dictation.    2.  Atrophy and small vessel ischemic changes of the periventricular white matter.    4/29 CT head   1.  No CT evidence of an acute intracranial abnormality.    2.  Atrophy and small vessel ischemic changes of the periventricular white matter.  Old lacunar infarcts.    4/22 x ray hip right   No evidence of right hip fracture.    4/22 x ray hip left   No evidence of left hip fracture.    4/22 CXR   Right-sided PICC with tip projecting over superior vena cava.    4/18 Echo   · Mildly decreased left ventricular systolic function. The estimated ejection fraction is 45%  · Local segmental wall motion abnormalities.  · Eccentric left ventricular hypertrophy.  · Severe left atrial enlargement.  · Grade I (mild) left ventricular diastolic dysfunction consistent with impaired relaxation.  · Normal left atrial pressure.  · Normal right ventricular systolic function.  · Mild right atrial enlargement.  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 I did not see mention of AVR in encounter notes or March echo report or prior [facility] report but there appears to be a bioprosthesis. There is mild central aortic insufficiency present.  · Mild mitral regurgitation.  · Normal central venous pressure (3 mm Hg).  · The estimated PA systolic pressure is 31 mm Hg    4/18 [facility]  Final Impressions  1. The study quality is good.   2. 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  3. Bubble study is positive for Grade II Patent Foramen Ovale.  4. Moderate lipomatous atrial septal hypertrophy.  5. Normal functioning prosthetic aortic valve with trivial   paravalvular regurgitation.   6. Preserved left ventricular function. Ejection fraction around 55%.  7. Grade III atherosclerosis of aortic arch.

## 2019-05-16 NOTE — ASSESSMENT & PLAN NOTE
Noted 4/29: Cr up to 2.1  Creat stable   Changed labs to M/W/f  Will go back to daily with the changes made today  Home health to monitor twice weekly

## 2019-05-17 ENCOUNTER — TELEPHONE (OUTPATIENT)
Dept: FAMILY MEDICINE | Facility: CLINIC | Age: 84
End: 2019-05-17

## 2019-05-17 NOTE — TELEPHONE ENCOUNTER
----- Message from Gill Pearce sent at 2019 11:38 AM CDT -----  Contact: [facility] Abdiel Farooq  MRN: [id number]  : 1935  PCP: Dylan Adam  Home Phone      [phone]  Work Phone      Not on file.  Mobile          [phone]      MESSAGE:     did physical therapy eval today, when the therapist was leaving the pt had a fall due to not using his walker. No bruises, no complaint of pain.     [phone]

## 2019-05-20 ENCOUNTER — TELEPHONE (OUTPATIENT)
Dept: FAMILY MEDICINE | Facility: CLINIC | Age: 84
End: 2019-05-20

## 2019-05-20 DIAGNOSIS — A41.9 SEPSIS, DUE TO UNSPECIFIED ORGANISM: Primary | ICD-10-CM

## 2019-05-20 LAB — LEVETIRACETAM SERPL-MCNC: 6.3 UG/ML (ref 3–60)

## 2019-05-20 NOTE — TELEPHONE ENCOUNTER
Contact: Clarissa @ [facility]  [facility] Abdiel Farooq  MRN: [id number]  : 1935  PCP: Dylan Adam  Home Phone      [phone]  Work Phone      Not on file.  Mobile          [phone]        MESSAGE: patient on IV antibiotics -- no order for weekly labs -- requesting order ASAP     Call Clarissa @ [phone]

## 2019-05-20 NOTE — TELEPHONE ENCOUNTER
----- Message from Earl Domingo sent at 2019  8:42 AM CDT -----  Contact: Clarissa @ [employer]  [facility] Abdiel Farooq  MRN: [id number]  : 1935  PCP: Dylan Adam  Home Phone      [phone]  Work Phone      Not on file.  Mobile          [phone]      MESSAGE: patient on IV antibiotics -- no order for weekly labs -- requesting order ASAP    Call Clarissa @ [phone]    PCP: Jair

## 2019-05-23 ENCOUNTER — HOSPITAL ENCOUNTER (INPATIENT)
Facility: HOSPITAL | Age: 84
LOS: 2 days | Discharge: HOME | DRG: 864 | End: 2019-05-26
Attending: SURGERY | Admitting: FAMILY MEDICINE
Payer: MEDICARE

## 2019-05-23 DIAGNOSIS — R50.9 FEVER, UNSPECIFIED FEVER CAUSE: ICD-10-CM

## 2019-05-23 DIAGNOSIS — R50.9 FEVER: ICD-10-CM

## 2019-05-23 LAB
ALBUMIN SERPL BCP-MCNC: 2.7 G/DL (ref 3.5–5.2)
ALP SERPL-CCNC: 100 U/L (ref 55–135)
ALT SERPL W/O P-5'-P-CCNC: 35 U/L (ref 10–44)
ANION GAP SERPL CALC-SCNC: 9 MMOL/L (ref 8–16)
APTT BLDCRRT: 36.2 SEC (ref 21–32)
AST SERPL-CCNC: 34 U/L (ref 10–40)
BACTERIA #/AREA URNS HPF: ABNORMAL /HPF
BASOPHILS # BLD AUTO: 0.03 K/UL (ref 0–0.2)
BASOPHILS NFR BLD: 0.5 % (ref 0–1.9)
BILIRUB SERPL-MCNC: 1.3 MG/DL (ref 0.1–1)
BILIRUB UR QL STRIP: ABNORMAL
BNP SERPL-MCNC: 827 PG/ML (ref 0–99)
BUN SERPL-MCNC: 16 MG/DL (ref 8–23)
CALCIUM SERPL-MCNC: 9 MG/DL (ref 8.7–10.5)
CHLORIDE SERPL-SCNC: 108 MMOL/L (ref 95–110)
CK MB SERPL-MCNC: 0.9 NG/ML (ref 0.1–6.5)
CK MB SERPL-RTO: 4.5 % (ref 0–5)
CK SERPL-CCNC: 20 U/L (ref 20–200)
CK SERPL-CCNC: 20 U/L (ref 20–200)
CLARITY UR: CLEAR
CO2 SERPL-SCNC: 22 MMOL/L (ref 23–29)
COLOR UR: YELLOW
CREAT SERPL-MCNC: 1.1 MG/DL (ref 0.5–1.4)
DEPRECATED S PYO AG THROAT QL EIA: NEGATIVE
DIFFERENTIAL METHOD: ABNORMAL
EOSINOPHIL # BLD AUTO: 0.2 K/UL (ref 0–0.5)
EOSINOPHIL NFR BLD: 2.6 % (ref 0–8)
ERYTHROCYTE [DISTWIDTH] IN BLOOD BY AUTOMATED COUNT: 19.1 % (ref 11.5–14.5)
EST. GFR  (AFRICAN AMERICAN): >60 ML/MIN/1.73 M^2
EST. GFR  (NON AFRICAN AMERICAN): >60 ML/MIN/1.73 M^2
GLUCOSE SERPL-MCNC: 128 MG/DL (ref 70–110)
GLUCOSE UR QL STRIP: NEGATIVE
GRAN CASTS #/AREA URNS LPF: 1 /LPF
HCT VFR BLD AUTO: 26.2 % (ref 40–54)
HGB BLD-MCNC: 7.7 G/DL (ref 14–18)
HGB UR QL STRIP: NEGATIVE
HYALINE CASTS #/AREA URNS LPF: 0 /LPF
INFLUENZA A, MOLECULAR: NEGATIVE
INFLUENZA B, MOLECULAR: NEGATIVE
INR PPP: 1.5 (ref 0.8–1.2)
KETONES UR QL STRIP: ABNORMAL
LACTATE SERPL-SCNC: 0.6 MMOL/L (ref 0.5–2.2)
LACTATE SERPL-SCNC: 0.8 MMOL/L (ref 0.5–2.2)
LEUKOCYTE ESTERASE UR QL STRIP: NEGATIVE
LYMPHOCYTES # BLD AUTO: 0.5 K/UL (ref 1–4.8)
LYMPHOCYTES NFR BLD: 8.3 % (ref 18–48)
MAGNESIUM SERPL-MCNC: 1.5 MG/DL (ref 1.6–2.6)
MCH RBC QN AUTO: 22.5 PG (ref 27–31)
MCHC RBC AUTO-ENTMCNC: 29.4 G/DL (ref 32–36)
MCV RBC AUTO: 77 FL (ref 82–98)
MICROSCOPIC COMMENT: ABNORMAL
MONOCYTES # BLD AUTO: 0.5 K/UL (ref 0.3–1)
MONOCYTES NFR BLD: 8.2 % (ref 4–15)
NEUTROPHILS # BLD AUTO: 4.6 K/UL (ref 1.8–7.7)
NEUTROPHILS NFR BLD: 80.4 % (ref 38–73)
NITRITE UR QL STRIP: NEGATIVE
PH UR STRIP: 6 [PH] (ref 5–8)
PHOSPHATE SERPL-MCNC: 2.7 MG/DL (ref 2.7–4.5)
PLATELET # BLD AUTO: 164 K/UL (ref 150–350)
PMV BLD AUTO: 8.8 FL (ref 9.2–12.9)
POTASSIUM SERPL-SCNC: 3.3 MMOL/L (ref 3.5–5.1)
PROT SERPL-MCNC: 5.6 G/DL (ref 6–8.4)
PROT UR QL STRIP: ABNORMAL
PROTHROMBIN TIME: 15.4 SEC (ref 9–12.5)
RBC # BLD AUTO: 3.42 M/UL (ref 4.6–6.2)
RBC #/AREA URNS HPF: 0 /HPF (ref 0–4)
RETICS/RBC NFR AUTO: 1.9 % (ref 0.4–2)
SODIUM SERPL-SCNC: 139 MMOL/L (ref 136–145)
SP GR UR STRIP: 1.02 (ref 1–1.03)
SPECIMEN SOURCE: NORMAL
TROPONIN I SERPL DL<=0.01 NG/ML-MCNC: 0.14 NG/ML (ref 0–0.03)
TSH SERPL DL<=0.005 MIU/L-ACNC: 0.91 UIU/ML (ref 0.4–4)
URN SPEC COLLECT METH UR: ABNORMAL
UROBILINOGEN UR STRIP-ACNC: ABNORMAL EU/DL
WBC # BLD AUTO: 5.76 K/UL (ref 3.9–12.7)
WBC #/AREA URNS HPF: 0 /HPF (ref 0–5)

## 2019-05-23 PROCEDURE — 87502 INFLUENZA DNA AMP PROBE: CPT

## 2019-05-23 PROCEDURE — 82728 ASSAY OF FERRITIN: CPT

## 2019-05-23 PROCEDURE — 84484 ASSAY OF TROPONIN QUANT: CPT

## 2019-05-23 PROCEDURE — 81000 URINALYSIS NONAUTO W/SCOPE: CPT

## 2019-05-23 PROCEDURE — 93005 ELECTROCARDIOGRAM TRACING: CPT

## 2019-05-23 PROCEDURE — 84443 ASSAY THYROID STIM HORMONE: CPT

## 2019-05-23 PROCEDURE — G0378 HOSPITAL OBSERVATION PER HR: HCPCS

## 2019-05-23 PROCEDURE — 82550 ASSAY OF CK (CPK): CPT

## 2019-05-23 PROCEDURE — 83735 ASSAY OF MAGNESIUM: CPT

## 2019-05-23 PROCEDURE — 83540 ASSAY OF IRON: CPT

## 2019-05-23 PROCEDURE — 83605 ASSAY OF LACTIC ACID: CPT | Mod: 91

## 2019-05-23 PROCEDURE — 80053 COMPREHEN METABOLIC PANEL: CPT

## 2019-05-23 PROCEDURE — 96365 THER/PROPH/DIAG IV INF INIT: CPT

## 2019-05-23 PROCEDURE — 93010 EKG 12-LEAD: ICD-10-PCS | Mod: ,,, | Performed by: INTERNAL MEDICINE

## 2019-05-23 PROCEDURE — 85045 AUTOMATED RETICULOCYTE COUNT: CPT

## 2019-05-23 PROCEDURE — 99285 EMERGENCY DEPT VISIT HI MDM: CPT | Mod: 25

## 2019-05-23 PROCEDURE — 25000003 PHARM REV CODE 250: Performed by: SURGERY

## 2019-05-23 PROCEDURE — 85025 COMPLETE CBC W/AUTO DIFF WBC: CPT

## 2019-05-23 PROCEDURE — 85610 PROTHROMBIN TIME: CPT

## 2019-05-23 PROCEDURE — 85730 THROMBOPLASTIN TIME PARTIAL: CPT

## 2019-05-23 PROCEDURE — 87040 BLOOD CULTURE FOR BACTERIA: CPT | Mod: 59

## 2019-05-23 PROCEDURE — 83880 ASSAY OF NATRIURETIC PEPTIDE: CPT

## 2019-05-23 PROCEDURE — 82746 ASSAY OF FOLIC ACID SERUM: CPT

## 2019-05-23 PROCEDURE — 87070 CULTURE OTHR SPECIMN AEROBIC: CPT

## 2019-05-23 PROCEDURE — 36415 COLL VENOUS BLD VENIPUNCTURE: CPT

## 2019-05-23 PROCEDURE — 82607 VITAMIN B-12: CPT

## 2019-05-23 PROCEDURE — 84100 ASSAY OF PHOSPHORUS: CPT

## 2019-05-23 PROCEDURE — 63600175 PHARM REV CODE 636 W HCPCS: Performed by: SURGERY

## 2019-05-23 PROCEDURE — 82553 CREATINE MB FRACTION: CPT

## 2019-05-23 PROCEDURE — 93010 ELECTROCARDIOGRAM REPORT: CPT | Mod: ,,, | Performed by: INTERNAL MEDICINE

## 2019-05-23 PROCEDURE — 96376 TX/PRO/DX INJ SAME DRUG ADON: CPT

## 2019-05-23 PROCEDURE — 87081 CULTURE SCREEN ONLY: CPT

## 2019-05-23 PROCEDURE — 96375 TX/PRO/DX INJ NEW DRUG ADDON: CPT

## 2019-05-23 PROCEDURE — 87880 STREP A ASSAY W/OPTIC: CPT

## 2019-05-23 RX ORDER — ACETAMINOPHEN 500 MG
1000 TABLET ORAL EVERY 8 HOURS PRN
Status: DISCONTINUED | OUTPATIENT
Start: 2019-05-23 | End: 2019-05-26 | Stop reason: HOSPADM

## 2019-05-23 RX ORDER — PANTOPRAZOLE SODIUM 40 MG/1
40 TABLET, DELAYED RELEASE ORAL DAILY
Status: DISCONTINUED | OUTPATIENT
Start: 2019-05-24 | End: 2019-05-26 | Stop reason: HOSPADM

## 2019-05-23 RX ORDER — ACETAMINOPHEN 500 MG
1000 TABLET ORAL
Status: COMPLETED | OUTPATIENT
Start: 2019-05-23 | End: 2019-05-23

## 2019-05-23 RX ORDER — IBUPROFEN 800 MG/1
800 TABLET ORAL
Status: COMPLETED | OUTPATIENT
Start: 2019-05-23 | End: 2019-05-23

## 2019-05-23 RX ORDER — SODIUM CHLORIDE 0.9 % (FLUSH) 0.9 %
10 SYRINGE (ML) INJECTION
Status: DISCONTINUED | OUTPATIENT
Start: 2019-05-23 | End: 2019-05-26 | Stop reason: HOSPADM

## 2019-05-23 RX ORDER — ONDANSETRON 2 MG/ML
4 INJECTION INTRAMUSCULAR; INTRAVENOUS EVERY 8 HOURS PRN
Status: DISCONTINUED | OUTPATIENT
Start: 2019-05-23 | End: 2019-05-26 | Stop reason: HOSPADM

## 2019-05-23 RX ORDER — HEPARIN 100 UNIT/ML
5 SYRINGE INTRAVENOUS
Status: COMPLETED | OUTPATIENT
Start: 2019-05-23 | End: 2019-05-23

## 2019-05-23 RX ADMIN — SODIUM CHLORIDE 500 ML: 0.9 INJECTION, SOLUTION INTRAVENOUS at 08:05

## 2019-05-23 RX ADMIN — VANCOMYCIN HYDROCHLORIDE 2000 MG: 500 INJECTION, POWDER, LYOPHILIZED, FOR SOLUTION INTRAVENOUS at 11:05

## 2019-05-23 RX ADMIN — IBUPROFEN 800 MG: 800 TABLET ORAL at 08:05

## 2019-05-23 RX ADMIN — ACETAMINOPHEN 1000 MG: 500 TABLET, FILM COATED ORAL at 08:05

## 2019-05-23 RX ADMIN — PIPERACILLIN AND TAZOBACTAM 4.5 G: 4; .5 INJECTION, POWDER, LYOPHILIZED, FOR SOLUTION INTRAVENOUS; PARENTERAL at 08:05

## 2019-05-23 RX ADMIN — HEPARIN 500 UNITS: 100 SYRINGE at 07:05

## 2019-05-24 ENCOUNTER — TELEPHONE (OUTPATIENT)
Dept: FAMILY MEDICINE | Facility: CLINIC | Age: 84
End: 2019-05-24

## 2019-05-24 PROBLEM — N40.1 BENIGN PROSTATIC HYPERPLASIA WITH URINARY FREQUENCY: Status: ACTIVE | Noted: 2019-05-24

## 2019-05-24 PROBLEM — E87.6 HYPOKALEMIA: Status: ACTIVE | Noted: 2019-05-24

## 2019-05-24 PROBLEM — R35.0 BENIGN PROSTATIC HYPERPLASIA WITH URINARY FREQUENCY: Status: ACTIVE | Noted: 2019-05-24

## 2019-05-24 PROBLEM — I48.0 PAROXYSMAL ATRIAL FIBRILLATION: Status: ACTIVE | Noted: 2019-05-24

## 2019-05-24 PROBLEM — Z66 DNR (DO NOT RESUSCITATE): Status: ACTIVE | Noted: 2019-05-24

## 2019-05-24 LAB
ABO + RH BLD: NORMAL
ALBUMIN SERPL BCP-MCNC: 2.4 G/DL (ref 3.5–5.2)
ALP SERPL-CCNC: 85 U/L (ref 55–135)
ALT SERPL W/O P-5'-P-CCNC: 30 U/L (ref 10–44)
ANION GAP SERPL CALC-SCNC: 9 MMOL/L (ref 8–16)
AST SERPL-CCNC: 24 U/L (ref 10–40)
BASOPHILS # BLD AUTO: 0.02 K/UL (ref 0–0.2)
BASOPHILS NFR BLD: 0.5 % (ref 0–1.9)
BILIRUB SERPL-MCNC: 1.2 MG/DL (ref 0.1–1)
BLD GP AB SCN CELLS X3 SERPL QL: NORMAL
BUN SERPL-MCNC: 18 MG/DL (ref 8–23)
CALCIUM SERPL-MCNC: 8.7 MG/DL (ref 8.7–10.5)
CHLORIDE SERPL-SCNC: 108 MMOL/L (ref 95–110)
CO2 SERPL-SCNC: 22 MMOL/L (ref 23–29)
CREAT SERPL-MCNC: 1.2 MG/DL (ref 0.5–1.4)
DIFFERENTIAL METHOD: ABNORMAL
EOSINOPHIL # BLD AUTO: 0.2 K/UL (ref 0–0.5)
EOSINOPHIL NFR BLD: 4.7 % (ref 0–8)
ERYTHROCYTE [DISTWIDTH] IN BLOOD BY AUTOMATED COUNT: 18.9 % (ref 11.5–14.5)
EST. GFR  (AFRICAN AMERICAN): >60 ML/MIN/1.73 M^2
EST. GFR  (NON AFRICAN AMERICAN): 56 ML/MIN/1.73 M^2
FERRITIN SERPL-MCNC: 38 NG/ML (ref 20–300)
FOLATE SERPL-MCNC: 8.6 NG/ML (ref 4–24)
GLUCOSE SERPL-MCNC: 119 MG/DL (ref 70–110)
HCT VFR BLD AUTO: 24 % (ref 40–54)
HGB BLD-MCNC: 7.2 G/DL (ref 14–18)
IRON SERPL-MCNC: 18 UG/DL (ref 45–160)
LACTATE SERPL-SCNC: 0.6 MMOL/L (ref 0.5–2.2)
LYMPHOCYTES # BLD AUTO: 0.6 K/UL (ref 1–4.8)
LYMPHOCYTES NFR BLD: 14.5 % (ref 18–48)
MCH RBC QN AUTO: 22.9 PG (ref 27–31)
MCHC RBC AUTO-ENTMCNC: 30 G/DL (ref 32–36)
MCV RBC AUTO: 76 FL (ref 82–98)
MONOCYTES # BLD AUTO: 0.4 K/UL (ref 0.3–1)
MONOCYTES NFR BLD: 9.6 % (ref 4–15)
NEUTROPHILS # BLD AUTO: 2.7 K/UL (ref 1.8–7.7)
NEUTROPHILS NFR BLD: 70.7 % (ref 38–73)
PLATELET # BLD AUTO: 133 K/UL (ref 150–350)
PMV BLD AUTO: 8.5 FL (ref 9.2–12.9)
POTASSIUM SERPL-SCNC: 3.1 MMOL/L (ref 3.5–5.1)
PROCALCITONIN SERPL IA-MCNC: 0.12 NG/ML
PROT SERPL-MCNC: 5 G/DL (ref 6–8.4)
RBC # BLD AUTO: 3.14 M/UL (ref 4.6–6.2)
SATURATED IRON: 6 % (ref 20–50)
SODIUM SERPL-SCNC: 139 MMOL/L (ref 136–145)
TOTAL IRON BINDING CAPACITY: 303 UG/DL (ref 250–450)
TRANSFERRIN SERPL-MCNC: 205 MG/DL (ref 200–375)
TROPONIN I SERPL DL<=0.01 NG/ML-MCNC: 0.07 NG/ML (ref 0–0.03)
TROPONIN I SERPL DL<=0.01 NG/ML-MCNC: 0.08 NG/ML (ref 0–0.03)
VIT B12 SERPL-MCNC: 211 PG/ML (ref 210–950)
WBC # BLD AUTO: 3.86 K/UL (ref 3.9–12.7)

## 2019-05-24 PROCEDURE — 86920 COMPATIBILITY TEST SPIN: CPT

## 2019-05-24 PROCEDURE — 85025 COMPLETE CBC W/AUTO DIFF WBC: CPT

## 2019-05-24 PROCEDURE — 96365 THER/PROPH/DIAG IV INF INIT: CPT

## 2019-05-24 PROCEDURE — 25000003 PHARM REV CODE 250: Performed by: NURSE PRACTITIONER

## 2019-05-24 PROCEDURE — 63600175 PHARM REV CODE 636 W HCPCS: Performed by: NURSE PRACTITIONER

## 2019-05-24 PROCEDURE — 25000003 PHARM REV CODE 250: Performed by: SURGERY

## 2019-05-24 PROCEDURE — 97161 PT EVAL LOW COMPLEX 20 MIN: CPT

## 2019-05-24 PROCEDURE — 99222 1ST HOSP IP/OBS MODERATE 55: CPT | Mod: AI,,, | Performed by: FAMILY MEDICINE

## 2019-05-24 PROCEDURE — 11000001 HC ACUTE MED/SURG PRIVATE ROOM

## 2019-05-24 PROCEDURE — 80053 COMPREHEN METABOLIC PANEL: CPT

## 2019-05-24 PROCEDURE — 97116 GAIT TRAINING THERAPY: CPT

## 2019-05-24 PROCEDURE — 99222 PR INITIAL HOSPITAL CARE,LEVL II: ICD-10-PCS | Mod: AI,,, | Performed by: FAMILY MEDICINE

## 2019-05-24 PROCEDURE — 96366 THER/PROPH/DIAG IV INF ADDON: CPT

## 2019-05-24 PROCEDURE — 36415 COLL VENOUS BLD VENIPUNCTURE: CPT

## 2019-05-24 PROCEDURE — 86850 RBC ANTIBODY SCREEN: CPT

## 2019-05-24 PROCEDURE — 84484 ASSAY OF TROPONIN QUANT: CPT | Mod: 91

## 2019-05-24 PROCEDURE — 63600175 PHARM REV CODE 636 W HCPCS: Performed by: SURGERY

## 2019-05-24 PROCEDURE — 84145 PROCALCITONIN (PCT): CPT

## 2019-05-24 PROCEDURE — 83605 ASSAY OF LACTIC ACID: CPT

## 2019-05-24 RX ORDER — LEVETIRACETAM 250 MG/1
250 TABLET ORAL 2 TIMES DAILY
Status: DISCONTINUED | OUTPATIENT
Start: 2019-05-24 | End: 2019-05-26 | Stop reason: HOSPADM

## 2019-05-24 RX ORDER — POTASSIUM CHLORIDE 20 MEQ/1
40 TABLET, EXTENDED RELEASE ORAL ONCE
Status: COMPLETED | OUTPATIENT
Start: 2019-05-24 | End: 2019-05-24

## 2019-05-24 RX ORDER — FUROSEMIDE 40 MG/1
40 TABLET ORAL DAILY
Status: DISCONTINUED | OUTPATIENT
Start: 2019-05-24 | End: 2019-05-26 | Stop reason: HOSPADM

## 2019-05-24 RX ORDER — SPIRONOLACTONE 25 MG/1
25 TABLET ORAL DAILY
Status: DISCONTINUED | OUTPATIENT
Start: 2019-05-24 | End: 2019-05-26 | Stop reason: HOSPADM

## 2019-05-24 RX ORDER — ATORVASTATIN CALCIUM 10 MG/1
10 TABLET, FILM COATED ORAL NIGHTLY
Status: DISCONTINUED | OUTPATIENT
Start: 2019-05-24 | End: 2019-05-26 | Stop reason: HOSPADM

## 2019-05-24 RX ORDER — NITROGLYCERIN 0.4 MG/1
0.4 TABLET SUBLINGUAL EVERY 5 MIN PRN
Status: DISCONTINUED | OUTPATIENT
Start: 2019-05-24 | End: 2019-05-26 | Stop reason: HOSPADM

## 2019-05-24 RX ORDER — LISINOPRIL 10 MG/1
10 TABLET ORAL DAILY
Status: DISCONTINUED | OUTPATIENT
Start: 2019-05-24 | End: 2019-05-26 | Stop reason: HOSPADM

## 2019-05-24 RX ORDER — TAMSULOSIN HYDROCHLORIDE 0.4 MG/1
0.4 CAPSULE ORAL DAILY
Status: DISCONTINUED | OUTPATIENT
Start: 2019-05-24 | End: 2019-05-26 | Stop reason: HOSPADM

## 2019-05-24 RX ORDER — ASPIRIN 81 MG/1
81 TABLET ORAL DAILY
Status: DISCONTINUED | OUTPATIENT
Start: 2019-05-24 | End: 2019-05-26 | Stop reason: HOSPADM

## 2019-05-24 RX ORDER — LANOLIN ALCOHOL/MO/W.PET/CERES
400 CREAM (GRAM) TOPICAL 2 TIMES DAILY
Status: COMPLETED | OUTPATIENT
Start: 2019-05-24 | End: 2019-05-24

## 2019-05-24 RX ORDER — CARVEDILOL 25 MG/1
25 TABLET ORAL 2 TIMES DAILY WITH MEALS
Status: DISCONTINUED | OUTPATIENT
Start: 2019-05-24 | End: 2019-05-26 | Stop reason: HOSPADM

## 2019-05-24 RX ADMIN — Medication 400 MG: at 09:05

## 2019-05-24 RX ADMIN — ASPIRIN 81 MG: 81 TABLET, COATED ORAL at 09:05

## 2019-05-24 RX ADMIN — PIPERACILLIN AND TAZOBACTAM 4.5 G: 4; .5 INJECTION, POWDER, LYOPHILIZED, FOR SOLUTION INTRAVENOUS; PARENTERAL at 09:05

## 2019-05-24 RX ADMIN — LISINOPRIL 10 MG: 10 TABLET ORAL at 09:05

## 2019-05-24 RX ADMIN — APIXABAN 5 MG: 5 TABLET, FILM COATED ORAL at 09:05

## 2019-05-24 RX ADMIN — SPIRONOLACTONE 25 MG: 25 TABLET ORAL at 09:05

## 2019-05-24 RX ADMIN — VANCOMYCIN HYDROCHLORIDE 1750 MG: 750 INJECTION, POWDER, LYOPHILIZED, FOR SOLUTION INTRAVENOUS at 11:05

## 2019-05-24 RX ADMIN — CARVEDILOL 25 MG: 25 TABLET, FILM COATED ORAL at 05:05

## 2019-05-24 RX ADMIN — ATORVASTATIN CALCIUM 10 MG: 10 TABLET, FILM COATED ORAL at 09:05

## 2019-05-24 RX ADMIN — LEVETIRACETAM 250 MG: 250 TABLET ORAL at 09:05

## 2019-05-24 RX ADMIN — PANTOPRAZOLE SODIUM 40 MG: 40 TABLET, DELAYED RELEASE ORAL at 09:05

## 2019-05-24 RX ADMIN — FERRIC CARBOXYMALTOSE INJECTION 750 MG: 50 INJECTION, SOLUTION INTRAVENOUS at 10:05

## 2019-05-24 RX ADMIN — TAMSULOSIN HYDROCHLORIDE 0.4 MG: 0.4 CAPSULE ORAL at 09:05

## 2019-05-24 RX ADMIN — PIPERACILLIN AND TAZOBACTAM 4.5 G: 4; .5 INJECTION, POWDER, LYOPHILIZED, FOR SOLUTION INTRAVENOUS; PARENTERAL at 05:05

## 2019-05-24 RX ADMIN — CARVEDILOL 25 MG: 25 TABLET, FILM COATED ORAL at 09:05

## 2019-05-24 RX ADMIN — POTASSIUM CHLORIDE 40 MEQ: 1500 TABLET, EXTENDED RELEASE ORAL at 09:05

## 2019-05-24 RX ADMIN — PIPERACILLIN AND TAZOBACTAM 4.5 G: 4; .5 INJECTION, POWDER, LYOPHILIZED, FOR SOLUTION INTRAVENOUS; PARENTERAL at 02:05

## 2019-05-24 RX ADMIN — FUROSEMIDE 40 MG: 40 TABLET ORAL at 09:05

## 2019-05-24 NOTE — NURSING
Patient hooked up to vancomycin infusion.  Sitter at bedside who is to be here throughout night. Patient up in chair, does not wish to get in bed.

## 2019-05-24 NOTE — SUBJECTIVE & OBJECTIVE
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    S/P 2-vessel coronary artery bypass        Past Surgical History:   Procedure Laterality Date    CARPAL TUNNEL RELEASE      CATARACT EXTRACTION, BILATERAL      CORONARY ANGIOGRAPHY  2019    CORONARY ARTERY BYPASS GRAFT  2004    HERNIA REPAIR      Inguinal & Ventral with MESH    INSERTION, PICC N/A 4/22/2019    Performed by D[facility] Provider at [facility] OR    REPLACEMENT, AORTIC VALVE, PERCUTANEOUS, TRANSCATHETER Right 3/18/2019    Performed by Mani Babcock MD at [facility] CATH    REPLACEMENT, AORTIC VALVE, PERCUTANEOUS, TRANSCATHETER Right 3/18/2019    Performed by Ac Osman MD at [facility] CATH    TONSILLECTOMY      Transesophageal echo (INES) intra-procedure log documentation N/A 4/18/2019    Performed by Tam Carrasco MD at [facility] CATH    TRANSESOPHAGEAL ECHOCARDIOGRAM (INES) N/A 6/28/2016    Performed by Hugh Begum MD at [facility] OR       Review of patient's allergies indicates:   Allergen Reactions    Bactrim [sulfamethoxazole-trimethoprim] Rash    Codeine Rash       No current facility-administered medications on file prior to encounter.      Current Outpatient Medications on File Prior to Encounter   Medication Sig    ampicillin sodium (AMPICILLIN 2 G/100 ML NS, READY TO MIX SYSTEM,) Inject 100 mLs (2 g total) into the vein every 6 (six) hours. 1st dose 6pm 5/16/19 and last dose 6pm 5/30/19    ANORO ELLIPTA 62.5-25  mcg/actuation DsDv INHALE 1 PUFF INTO LUNGS ONCE DAILY    apixaban (ELIQUIS) 5 mg Tab Take 1 tablet (5 mg total) by mouth 2 (two) times daily.    aspirin (ECOTRIN) 81 MG EC tablet Take 81 mg by mouth once daily.    atorvastatin (LIPITOR) 10 MG tablet Take 10 mg by mouth every evening.    carvedilol (COREG) 12.5 MG tablet Take 25 mg by mouth 2 (two) times daily with meals.     furosemide (LASIX) 20 MG tablet Take 2 tablets (40 mg total) by mouth once daily.    levETIRAcetam (KEPPRA) 250 MG Tab Take 1 tablet (250 mg total) by mouth 2 (two) times daily.    lisinopril 10 MG tablet Take 1 tablet (10 mg total) by mouth once daily.    nitroGLYCERIN (NITROSTAT) 0.4 MG SL tablet Place 0.4 mg under the tongue every 5 (five) minutes as needed for Chest pain.    omeprazole (PRILOSEC) 40 MG capsule Take 40 mg by mouth once daily.    tamsulosin (FLOMAX) 0.4 mg Cap Take 1 capsule (0.4 mg total) by mouth once daily.    apixaban (ELIQUIS) 5 mg Tab Take 1 tablet (5 mg total) by mouth 2 (two) times daily.    lidocaine (LIDODERM) 5 % Place 1 patch onto the skin once daily. Remove & Discard patch within 12 hours or as directed by MD    mirtazapine (REMERON) 7.5 MG Tab Take 1 tablet (7.5 mg total) by mouth every evening.     Family History     Problem Relation (Age of Onset)    Atrial fibrillation Father, Sister    COPD Sister    Cancer Father    Diabetes type II Mother, Sister    Heart failure Mother    Hypertension Mother, Father, Sister, Brother    Pacemaker/defibrilator Sister    Pulmonary embolism Brother        Tobacco Use    Smoking status: Former Smoker     Last attempt to quit: 2004     Years since quitting: 15.4    Smokeless tobacco: Never Used    Tobacco comment: Smoked for 60 years   Substance and Sexual Activity    Alcohol use: No     Frequency: Never    Drug use: No    Sexual activity: Yes     Partners: Female     Review of Systems   Constitution: Positive for fever. Negative for chills, decreased  appetite, diaphoresis, malaise/fatigue, night sweats, weight gain and weight loss.   HENT: Negative.    Eyes: Negative.    Cardiovascular: Negative.    Respiratory: Negative.    Endocrine: Negative.    Hematologic/Lymphatic: Negative.    Skin: Negative.    Musculoskeletal: Negative.    Gastrointestinal: Negative.    Genitourinary: Negative.    Neurological: Negative.    Psychiatric/Behavioral: Negative.    Allergic/Immunologic: Negative.      Objective:     Vital Signs (Most Recent):  Temp: 96.7 °F (35.9 °C) (05/24/19 0740)  Pulse: 63 (05/24/19 0740)  Resp: 18 (05/24/19 0740)  BP: (!) 143/70 (05/24/19 0740)  SpO2: 97 % (05/24/19 0740) Vital Signs (24h Range):  Temp:  [96.4 °F (35.8 °C)-101.6 °F (38.7 °C)] 96.7 °F (35.9 °C)  Pulse:  [53-86] 63  Resp:  [16-27] 18  SpO2:  [93 %-97 %] 97 %  BP: (102-143)/(50-70) 143/70     Weight: 88.5 kg (195 lb 1.7 oz)  Body mass index is 30.56 kg/m².    SpO2: 97 %  O2 Device (Oxygen Therapy): room air    No intake or output data in the 24 hours ending 05/24/19 0908    Lines/Drains/Airways     Peripheral Intravenous Line                 Peripheral IV - Single Lumen 05/23/19 1935 20 G Left Forearm less than 1 day                Physical Exam   Constitutional: He is oriented to person, place, and time. He appears well-developed and well-nourished.   HENT:   Head: Normocephalic and atraumatic.   Neck: Normal range of motion. Neck supple. No JVD present. No tracheal deviation present. No thyromegaly present.   Cardiovascular: Normal rate, regular rhythm and intact distal pulses. Exam reveals no gallop and no friction rub.   Murmur (3/6 JUAN ANTONIO) heard.  Pulmonary/Chest: Effort normal and breath sounds normal. No stridor. No respiratory distress. He has no wheezes. He has no rales. He exhibits no tenderness.   Abdominal: Soft. Bowel sounds are normal. He exhibits no distension and no mass. There is no tenderness. There is no rebound and no guarding.   Musculoskeletal: Normal range of motion. He  exhibits edema (3+ pitting edema to BLE).   Lymphadenopathy:     He has no cervical adenopathy.   Neurological: He is alert and oriented to person, place, and time.   Skin: Skin is warm and dry. No rash noted. No erythema. No pallor.   Psychiatric: He has a normal mood and affect. His behavior is normal. Judgment and thought content normal.       Significant Labs:   ABG: No results for input(s): PH, PCO2, HCO3, POCSATURATED, BE in the last 48 hours., Blood Culture: No results for input(s): LABBLOO in the last 48 hours., BMP:   Recent Labs   Lab 05/23/19 1936 05/24/19 0315   * 119*    139   K 3.3* 3.1*    108   CO2 22* 22*   BUN 16 18   CREATININE 1.1 1.2   CALCIUM 9.0 8.7   MG 1.5*  --    , CMP   Recent Labs   Lab 05/23/19 1936 05/24/19 0315    139   K 3.3* 3.1*    108   CO2 22* 22*   * 119*   BUN 16 18   CREATININE 1.1 1.2   CALCIUM 9.0 8.7   PROT 5.6* 5.0*   ALBUMIN 2.7* 2.4*   BILITOT 1.3* 1.2*   ALKPHOS 100 85   AST 34 24   ALT 35 30   ANIONGAP 9 9   ESTGFRAFRICA >60 >60   EGFRNONAA >60 56*   , CBC   Recent Labs   Lab 05/23/19 1936 05/24/19 0315   WBC 5.76 3.86*   HGB 7.7* 7.2*   HCT 26.2* 24.0*    133*   , INR   Recent Labs   Lab 05/23/19 1936   INR 1.5*   , Lipid Panel No results for input(s): CHOL, HDL, LDLCALC, TRIG, CHOLHDL in the last 48 hours.,   Pathology Results  (Last 10 years)    None      , Troponin   Recent Labs   Lab 05/23/19 1936   TROPONINI 0.141*   , All pertinent lab results from the last 24 hours have been reviewed. and   Recent Lab Results       05/24/19  0743   05/24/19 0315   05/23/19  2317   05/23/19 2017 05/23/19  1942        Influenza A, Molecular         Negative     Influenza B, Molecular         Negative     Albumin   2.4           Alkaline Phosphatase   85           ALT   30           Anion Gap   9           Appearance, UA       Clear       aPTT               AST   24           Bacteria, UA       Occasional       Baso #   0.02            Basophil%   0.5           Bilirubin (UA)       1+  Comment:  Positive urine bilirubin is not confirmed. Correlate with   serum bilirubin and clinical presentation.         BILIRUBIN TOTAL   1.2  Comment:  For infants and newborns, interpretation of results should be based  on gestational age, weight and in agreement with clinical  observations.  Premature Infant recommended reference ranges:  Up to 24 hours.............<8.0 mg/dL  Up to 48 hours............<12.0 mg/dL  3-5 days..................<15.0 mg/dL  6-29 days.................<15.0 mg/dL             BNP               BUN, Bld   18           Calcium   8.7           Chloride   108           CO2   22           Color, UA       Yellow       CPK               CPK MB               Creatinine   1.2           Differential Method   Automated           eGFR if    >60           eGFR if non    56  Comment:  Calculation used to obtain the estimated glomerular filtration  rate (eGFR) is the CKD-EPI equation.              Eos #   0.2           Eosinophil%   4.7           Ferritin               Flu A & B Source         Nasal swab     Folate               Glucose   119           Glucose, UA       Negative       Gran # (ANC)   2.7           Gran%   70.7           Granular Casts, UA       1       Group & Rh O POS             Hematocrit   24.0           Hemoglobin   7.2           Hyaline Casts, UA       0       INDIRECT KRISTIN NEG             Coumadin Monitoring INR               Iron               Ketones, UA       Trace       Lactate, Elijah   0.6  Comment:  Falsely low lactic acid results can be found in samples   containing >=13.0 mg/dL total bilirubin and/or >=3.5 mg/dL   direct bilirubin.   0.6  Comment:  Falsely low lactic acid results can be found in samples   containing >=13.0 mg/dL total bilirubin and/or >=3.5 mg/dL   direct bilirubin.           Leukocytes, UA       Negative       Lymph #   0.6           Lymph%   14.5            Magnesium               MB%               MCH   22.9           MCHC   30.0           MCV   76           Microscopic Comment       SEE COMMENT  Comment:  Other formed elements not mentioned in the report are not   present in the microscopic examination.          Mono #   0.4           Mono%   9.6           MPV   8.5           NITRITE UA       Negative       Occult Blood UA       Negative       pH, UA       6.0       Phosphorus               Platelets   133           Potassium   3.1           PROTEIN TOTAL   5.0           Protein, UA       2+  Comment:  Recommend a 24 hour urine protein or a urine   protein/creatinine ratio if globulin induced proteinuria is  clinically suspected.         Protime               RAPID STREP A SCREEN         Negative  Comment:  See Micro for reflexed Strep culture.     RBC   3.14           RBC, UA       0       RDW   18.9           Retic               Saturated Iron               Sodium   139           Specific Garner, UA       1.025       Specimen UA       Urine, Clean Catch       TIBC               Transferrin               Troponin I               TSH               UROBILINOGEN UA       4.0-6.0       Vitamin B-12               WBC, UA       0       WBC   3.86                            05/23/19 1936 05/23/19 1935        Influenza A, Molecular         Influenza B, Molecular         Albumin 2.7       Alkaline Phosphatase 100       ALT 35       Anion Gap 9       Appearance, UA         aPTT 36.2  Comment:  aPTT therapeutic range = 39-69 seconds       AST 34       Bacteria, UA         Baso # 0.03       Basophil% 0.5       Bilirubin (UA)         BILIRUBIN TOTAL 1.3  Comment:  For infants and newborns, interpretation of results should be based  on gestational age, weight and in agreement with clinical  observations.  Premature Infant recommended reference ranges:  Up to 24 hours.............<8.0 mg/dL  Up to 48 hours............<12.0 mg/dL  3-5 days..................<15.0 mg/dL  6-29  "days.................<15.0 mg/dL           Comment:  Values of less than 100 pg/ml are consistent with non-CHF populations.       BUN, Bld 16       Calcium 9.0       Chloride 108       CO2 22       Color, UA         CPK 20        20       CPK MB 0.9       Creatinine 1.1       Differential Method Automated       eGFR if  >60       eGFR if non  >60  Comment:  Calculation used to obtain the estimated glomerular filtration  rate (eGFR) is the CKD-EPI equation.          Eos # 0.2       Eosinophil% 2.6       Ferritin   38     Flu A & B Source         Folate   8.6     Glucose 128       Glucose, UA         Gran # (ANC) 4.6       Gran% 80.4       Granular Casts, UA         Group & Rh         Hematocrit 26.2       Hemoglobin 7.7       Hyaline Casts, UA         INDIRECT KRISTIN         Coumadin Monitoring INR 1.5  Comment:  Coumadin Therapy:  2.0 - 3.0 for INR for all indicators except mechanical heart valves  and antiphospholipid syndromes which should use 2.5 - 3.5.         Iron   18     Ketones, UA         Lactate, Elijah 0.8  Comment:  Falsely low lactic acid results can be found in samples   containing >=13.0 mg/dL total bilirubin and/or >=3.5 mg/dL   direct bilirubin.         Leukocytes, UA         Lymph # 0.5       Lymph% 8.3       Magnesium 1.5       MB% 4.5  Comment:  To be positive, the MB% must be greater than 5% AND the CK-MB  greater than 6.5 ng/mL. Values not in the reference interval,   but not qualifying as positive, should be considered "trace".         MCH 22.5       MCHC 29.4       MCV 77       Microscopic Comment         Mono # 0.5       Mono% 8.2       MPV 8.8       NITRITE UA         Occult Blood UA         pH, UA         Phosphorus 2.7       Platelets 164       Potassium 3.3       PROTEIN TOTAL 5.6       Protein, UA         Protime 15.4       RAPID STREP A SCREEN         RBC 3.42       RBC, UA         RDW 19.1       Retic   1.9     Saturated Iron   6     Sodium 139    "    Specific Gravity, UA         Specimen UA         TIBC   303     Transferrin   205     Troponin I 0.141  Comment:  The reference interval for Troponin I represents the 99th percentile   cutoff   for our facility and is consistent with 3rd generation assay   performance.         TSH 0.911       UROBILINOGEN UA         Vitamin B-12   211     WBC, UA         WBC 5.76             Significant Imaging: CT scan: CT ABDOMEN PELVIS WITH CONTRAST: No results found for this visit on 05/23/19. and CT ABDOMEN PELVIS WITHOUT CONTRAST: No results found for this visit on 05/23/19., Echocardiogram:   2D echo with color flow doppler: No results found for this or any previous visit. and Transthoracic echo (TTE) complete (Cupid Only):   Results for orders placed or performed during the hospital encounter of 04/16/19   Transthoracic echo (TTE) complete (Cupid Only)   Result Value Ref Range    BSA 2.06 m2    LA WIDTH 4.45 cm    PV PEAK VELOCITY 1.38 cm/s    LVIDD 6.93 (A) 3.5 - 6.0 cm    IVS 1.13 (A) 0.6 - 1.1 cm    PW 1.17 (A) 0.6 - 1.1 cm    Ao root annulus 2.86 cm    LVIDS 4.76 (A) 2.1 - 4.0 cm    FS 31 28 - 44 %    LA volume 102.99 cm3    STJ 2.65 cm    LV mass 377.93 g    LA size 4.98 cm    RVDD 3.25 cm    Left Ventricle Relative Wall Thickness 0.34 cm    AV mean gradient 7.38 mmHg    AV valve area 2.16 cm2    AV Velocity Ratio 0.72     AV index (prosthetic) 0.70     E/A ratio 1.05     E wave decelartion time 305.25 msec    IVRT 0.11 msec    Pulm vein S/D ratio 1.26     LVOT diameter 1.99 cm    LVOT area 3.11 cm2    LVOT peak abdifatah 1.6607200909 m/s    LVOT peak VTI 28.16 cm    Ao peak abdifatah 1.62 m/s    Ao VTI 40.46 cm    LVOT stroke volume 87.54 cm3    AV peak gradient 10.50 mmHg    MV Peak E Abdifatah 1.16 m/s    TR Max Abdifatah 2.65 m/s    MV Peak A Abdifatah 1.11 m/s    PV Peak S Abdifatah 0.44 m/s    PV Peak D Abdifatah 0.35 m/s    LV Systolic Volume 105.45 mL    LV Systolic Volume Index 52.5 mL/m2    LV Diastolic Volume 249.54 mL    LV Diastolic Volume Index  124.20 mL/m2    LA Volume Index 51.3 mL/m2    LV Mass Index 188.1 g/m2    RA Major Axis 5.02 cm    Left Atrium Minor Axis 5.14 cm    Left Atrium Major Axis 5.84 cm    Triscuspid Valve Regurgitation Peak Gradient 28.09 mmHg    Right Atrial Pressure (from IVC) 3 mmHg    TV rest pulmonary artery pressure 31 mmHg    and X-Ray: CXR: X-Ray Chest 1 View (CXR):   Results for orders placed or performed during the hospital encounter of 05/23/19   X-Ray Chest 1 View    Narrative    EXAMINATION:  XR CHEST 1 VIEW    CLINICAL HISTORY:  Chest pain.    COMPARISON:  04/22/2019    FINDINGS:  Mild blunting left costophrenic angle is present compatible with a small left pleural effusion, new since the previous exam.  Otherwise lungs are clear.  Heart is enlarged with postoperative changes.  Vascular calcification of the aorta noted.  Right-sided PICC line in place.  Distal tip overlies the distal SVC.  No suspicious bony abnormality.      Impression    1. Cardiomegaly with postoperative changes.  2. Evidence of a small left pleural effusion, new since 04/22/2019.  3. Right-sided PICC line unchanged.      Electronically signed by: Renan Deshpande MD  Date:    05/23/2019  Time:    19:53    and X-Ray Chest PA and Lateral (CXR): No results found for this visit on 05/23/19. and KUB: X-Ray Abdomen AP 1 View (KUB): No results found for this visit on 05/23/19.

## 2019-05-24 NOTE — TELEPHONE ENCOUNTER
----- Message from Miley Aceves MA sent at 2019 10:48 AM CDT -----  Contact: Geovany olmos/Ghassan  [address] Farooq  MRN: [id number]  : 1935  PCP: Dylan Adam  Home Phone      [phone]  Work Phone      Not on file.  Mobile          [phone]      MESSAGE:   Pt refused physical therapy this week.  Phone:  [phone]

## 2019-05-24 NOTE — ED TRIAGE NOTES
"84 y.o. male presents to ER   Chief Complaint   Patient presents with    Fever     onset today, "feels weak". history of picc line with antibiotic infusion   . No acute distress noted.  "

## 2019-05-24 NOTE — SUBJECTIVE & OBJECTIVE
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    S/P 2-vessel coronary artery bypass        Past Surgical History:   Procedure Laterality Date    CARPAL TUNNEL RELEASE      CATARACT EXTRACTION, BILATERAL      CORONARY ANGIOGRAPHY  2019    CORONARY ARTERY BYPASS GRAFT  2004    HERNIA REPAIR      Inguinal & Ventral with MESH    INSERTION, PICC N/A 4/22/2019    Performed by [facility] at [facility]    REPLACEMENT, AORTIC VALVE, PERCUTANEOUS, TRANSCATHETER Right 3/18/2019    Performed by Mani Babcock MD at [facility] CATH    REPLACEMENT, AORTIC VALVE, PERCUTANEOUS, TRANSCATHETER Right 3/18/2019    Performed by Ac Osman MD at [facility] CATH    TONSILLECTOMY      Transesophageal echo (INES) intra-procedure log documentation N/A 4/18/2019    Performed by Tam Carrasco MD at [facility] CATH    TRANSESOPHAGEAL ECHOCARDIOGRAM (INES) N/A 6/28/2016    Performed by Hugh Begum MD at [facility] OR       Review of patient's allergies indicates:   Allergen Reactions    Bactrim [sulfamethoxazole-trimethoprim] Rash    Codeine Rash       No current facility-administered medications on file prior to encounter.      Current Outpatient Medications on File Prior to Encounter   Medication Sig    ampicillin sodium (AMPICILLIN 2 G/100 ML NS, READY TO MIX SYSTEM,) Inject 100 mLs (2 g total) into the vein every 6 (six) hours. 1st dose 6pm 5/16/19 and last dose 6pm 5/30/19    ANORO ELLIPTA 62.5-25  mcg/actuation DsDv INHALE 1 PUFF INTO LUNGS ONCE DAILY    apixaban (ELIQUIS) 5 mg Tab Take 1 tablet (5 mg total) by mouth 2 (two) times daily.    aspirin (ECOTRIN) 81 MG EC tablet Take 81 mg by mouth once daily.    atorvastatin (LIPITOR) 10 MG tablet Take 10 mg by mouth every evening.    carvedilol (COREG) 12.5 MG tablet Take 25 mg by mouth 2 (two) times daily with meals.     furosemide (LASIX) 20 MG tablet Take 2 tablets (40 mg total) by mouth once daily.    levETIRAcetam (KEPPRA) 250 MG Tab Take 1 tablet (250 mg total) by mouth 2 (two) times daily.    lisinopril 10 MG tablet Take 1 tablet (10 mg total) by mouth once daily.    nitroGLYCERIN (NITROSTAT) 0.4 MG SL tablet Place 0.4 mg under the tongue every 5 (five) minutes as needed for Chest pain.    omeprazole (PRILOSEC) 40 MG capsule Take 40 mg by mouth once daily.    tamsulosin (FLOMAX) 0.4 mg Cap Take 1 capsule (0.4 mg total) by mouth once daily.    apixaban (ELIQUIS) 5 mg Tab Take 1 tablet (5 mg total) by mouth 2 (two) times daily.    lidocaine (LIDODERM) 5 % Place 1 patch onto the skin once daily. Remove & Discard patch within 12 hours or as directed by MD    mirtazapine (REMERON) 7.5 MG Tab Take 1 tablet (7.5 mg total) by mouth every evening.     Family History     Problem Relation (Age of Onset)    Atrial fibrillation Father, Sister    COPD Sister    Cancer Father    Diabetes type II Mother, Sister    Heart failure Mother    Hypertension Mother, Father, Sister, Brother    Pacemaker/defibrilator Sister    Pulmonary embolism Brother        Tobacco Use    Smoking status: Former Smoker     Last attempt to quit: 2004     Years since quitting: 15.4    Smokeless tobacco: Never Used    Tobacco comment: Smoked for 60 years   Substance and Sexual Activity    Alcohol use: No     Frequency: Never    Drug use: No    Sexual activity: Yes     Partners: Female     Review of Systems   Constitutional: Positive for fever. Negative for chills.   HENT:  Negative for congestion, ear pain, postnasal drip, rhinorrhea, sore throat and trouble swallowing.    Eyes: Negative for redness and itching.   Respiratory: Negative for cough, shortness of breath and wheezing.    Cardiovascular: Negative for chest pain and palpitations.   Gastrointestinal: Negative for abdominal pain, diarrhea, nausea and vomiting.   Genitourinary: Negative for dysuria and frequency.   Skin: Negative for rash.   Neurological: Negative for weakness and headaches.     Objective:     Vital Signs (Most Recent):  Temp: 96.4 °F (35.8 °C) (05/24/19 0316)  Pulse: 62 (05/24/19 0600)  Resp: 20 (05/24/19 0316)  BP: (!) 123/59 (05/24/19 0316)  SpO2: (!) 93 % (05/24/19 0316) Vital Signs (24h Range):  Temp:  [96.4 °F (35.8 °C)-101.6 °F (38.7 °C)] 96.4 °F (35.8 °C)  Pulse:  [53-86] 62  Resp:  [16-27] 20  SpO2:  [93 %-95 %] 93 %  BP: (102-141)/(50-65) 123/59     Weight: 88.5 kg (195 lb 1.7 oz)  Body mass index is 30.56 kg/m².    Physical Exam   Constitutional: He is oriented to person, place, and time. He appears well-developed. No distress.   HENT:   Head: Normocephalic and atraumatic.   Slight facial asymmetry   Eyes: Pupils are equal, round, and reactive to light. Conjunctivae are normal.   Neck: Normal range of motion. Neck supple. No thyromegaly present.   Cardiovascular: Normal rate, regular rhythm and intact distal pulses.   Murmur heard.  Pulmonary/Chest: Effort normal and breath sounds normal. No respiratory distress. He has no wheezes.   Abdominal: Soft. Bowel sounds are normal. There is no tenderness.   Musculoskeletal: Normal range of motion. He exhibits edema (2+).   Lymphadenopathy:     He has no cervical adenopathy.   Neurological: He is alert and oriented to person, place, and time.   Skin: Skin is warm and dry. No rash noted.   Psychiatric: He has a normal mood and affect. His behavior is normal.   Nursing note and vitals reviewed.        CRANIAL NERVES     CN III, IV, VI   Pupils are equal,  round, and reactive to light.       Significant Labs:   CBC:   Recent Labs   Lab 05/23/19 1936 05/24/19 0315   WBC 5.76 3.86*   HGB 7.7* 7.2*   HCT 26.2* 24.0*    133*   retic 1.9  Ferritin 38  Folate 8.6  Iron 18, sat fe 6  B12 211    CMP:   Recent Labs   Lab 05/23/19 1936 05/24/19 0315    139   K 3.3* 3.1*    108   CO2 22* 22*   * 119*   BUN 16 18   CREATININE 1.1 1.2   CALCIUM 9.0 8.7   PROT 5.6* 5.0*   ALBUMIN 2.7* 2.4*   BILITOT 1.3* 1.2*   ALKPHOS 100 85   AST 34 24   ALT 35 30   ANIONGAP 9 9   EGFRNONAA >60 56*     Cardiac Markers:   Recent Labs   Lab 05/23/19 1936   *     Coagulation:   Recent Labs   Lab 05/23/19 1936   INR 1.5*   APTT 36.2*     Lactic Acid:   Recent Labs   Lab 05/23/19 1936 05/23/19 2317 05/24/19 0315   LACTATE 0.8 0.6 0.6     Recent Labs   Lab 05/23/19 1936   CPK 20  20   CPKMB 0.9   TROPONINI 0.141*   MB 4.5   mag 1.5    TSH:   Recent Labs   Lab 05/23/19 1936   TSH 0.911       Urine Studies:   Recent Labs   Lab 05/23/19 2017   COLORU Yellow   APPEARANCEUA Clear   PHUR 6.0   SPECGRAV 1.025   PROTEINUA 2+*   GLUCUA Negative   KETONESU Trace*   BILIRUBINUA 1+*   OCCULTUA Negative   NITRITE Negative   UROBILINOGEN 4.0-6.0*   LEUKOCYTESUR Negative   RBCUA 0   WBCUA 0   BACTERIA Occasional   HYALINECASTS 0     Repeat blood cultures 4/23 NGTD one from PICC that was pulled as well as arm  Also culturing the catheter tip       Throat screen and flu negative       Old blood cultures 4/17/19   Blood Culture, Routine Gram stain peds bottle: Gram positive cocci in chains resembling Strep    Blood Culture, Routine Results called to and read back by: Edith Cee RN 04/18/2019  09:23    Blood Culture, Routine ENTEROCOCCUS FAECALIS    Resulting Agency [facility]   Susceptibility      Enterococcus faecalis     CULTURE, BLOOD     Ampicillin <=2 mcg/mL Sensitive     Gentamicin Synergy Screen <=500 mcg/mL Sensitive     Tetracycline <=4 mcg/mL Sensitive      Vancomycin 2 mcg/mL Sensitive           Significant Imaging:     CXR 1. Cardiomegaly with postoperative changes.  2. Evidence of a small left pleural effusion, new since 04/22/2019.  3. Right-sided PICC line unchanged.

## 2019-05-24 NOTE — ASSESSMENT & PLAN NOTE
Repeated blood cultutres as well as pulled picc line and cultured the tip  Broadened the abx including vanc that his blood cultures 5 weeks ago were sensitive to.   Cont to monitor with broadened abx

## 2019-05-24 NOTE — HPI
85 yo male patient was recently d/c from [facility] for cont IV abx at home. Was on week 5 of ampicillin. Had fever 101 at home. No other complaints besides fever. No elevated WBC. No cough or runny nose. No urinary complaints. He denies sick contacts. Work up in ER essentially normal except low K and MAg and elevated BNP. He denies SOB or increased swelling. Although his daughter reports that he was more SOB at home the last day or so. Lungs clear    H/H is a little lower than his usual. Was switched from pradaxa to eliquis. More bruising noted. Anemia w/u slightly lower fe. Occult + last visit, usually hgb ~8.

## 2019-05-24 NOTE — PLAN OF CARE
Problem: Adult Inpatient Plan of Care  Goal: Plan of Care Review  Outcome: Ongoing (interventions implemented as appropriate)  Patient received iron infusion. Patient is on IV antibiotic therapy. Patient States he has no pain at this time. Patient has 3+ edema noted to lower extremities. Family with patient throughout the day. Patient has an overnight sitter. No signs of distress noted. Will continue to monitor. Plan of care reviewed with patient and family. Patient and family verbalized understanding.

## 2019-05-24 NOTE — NURSING
Received report from Sandra RIVERS.  IV antibiotics ordered. IV iron ordered. Patient's Potassium is 3.1. KDUR 4mg ordered. 3+ edema noted to both legs. Compression stocking on. No signs of distress noted. Patient is afebrile. Will continue to monitor.

## 2019-05-24 NOTE — PLAN OF CARE
05/24/19 1039   Discharge Assessment   Assessment Type Discharge Planning Assessment   Confirmed/corrected address and phone number on facesheet? Yes   Assessment information obtained from? Patient   Expected Length of Stay (days) 4   Communicated expected length of stay with patient/caregiver yes   Prior to hospitilization cognitive status: Alert/Oriented   Prior to hospitalization functional status: Assistive Equipment   Current cognitive status: Alert/Oriented   Current Functional Status: Assistive Equipment   Facility Arrived From: Home   Lives With spouse   Able to Return to Prior Arrangements yes   Is patient able to care for self after discharge? Yes   Who are your caregiver(s) and their phone number(s)? Mitzy [phone]   Patient's perception of discharge disposition home health   Readmission Within the Last 30 Days previous discharge plan unsuccessful   If yes, most recent facility name: [facility]   Patient currently being followed by outpatient case management? No   Patient currently receives any other outside agency services? Yes   Name and contact number of agency or person providing outside services [facility]   Is it the patient/care giver preference to resume care with the current outside agency? Yes   Equipment Currently Used at Home walker, rolling   Do you have any problems affording any of your prescribed medications? No   Is the patient taking medications as prescribed? yes   Does the patient have transportation home? Yes   Transportation Anticipated family or friend will provide   Dialysis Name and Scheduled days n/a   Does the patient receive services at the [facility]? No   Discharge Plan A Home Health   Discharge Plan B Skilled Nursing Facility   DME Needed Upon Discharge  none   Patient/Family in Agreement with Plan yes         Patient admitted for fever. He recently was discharged from our swing bed, and was finishing IV antibiotics at  home when he spiked a fever last night. He is staying with daughter, clementina, and can return there upon discharge. Home Health services will resume with [facility] upon discharge. Patient educated on diagnosis for this admission, and patient verbalizes understanding. Discharge planning brochure and educational handout of what signs and symptoms to look for after discharge, and how to manage health at home, given to patient and family. Patient and family are encouraged to call with any questions or help the would need. Contact information given. CM will continue to follow patient throughout the transitions of care, and assist with any discharge needs.

## 2019-05-24 NOTE — H&P
"[facility]  History & Physical    Patient Name: St Lacho Farooq  MRN: [id number]  Admission Date: 5/23/2019  Attending Physician: Gill Figueroa MD   Primary Care Provider: Dylan Adam MD         Patient information was obtained from patient and ER records.     Subjective:     Principal Problem:Fever    Chief Complaint:   Chief Complaint   Patient presents with    Fever     onset today, "feels weak". history of picc line with antibiotic infusion        HPI: 85 yo male patient was recently d/c from [facility] for cont IV abx at home. Was on week 5 of ampicillin. Had fever 101 at home. No other complaints besides fever. No elevated WBC. No cough or runny nose. No urinary complaints. He denies sick contacts. Work up in ER essentially normal except low K and MAg and elevated BNP. He denies SOB or increased swelling. Although his daughter reports that he was more SOB at home the last day or so. Lungs clear    H/H is a little lower than his usual. Was switched from pradaxa to eliquis. More bruising noted. Anemia w/u slightly lower fe. Occult + last visit, usually hgb ~8.     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    S/P 2-vessel coronary artery bypass        Past Surgical History:   Procedure Laterality Date    CARPAL TUNNEL RELEASE      CATARACT EXTRACTION, BILATERAL      CORONARY ANGIOGRAPHY  2019    CORONARY ARTERY BYPASS GRAFT  2004    " HERNIA REPAIR      Inguinal & Ventral with MESH    INSERTION, PICC N/A 4/22/2019    Performed by [facility]osc [facility] Provider at [facility] OR    REPLACEMENT, AORTIC VALVE, PERCUTANEOUS, TRANSCATHETER Right 3/18/2019    Performed by Mani Babcock MD at [facility] CATH    REPLACEMENT, AORTIC VALVE, PERCUTANEOUS, TRANSCATHETER Right 3/18/2019    Performed by Ac Osman MD at [facility] CATH    TONSILLECTOMY      Transesophageal echo (INES) intra-procedure log documentation N/A 4/18/2019    Performed by Tam Carrasco MD at [facility] CATH    TRANSESOPHAGEAL ECHOCARDIOGRAM (INES) N/A 6/28/2016    Performed by Hugh Begum MD at [facility] OR       Review of patient's allergies indicates:   Allergen Reactions    Bactrim [sulfamethoxazole-trimethoprim] Rash    Codeine Rash       No current facility-administered medications on file prior to encounter.      Current Outpatient Medications on File Prior to Encounter   Medication Sig    ampicillin sodium (AMPICILLIN 2 G/100 ML NS, READY TO MIX SYSTEM,) Inject 100 mLs (2 g total) into the vein every 6 (six) hours. 1st dose 6pm 5/16/19 and last dose 6pm 5/30/19    ANORO ELLIPTA 62.5-25 mcg/actuation DsDv INHALE 1 PUFF INTO LUNGS ONCE DAILY    apixaban (ELIQUIS) 5 mg Tab Take 1 tablet (5 mg total) by mouth 2 (two) times daily.    aspirin (ECOTRIN) 81 MG EC tablet Take 81 mg by mouth once daily.    atorvastatin (LIPITOR) 10 MG tablet Take 10 mg by mouth every evening.    carvedilol (COREG) 12.5 MG tablet Take 25 mg by mouth 2 (two) times daily with meals.     furosemide (LASIX) 20 MG tablet Take 2 tablets (40 mg total) by mouth once daily.    levETIRAcetam (KEPPRA) 250 MG Tab Take 1 tablet (250 mg total) by mouth 2 (two) times daily.    lisinopril 10 MG tablet Take 1 tablet (10 mg total) by mouth once daily.    nitroGLYCERIN (NITROSTAT) 0.4 MG SL tablet Place 0.4 mg under the tongue every 5 (five) minutes as needed for Chest pain.    omeprazole (PRILOSEC) 40 MG capsule Take 40 mg  by mouth once daily.    tamsulosin (FLOMAX) 0.4 mg Cap Take 1 capsule (0.4 mg total) by mouth once daily.    apixaban (ELIQUIS) 5 mg Tab Take 1 tablet (5 mg total) by mouth 2 (two) times daily.    lidocaine (LIDODERM) 5 % Place 1 patch onto the skin once daily. Remove & Discard patch within 12 hours or as directed by MD    mirtazapine (REMERON) 7.5 MG Tab Take 1 tablet (7.5 mg total) by mouth every evening.     Family History     Problem Relation (Age of Onset)    Atrial fibrillation Father, Sister    COPD Sister    Cancer Father    Diabetes type II Mother, Sister    Heart failure Mother    Hypertension Mother, Father, Sister, Brother    Pacemaker/defibrilator Sister    Pulmonary embolism Brother        Tobacco Use    Smoking status: Former Smoker     Last attempt to quit: 2004     Years since quitting: 15.4    Smokeless tobacco: Never Used    Tobacco comment: Smoked for 60 years   Substance and Sexual Activity    Alcohol use: No     Frequency: Never    Drug use: No    Sexual activity: Yes     Partners: Female     Review of Systems   Constitutional: Positive for fever. Negative for chills.   HENT: Negative for congestion, ear pain, postnasal drip, rhinorrhea, sore throat and trouble swallowing.    Eyes: Negative for redness and itching.   Respiratory: Negative for cough, shortness of breath and wheezing.    Cardiovascular: Negative for chest pain and palpitations.   Gastrointestinal: Negative for abdominal pain, diarrhea, nausea and vomiting.   Genitourinary: Negative for dysuria and frequency.   Skin: Negative for rash.   Neurological: Negative for weakness and headaches.     Objective:     Vital Signs (Most Recent):  Temp: 96.4 °F (35.8 °C) (05/24/19 0316)  Pulse: 62 (05/24/19 0600)  Resp: 20 (05/24/19 0316)  BP: (!) 123/59 (05/24/19 0316)  SpO2: (!) 93 % (05/24/19 0316) Vital Signs (24h Range):  Temp:  [96.4 °F (35.8 °C)-101.6 °F (38.7 °C)] 96.4 °F (35.8 °C)  Pulse:  [53-86] 62  Resp:  [16-27] 20  SpO2:   [93 %-95 %] 93 %  BP: (102-141)/(50-65) 123/59     Weight: 88.5 kg (195 lb 1.7 oz)  Body mass index is 30.56 kg/m².    Physical Exam   Constitutional: He is oriented to person, place, and time. He appears well-developed. No distress.   HENT:   Head: Normocephalic and atraumatic.   Slight facial asymmetry   Eyes: Pupils are equal, round, and reactive to light. Conjunctivae are normal.   Neck: Normal range of motion. Neck supple. No thyromegaly present.   Cardiovascular: Normal rate, regular rhythm and intact distal pulses.   Murmur heard.  Pulmonary/Chest: Effort normal and breath sounds normal. No respiratory distress. He has no wheezes.   Abdominal: Soft. Bowel sounds are normal. There is no tenderness.   Musculoskeletal: Normal range of motion. He exhibits edema (2+).   Lymphadenopathy:     He has no cervical adenopathy.   Neurological: He is alert and oriented to person, place, and time.   Skin: Skin is warm and dry. No rash noted.   Psychiatric: He has a normal mood and affect. His behavior is normal.   Nursing note and vitals reviewed.        CRANIAL NERVES     CN III, IV, VI   Pupils are equal, round, and reactive to light.       Significant Labs:   CBC:   Recent Labs   Lab 05/23/19 1936 05/24/19 0315   WBC 5.76 3.86*   HGB 7.7* 7.2*   HCT 26.2* 24.0*    133*   retic 1.9  Ferritin 38  Folate 8.6  Iron 18, sat fe 6  B12 211    CMP:   Recent Labs   Lab 05/23/19 1936 05/24/19 0315    139   K 3.3* 3.1*    108   CO2 22* 22*   * 119*   BUN 16 18   CREATININE 1.1 1.2   CALCIUM 9.0 8.7   PROT 5.6* 5.0*   ALBUMIN 2.7* 2.4*   BILITOT 1.3* 1.2*   ALKPHOS 100 85   AST 34 24   ALT 35 30   ANIONGAP 9 9   EGFRNONAA >60 56*     Cardiac Markers:   Recent Labs   Lab 05/23/19 1936   *     Coagulation:   Recent Labs   Lab 05/23/19 1936   INR 1.5*   APTT 36.2*     Lactic Acid:   Recent Labs   Lab 05/23/19 1936 05/23/19  2317 05/24/19  0315   LACTATE 0.8 0.6 0.6     Recent Labs   Lab  05/23/19 1936   CPK 20  20   CPKMB 0.9   TROPONINI 0.141*   MB 4.5   mag 1.5    TSH:   Recent Labs   Lab 05/23/19 1936   TSH 0.911       Urine Studies:   Recent Labs   Lab 05/23/19 2017   COLORU Yellow   APPEARANCEUA Clear   PHUR 6.0   SPECGRAV 1.025   PROTEINUA 2+*   GLUCUA Negative   KETONESU Trace*   BILIRUBINUA 1+*   OCCULTUA Negative   NITRITE Negative   UROBILINOGEN 4.0-6.0*   LEUKOCYTESUR Negative   RBCUA 0   WBCUA 0   BACTERIA Occasional   HYALINECASTS 0     Repeat blood cultures 4/23 NGTD one from PICC that was pulled as well as arm  Also culturing the catheter tip       Throat screen and flu negative       Old blood cultures 4/17/19   Blood Culture, Routine Gram stain peds bottle: Gram positive cocci in chains resembling Strep    Blood Culture, Routine Results called to and read back by: Edith Cee RN 04/18/2019  09:23    Blood Culture, Routine ENTEROCOCCUS FAECALIS    Resulting Agency [facility]   Susceptibility      Enterococcus faecalis     CULTURE, BLOOD     Ampicillin <=2 mcg/mL Sensitive     Gentamicin Synergy Screen <=500 mcg/mL Sensitive     Tetracycline <=4 mcg/mL Sensitive     Vancomycin 2 mcg/mL Sensitive           Significant Imaging:     CXR 1. Cardiomegaly with postoperative changes.  2. Evidence of a small left pleural effusion, new since 04/22/2019.  3. Right-sided PICC line unchanged.    Assessment/Plan:     * Fever  Repeated blood cultutres as well as pulled picc line and cultured the tip  Broadened the abx including vanc that his blood cultures 5 weeks ago were sensitive to.   Cont to monitor with broadened abx    Hypokalemia  Replace K today as well as mag po      Benign prostatic hyperplasia with urinary frequency  Cont flomax      Paroxysmal atrial fibrillation  None on tele, cont eliquis and BB      DNR (do not resuscitate)  He has living will on file      Encephalopathy  AMS last visit  Was started on keppra for poss sz activity from recent embolic strokes      Acute bacterial  endocarditis  Cont vanc for now; needs another week of IV abx, follow new cultures       Atrial thrombus  Cont eliquis and asa      Chronic diastolic heart failure, NYHA class 2  BNP was elevated  Daughter reports SOB at home. On lasix 40mg at home, cont with low dose ace and BB      Anemia  Type and screen today  Replace FE      Emphysema/COPD  Nebs as needed        VTE Risk Mitigation (From admission, onward)        Ordered     apixaban tablet 5 mg  2 times daily      05/24/19 0810     IP VTE HIGH RISK PATIENT  Once      05/23/19 2123     Place sequential compression device  Until discontinued      05/23/19 2123             Gill Figueroa MD  Department of Hospital Medicine   [facility]

## 2019-05-24 NOTE — PLAN OF CARE
Problem: Adult Inpatient Plan of Care  Goal: Plan of Care Review  Outcome: Ongoing (interventions implemented as appropriate)  Patient admitted at start of shift from Emergency Room with fever.  Recently discharged home with PICC line and home antibiotic infusion regimen.  Febrile in ER, PICC line removed and cultured.  Since admission, afebrile, VSS, on room air. Heart monitor in place, NSR with PVC's and PAC's.  Patient denies any pain.  Family or private sitter beside patient throughout night.  Patient chose to sleep sitting up in chair.  IV antibiotic regimen initiated of zosyn every 8 hours and Vancomycin daily.  CIS consult ordered and will be called first thing in morning. Wound care done to skin tear at left wrist.  Labs drawn as ordered. Urinalysis collected with negative results.   Patient verbalizes understanding of plan of care.

## 2019-05-24 NOTE — NURSING
Patient admitted from ER via wheelchair.  SILVESTRE Sevilla gave report at bedside.  Patient assisted to chair as he does not wish to be in the bed.  Daughter, Mitzy, and wife at bedside.  Patient denies any pain or shortness of breath.

## 2019-05-24 NOTE — CONSULTS
[facility]  Cardiology  Consult Note    Patient Name: St Lacho Farooq  MRN: [id number]  Admission Date: 5/23/2019  Hospital Length of Stay: 0 days  Code Status: DNR   Attending Provider: Gill Figueroa MD   Consulting Provider: Alexx Thibodeaux NP  Primary Care Physician: Dylan Adam MD  Principal Problem:Fever    Patient information was obtained from patient, past medical records and ER records.     Consults  Subjective:     Chief Complaint:  fever     HPI:   84 year old male with history of AVR, chronic diastolic CHF, moderate AS, CVA, CAD, carotid artery stenosis, PAF, presents to ER with c/o fever. Patient has PICC for home IV ABX.     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    S/P 2-vessel coronary artery bypass        Past Surgical History:   Procedure Laterality Date    CARPAL TUNNEL RELEASE      CATARACT EXTRACTION, BILATERAL      CORONARY ANGIOGRAPHY  2019    CORONARY ARTERY BYPASS GRAFT  2004    HERNIA REPAIR      Inguinal & Ventral with MESH    INSERTION, PICC N/A 4/22/2019    Performed by Dosc Diagnostic Provider at [facility] OR    REPLACEMENT, AORTIC VALVE, PERCUTANEOUS, TRANSCATHETER Right 3/18/2019    Performed by Mani Babcock MD at [facility] CATH    REPLACEMENT, AORTIC VALVE, PERCUTANEOUS, TRANSCATHETER Right 3/18/2019    Performed by Ac Osman MD at [facility] CATH    TONSILLECTOMY      Transesophageal echo (INES) intra-procedure log  documentation N/A 4/18/2019    Performed by [facility] at [facility] CATH    TRANSESOPHAGEAL ECHOCARDIOGRAM (INES) N/A 6/28/2016    Performed by Hugh Begum MD at [facility] OR       Review of patient's allergies indicates:   Allergen Reactions    Bactrim [sulfamethoxazole-trimethoprim] Rash    Codeine Rash       No current facility-administered medications on file prior to encounter.      Current Outpatient Medications on File Prior to Encounter   Medication Sig    ampicillin sodium (AMPICILLIN 2 G/100 ML NS, READY TO MIX SYSTEM,) Inject 100 mLs (2 g total) into the vein every 6 (six) hours. 1st dose 6pm 5/16/19 and last dose 6pm 5/30/19    ANORO ELLIPTA 62.5-25 mcg/actuation DsDv INHALE 1 PUFF INTO LUNGS ONCE DAILY    apixaban (ELIQUIS) 5 mg Tab Take 1 tablet (5 mg total) by mouth 2 (two) times daily.    aspirin (ECOTRIN) 81 MG EC tablet Take 81 mg by mouth once daily.    atorvastatin (LIPITOR) 10 MG tablet Take 10 mg by mouth every evening.    carvedilol (COREG) 12.5 MG tablet Take 25 mg by mouth 2 (two) times daily with meals.     furosemide (LASIX) 20 MG tablet Take 2 tablets (40 mg total) by mouth once daily.    levETIRAcetam (KEPPRA) 250 MG Tab Take 1 tablet (250 mg total) by mouth 2 (two) times daily.    lisinopril 10 MG tablet Take 1 tablet (10 mg total) by mouth once daily.    nitroGLYCERIN (NITROSTAT) 0.4 MG SL tablet Place 0.4 mg under the tongue every 5 (five) minutes as needed for Chest pain.    omeprazole (PRILOSEC) 40 MG capsule Take 40 mg by mouth once daily.    tamsulosin (FLOMAX) 0.4 mg Cap Take 1 capsule (0.4 mg total) by mouth once daily.    apixaban (ELIQUIS) 5 mg Tab Take 1 tablet (5 mg total) by mouth 2 (two) times daily.    lidocaine (LIDODERM) 5 % Place 1 patch onto the skin once daily. Remove & Discard patch within 12 hours or as directed by MD    mirtazapine (REMERON) 7.5 MG Tab Take 1 tablet (7.5 mg total) by mouth every evening.     Family History     Problem Relation  (Age of Onset)    Atrial fibrillation Father, Sister    COPD Sister    Cancer Father    Diabetes type II Mother, Sister    Heart failure Mother    Hypertension Mother, Father, Sister, Brother    Pacemaker/defibrilator Sister    Pulmonary embolism Brother        Tobacco Use    Smoking status: Former Smoker     Last attempt to quit: 2004     Years since quitting: 15.4    Smokeless tobacco: Never Used    Tobacco comment: Smoked for 60 years   Substance and Sexual Activity    Alcohol use: No     Frequency: Never    Drug use: No    Sexual activity: Yes     Partners: Female     Review of Systems   Constitution: Positive for fever. Negative for chills, decreased appetite, diaphoresis, malaise/fatigue, night sweats, weight gain and weight loss.   HENT: Negative.    Eyes: Negative.    Cardiovascular: Negative.    Respiratory: Negative.    Endocrine: Negative.    Hematologic/Lymphatic: Negative.    Skin: Negative.    Musculoskeletal: Negative.    Gastrointestinal: Negative.    Genitourinary: Negative.    Neurological: Negative.    Psychiatric/Behavioral: Negative.    Allergic/Immunologic: Negative.      Objective:     Vital Signs (Most Recent):  Temp: 96.7 °F (35.9 °C) (05/24/19 0740)  Pulse: 63 (05/24/19 0740)  Resp: 18 (05/24/19 0740)  BP: (!) 143/70 (05/24/19 0740)  SpO2: 97 % (05/24/19 0740) Vital Signs (24h Range):  Temp:  [96.4 °F (35.8 °C)-101.6 °F (38.7 °C)] 96.7 °F (35.9 °C)  Pulse:  [53-86] 63  Resp:  [16-27] 18  SpO2:  [93 %-97 %] 97 %  BP: (102-143)/(50-70) 143/70     Weight: 88.5 kg (195 lb 1.7 oz)  Body mass index is 30.56 kg/m².    SpO2: 97 %  O2 Device (Oxygen Therapy): room air    No intake or output data in the 24 hours ending 05/24/19 0908    Lines/Drains/Airways     Peripheral Intravenous Line                 Peripheral IV - Single Lumen 05/23/19 1935 20 G Left Forearm less than 1 day                Physical Exam   Constitutional: He is oriented to person, place, and time. He appears well-developed  and well-nourished.   HENT:   Head: Normocephalic and atraumatic.   Neck: Normal range of motion. Neck supple. No JVD present. No tracheal deviation present. No thyromegaly present.   Cardiovascular: Normal rate, regular rhythm and intact distal pulses. Exam reveals no gallop and no friction rub.   Murmur (3/6 JUAN ANTONIO) heard.  Pulmonary/Chest: Effort normal and breath sounds normal. No stridor. No respiratory distress. He has no wheezes. He has no rales. He exhibits no tenderness.   Abdominal: Soft. Bowel sounds are normal. He exhibits no distension and no mass. There is no tenderness. There is no rebound and no guarding.   Musculoskeletal: Normal range of motion. He exhibits edema (3+ pitting edema to BLE).   Lymphadenopathy:     He has no cervical adenopathy.   Neurological: He is alert and oriented to person, place, and time.   Skin: Skin is warm and dry. No rash noted. No erythema. No pallor.   Psychiatric: He has a normal mood and affect. His behavior is normal. Judgment and thought content normal.       Significant Labs:   ABG: No results for input(s): PH, PCO2, HCO3, POCSATURATED, BE in the last 48 hours., Blood Culture: No results for input(s): LABBLOO in the last 48 hours., BMP:   Recent Labs   Lab 05/23/19 1936 05/24/19 0315   * 119*    139   K 3.3* 3.1*    108   CO2 22* 22*   BUN 16 18   CREATININE 1.1 1.2   CALCIUM 9.0 8.7   MG 1.5*  --    , CMP   Recent Labs   Lab 05/23/19 1936 05/24/19 0315    139   K 3.3* 3.1*    108   CO2 22* 22*   * 119*   BUN 16 18   CREATININE 1.1 1.2   CALCIUM 9.0 8.7   PROT 5.6* 5.0*   ALBUMIN 2.7* 2.4*   BILITOT 1.3* 1.2*   ALKPHOS 100 85   AST 34 24   ALT 35 30   ANIONGAP 9 9   ESTGFRAFRICA >60 >60   EGFRNONAA >60 56*   , CBC   Recent Labs   Lab 05/23/19 1936 05/24/19 0315   WBC 5.76 3.86*   HGB 7.7* 7.2*   HCT 26.2* 24.0*    133*   , INR   Recent Labs   Lab 05/23/19 1936   INR 1.5*   , Lipid Panel No results for input(s): CHOL,  HDL, LDLCALC, TRIG, CHOLHDL in the last 48 hours.,   Pathology Results  (Last 10 years)    None      , Troponin   Recent Labs   Lab 05/23/19  1936   TROPONINI 0.141*   , All pertinent lab results from the last 24 hours have been reviewed. and   Recent Lab Results       05/24/19  0743   05/24/19  0315   05/23/19  2317   05/23/19 2017 05/23/19  1942        Influenza A, Molecular         Negative     Influenza B, Molecular         Negative     Albumin   2.4           Alkaline Phosphatase   85           ALT   30           Anion Gap   9           Appearance, UA       Clear       aPTT               AST   24           Bacteria, UA       Occasional       Baso #   0.02           Basophil%   0.5           Bilirubin (UA)       1+  Comment:  Positive urine bilirubin is not confirmed. Correlate with   serum bilirubin and clinical presentation.         BILIRUBIN TOTAL   1.2  Comment:  For infants and newborns, interpretation of results should be based  on gestational age, weight and in agreement with clinical  observations.  Premature Infant recommended reference ranges:  Up to 24 hours.............<8.0 mg/dL  Up to 48 hours............<12.0 mg/dL  3-5 days..................<15.0 mg/dL  6-29 days.................<15.0 mg/dL             BNP               BUN, Bld   18           Calcium   8.7           Chloride   108           CO2   22           Color, UA       Yellow       CPK               CPK MB               Creatinine   1.2           Differential Method   Automated           eGFR if    >60           eGFR if non    56  Comment:  Calculation used to obtain the estimated glomerular filtration  rate (eGFR) is the CKD-EPI equation.              Eos #   0.2           Eosinophil%   4.7           Ferritin               Flu A & B Source         Nasal swab     Folate               Glucose   119           Glucose, UA       Negative       Gran # (ANC)   2.7           Gran%   70.7           Granular  Casts, UA       1       Group & Rh O POS             Hematocrit   24.0           Hemoglobin   7.2           Hyaline Casts, UA       0       INDIRECT KRISTIN NEG             Coumadin Monitoring INR               Iron               Ketones, UA       Trace       Lactate, Elijah   0.6  Comment:  Falsely low lactic acid results can be found in samples   containing >=13.0 mg/dL total bilirubin and/or >=3.5 mg/dL   direct bilirubin.   0.6  Comment:  Falsely low lactic acid results can be found in samples   containing >=13.0 mg/dL total bilirubin and/or >=3.5 mg/dL   direct bilirubin.           Leukocytes, UA       Negative       Lymph #   0.6           Lymph%   14.5           Magnesium               MB%               MCH   22.9           MCHC   30.0           MCV   76           Microscopic Comment       SEE COMMENT  Comment:  Other formed elements not mentioned in the report are not   present in the microscopic examination.          Mono #   0.4           Mono%   9.6           MPV   8.5           NITRITE UA       Negative       Occult Blood UA       Negative       pH, UA       6.0       Phosphorus               Platelets   133           Potassium   3.1           PROTEIN TOTAL   5.0           Protein, UA       2+  Comment:  Recommend a 24 hour urine protein or a urine   protein/creatinine ratio if globulin induced proteinuria is  clinically suspected.         Protime               RAPID STREP A SCREEN         Negative  Comment:  See Micro for reflexed Strep culture.     RBC   3.14           RBC, UA       0       RDW   18.9           Retic               Saturated Iron               Sodium   139           Specific [place], UA       1.025       Specimen UA       Urine, Clean Catch       TIBC               Transferrin               Troponin I               TSH               UROBILINOGEN UA       4.0-6.0       Vitamin B-12               WBC, UA       0       WBC   3.86                            05/23/19 1936 05/23/19 1935         Influenza A, Molecular         Influenza B, Molecular         Albumin 2.7       Alkaline Phosphatase 100       ALT 35       Anion Gap 9       Appearance, UA         aPTT 36.2  Comment:  aPTT therapeutic range = 39-69 seconds       AST 34       Bacteria, UA         Baso # 0.03       Basophil% 0.5       Bilirubin (UA)         BILIRUBIN TOTAL 1.3  Comment:  For infants and newborns, interpretation of results should be based  on gestational age, weight and in agreement with clinical  observations.  Premature Infant recommended reference ranges:  Up to 24 hours.............<8.0 mg/dL  Up to 48 hours............<12.0 mg/dL  3-5 days..................<15.0 mg/dL  6-29 days.................<15.0 mg/dL           Comment:  Values of less than 100 pg/ml are consistent with non-CHF populations.       BUN, Bld 16       Calcium 9.0       Chloride 108       CO2 22       Color, UA         CPK 20        20       CPK MB 0.9       Creatinine 1.1       Differential Method Automated       eGFR if  >60       eGFR if non  >60  Comment:  Calculation used to obtain the estimated glomerular filtration  rate (eGFR) is the CKD-EPI equation.          Eos # 0.2       Eosinophil% 2.6       Ferritin   38     Flu A & B Source         Folate   8.6     Glucose 128       Glucose, UA         Gran # (ANC) 4.6       Gran% 80.4       Granular Casts, UA         Group & Rh         Hematocrit 26.2       Hemoglobin 7.7       Hyaline Casts, UA         INDIRECT KRISTIN         Coumadin Monitoring INR 1.5  Comment:  Coumadin Therapy:  2.0 - 3.0 for INR for all indicators except mechanical heart valves  and antiphospholipid syndromes which should use 2.5 - 3.5.         Iron   18     Ketones, UA         Lactate, Elijah 0.8  Comment:  Falsely low lactic acid results can be found in samples   containing >=13.0 mg/dL total bilirubin and/or >=3.5 mg/dL   direct bilirubin.         Leukocytes, UA         Lymph # 0.5       Lymph% 8.3  "      Magnesium 1.5       MB% 4.5  Comment:  To be positive, the MB% must be greater than 5% AND the CK-MB  greater than 6.5 ng/mL. Values not in the reference interval,   but not qualifying as positive, should be considered "trace".         MCH 22.5       MCHC 29.4       MCV 77       Microscopic Comment         Mono # 0.5       Mono% 8.2       MPV 8.8       NITRITE UA         Occult Blood UA         pH, UA         Phosphorus 2.7       Platelets 164       Potassium 3.3       PROTEIN TOTAL 5.6       Protein, UA         Protime 15.4       RAPID STREP A SCREEN         RBC 3.42       RBC, UA         RDW 19.1       Retic   1.9     Saturated Iron   6     Sodium 139       Specific Gravity, UA         Specimen UA         TIBC   303     Transferrin   205     Troponin I 0.141  Comment:  The reference interval for Troponin I represents the 99th percentile   cutoff   for our facility and is consistent with 3rd generation assay   performance.         TSH 0.911       UROBILINOGEN UA         Vitamin B-12   211     WBC, UA         WBC 5.76             Significant Imaging: CT scan: CT ABDOMEN PELVIS WITH CONTRAST: No results found for this visit on 05/23/19. and CT ABDOMEN PELVIS WITHOUT CONTRAST: No results found for this visit on 05/23/19., Echocardiogram:   2D echo with color flow doppler: No results found for this or any previous visit. and Transthoracic echo (TTE) complete (Cupid Only):   Results for orders placed or performed during the hospital encounter of 04/16/19   Transthoracic echo (TTE) complete (Cupid Only)   Result Value Ref Range    BSA 2.06 m2    LA WIDTH 4.45 cm    PV PEAK VELOCITY 1.38 cm/s    LVIDD 6.93 (A) 3.5 - 6.0 cm    IVS 1.13 (A) 0.6 - 1.1 cm    PW 1.17 (A) 0.6 - 1.1 cm    Ao root annulus 2.86 cm    LVIDS 4.76 (A) 2.1 - 4.0 cm    FS 31 28 - 44 %    LA volume 102.99 cm3    STJ 2.65 cm    LV mass 377.93 g    LA size 4.98 cm    RVDD 3.25 cm    Left Ventricle Relative Wall Thickness 0.34 cm    AV mean gradient " 7.38 mmHg    AV valve area 2.16 cm2    AV Velocity Ratio 0.72     AV index (prosthetic) 0.70     E/A ratio 1.05     E wave decelartion time 305.25 msec    IVRT 0.11 msec    Pulm vein S/D ratio 1.26     LVOT diameter 1.99 cm    LVOT area 3.11 cm2    LVOT peak abdifatah 3.5029615292 m/s    LVOT peak VTI 28.16 cm    Ao peak abdifatah 1.62 m/s    Ao VTI 40.46 cm    LVOT stroke volume 87.54 cm3    AV peak gradient 10.50 mmHg    MV Peak E Abdifatah 1.16 m/s    TR Max Abdifatah 2.65 m/s    MV Peak A Abdifatah 1.11 m/s    PV Peak S Abdifatah 0.44 m/s    PV Peak D Abdifaath 0.35 m/s    LV Systolic Volume 105.45 mL    LV Systolic Volume Index 52.5 mL/m2    LV Diastolic Volume 249.54 mL    LV Diastolic Volume Index 124.20 mL/m2    LA Volume Index 51.3 mL/m2    LV Mass Index 188.1 g/m2    RA Major Axis 5.02 cm    Left Atrium Minor Axis 5.14 cm    Left Atrium Major Axis 5.84 cm    Triscuspid Valve Regurgitation Peak Gradient 28.09 mmHg    Right Atrial Pressure (from IVC) 3 mmHg    TV rest pulmonary artery pressure 31 mmHg    and X-Ray: CXR: X-Ray Chest 1 View (CXR):   Results for orders placed or performed during the hospital encounter of 05/23/19   X-Ray Chest 1 View    Narrative    EXAMINATION:  XR CHEST 1 VIEW    CLINICAL HISTORY:  Chest pain.    COMPARISON:  04/22/2019    FINDINGS:  Mild blunting left costophrenic angle is present compatible with a small left pleural effusion, new since the previous exam.  Otherwise lungs are clear.  Heart is enlarged with postoperative changes.  Vascular calcification of the aorta noted.  Right-sided PICC line in place.  Distal tip overlies the distal SVC.  No suspicious bony abnormality.      Impression    1. Cardiomegaly with postoperative changes.  2. Evidence of a small left pleural effusion, new since 04/22/2019.  3. Right-sided PICC line unchanged.      Electronically signed by: Renan Deshpande MD  Date:    05/23/2019  Time:    19:53    and X-Ray Chest PA and Lateral (CXR): No results found for this visit on 05/23/19. and  KUB: X-Ray Abdomen AP 1 View (KUB): No results found for this visit on 05/23/19.    Assessment and Plan:     No notes have been filed under this hospital service.  Service: Cardiology      VTE Risk Mitigation (From admission, onward)        Ordered     apixaban tablet 5 mg  2 times daily      05/24/19 0810     IP VTE HIGH RISK PATIENT  Once      05/23/19 2123     Place sequential compression device  Until discontinued      05/23/19 2123          Current Facility-Administered Medications   Medication    acetaminophen tablet 1,000 mg    apixaban tablet 5 mg    aspirin EC tablet 81 mg    atorvastatin tablet 10 mg    carvedilol tablet 25 mg    ferric carboxymaltose (INJECTAFER) 750 mg in sodium chloride 0.9% 265 mL infusion    furosemide tablet 40 mg    levETIRAcetam tablet 250 mg    lisinopril tablet 10 mg    magnesium oxide tablet 400 mg    nitroGLYCERIN SL tablet 0.4 mg    ondansetron injection 4 mg    pantoprazole EC tablet 40 mg    piperacillin-tazobactam 4.5 g in dextrose 5 % 100 mL IVPB (ready to mix system)    promethazine (PHENERGAN) 12.5 mg in dextrose 5 % 50 mL IVPB    sodium chloride 0.9% flush 10 mL    spironolactone tablet 25 mg    tamsulosin 24 hr capsule 0.4 mg    vancomycin 1.75 g in 5 % dextrose 500 mL IVPB     ECHO 4/2019: LVEF 55%, large thrombus in left atrial appendage, bubble study positive for grade 2 patent PFO, normal functioning prosthetic aortic valve, trivial perivalvular regurgitation,, grade 3 atherosclerosis of aortic arch.      PET perfusion 2/2019: abnormal consistent with ischemia. Small partially reversible perfusion abnormality of moderate intensity in the inferior region, small reversible perfusion abnormality of moderate intensity in the inferolateral region.       AVR 2015    C 2/2019: sever 3 vessel CAD.  of prox RCA. 99% LM.  mid LCFX. Up to 80% throughout LAD. Patent LIMA to mid LAD. Patent SVG to diagonal 1.      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 I will follow-up with patient. Please contact us if you have any additional questions.      Transcribed by  Alexx Thibodeaux NP for Dr PRADEEP Begum  Cardiology   [facility]  I attest that I have personally seen and examined this patient. I have reviewed and discussed the management in detail as outlined above.

## 2019-05-24 NOTE — PT/OT/SLP EVAL
"Physical Therapy Evaluation    Patient Name:  St Lacho Farooq   MRN:  [id number]    Recommendations:     Discharge Recommendations:  home with home health, home health PT   Discharge Equipment Recommendations: none   Barriers to discharge: None    Assessment:     St Lacho Farooq is a 84 y.o. male admitted with a medical diagnosis of Fever.  He presents with the following impairments/functional limitations:  weakness, impaired endurance, gait instability, impaired self care skills, impaired functional mobilty, decreased coordination. Patient was recently discharged a week ago from Swing  Unit here at [facility] to home with home health PT. Patient recently had 2 falls since the discharged  on  The stairs area at his daughter's house where he currently staying. Patient wants to  learn to walk safely on the stairs. Patient will benefit from skilled PT tx to inc safety and inc modified independence with mobility, self care tasks, gait task and stairs management.    Rehab Prognosis: Good; patient would benefit from acute skilled PT services to address these deficits and reach maximum level of function.    Recent Surgery: * No surgery found *      Plan:     During this hospitalization, patient to be seen 5 x/week to address the identified rehab impairments via gait training, therapeutic activities, therapeutic exercises and progress toward the following goals:    · Plan of Care Expires:  05/30/19    Subjective     Chief Complaint: " I don't feel confident and not doing good in walking up and down steps using both handrails in my daughter's house".  Patient/Family Comments/goals: "To walk steady and walk better on the stairs".  Pain/Comfort:  · Pain Rating 1: 0/10  · Pain Rating Post-Intervention 1: 0/10    Patients cultural, spiritual, Amish conflicts given the current situation: yes    Living Environment:  Patient currently staying at his daughter's house([facility]) with 2 steps and handrials to get up the " kitchen area.  Prior to admission, patients level of function was Modified independent with gait functions using RW at home distances, Supervision with self care tasks and some functional mobility.  Equipment used at home: walker, rolling, bedside commode, cane, straight, shower chair.  DME owned (not currently used): rolling walker, single point cane, bedside commode and shower chair.  Upon discharge, patient will have assistance from daughter and Sitter.    Objective:     Communicated with patient and Sitter prior to session.  Patient found up in chair with telemetry, peripheral IV  upon PT entry to room.    General Precautions: Standard,     Orthopedic Precautions:N/A   Braces: N/A     Exams:  · Cognitive Exam:  Patient is oriented to Person, Place and Time  · Gross Motor Coordination:  WFL  · Skin Integrity/Edema:      · -       Skin integrity: Visible skin intact  · RLE ROM: WFL  · RLE Strength: WFL  · LLE ROM: WFL  · LLE Strength: WFL    Functional Mobility:  · Transfers:     · Sit to Stand:  stand by assistance with rolling walker  · Bed to Chair: stand by assistance with  rolling walker  using  Step Transfer  · Gait: RW Ambulation x 150 feet with SBA. Reciprocal gait with slight irregular stride length and slight dec weight shifting.  · Balance: Dynamic standing with Fair grade.  · Stairs:  Pt ascended/descended 4 stair(s) with No Assistive Device with left handrail with Contact Guard Assistance.       Therapeutic Activities and Exercises:   Completed PT evaluation. Trained patient in ascending and descending stairs steps using left handrail side steps tech.    AM-PAC 6 CLICK MOBILITY  Total Score:20     Patient left up in chair with all lines intact, call button in reach, nurse notified and Sitter and friends present.    GOALS:   Multidisciplinary Problems     Physical Therapy Goals        Problem: Physical Therapy Goal    Goal Priority Disciplines Outcome Goal Variances Interventions   Physical Therapy  Goal     PT, PT/OT      Description:  Goals to be met by: 7     Patient will increase functional independence with mobility by performin. Increase dynamic standing balance to Good grade.  2. Sit to Stand transfer to mod independent with RW  3. Bed to chair transfer with Modified Independentwith or without rolling walker using Step Transfer TECHNIQUE  4. Gait  x 300  feet with Modified Independent with or without rolling walker  5. Lower extremity exercise program x10 reps per handout, with assistance as needed                    History:     Past Medical History:   Diagnosis Date    Anemia     Aortic valve stenosis     Arthritis     Atrial fibrillation     Cancer     Basal Cell Skin Cancer    Carotid artery stenosis     Coronary artery disease     CVA (cerebral vascular accident)     Encounter for blood transfusion     Exercise-induced angina     Hyperkalemia     at times    Hyperlipemia     Hypertension     Iron deficiency anemia     MI (myocardial infarction)     MALATHI (obstructive sleep apnea)     PAD (peripheral artery disease)     Prostatitis     Renal failure     MILD after MI    S/P 2-vessel coronary artery bypass        Past Surgical History:   Procedure Laterality Date    CARPAL TUNNEL RELEASE      CATARACT EXTRACTION, BILATERAL      CORONARY ANGIOGRAPHY      CORONARY ARTERY BYPASS GRAFT      HERNIA REPAIR      Inguinal & Ventral with MESH    INSERTION, PICC N/A 2019    Performed by Dosc Diagnostic Provider at [facility] OR    REPLACEMENT, AORTIC VALVE, PERCUTANEOUS, TRANSCATHETER Right 3/18/2019    Performed by Mani Babcock MD at [facility] CATH    REPLACEMENT, AORTIC VALVE, PERCUTANEOUS, TRANSCATHETER Right 3/18/2019    Performed by cA Osman MD at [facility] CATH    TONSILLECTOMY      Transesophageal echo (INES) intra-procedure log documentation N/A 2019    Performed by Tam Carrasco MD at [facility] CATH    TRANSESOPHAGEAL ECHOCARDIOGRAM (INES) N/A  6/28/2016    Performed by Hugh Begum MD at [facility] OR       Time Tracking:     PT Received On: 05/24/19  PT Start Time: 1220     PT Stop Time: 1258  PT Total Time (min): 38 min     Billable Minutes: Evaluation 15 and Gait Training 20      Keven Goldstein, PT  05/24/2019

## 2019-05-24 NOTE — PLAN OF CARE
05/24/19 1038   Advance Directives (For Healthcare)   Advance Directive  (If Adv Dir status is received, view document under Code in header or Chart Review Media tab) Advance Directive currently in Epic.

## 2019-05-24 NOTE — ED PROVIDER NOTES
"[facility]                                                 Chief Complaint  84 y.o. male with Fever (onset today, "feels weak". history of picc line with antibiotic infusion)    History of Present Illness  St Lacho Farooq presents to the emergency room with fever this afternoon  The patient was recently admitted to the hospital for several weeks for back pain  Patient had a blood culture positive for enterococcus, was sensitive to ampicillin  Patient was doing well at home for last 7 days, outpatient ampicillin by home health  Patient spiked a fever earlier today, nonspecific symptoms, ROS largely negative    The history is provided by the patient   device was not used during this ER visit    Past Medical History   -- Anemia     -- Aortic valve stenosis     -- Arthritis     -- Atrial fibrillation     -- Cancer     -- Carotid artery stenosis     -- Coronary artery disease     -- CVA (cerebral vascular accident)     -- Encounter for blood transfusion     -- Exercise-induced angina     -- Hyperkalemia     -- Hyperlipemia     -- Hypertension     -- Iron deficiency anemia     -- MI (myocardial infarction)     -- MALATHI (obstructive sleep apnea)     -- PAD (peripheral artery disease)     -- Prostatitis     -- Renal failure     -- S/P 2-vessel coronary artery bypass        Surgeries: Cataract, carpal tunnel, CABG, hernia, aortic valve, tonsillectomy, echocardiogram  Allergies: Bactrim and codeine    I have reviewed all of this patient's past medical, surgical, family, and social   histories as well as active allergies and medications documented in the  electronic medical record    Review of Systems and Physical Exam      Review of Systems  -- Constitution - fever, denies fatigue, no weakness, no chills  -- Eyes - no tearing or redness, no visual disturbance  -- Ear, Nose - no tinnitus or earache, no nasal congestion or discharge  -- Mouth,Throat - no sore throat, no toothache, " normal voice, normal swallowing  -- Respiratory - denies cough and congestion, no shortness of breath, no MEDINA  -- Cardiovascular - denies chest pain, no palpitations, denies claudication  -- Gastrointestinal - denies abdominal pain, nausea, vomiting, or diarrhea  -- Genitourinary - no dysuria, denies flank pain, no hematuria, no STD risk  -- Musculoskeletal - denies back pain, negative for trauma or injury  -- Neurological - no headache, denies weakness or seizure; no LOC  -- Skin - denies pallor, rash, or changes in skin. no hives or welts noted     Vital Signs  His oral temperature is 99.3 °F (37.4 °C).   His blood pressure is 127/60 and his pulse is 68.   His respiration is 25 and oxygen saturation is 93%    Physical Exam  -- Nursing note and vitals reviewed  -- Constitutional: Appears well-developed and well-nourished  -- Head: Atraumatic. Normocephalic. No obvious abnormality  -- Eyes: Pupils are equal and reactive to light. Normal conjunctiva and lids  -- Cardiac: Normal rate, regular rhythm and normal heart sounds  -- Pulmonary: Normal respiratory effort, breath sounds clear to auscultation  -- Abdominal: Soft, no tenderness. Normal bowel sounds. Normal liver edge  -- Musculoskeletal: Normal range of motion, no effusions. Joints stable   -- Neurological: No focal deficits. Showed good interaction with staff  -- Vascular: Posterior tibial, dorsalis pedis and radial pulses 2+ bilaterally    -- Lymphatics: No cervical or peripheral lymphadenopathy. No edema noted  -- Skin: Right upper extremity PICC line appears clean dry and intact    Emergency Room Course      Lab Results     K 3.3 (L)      CO2 22 (L)   BUN 16   CREATININE 1.1    (H)   ALKPHOS 100   AST 34   ALT 35   BILITOT 1.3 (H)   ALBUMIN 2.7 (L)   PROT 5.6 (L)   WBC 5.76   HGB 7.7 (L)   HCT 26.2 (L)      CPK 20   CPK 20   CPKMB 0.9   TROPONINI 0.141 (H)   INR 1.5 (H)    (H)   LACTATE 0.8   MG 1.5 (L)   TSH 0.911      Urinalysis  -- Urinalysis performed during this ER visit showed no signs of infection      EKG   -- The EKG findings today were without concerning findings from baseline    Chest x-ray  1. Cardiomegaly with postoperative changes.   2. Evidence of a small left pleural effusion, new since 04/22/2019.   3. Right-sided PICC line unchanged.     Additional Work up  -- Blood cultures have also been drawn, results are pending  -- the patient tested negative for influenza A in the emergency room today  -- The strep screen was negative  -- PICC line removed sterilely by the ER physician and sent for culture    Medications Given  piperacillin-tazobactam 4.5 g in dextrose 5 % 100 mL IVPB (ready to mix system) (4.5 g Intravenous New Bag 5/23/19 2048)   vancomycin 2 g in dextrose 5 % 500 mL IVPB (has no administration in time range)   vancomycin 1.75 g in 5 % dextrose 500 mL IVPB (1,750 mg Intravenous Trough Due 30 minutes Before Dose 5/25/19 2130)   heparin, porcine (PF) 100 unit/mL injection flush 500 Units (500 Units Intravenous Given by Other 5/23/19 1947)   ibuprofen tablet 800 mg (800 mg Oral Given 5/23/19 2041)   acetaminophen tablet 1,000 mg (1,000 mg Oral Given 5/23/19 2042)   sodium chloride 0.9% bolus 500 mL (500 mLs Intravenous New Bag 5/23/19 2048)     MDM  Number of Diagnoses or Management Options  Fever: new, needed workup     Amount and/or Complexity of Data Reviewed  Clinical lab tests: reviewed and ordered  Tests in the radiology section of CPT®: ordered and reviewed  Tests in the medicine section of CPT®: reviewed and ordered  Discuss the patient with other providers: yes    Risk of Complications, Morbidity, and/or Mortality  Presenting problems: high  Diagnostic procedures: high  Management options: high       ED Physician Management  -- Diagnosis management comments: 84 y.o. male with fever today  -- patient has a PICC line and is getting ampicillin for Enterococcus  -- consider possible PICC line  infection, PICC line removed in the ER  -- patient will be started on vancomycin and Zosyn for MRSA coverage  -- patient will be admitted to the hospital for overnight observation  -- blood cultures drawn, PICC line culture pending, no fever noted now    Diagnosis  -- The encounter diagnosis was Fever.    Disposition and Plan  -- Disposition: observation  -- Condition: stable    This note is dictated on M*Modal word recognition program.  There are word recognition mistakes that are occasionally missed on review.         Binh Ozuna MD  05/23/19 5085

## 2019-05-24 NOTE — HPI
84 year old male with history of AVR, chronic diastolic CHF, moderate AS, CVA, CAD, carotid artery stenosis, PAF, presents to ER with c/o fever. Patient has PICC for home IV ABX.

## 2019-05-24 NOTE — ASSESSMENT & PLAN NOTE
BNP was elevated  Daughter reports SOB at home. On lasix 40mg at home, cont with low dose ace and BB

## 2019-05-24 NOTE — PROGRESS NOTES
Pharmacokinetic Initial Assessment: IV Vancomycin    Assessment/Plan:    Initiate intravenous vancomycin with loading dose of 2000 mg once followed by a maintenance dose of vancomycin 1750mg IV every 24 hours   Reduced protocol recommended loading dose due to patient's age, renal function, and concomitant Zosyn.   Desired empiric serum trough concentration is 10 to 20 mcg/mL.  Draw vancomycin trough level 30 min prior to third dose on 5/25/19 at approximately 2100   Pharmacy will continue to follow and monitor vancomycin.      Please contact pharmacy at extension [phone] with any questions regarding this assessment.     Thank you for the consult,   Swapna Bingham     Patient brief summary:  St Lacho Farooq is a 84 y.o. male initiated on antimicrobial therapy with IV Vancomycin for treatment of suspected bacteremia    Drug Allergies:   Review of patient's allergies indicates:   Allergen Reactions    Bactrim [sulfamethoxazole-trimethoprim] Rash    Codeine Rash       Actual Body Weight:   88.5 kg    Renal Function:   CrCl cannot be calculated (Unknown ideal weight.).,     Dialysis Method (if applicable):  NA     CBC (last 72 hours):  Recent Labs   Lab Result Units 05/23/19  1936   WBC K/uL 5.76   Hemoglobin g/dL 7.7*   Hematocrit % 26.2*   Platelets K/uL 164   Gran% % 80.4*   Lymph% % 8.3*   Mono% % 8.2   Eosinophil% % 2.6   Basophil% % 0.5   Differential Method  Automated       Metabolic Panel (last 72 hours):  Recent Labs   Lab Result Units 05/23/19  1936   Sodium mmol/L 139   Potassium mmol/L 3.3*   Chloride mmol/L 108   CO2 mmol/L 22*   Glucose mg/dL 128*   BUN, Bld mg/dL 16   Creatinine mg/dL 1.1   Albumin g/dL 2.7*   Total Bilirubin mg/dL 1.3*   Alkaline Phosphatase U/L 100   AST U/L 34   ALT U/L 35   Magnesium mg/dL 1.5*   Phosphorus mg/dL 2.7       Drug levels (last 3 results):  No results for input(s): VANCOMYCINRA, VANCOMYCINPE, VANCOMYCINTR in the last 72 hours.    Microbiologic  Results:  Microbiology Results (last 7 days)       Procedure Component Value Units Date/Time    Influenza A & B by Molecular [[id number]] Collected:  05/23/19 1942    Order Status:  Completed Specimen:  Nasopharyngeal Swab Updated:  05/23/19 2003     Influenza A, Molecular Negative     Influenza B, Molecular Negative     Flu A & B Source Nasal swab    Strep A culture, throat [[id number]] Collected:  05/23/19 1942    Order Status:  No result Specimen:  Throat Updated:  05/23/19 1958    Throat Screen, Rapid [[id number]] Collected:  05/23/19 1942    Order Status:  Completed Specimen:  Throat Updated:  05/23/19 1957     Rapid Strep A Screen Negative     Comment: See Micro for reflexed Strep culture.       Blood culture [[id number]] Collected:  05/23/19 1936    Order Status:  Sent Specimen:  Blood Updated:  05/23/19 1936    Blood culture [[id number]] Collected:  05/23/19 1936    Order Status:  Sent Specimen:  Blood Updated:  05/23/19 1936

## 2019-05-24 NOTE — PLAN OF CARE
05/24/19 1038   Medicare Message   Important Message from Medicare regarding Discharge Appeal Rights Given to patient/caregiver;Explained to patient/caregiver;Signed/date by patient/caregiver   Date IMM was signed 05/23/19   Time IMM was signed 2042

## 2019-05-25 LAB
ALBUMIN SERPL BCP-MCNC: 2.6 G/DL (ref 3.5–5.2)
ALP SERPL-CCNC: 106 U/L (ref 55–135)
ALT SERPL W/O P-5'-P-CCNC: 39 U/L (ref 10–44)
ANION GAP SERPL CALC-SCNC: 7 MMOL/L (ref 8–16)
AST SERPL-CCNC: 28 U/L (ref 10–40)
BASOPHILS # BLD AUTO: 0.03 K/UL (ref 0–0.2)
BASOPHILS NFR BLD: 0.6 % (ref 0–1.9)
BILIRUB SERPL-MCNC: 1.1 MG/DL (ref 0.1–1)
BUN SERPL-MCNC: 11 MG/DL (ref 8–23)
CALCIUM SERPL-MCNC: 9.4 MG/DL (ref 8.7–10.5)
CHLORIDE SERPL-SCNC: 110 MMOL/L (ref 95–110)
CO2 SERPL-SCNC: 23 MMOL/L (ref 23–29)
CREAT SERPL-MCNC: 1.1 MG/DL (ref 0.5–1.4)
DIFFERENTIAL METHOD: ABNORMAL
EOSINOPHIL # BLD AUTO: 0.3 K/UL (ref 0–0.5)
EOSINOPHIL NFR BLD: 5.3 % (ref 0–8)
ERYTHROCYTE [DISTWIDTH] IN BLOOD BY AUTOMATED COUNT: 18.8 % (ref 11.5–14.5)
EST. GFR  (AFRICAN AMERICAN): >60 ML/MIN/1.73 M^2
EST. GFR  (NON AFRICAN AMERICAN): >60 ML/MIN/1.73 M^2
GLUCOSE SERPL-MCNC: 101 MG/DL (ref 70–110)
HCT VFR BLD AUTO: 26.4 % (ref 40–54)
HGB BLD-MCNC: 7.8 G/DL (ref 14–18)
LYMPHOCYTES # BLD AUTO: 0.8 K/UL (ref 1–4.8)
LYMPHOCYTES NFR BLD: 16.5 % (ref 18–48)
MAGNESIUM SERPL-MCNC: 1.7 MG/DL (ref 1.6–2.6)
MCH RBC QN AUTO: 22.7 PG (ref 27–31)
MCHC RBC AUTO-ENTMCNC: 29.5 G/DL (ref 32–36)
MCV RBC AUTO: 77 FL (ref 82–98)
MONOCYTES # BLD AUTO: 0.6 K/UL (ref 0.3–1)
MONOCYTES NFR BLD: 11.6 % (ref 4–15)
NEUTROPHILS # BLD AUTO: 3.4 K/UL (ref 1.8–7.7)
NEUTROPHILS NFR BLD: 66 % (ref 38–73)
PLATELET # BLD AUTO: 173 K/UL (ref 150–350)
PMV BLD AUTO: 9.7 FL (ref 9.2–12.9)
POTASSIUM SERPL-SCNC: 3.8 MMOL/L (ref 3.5–5.1)
PROT SERPL-MCNC: 5.6 G/DL (ref 6–8.4)
RBC # BLD AUTO: 3.43 M/UL (ref 4.6–6.2)
SODIUM SERPL-SCNC: 140 MMOL/L (ref 136–145)
VANCOMYCIN TROUGH SERPL-MCNC: 14 UG/ML (ref 10–22)
WBC # BLD AUTO: 5.09 K/UL (ref 3.9–12.7)

## 2019-05-25 PROCEDURE — 97530 THERAPEUTIC ACTIVITIES: CPT

## 2019-05-25 PROCEDURE — 63600175 PHARM REV CODE 636 W HCPCS: Performed by: SURGERY

## 2019-05-25 PROCEDURE — 25000003 PHARM REV CODE 250: Performed by: NURSE PRACTITIONER

## 2019-05-25 PROCEDURE — 11000001 HC ACUTE MED/SURG PRIVATE ROOM

## 2019-05-25 PROCEDURE — 80202 ASSAY OF VANCOMYCIN: CPT

## 2019-05-25 PROCEDURE — 99232 PR SUBSEQUENT HOSPITAL CARE,LEVL II: ICD-10-PCS | Mod: ,,, | Performed by: FAMILY MEDICINE

## 2019-05-25 PROCEDURE — 97116 GAIT TRAINING THERAPY: CPT

## 2019-05-25 PROCEDURE — 25000003 PHARM REV CODE 250: Performed by: SURGERY

## 2019-05-25 PROCEDURE — 25000003 PHARM REV CODE 250: Performed by: FAMILY MEDICINE

## 2019-05-25 PROCEDURE — 83735 ASSAY OF MAGNESIUM: CPT

## 2019-05-25 PROCEDURE — 36415 COLL VENOUS BLD VENIPUNCTURE: CPT

## 2019-05-25 PROCEDURE — 80053 COMPREHEN METABOLIC PANEL: CPT

## 2019-05-25 PROCEDURE — 85025 COMPLETE CBC W/AUTO DIFF WBC: CPT

## 2019-05-25 PROCEDURE — 99232 SBSQ HOSP IP/OBS MODERATE 35: CPT | Mod: ,,, | Performed by: FAMILY MEDICINE

## 2019-05-25 RX ORDER — DOXYLAMINE SUCCINATE 25 MG
TABLET ORAL 2 TIMES DAILY
Status: DISCONTINUED | OUTPATIENT
Start: 2019-05-25 | End: 2019-05-26 | Stop reason: HOSPADM

## 2019-05-25 RX ADMIN — TAMSULOSIN HYDROCHLORIDE 0.4 MG: 0.4 CAPSULE ORAL at 09:05

## 2019-05-25 RX ADMIN — APIXABAN 5 MG: 5 TABLET, FILM COATED ORAL at 09:05

## 2019-05-25 RX ADMIN — CARVEDILOL 25 MG: 25 TABLET, FILM COATED ORAL at 04:05

## 2019-05-25 RX ADMIN — ASPIRIN 81 MG: 81 TABLET, COATED ORAL at 09:05

## 2019-05-25 RX ADMIN — LEVETIRACETAM 250 MG: 250 TABLET ORAL at 09:05

## 2019-05-25 RX ADMIN — LISINOPRIL 10 MG: 10 TABLET ORAL at 09:05

## 2019-05-25 RX ADMIN — ATORVASTATIN CALCIUM 10 MG: 10 TABLET, FILM COATED ORAL at 09:05

## 2019-05-25 RX ADMIN — SPIRONOLACTONE 25 MG: 25 TABLET ORAL at 09:05

## 2019-05-25 RX ADMIN — CARVEDILOL 25 MG: 25 TABLET, FILM COATED ORAL at 07:05

## 2019-05-25 RX ADMIN — PIPERACILLIN AND TAZOBACTAM 4.5 G: 4; .5 INJECTION, POWDER, LYOPHILIZED, FOR SOLUTION INTRAVENOUS; PARENTERAL at 03:05

## 2019-05-25 RX ADMIN — PANTOPRAZOLE SODIUM 40 MG: 40 TABLET, DELAYED RELEASE ORAL at 09:05

## 2019-05-25 RX ADMIN — VANCOMYCIN HYDROCHLORIDE 1750 MG: 750 INJECTION, POWDER, LYOPHILIZED, FOR SOLUTION INTRAVENOUS at 10:05

## 2019-05-25 RX ADMIN — PIPERACILLIN AND TAZOBACTAM 4.5 G: 4; .5 INJECTION, POWDER, LYOPHILIZED, FOR SOLUTION INTRAVENOUS; PARENTERAL at 07:05

## 2019-05-25 RX ADMIN — MICONAZOLE NITRATE: 20 CREAM TOPICAL at 11:05

## 2019-05-25 RX ADMIN — FUROSEMIDE 40 MG: 40 TABLET ORAL at 09:05

## 2019-05-25 NOTE — PLAN OF CARE
Problem: Adult Inpatient Plan of Care  Goal: Plan of Care Review  Outcome: Ongoing (interventions implemented as appropriate)  Patient sitting up in chair, sitter at bedside, with no complaints other than having to be in the hospital. IV site leaking and bleeding, new IV started and IV antibiotics administered. Zosyn retimed to not conflict with vanc. Patient up to restroom with walker and stand by assist, free from falls/injury. VS stable, A&O. Skin tear on left hand rewrapped with coban. No acute changes noted. Plan of care reviewed and followed.

## 2019-05-25 NOTE — ASSESSMENT & PLAN NOTE
Type and screen today  Replace FE    CBC improved today.  Recheck h/h.    Transfuse if symptomatic or if drop

## 2019-05-25 NOTE — SUBJECTIVE & OBJECTIVE
Review of Systems   Constitutional: Negative for chills and fever.   HENT: Negative for congestion, ear pain, postnasal drip, rhinorrhea, sore throat and trouble swallowing.    Eyes: Negative for redness and itching.   Respiratory: Negative for cough, shortness of breath and wheezing.    Cardiovascular: Negative for chest pain and palpitations.   Gastrointestinal: Negative for abdominal pain, diarrhea, nausea and vomiting.   Genitourinary: Negative for dysuria and frequency.   Skin: Negative for rash.   Neurological: Negative for weakness and headaches.     Objective:     Vital Signs (Most Recent):  Temp: 97.7 °F (36.5 °C) (05/25/19 0702)  Pulse: 70 (05/25/19 0800)  Resp: 17 (05/25/19 0702)  BP: 135/63 (05/25/19 0702)  SpO2: (!) 94 % (05/25/19 0702) Vital Signs (24h Range):  Temp:  [96.7 °F (35.9 °C)-97.7 °F (36.5 °C)] 97.7 °F (36.5 °C)  Pulse:  [60-71] 70  Resp:  [17-18] 17  SpO2:  [92 %-94 %] 94 %  BP: (122-140)/(58-65) 135/63     Weight: 88.5 kg (195 lb 1.7 oz)  Body mass index is 30.56 kg/m².    Physical Exam   Constitutional: He is oriented to person, place, and time. He appears well-developed. No distress.   HENT:   Head: Normocephalic and atraumatic.   Slight facial asymmetry   Eyes: Pupils are equal, round, and reactive to light. Conjunctivae are normal.   Neck: Normal range of motion. Neck supple. No thyromegaly present.   Cardiovascular: Normal rate, regular rhythm and intact distal pulses.   Murmur heard.  Pulmonary/Chest: Effort normal and breath sounds normal. No respiratory distress. He has no wheezes.   Abdominal: Soft. Bowel sounds are normal. There is no tenderness.   Musculoskeletal: Normal range of motion. He exhibits edema (2+).   Lymphadenopathy:     He has no cervical adenopathy.   Neurological: He is alert and oriented to person, place, and time.   Skin: Skin is warm and dry. No rash noted.   Psychiatric: He has a normal mood and affect. His behavior is normal.   Nursing note and vitals  reviewed.        CRANIAL NERVES     CN III, IV, VI   Pupils are equal, round, and reactive to light.       Significant Labs:   CBC:   Recent Labs   Lab 05/23/19 1936 05/24/19 0315 05/25/19 0618   WBC 5.76 3.86* 5.09   HGB 7.7* 7.2* 7.8*   HCT 26.2* 24.0* 26.4*    133* 173   retic 1.9  Ferritin 38  Folate 8.6  Iron 18, sat fe 6  B12 211    CMP:   Recent Labs   Lab 05/23/19 1936 05/24/19 0315 05/25/19 0618    139 140   K 3.3* 3.1* 3.8    108 110   CO2 22* 22* 23   * 119* 101   BUN 16 18 11   CREATININE 1.1 1.2 1.1   CALCIUM 9.0 8.7 9.4   PROT 5.6* 5.0* 5.6*   ALBUMIN 2.7* 2.4* 2.6*   BILITOT 1.3* 1.2* 1.1*   ALKPHOS 100 85 106   AST 34 24 28   ALT 35 30 39   ANIONGAP 9 9 7*   EGFRNONAA >60 56* >60     Cardiac Markers:   Recent Labs   Lab 05/23/19 1936   *     Coagulation:   Recent Labs   Lab 05/23/19 1936   INR 1.5*   APTT 36.2*     Lactic Acid:   Recent Labs   Lab 05/23/19 1936 05/23/19  2317 05/24/19 0315   LACTATE 0.8 0.6 0.6     Recent Labs   Lab 05/23/19 1936 05/24/19  1454   CPK 20  20  --   --    CPKMB 0.9  --   --    TROPONINI 0.141*   < > 0.066*   MB 4.5  --   --     < > = values in this interval not displayed.   mag 1.5    TSH:   Recent Labs   Lab 05/23/19 1936   TSH 0.911       Urine Studies:   Recent Labs   Lab 05/23/19 2017   COLORU Yellow   APPEARANCEUA Clear   PHUR 6.0   SPECGRAV 1.025   PROTEINUA 2+*   GLUCUA Negative   KETONESU Trace*   BILIRUBINUA 1+*   OCCULTUA Negative   NITRITE Negative   UROBILINOGEN 4.0-6.0*   LEUKOCYTESUR Negative   RBCUA 0   WBCUA 0   BACTERIA Occasional   HYALINECASTS 0     Repeat blood cultures 4/23 NGTD one from PICC that was pulled as well as arm  Also culturing the catheter tip       Throat screen and flu negative       Old blood cultures 4/17/19   Blood Culture, Routine Gram stain peds bottle: Gram positive cocci in chains resembling Strep    Blood Culture, Routine Results called to and read back by: Edith Cee RN  04/18/2019  09:23    Blood Culture, Routine ENTEROCOCCUS FAECALIS    Resulting Agency [facility]   Susceptibility      Enterococcus faecalis     CULTURE, BLOOD     Ampicillin <=2 mcg/mL Sensitive     Gentamicin Synergy Screen <=500 mcg/mL Sensitive     Tetracycline <=4 mcg/mL Sensitive     Vancomycin 2 mcg/mL Sensitive           Significant Imaging:     CXR 1. Cardiomegaly with postoperative changes.  2. Evidence of a small left pleural effusion, new since 04/22/2019.  3. Right-sided PICC line unchanged.

## 2019-05-25 NOTE — HOSPITAL COURSE
Patient reports that he feels much better. No fevers since admit. Electrolytes replaced yesterday. Tolerating diet well. Denies any weakness. Cultures are pending.    5/26  Plan of care discussed with daughter and patient yesterday. No desire for completion of course of abx at this point (had 4 days left to treat enterobacter). Cultures are still in process. Tip culture NGTD. Blood counts are stable, but patient with some weakness. Ambulating well. Afebrile.

## 2019-05-25 NOTE — PLAN OF CARE
Problem: Adult Inpatient Plan of Care  Goal: Plan of Care Review  Outcome: Ongoing (interventions implemented as appropriate)  Pt admitted with fever of unknown origin. On IV zosyn. PICC line discontinued in ER and tip cultured; negative to date. Afebrile; WBC negative. Blood cultures in process. Vitals stable. History of anemia; H&H holding @ 7&26; pt on eliquis; history of Afib; no active bleeding noted. Ambulating with walker and stand by assist. PT consulted. Redness to buttocks and groin; miconazole cream ordered. Call bell within reach. Reviewed plan of care with pt and family; state agreement.

## 2019-05-25 NOTE — PROGRESS NOTES
[facility] Medicine  Progress Note    Patient Name: St Lacho Farooq  MRN: [id number]  Patient Class: IP- Inpatient   Admission Date: 5/23/2019  Length of Stay: 1 days  Attending Physician: Gill Figueroa MD  Primary Care Provider: Dylan Adam MD        Subjective:     Principal Problem:Fever    HPI:  85 yo male patient was recently d/c from [facility] for cont IV abx at home. Was on week 5 of ampicillin. Had fever 101 at home. No other complaints besides fever. No elevated WBC. No cough or runny nose. No urinary complaints. He denies sick contacts. Work up in ER essentially normal except low K and MAg and elevated BNP. He denies SOB or increased swelling. Although his daughter reports that he was more SOB at home the last day or so. Lungs clear    H/H is a little lower than his usual. Was switched from pradaxa to eliquis. More bruising noted. Anemia w/u slightly lower fe. Occult + last visit, usually hgb ~8.     Hospital Course:  Patient reports that he feels much better. No fevers since admit. Electrolytes replaced yesterday. Tolerating diet well. Denies any weakness. Cultures are pending.    Review of Systems   Constitutional: Negative for chills and fever.   HENT: Negative for congestion, ear pain, postnasal drip, rhinorrhea, sore throat and trouble swallowing.    Eyes: Negative for redness and itching.   Respiratory: Negative for cough, shortness of breath and wheezing.    Cardiovascular: Negative for chest pain and palpitations.   Gastrointestinal: Negative for abdominal pain, diarrhea, nausea and vomiting.   Genitourinary: Negative for dysuria and frequency.   Skin: Negative for rash.   Neurological: Negative for weakness and headaches.     Objective:     Vital Signs (Most Recent):  Temp: 97.7 °F (36.5 °C) (05/25/19 0702)  Pulse: 70 (05/25/19 0800)  Resp: 17 (05/25/19 0702)  BP: 135/63 (05/25/19 0702)  SpO2: (!) 94 % (05/25/19 0702) Vital Signs (24h Range):  Temp:  [96.7  °F (35.9 °C)-97.7 °F (36.5 °C)] 97.7 °F (36.5 °C)  Pulse:  [60-71] 70  Resp:  [17-18] 17  SpO2:  [92 %-94 %] 94 %  BP: (122-140)/(58-65) 135/63     Weight: 88.5 kg (195 lb 1.7 oz)  Body mass index is 30.56 kg/m².    Physical Exam   Constitutional: He is oriented to person, place, and time. He appears well-developed. No distress.   HENT:   Head: Normocephalic and atraumatic.   Slight facial asymmetry   Eyes: Pupils are equal, round, and reactive to light. Conjunctivae are normal.   Neck: Normal range of motion. Neck supple. No thyromegaly present.   Cardiovascular: Normal rate, regular rhythm and intact distal pulses.   Murmur heard.  Pulmonary/Chest: Effort normal and breath sounds normal. No respiratory distress. He has no wheezes.   Abdominal: Soft. Bowel sounds are normal. There is no tenderness.   Musculoskeletal: Normal range of motion. He exhibits edema (2+).   Lymphadenopathy:     He has no cervical adenopathy.   Neurological: He is alert and oriented to person, place, and time.   Skin: Skin is warm and dry. No rash noted.   Psychiatric: He has a normal mood and affect. His behavior is normal.   Nursing note and vitals reviewed.        CRANIAL NERVES     CN III, IV, VI   Pupils are equal, round, and reactive to light.       Significant Labs:   CBC:   Recent Labs   Lab 05/23/19 1936 05/24/19 0315 05/25/19 0618   WBC 5.76 3.86* 5.09   HGB 7.7* 7.2* 7.8*   HCT 26.2* 24.0* 26.4*    133* 173   retic 1.9  Ferritin 38  Folate 8.6  Iron 18, sat fe 6  B12 211    CMP:   Recent Labs   Lab 05/23/19 1936 05/24/19 0315 05/25/19 0618    139 140   K 3.3* 3.1* 3.8    108 110   CO2 22* 22* 23   * 119* 101   BUN 16 18 11   CREATININE 1.1 1.2 1.1   CALCIUM 9.0 8.7 9.4   PROT 5.6* 5.0* 5.6*   ALBUMIN 2.7* 2.4* 2.6*   BILITOT 1.3* 1.2* 1.1*   ALKPHOS 100 85 106   AST 34 24 28   ALT 35 30 39   ANIONGAP 9 9 7*   EGFRNONAA >60 56* >60     Cardiac Markers:   Recent Labs   Lab 05/23/19 1936   *      Coagulation:   Recent Labs   Lab 05/23/19 1936   INR 1.5*   APTT 36.2*     Lactic Acid:   Recent Labs   Lab 05/23/19 1936 05/23/19  2317 05/24/19  0315   LACTATE 0.8 0.6 0.6     Recent Labs   Lab 05/23/19 1936 05/24/19  1454   CPK 20  20  --   --    CPKMB 0.9  --   --    TROPONINI 0.141*   < > 0.066*   MB 4.5  --   --     < > = values in this interval not displayed.   mag 1.5    TSH:   Recent Labs   Lab 05/23/19 1936   TSH 0.911       Urine Studies:   Recent Labs   Lab 05/23/19 2017   COLORU Yellow   APPEARANCEUA Clear   PHUR 6.0   SPECGRAV 1.025   PROTEINUA 2+*   GLUCUA Negative   KETONESU Trace*   BILIRUBINUA 1+*   OCCULTUA Negative   NITRITE Negative   UROBILINOGEN 4.0-6.0*   LEUKOCYTESUR Negative   RBCUA 0   WBCUA 0   BACTERIA Occasional   HYALINECASTS 0     Repeat blood cultures 4/23 NGTD one from PICC that was pulled as well as arm  Also culturing the catheter tip       Throat screen and flu negative       Old blood cultures 4/17/19   Blood Culture, Routine Gram stain peds bottle: Gram positive cocci in chains resembling Strep    Blood Culture, Routine Results called to and read back by: Edith Cee RN 04/18/2019  09:23    Blood Culture, Routine ENTEROCOCCUS FAECALIS    Resulting Agency [facility]   Susceptibility      Enterococcus faecalis     CULTURE, BLOOD     Ampicillin <=2 mcg/mL Sensitive     Gentamicin Synergy Screen <=500 mcg/mL Sensitive     Tetracycline <=4 mcg/mL Sensitive     Vancomycin 2 mcg/mL Sensitive           Significant Imaging:     CXR 1. Cardiomegaly with postoperative changes.  2. Evidence of a small left pleural effusion, new since 04/22/2019.  3. Right-sided PICC line unchanged.    Assessment/Plan:      * Fever  Repeated blood cultutres as well as pulled picc line and cultured the tip  Broadened the abx including vanc that his blood cultures 5 weeks ago were sensitive to.   Cont to monitor with broadened abx    Follow cultures, ngtd    Hypokalemia  This is improved.      Benign  prostatic hyperplasia with urinary frequency  Cont flomax      Paroxysmal atrial fibrillation  None on tele, cont eliquis and BB      DNR (do not resuscitate)  He has living will on file      Encephalopathy  AMS last visit  Was started on keppra for poss sz activity from recent embolic strokes      Acute bacterial endocarditis  Cont vanc for now; needs another week of IV abx, follow new cultures       Atrial thrombus  Cont eliquis and asa      Chronic diastolic heart failure, NYHA class 2  BNP was elevated  Daughter reports SOB at home. On lasix 40mg at home, cont with low dose ace and BB      Anemia  Type and screen today  Replace FE    CBC improved today.  Recheck h/h.    Transfuse if symptomatic or if drop      Emphysema/COPD  Nebs as needed        VTE Risk Mitigation (From admission, onward)        Ordered     apixaban tablet 5 mg  2 times daily      05/24/19 0810     IP VTE HIGH RISK PATIENT  Once      05/23/19 2123     Place sequential compression device  Until discontinued      05/23/19 2123              Gill Figueroa MD  Department of Hospital Medicine   [facility]

## 2019-05-25 NOTE — PT/OT/SLP PROGRESS
"Physical Therapy Treatment    Patient Name:  St Lacho Farooq   MRN:  [id number]    Recommendations:     Discharge Recommendations:  home with home health, home health PT   Discharge Equipment Recommendations: none   Barriers to discharge: None    Assessment:     St Lacho Farooq is a 84 y.o. male admitted with a medical diagnosis of Fever.  He presents with the following impairments/functional limitations:  weakness, gait instability, impaired endurance, impaired self care skills, impaired functional mobilty, decreased coordination. Patient is showing good motivation with Physical Therapy and willing to learn better in going up and down steps. Adjusted patient's personal RW to greater stability and patient felt increased balance during gait functions to Therapy room.    Rehab Prognosis: Good; patient would benefit from acute skilled PT services to address these deficits and reach maximum level of function.    Recent Surgery: * No surgery found *      Plan:     During this hospitalization, patient to be seen 5 x/week to address the identified rehab impairments via gait training, therapeutic activities, therapeutic exercises and progress toward the following goals:    · Plan of Care Expires:  05/30/19    Subjective     Chief Complaint: No new complain.  Patient/Family Comments/goals: " Patient stated, " I want to do it right in going up steps using 1 handrail".  Pain/Comfort:  · Pain Rating 1: 0/10  · Pain Rating Post-Intervention 1: 0/10      Objective:     Communicated with patient  prior to session.  Patient found up in chair with telemetry, peripheral IV upon PT entry to room.     General Precautions: Standard, fall   Orthopedic Precautions:N/A   Braces: N/A     Functional Mobility:  · Transfers:     · Sit to Stand:  supervision with rolling walker  · Bed to Chair: supervision with  rolling walker  using  Step Transfer  · Gait: RW Ambulation x 200 feet with Supervision. Reciprocal gait, increased right " Foot/Floor clearance and stride.  · Balance: Stand dynamic with Good- grade using RW for support  · Stairs:  Pt ascended/descended 20 stair(s) with No Assistive Device with left handrail with Stand-by Assistance.       AM-PAC 6 CLICK MOBILITY  Turning over in bed (including adjusting bedclothes, sheets and blankets)?: 4  Sitting down on and standing up from a chair with arms (e.g., wheelchair, bedside commode, etc.): 3  Moving from lying on back to sitting on the side of the bed?: 4  Moving to and from a bed to a chair (including a wheelchair)?: 3  Need to walk in hospital room?: 3  Climbing 3-5 steps with a railing?: 3  Basic Mobility Total Score: 20       Therapeutic Activities and Exercises:   Educated and trained patient ascending/descending 20 steps using left handrail with Side Steps tech. Explained and demonstrated to daughter Mitzy the side steps tech using 1 handrail to be apply at home environment. Caregiver( xochilt Forrester) verbalized understanding. Pt performed bilateral  LE exercises consisting of Active range of motion exercises for Strengthening and coordination ex  x 10 reps consisting of Ankle DF, Ankle PF, ABD/ADD, LAQ, marching in Place at Sitting  and Sit<>Stand ex with pt able to tolerate activities well with rest periods.   PT discussed importance of patient's performance of Home Exercise Program (HEP) with pt verbalizing understanding.     Patient left up in chair with all lines intact, call button in reach and Nurse Edith notified..    GOALS:   Multidisciplinary Problems     Physical Therapy Goals        Problem: Physical Therapy Goal    Goal Priority Disciplines Outcome Goal Variances Interventions   Physical Therapy Goal     PT, PT/OT Ongoing (interventions implemented as appropriate)     Description:  Goals to be met by: 7     Patient will increase functional independence with mobility by performin. Increase dynamic standing balance to Good grade.  2. Sit to Stand transfer to mod  independent with RW  3. Bed to chair transfer with Modified Independentwith or without rolling walker using Step Transfer TECHNIQUE  4. Gait  x 300  feet with Modified Independent with or without rolling walker  5. Lower extremity exercise program x10 reps per handout, with assistance as needed                    Time Tracking:     PT Received On: 05/25/19  PT Start Time: 1056     PT Stop Time: 1124  PT Total Time (min): 28 min     Billable Minutes: Gait Training 15 and Therapeutic Activity 10    Treatment Type: Treatment  PT/PTA: PT           Keven Goldstein, PT  05/25/2019

## 2019-05-25 NOTE — ASSESSMENT & PLAN NOTE
Repeated blood cultutres as well as pulled picc line and cultured the tip  Broadened the abx including vanc that his blood cultures 5 weeks ago were sensitive to.   Cont to monitor with broadened abx    Follow cultures, ngtd

## 2019-05-26 VITALS
HEART RATE: 86 BPM | BODY MASS INDEX: 30.63 KG/M2 | TEMPERATURE: 98 F | SYSTOLIC BLOOD PRESSURE: 118 MMHG | OXYGEN SATURATION: 94 % | DIASTOLIC BLOOD PRESSURE: 56 MMHG | HEIGHT: 67 IN | WEIGHT: 195.13 LBS | RESPIRATION RATE: 18 BRPM

## 2019-05-26 LAB
ALBUMIN SERPL BCP-MCNC: 2.4 G/DL (ref 3.5–5.2)
ALP SERPL-CCNC: 105 U/L (ref 55–135)
ALT SERPL W/O P-5'-P-CCNC: 34 U/L (ref 10–44)
ANION GAP SERPL CALC-SCNC: 7 MMOL/L (ref 8–16)
AST SERPL-CCNC: 23 U/L (ref 10–40)
BACTERIA THROAT CULT: NORMAL
BASOPHILS # BLD AUTO: 0.02 K/UL (ref 0–0.2)
BASOPHILS NFR BLD: 0.4 % (ref 0–1.9)
BILIRUB SERPL-MCNC: 1 MG/DL (ref 0.1–1)
BLD PROD TYP BPU: NORMAL
BLOOD UNIT EXPIRATION DATE: NORMAL
BLOOD UNIT TYPE CODE: 5100
BLOOD UNIT TYPE: NORMAL
BUN SERPL-MCNC: 9 MG/DL (ref 8–23)
CALCIUM SERPL-MCNC: 9.3 MG/DL (ref 8.7–10.5)
CHLORIDE SERPL-SCNC: 111 MMOL/L (ref 95–110)
CO2 SERPL-SCNC: 24 MMOL/L (ref 23–29)
CODING SYSTEM: NORMAL
CREAT SERPL-MCNC: 1.1 MG/DL (ref 0.5–1.4)
DIFFERENTIAL METHOD: ABNORMAL
DISPENSE STATUS: NORMAL
EOSINOPHIL # BLD AUTO: 0.3 K/UL (ref 0–0.5)
EOSINOPHIL NFR BLD: 6 % (ref 0–8)
ERYTHROCYTE [DISTWIDTH] IN BLOOD BY AUTOMATED COUNT: 18.9 % (ref 11.5–14.5)
EST. GFR  (AFRICAN AMERICAN): >60 ML/MIN/1.73 M^2
EST. GFR  (NON AFRICAN AMERICAN): >60 ML/MIN/1.73 M^2
GLUCOSE SERPL-MCNC: 124 MG/DL (ref 70–110)
HCT VFR BLD AUTO: 25.2 % (ref 40–54)
HGB BLD-MCNC: 7.4 G/DL (ref 14–18)
LYMPHOCYTES # BLD AUTO: 0.9 K/UL (ref 1–4.8)
LYMPHOCYTES NFR BLD: 16.1 % (ref 18–48)
MAGNESIUM SERPL-MCNC: 1.7 MG/DL (ref 1.6–2.6)
MCH RBC QN AUTO: 22.6 PG (ref 27–31)
MCHC RBC AUTO-ENTMCNC: 29.4 G/DL (ref 32–36)
MCV RBC AUTO: 77 FL (ref 82–98)
MONOCYTES # BLD AUTO: 0.6 K/UL (ref 0.3–1)
MONOCYTES NFR BLD: 11.7 % (ref 4–15)
NEUTROPHILS # BLD AUTO: 3.5 K/UL (ref 1.8–7.7)
NEUTROPHILS NFR BLD: 65.8 % (ref 38–73)
PLATELET # BLD AUTO: 174 K/UL (ref 150–350)
PMV BLD AUTO: 9.7 FL (ref 9.2–12.9)
POTASSIUM SERPL-SCNC: 3.1 MMOL/L (ref 3.5–5.1)
PROT SERPL-MCNC: 5.2 G/DL (ref 6–8.4)
RBC # BLD AUTO: 3.28 M/UL (ref 4.6–6.2)
SODIUM SERPL-SCNC: 142 MMOL/L (ref 136–145)
TRANS ERYTHROCYTES VOL PATIENT: NORMAL ML
WBC # BLD AUTO: 5.29 K/UL (ref 3.9–12.7)

## 2019-05-26 PROCEDURE — 80053 COMPREHEN METABOLIC PANEL: CPT

## 2019-05-26 PROCEDURE — 85025 COMPLETE CBC W/AUTO DIFF WBC: CPT

## 2019-05-26 PROCEDURE — 25000003 PHARM REV CODE 250: Performed by: SURGERY

## 2019-05-26 PROCEDURE — 83735 ASSAY OF MAGNESIUM: CPT

## 2019-05-26 PROCEDURE — 36415 COLL VENOUS BLD VENIPUNCTURE: CPT

## 2019-05-26 PROCEDURE — P9021 RED BLOOD CELLS UNIT: HCPCS

## 2019-05-26 PROCEDURE — 63600175 PHARM REV CODE 636 W HCPCS: Performed by: SURGERY

## 2019-05-26 PROCEDURE — 36430 TRANSFUSION BLD/BLD COMPNT: CPT

## 2019-05-26 PROCEDURE — 97530 THERAPEUTIC ACTIVITIES: CPT

## 2019-05-26 PROCEDURE — 25000003 PHARM REV CODE 250: Performed by: FAMILY MEDICINE

## 2019-05-26 PROCEDURE — 25000003 PHARM REV CODE 250: Performed by: NURSE PRACTITIONER

## 2019-05-26 PROCEDURE — A4216 STERILE WATER/SALINE, 10 ML: HCPCS | Performed by: SURGERY

## 2019-05-26 PROCEDURE — 97116 GAIT TRAINING THERAPY: CPT

## 2019-05-26 PROCEDURE — 99239 HOSP IP/OBS DSCHRG MGMT >30: CPT | Mod: ,,, | Performed by: FAMILY MEDICINE

## 2019-05-26 PROCEDURE — 99239 PR HOSPITAL DISCHARGE DAY,>30 MIN: ICD-10-PCS | Mod: ,,, | Performed by: FAMILY MEDICINE

## 2019-05-26 PROCEDURE — 63600175 PHARM REV CODE 636 W HCPCS: Performed by: FAMILY MEDICINE

## 2019-05-26 RX ORDER — SPIRONOLACTONE 25 MG/1
25 TABLET ORAL DAILY
Qty: 30 TABLET | Refills: 11 | Status: SHIPPED | OUTPATIENT
Start: 2019-05-27 | End: 2020-05-26

## 2019-05-26 RX ORDER — POTASSIUM CHLORIDE 20 MEQ/1
40 TABLET, EXTENDED RELEASE ORAL
Status: COMPLETED | OUTPATIENT
Start: 2019-05-26 | End: 2019-05-26

## 2019-05-26 RX ORDER — HYDROCODONE BITARTRATE AND ACETAMINOPHEN 500; 5 MG/1; MG/1
TABLET ORAL
Status: DISCONTINUED | OUTPATIENT
Start: 2019-05-26 | End: 2019-05-26 | Stop reason: HOSPADM

## 2019-05-26 RX ORDER — KETOCONAZOLE 20 MG/G
CREAM TOPICAL 2 TIMES DAILY
Qty: 60 G | Refills: 5 | Status: SHIPPED | OUTPATIENT
Start: 2019-05-26

## 2019-05-26 RX ORDER — POTASSIUM CHLORIDE 20 MEQ/1
40 TABLET, EXTENDED RELEASE ORAL DAILY
Qty: 60 TABLET | Refills: 1 | Status: SHIPPED | OUTPATIENT
Start: 2019-05-27

## 2019-05-26 RX ORDER — CYANOCOBALAMIN 1000 UG/ML
1000 INJECTION, SOLUTION INTRAMUSCULAR; SUBCUTANEOUS ONCE
Status: COMPLETED | OUTPATIENT
Start: 2019-05-26 | End: 2019-05-26

## 2019-05-26 RX ORDER — CYANOCOBALAMIN 1000 UG/ML
1000 INJECTION, SOLUTION INTRAMUSCULAR; SUBCUTANEOUS
Qty: 1 ML | Refills: 5 | Status: SHIPPED | OUTPATIENT
Start: 2019-06-26

## 2019-05-26 RX ORDER — NYSTATIN 100000 [USP'U]/G
POWDER TOPICAL 2 TIMES DAILY
Qty: 60 G | Refills: 1 | Status: SHIPPED | OUTPATIENT
Start: 2019-05-26

## 2019-05-26 RX ORDER — POTASSIUM CHLORIDE 20 MEQ/1
40 TABLET, EXTENDED RELEASE ORAL DAILY
Status: DISCONTINUED | OUTPATIENT
Start: 2019-05-27 | End: 2019-05-26 | Stop reason: HOSPADM

## 2019-05-26 RX ORDER — MAGNESIUM SULFATE 1 G/100ML
1 INJECTION INTRAVENOUS ONCE
Status: COMPLETED | OUTPATIENT
Start: 2019-05-26 | End: 2019-05-26

## 2019-05-26 RX ADMIN — VANCOMYCIN HYDROCHLORIDE 1750 MG: 750 INJECTION, POWDER, LYOPHILIZED, FOR SOLUTION INTRAVENOUS at 02:05

## 2019-05-26 RX ADMIN — LEVETIRACETAM 250 MG: 250 TABLET ORAL at 08:05

## 2019-05-26 RX ADMIN — ASPIRIN 81 MG: 81 TABLET, COATED ORAL at 08:05

## 2019-05-26 RX ADMIN — Medication 10 ML: at 04:05

## 2019-05-26 RX ADMIN — MAGNESIUM SULFATE IN DEXTROSE 1 G: 10 INJECTION, SOLUTION INTRAVENOUS at 10:05

## 2019-05-26 RX ADMIN — APIXABAN 5 MG: 5 TABLET, FILM COATED ORAL at 08:05

## 2019-05-26 RX ADMIN — TAMSULOSIN HYDROCHLORIDE 0.4 MG: 0.4 CAPSULE ORAL at 08:05

## 2019-05-26 RX ADMIN — POTASSIUM CHLORIDE 40 MEQ: 1500 TABLET, EXTENDED RELEASE ORAL at 12:05

## 2019-05-26 RX ADMIN — PIPERACILLIN AND TAZOBACTAM 4.5 G: 4; .5 INJECTION, POWDER, LYOPHILIZED, FOR SOLUTION INTRAVENOUS; PARENTERAL at 07:05

## 2019-05-26 RX ADMIN — MICONAZOLE NITRATE: 20 CREAM TOPICAL at 04:05

## 2019-05-26 RX ADMIN — LISINOPRIL 10 MG: 10 TABLET ORAL at 08:05

## 2019-05-26 RX ADMIN — FUROSEMIDE 40 MG: 40 TABLET ORAL at 08:05

## 2019-05-26 RX ADMIN — PANTOPRAZOLE SODIUM 40 MG: 40 TABLET, DELAYED RELEASE ORAL at 08:05

## 2019-05-26 RX ADMIN — PIPERACILLIN AND TAZOBACTAM 4.5 G: 4; .5 INJECTION, POWDER, LYOPHILIZED, FOR SOLUTION INTRAVENOUS; PARENTERAL at 12:05

## 2019-05-26 RX ADMIN — CYANOCOBALAMIN 1000 MCG: 1000 INJECTION, SOLUTION INTRAMUSCULAR; SUBCUTANEOUS at 12:05

## 2019-05-26 RX ADMIN — SPIRONOLACTONE 25 MG: 25 TABLET ORAL at 08:05

## 2019-05-26 RX ADMIN — CARVEDILOL 25 MG: 25 TABLET, FILM COATED ORAL at 07:05

## 2019-05-26 RX ADMIN — MICONAZOLE NITRATE: 20 CREAM TOPICAL at 08:05

## 2019-05-26 RX ADMIN — POTASSIUM CHLORIDE 40 MEQ: 1500 TABLET, EXTENDED RELEASE ORAL at 10:05

## 2019-05-26 NOTE — ASSESSMENT & PLAN NOTE
Type and screen today  Replace FE    CBC improved today.  Recheck h/h.    Will give 1 unit today. Cont to use iron and give B12 injections

## 2019-05-26 NOTE — DISCHARGE SUMMARY
[facility] Medicine  Discharge Summary      Patient Name: St Lacho Farooq  MRN: [id number]  Admission Date: 5/23/2019  Hospital Length of Stay: 2 days  Discharge Date and Time:  05/26/2019 10:55 AM  Attending Physician: Gill Haque MD   Discharging Provider: Gill Haque MD  Primary Care Provider: Dylan Adam MD      HPI:   85 yo male patient was recently d/c from [facility] for cont IV abx at home. Was on week 5 of ampicillin. Had fever 101 at home. No other complaints besides fever. No elevated WBC. No cough or runny nose. No urinary complaints. He denies sick contacts. Work up in ER essentially normal except low K and MAg and elevated BNP. He denies SOB or increased swelling. Although his daughter reports that he was more SOB at home the last day or so. Lungs clear    H/H is a little lower than his usual. Was switched from pradaxa to eliquis. More bruising noted. Anemia w/u slightly lower fe. Occult + last visit, usually hgb ~8.     * No surgery found *      Hospital Course:   Patient reports that he feels much better. No fevers since admit. Electrolytes replaced yesterday. Tolerating diet well. Denies any weakness. Cultures are pending.    5/26  Plan of care discussed with daughter and patient yesterday. No desire for completion of course of abx at this point (had 4 days left to treat enterobacter). Cultures are still in process. Tip culture NGTD. Blood counts are stable, but patient with some weakness. Ambulating well. Afebrile.     Consults:   Consults (From admission, onward)        Status Ordering Provider     Inpatient consult to Cardiology[facility]  Once     Provider:  Hugh Begum MD    Acknowledged GILL HAQUE     Pharmacy to dose Vancomycin consult  Once     Provider:  (Not yet assigned)    Acknowledged [place] CARDENAS          * Fever  Repeated blood cultutres as well as pulled picc line and cultured the tip  Broadened the abx including vanc that his  blood cultures 5 weeks ago were sensitive to.   Cont to monitor with broadened abx    Follow cultures, ngtd; procalcitonin neg    Hypokalemia  Replace again today and place on daily potassium      Started on spironolactone here.    K still low.    Repeat BMP this week    Benign prostatic hyperplasia with urinary frequency  Cont flomax      Paroxysmal atrial fibrillation  Some on tele, cont eliquis and BB; No RVR      DNR (do not resuscitate)  He has living will on file      Encephalopathy  AMS last visit  Was started on keppra for poss sz activity from recent embolic strokes      Acute bacterial endocarditis  Cont vanc for now    With level at 14, even with d/c of abx, he should be covered for last 4 days of 'course.'     Will await blood cultures and consider d/c today as vanc level 14      Atrial thrombus  Cont eliquis and asa      Chronic diastolic heart failure, NYHA class 2  BNP was elevated  Daughter reports SOB at home. On lasix 40mg at home, cont with low dose ace and BB, also begin daily k - 40      Anemia  Type and screen today  Replace FE    CBC improved today.  Recheck h/h.    Will give 1 unit today. Cont to use iron and give B12 injections    Needs repeat cbc in 1 week and needs to have b12 injections every month.      Emphysema/COPD  Nebs as needed        Final Active Diagnoses:    Diagnosis Date Noted POA    PRINCIPAL PROBLEM:  Fever [R50.9] 05/23/2019 Yes    DNR (do not resuscitate) [Z66] 05/24/2019 Yes    Paroxysmal atrial fibrillation [I48.0] 05/24/2019 Yes    Benign prostatic hyperplasia with urinary frequency [N40.1, R35.0] 05/24/2019 Yes    Hypokalemia [E87.6] 05/24/2019 Yes    Encephalopathy [G93.40]  Yes    Acute bacterial endocarditis [I33.0] 04/23/2019 Yes    Atrial thrombus [I51.3] 04/19/2019 Yes    Chronic diastolic heart failure, NYHA class 2 [I50.32] 03/18/2019 Yes    Anemia [D64.9] 11/03/2016 Yes    Emphysema/COPD [J43.9] 02/03/2015 Yes      Problems Resolved During this  Admission:       Discharged Condition: fair    Disposition: Home or Self Care    Follow Up: Needs BMP and CBC this week with f/u with Dr. Lozano this week    Patient Instructions:   No discharge procedures on file.    Significant Diagnostic Studies: Labs:   CMP   Recent Labs   Lab 05/25/19  0618 05/26/19  0548    142   K 3.8 3.1*    111*   CO2 23 24    124*   BUN 11 9   CREATININE 1.1 1.1   CALCIUM 9.4 9.3   PROT 5.6* 5.2*   ALBUMIN 2.6* 2.4*   BILITOT 1.1* 1.0   ALKPHOS 106 105   AST 28 23   ALT 39 34   ANIONGAP 7* 7*   ESTGFRAFRICA >60 >60   EGFRNONAA >60 >60    and CBC   Recent Labs   Lab 05/25/19  0618 05/26/19  0548   WBC 5.09 5.29   HGB 7.8* 7.4*   HCT 26.4* 25.2*    174     Microbiology:   Blood Culture   Lab Results   Component Value Date    LABBLOO No growth to date 05/23/2019       Pending Diagnostic Studies:     None         Medications:  Reconciled Home Medications:      Medication List      START taking these medications    cyanocobalamin 1,000 mcg/mL injection  Inject 1 mL (1,000 mcg total) into the skin every 30 days.  Start taking on:  6/26/2019     ketoconazole 2 % cream  Commonly known as:  NIZORAL  Apply topically 2 (two) times daily.     nystatin powder  Commonly known as:  MYCOSTATIN  Apply topically 2 (two) times daily.     potassium chloride SA 20 MEQ tablet  Commonly known as:  K-DUR,KLOR-CON  Take 2 tablets (40 mEq total) by mouth once daily.  Start taking on:  5/27/2019     spironolactone 25 MG tablet  Commonly known as:  ALDACTONE  Take 1 tablet (25 mg total) by mouth once daily.  Start taking on:  5/27/2019        CHANGE how you take these medications    ELIQUIS 5 mg Tab  Generic drug:  apixaban  Take 1 tablet (5 mg total) by mouth 2 (two) times daily.  What changed:  Another medication with the same name was removed. Continue taking this medication, and follow the directions you see here.        CONTINUE taking these medications    ANORO ELLIPTA 62.5-25  mcg/actuation Dsdv  Generic drug:  umeclidinium-vilanterol  INHALE 1 PUFF INTO LUNGS ONCE DAILY     aspirin 81 MG EC tablet  Commonly known as:  ECOTRIN  Take 81 mg by mouth once daily.     atorvastatin 10 MG tablet  Commonly known as:  LIPITOR  Take 10 mg by mouth every evening.     carvedilol 12.5 MG tablet  Commonly known as:  COREG  Take 25 mg by mouth 2 (two) times daily with meals.     furosemide 20 MG tablet  Commonly known as:  LASIX  Take 2 tablets (40 mg total) by mouth once daily.     levETIRAcetam 250 MG Tab  Commonly known as:  KEPPRA  Take 1 tablet (250 mg total) by mouth 2 (two) times daily.     lidocaine 5 %  Commonly known as:  LIDODERM  Place 1 patch onto the skin once daily. Remove & Discard patch within 12 hours or as directed by MD     lisinopril 10 MG tablet  Take 1 tablet (10 mg total) by mouth once daily.     mirtazapine 7.5 MG Tab  Commonly known as:  REMERON  Take 1 tablet (7.5 mg total) by mouth every evening.     nitroGLYCERIN 0.4 MG SL tablet  Commonly known as:  NITROSTAT  Place 0.4 mg under the tongue every 5 (five) minutes as needed for Chest pain.     omeprazole 40 MG capsule  Commonly known as:  PRILOSEC  Take 40 mg by mouth once daily.     tamsulosin 0.4 mg Cap  Commonly known as:  FLOMAX  Take 1 capsule (0.4 mg total) by mouth once daily.        STOP taking these medications    AMPICILLIN 2 G/100 ML NS (READY TO MIX SYSTEM)            Indwelling Lines/Drains at time of discharge:   Lines/Drains/Airways          None          Time spent on the discharge of patient: 35 minutes  Patient was seen and examined on the date of discharge and determined to be suitable for discharge.         Gill Figueroa MD  Department of Hospital Medicine  [facility]

## 2019-05-26 NOTE — ASSESSMENT & PLAN NOTE
Replace again today and place on daily potassium      Started on spironolactone here.    K still low.    Repeat BMP this week

## 2019-05-26 NOTE — PLAN OF CARE
Problem: Adult Inpatient Plan of Care  Goal: Plan of Care Review  Outcome: Ongoing (interventions implemented as appropriate)     05/26/19 0553   Plan of Care Review   Plan of Care Reviewed With patient   Progress no change     Goal: Patient-Specific Goal (Individualization)  Outcome: Ongoing (interventions implemented as appropriate)     05/26/19 0553   OTHER   Anxieties, Fears or Concerns Patient's pleasantly verbalized concerns regarding discharge on Monday.   Individualized Care Needs Antibotics administered, safety maintained, and no overt problems througout the night.   Patient-Specific Goal (Individualization)   Patient-Specific Goals (Include Timeframe) Care provided according to MD orders over night.

## 2019-05-26 NOTE — ASSESSMENT & PLAN NOTE
Repeated blood cultutres as well as pulled picc line and cultured the tip  Broadened the abx including vanc that his blood cultures 5 weeks ago were sensitive to.   Cont to monitor with broadened abx    Follow cultures, ngtd; procalcitonin neg

## 2019-05-26 NOTE — PT/OT/SLP DISCHARGE
Physical Therapy Discharge Summary    Name: St Lacho Farooq  MRN: [id number]   Principal Problem: Fever     Patient Discharged from acute Physical Therapy on 19.  Please refer to prior PT noted date on 2619 for functional status.     Assessment:     Patient was discharged unexpectedly.  Information required to complete an accurate discharge summary is unknown.  Refer to therapy initial evaluation and last progress note for initial and most recent functional status and goal achievement.  Recommendations made may be found in medical record.    Objective:     GOALS:   Multidisciplinary Problems     Physical Therapy Goals     Not on file          Multidisciplinary Problems (Resolved)        Problem: Physical Therapy Goal    Goal Priority Disciplines Outcome Goal Variances Interventions   Physical Therapy Goal   (Resolved)     PT, PT/OT Outcome(s) achieved     Description:  Goals to be met by: 7     Patient will increase functional independence with mobility by performin. Increase dynamic standing balance to Good grade.  2. Sit to Stand transfer to mod independent with RW  3. Bed to chair transfer with Modified Independentwith or without rolling walker using Step Transfer TECHNIQUE  4. Gait  x 300  feet with Modified Independent with or without rolling walker  5. Lower extremity exercise program x10 reps per handout, with assistance as needed                    Reasons for Discontinuation of Therapy Services  Transfer to alternate level of care. and Satisfactory goal achievement.      Plan:     Patient Discharged to: Home with Home Health Service.    Keven Goldstein, PT  2019

## 2019-05-26 NOTE — PROGRESS NOTES
Staff Handoff  Bedside handoff report received from Edith RIVERS. POC discussed. Will monitor.      Resident Handoff

## 2019-05-26 NOTE — NURSING
Pt in stable condition. Discharged home. Reviewed discharge instructions, follow up appts, medications and reportable signs and symptoms with pt and daughter Kassy; state understanding.

## 2019-05-26 NOTE — ASSESSMENT & PLAN NOTE
Cont vanc for now    With level at 14, even with d/c of abx, he should be covered for last 4 days of 'course.'     Will await blood cultures and consider d/c today as vanc level 14

## 2019-05-26 NOTE — SUBJECTIVE & OBJECTIVE
Review of Systems   Constitutional: Negative for chills and fever.   HENT: Negative for congestion, ear pain, postnasal drip, rhinorrhea, sore throat and trouble swallowing.    Eyes: Negative for redness and itching.   Respiratory: Negative for cough, shortness of breath and wheezing.    Cardiovascular: Negative for chest pain and palpitations.   Gastrointestinal: Negative for abdominal pain, diarrhea, nausea and vomiting.   Genitourinary: Negative for dysuria and frequency.   Skin: Negative for rash.   Neurological: Negative for weakness and headaches.     Objective:     Vital Signs (Most Recent):  Temp: 97.6 °F (36.4 °C) (05/26/19 0702)  Pulse: 89 (05/26/19 0702)  Resp: 17 (05/26/19 0702)  BP: (!) 146/65 (05/26/19 0702)  SpO2: 96 % (05/26/19 0702) Vital Signs (24h Range):  Temp:  [96.7 °F (35.9 °C)-97.9 °F (36.6 °C)] 97.6 °F (36.4 °C)  Pulse:  [62-94] 89  Resp:  [17-18] 17  SpO2:  [93 %-96 %] 96 %  BP: (117-146)/(59-66) 146/65     Weight: 88.5 kg (195 lb 1.7 oz)  Body mass index is 30.56 kg/m².    Physical Exam   Constitutional: He is oriented to person, place, and time. He appears well-developed. No distress.   HENT:   Head: Normocephalic and atraumatic.   Slight facial asymmetry   Eyes: Pupils are equal, round, and reactive to light. Conjunctivae are normal.   Neck: Normal range of motion. Neck supple. No thyromegaly present.   Cardiovascular: Normal rate, regular rhythm and intact distal pulses.   Murmur heard.  Pulmonary/Chest: Effort normal and breath sounds normal. No respiratory distress. He has no wheezes.   Abdominal: Soft. Bowel sounds are normal. There is no tenderness.   Musculoskeletal: Normal range of motion. He exhibits edema (2+).   Lymphadenopathy:     He has no cervical adenopathy.   Neurological: He is alert and oriented to person, place, and time.   Skin: Skin is warm and dry. No rash noted.   Psychiatric: He has a normal mood and affect. His behavior is normal.   Nursing note and vitals  reviewed.        CRANIAL NERVES     CN III, IV, VI   Pupils are equal, round, and reactive to light.       Significant Labs:   CBC:   Recent Labs   Lab 05/25/19 0618 05/26/19  0548   WBC 5.09 5.29   HGB 7.8* 7.4*   HCT 26.4* 25.2*    174   retic 1.9  Ferritin 38  Folate 8.6  Iron 18, sat fe 6  B12 211    CMP:   Recent Labs   Lab 05/25/19 0618 05/26/19  0548    142   K 3.8 3.1*    111*   CO2 23 24    124*   BUN 11 9   CREATININE 1.1 1.1   CALCIUM 9.4 9.3   PROT 5.6* 5.2*   ALBUMIN 2.6* 2.4*   BILITOT 1.1* 1.0   ALKPHOS 106 105   AST 28 23   ALT 39 34   ANIONGAP 7* 7*   EGFRNONAA >60 >60     Cardiac Markers:   No results for input(s): CKMB, MYOGLOBIN, BNP, TROPISTAT in the last 48 hours.  Coagulation:   No results for input(s): PT, INR, APTT in the last 48 hours.  Lactic Acid:   No results for input(s): LACTATE in the last 48 hours.  Recent Labs   Lab 05/23/19 1936 05/24/19  1454   CPK 20  20  --   --    CPKMB 0.9  --   --    TROPONINI 0.141*   < > 0.066*   MB 4.5  --   --     < > = values in this interval not displayed.   mag 1.5    TSH:   Recent Labs   Lab 05/23/19 1936   TSH 0.911       Urine Studies:   No results for input(s): COLORU, APPEARANCEUA, PHUR, SPECGRAV, PROTEINUA, GLUCUA, KETONESU, BILIRUBINUA, OCCULTUA, NITRITE, UROBILINOGEN, LEUKOCYTESUR, RBCUA, WBCUA, BACTERIA, SQUAMEPITHEL, HYALINECASTS in the last 48 hours.    Invalid input(s): WRIGHTSUR  Repeat blood cultures 4/23 NGTD one from PICC that was pulled as well as arm  Also culturing the catheter tip       Throat screen and flu negative       Old blood cultures 4/17/19   Blood Culture, Routine Gram stain peds bottle: Gram positive cocci in chains resembling Strep    Blood Culture, Routine Results called to and read back by: Edith Cee RN 04/18/2019  09:23    Blood Culture, Routine ENTEROCOCCUS FAECALIS    Resulting Agency [facility]   Susceptibility      Enterococcus faecalis     CULTURE, BLOOD     Ampicillin <=2 mcg/mL  Sensitive     Gentamicin Synergy Screen <=500 mcg/mL Sensitive     Tetracycline <=4 mcg/mL Sensitive     Vancomycin 2 mcg/mL Sensitive           Significant Imaging:     CXR 1. Cardiomegaly with postoperative changes.  2. Evidence of a small left pleural effusion, new since 04/22/2019.  3. Right-sided PICC line unchanged.

## 2019-05-26 NOTE — ASSESSMENT & PLAN NOTE
Cont vanc for now    With level at 14, even with d/c of abx, he should be covered for last 4 days of 'course.'     Will await blood cultures and consider d/c today.

## 2019-05-26 NOTE — PROGRESS NOTES
[facility] Medicine  Progress Note    Patient Name: St Lacho Farooq  MRN: [id number]  Patient Class: IP- Inpatient   Admission Date: 5/23/2019  Length of Stay: 2 days  Attending Physician: Gill Figueroa MD  Primary Care Provider: Dylan Adam MD        Subjective:     Principal Problem:Fever    HPI:  83 yo male patient was recently d/c from [facility] for cont IV abx at home. Was on week 5 of ampicillin. Had fever 101 at home. No other complaints besides fever. No elevated WBC. No cough or runny nose. No urinary complaints. He denies sick contacts. Work up in ER essentially normal except low K and MAg and elevated BNP. He denies SOB or increased swelling. Although his daughter reports that he was more SOB at home the last day or so. Lungs clear    H/H is a little lower than his usual. Was switched from pradaxa to eliquis. More bruising noted. Anemia w/u slightly lower fe. Occult + last visit, usually hgb ~8.     Hospital Course:  Patient reports that he feels much better. No fevers since admit. Electrolytes replaced yesterday. Tolerating diet well. Denies any weakness. Cultures are pending.    5/26  Plan of care discussed with daughter and patient yesterday. No desire for completion of course of abx at this point (had 4 days left to treat enterobacter). Cultures are still in process. Tip culture NGTD. Blood counts are stable, but patient with some weakness. Ambulating well. Afebrile.    Review of Systems   Constitutional: Negative for chills and fever.   HENT: Negative for congestion, ear pain, postnasal drip, rhinorrhea, sore throat and trouble swallowing.    Eyes: Negative for redness and itching.   Respiratory: Negative for cough, shortness of breath and wheezing.    Cardiovascular: Negative for chest pain and palpitations.   Gastrointestinal: Negative for abdominal pain, diarrhea, nausea and vomiting.   Genitourinary: Negative for dysuria and frequency.   Skin: Negative  for rash.   Neurological: Negative for weakness and headaches.     Objective:     Vital Signs (Most Recent):  Temp: 97.6 °F (36.4 °C) (05/26/19 0702)  Pulse: 89 (05/26/19 0702)  Resp: 17 (05/26/19 0702)  BP: (!) 146/65 (05/26/19 0702)  SpO2: 96 % (05/26/19 0702) Vital Signs (24h Range):  Temp:  [96.7 °F (35.9 °C)-97.9 °F (36.6 °C)] 97.6 °F (36.4 °C)  Pulse:  [62-94] 89  Resp:  [17-18] 17  SpO2:  [93 %-96 %] 96 %  BP: (117-146)/(59-66) 146/65     Weight: 88.5 kg (195 lb 1.7 oz)  Body mass index is 30.56 kg/m².    Physical Exam   Constitutional: He is oriented to person, place, and time. He appears well-developed. No distress.   HENT:   Head: Normocephalic and atraumatic.   Slight facial asymmetry   Eyes: Pupils are equal, round, and reactive to light. Conjunctivae are normal.   Neck: Normal range of motion. Neck supple. No thyromegaly present.   Cardiovascular: Normal rate, regular rhythm and intact distal pulses.   Murmur heard.  Pulmonary/Chest: Effort normal and breath sounds normal. No respiratory distress. He has no wheezes.   Abdominal: Soft. Bowel sounds are normal. There is no tenderness.   Musculoskeletal: Normal range of motion. He exhibits edema (2+).   Lymphadenopathy:     He has no cervical adenopathy.   Neurological: He is alert and oriented to person, place, and time.   Skin: Skin is warm and dry. No rash noted.   Psychiatric: He has a normal mood and affect. His behavior is normal.   Nursing note and vitals reviewed.        CRANIAL NERVES     CN III, IV, VI   Pupils are equal, round, and reactive to light.       Significant Labs:   CBC:   Recent Labs   Lab 05/25/19  0618 05/26/19  0548   WBC 5.09 5.29   HGB 7.8* 7.4*   HCT 26.4* 25.2*    174   retic 1.9  Ferritin 38  Folate 8.6  Iron 18, sat fe 6  B12 211    CMP:   Recent Labs   Lab 05/25/19  0618 05/26/19  0548    142   K 3.8 3.1*    111*   CO2 23 24    124*   BUN 11 9   CREATININE 1.1 1.1   CALCIUM 9.4 9.3   PROT 5.6* 5.2*    ALBUMIN 2.6* 2.4*   BILITOT 1.1* 1.0   ALKPHOS 106 105   AST 28 23   ALT 39 34   ANIONGAP 7* 7*   EGFRNONAA >60 >60     Cardiac Markers:   No results for input(s): CKMB, MYOGLOBIN, BNP, TROPISTAT in the last 48 hours.  Coagulation:   No results for input(s): PT, INR, APTT in the last 48 hours.  Lactic Acid:   No results for input(s): LACTATE in the last 48 hours.  Recent Labs   Lab 05/23/19 1936 05/24/19  1454   CPK 20  20  --   --    CPKMB 0.9  --   --    TROPONINI 0.141*   < > 0.066*   MB 4.5  --   --     < > = values in this interval not displayed.   mag 1.5    TSH:   Recent Labs   Lab 05/23/19 1936   TSH 0.911       Urine Studies:   No results for input(s): COLORU, APPEARANCEUA, PHUR, SPECGRAV, PROTEINUA, GLUCUA, KETONESU, BILIRUBINUA, OCCULTUA, NITRITE, UROBILINOGEN, LEUKOCYTESUR, RBCUA, WBCUA, BACTERIA, SQUAMEPITHEL, HYALINECASTS in the last 48 hours.    Invalid input(s): WRIGHTSUR  Repeat blood cultures 4/23 NGTD one from PICC that was pulled as well as arm  Also culturing the catheter tip       Throat screen and flu negative       Old blood cultures 4/17/19   Blood Culture, Routine Gram stain peds bottle: Gram positive cocci in chains resembling Strep    Blood Culture, Routine Results called to and read back by: Edith Cee RN 04/18/2019  09:23    Blood Culture, Routine ENTEROCOCCUS FAECALIS    Resulting Agency [facility]   Susceptibility      Enterococcus faecalis     CULTURE, BLOOD     Ampicillin <=2 mcg/mL Sensitive     Gentamicin Synergy Screen <=500 mcg/mL Sensitive     Tetracycline <=4 mcg/mL Sensitive     Vancomycin 2 mcg/mL Sensitive           Significant Imaging:     CXR 1. Cardiomegaly with postoperative changes.  2. Evidence of a small left pleural effusion, new since 04/22/2019.  3. Right-sided PICC line unchanged.    Assessment/Plan:      * Fever  Repeated blood cultutres as well as pulled picc line and cultured the tip  Broadened the abx including vanc that his blood cultures 5 weeks ago  were sensitive to.   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      Atrial thrombus  Cont eliquis and asa      Chronic diastolic heart failure, NYHA class 2  BNP was elevated  Daughter reports SOB at home. On lasix 40mg at home, cont with low dose ace and BB, also begin daily k - 40      Anemia  Type and screen today  Replace FE    CBC improved today.  Recheck h/h.    Will give 1 unit today. Cont to use iron and give B12 injections      Emphysema/COPD  Nebs as needed        VTE Risk Mitigation (From admission, onward)        Ordered     apixaban tablet 5 mg  2 times daily      05/24/19 0810     IP VTE HIGH RISK PATIENT  Once      05/23/19 2123     Place sequential compression device  Until discontinued      05/23/19 2123              Gill Figueroa MD  Department of Hospital Medicine   [facility]

## 2019-05-26 NOTE — ASSESSMENT & PLAN NOTE
Type and screen today  Replace FE    CBC improved today.  Recheck h/h.    Will give 1 unit today. Cont to use iron and give B12 injections    Needs repeat cbc in 1 week and needs to have b12 injections every month.

## 2019-05-26 NOTE — PROGRESS NOTES
Pharmacokinetic Assessment Follow Up: IV Vancomycin    Vancomycin serum concentration assessment(s):    The trough level was drawned correctly and can be used to guide therapy at this time. The measurement is within the desired definitive target range of 10 to 15 mcg/mL.    Vancomycin Regimen Plan:    Continue regimen to Vancomycin 1750 mg IV every 24hour with next serum trough concentration measured at 2130 prior to 3rd dose on 05/27/19    Pharmacy will continue to follow and monitor vancomycin.    Please contact pharmacy at extension [phone] for questions regarding this assessment.    Thank you for the consult,   Mayra Tapia     Patient brief summary:  St Lacho Farooq is a 84 y.o. male initiated on antimicrobial therapy with IV Vancomycin for treatment of suspected bacteremia    The patient received a loading dose, followed by the current treatment regimen: vancomycin 1750 mg IV every 24 hours    Drug Allergies:   Review of patient's allergies indicates:   Allergen Reactions    Bactrim [sulfamethoxazole-trimethoprim] Rash    Codeine Rash       Actual Body Weight:   88.5 kg    Renal Function:   Estimated Creatinine Clearance: 55 mL/min (based on SCr of 1.1 mg/dL).,     Dialysis Method (if applicable):      CBC (last 72 hours):  Recent Labs   Lab Result Units 05/23/19 1936 05/24/19 0315 05/25/19 0618 05/26/19  0548   WBC K/uL 5.76 3.86* 5.09 5.29   Hemoglobin g/dL 7.7* 7.2* 7.8* 7.4*   Hematocrit % 26.2* 24.0* 26.4* 25.2*   Platelets K/uL 164 133* 173 174   Gran% % 80.4* 70.7 66.0 65.8   Lymph% % 8.3* 14.5* 16.5* 16.1*   Mono% % 8.2 9.6 11.6 11.7   Eosinophil% % 2.6 4.7 5.3 6.0   Basophil% % 0.5 0.5 0.6 0.4   Differential Method  Automated Automated Automated Automated       Metabolic Panel (last 72 hours):  Recent Labs   Lab Result Units 05/23/19 1936 05/23/19 2017 05/24/19 0315 05/25/19 0618 05/26/19  0548   Sodium mmol/L 139  --  139 140 142   Potassium mmol/L 3.3*  --  3.1* 3.8 3.1*   Chloride  mmol/L 108  --  108 110 111*   CO2 mmol/L 22*  --  22* 23 24   Glucose mg/dL 128*  --  119* 101 124*   Glucose, UA   --  Negative  --   --   --    BUN, Bld mg/dL 16  --  18 11 9   Creatinine mg/dL 1.1  --  1.2 1.1 1.1   Albumin g/dL 2.7*  --  2.4* 2.6* 2.4*   Total Bilirubin mg/dL 1.3*  --  1.2* 1.1* 1.0   Alkaline Phosphatase U/L 100  --  85 106 105   AST U/L 34  --  24 28 23   ALT U/L 35  --  30 39 34   Magnesium mg/dL 1.5*  --   --  1.7 1.7   Phosphorus mg/dL 2.7  --   --   --   --        Vancomycin Administrations:  vancomycin given in the last 96 hours                   vancomycin 1.75 g in 5 % dextrose 500 mL IVPB (mg) 1,750 mg New Bag 05/25/19 2200     1,750 mg New Bag 05/24/19 2321                Drug levels (last 3 results):  Recent Labs   Lab Result Units 05/25/19  2127   Vancomycin-Trough ug/mL 14.0       Microbiologic Results:  Microbiology Results (last 7 days)     Procedure Component Value Units Date/Time    Strep A culture, throat [[id number]] Collected:  05/23/19 1942    Order Status:  Completed Specimen:  Throat Updated:  05/25/19 1053     Strep A Culture No significant growth    IV catheter culture [[id number]] Collected:  05/23/19 2056    Order Status:  Completed Specimen:  Catheter Tip Updated:  05/25/19 0722     Aerobic Culture - Cath tip No growth    Narrative:       This is a catheter tip from a PICC line    Blood culture [[id number]] Collected:  05/23/19 1945    Order Status:  Sent Specimen:  Blood Updated:  05/24/19 0509    Narrative:       Drawn from PICC on right arm as per Dr Ozuna.    Blood culture [[id number]] Collected:  05/23/19 1936    Order Status:  Sent Specimen:  Blood Updated:  05/24/19 0509    Aerobic culture [[id number]] Collected:  05/23/19 2056    Order Status:  Canceled Specimen:  Catheter Tip from Arm, Right Updated:  05/24/19 0456    Aerobic culture [[id number]]     Order Status:  Canceled Specimen:  Abscess     Influenza A & B by Molecular [[id number]] Collected:  05/23/19  1942    Order Status:  Completed Specimen:  Nasopharyngeal Swab Updated:  05/23/19 2003     Influenza A, Molecular Negative     Influenza B, Molecular Negative     Flu A & B Source Nasal swab    Throat Screen, Rapid [[id number]] Collected:  05/23/19 1942    Order Status:  Completed Specimen:  Throat Updated:  05/23/19 1957     Rapid Strep A Screen Negative     Comment: See Micro for reflexed Strep culture.

## 2019-05-26 NOTE — ASSESSMENT & PLAN NOTE
BNP was elevated  Daughter reports SOB at home. On lasix 40mg at home, cont with low dose ace and BB, also begin daily k - 40

## 2019-05-26 NOTE — PT/OT/SLP PROGRESS
"Physical Therapy Treatment    Patient Name:  St Lacho Farooq   MRN:  [id number]    Recommendations:     Discharge Recommendations:  home with home health, home health PT   Discharge Equipment Recommendations: none   Barriers to discharge: None    Assessment:     St Lacho Farooq is a 84 y.o. male admitted with a medical diagnosis of Fever.  He presents with the following impairments/functional limitations:  weakness, gait instability, impaired self care skills, impaired functional mobilty, decreased coordination . Patient seen up at beside chair having blood transfusion with daughter at beside. Patient is very alert and no new complain. Patient is going home today. Reviewed the Home Exercise Program and  the safety measures with stairs management. Patient verbalized understanding with return demonstration.    Rehab Prognosis: Good; patient would benefit from acute skilled PT services to address these deficits and reach maximum level of function.    Recent Surgery: * No surgery found *      Plan:     During this hospitalization, patient to be seen 5 x/week to address the identified rehab impairments via gait training, therapeutic exercises, therapeutic activities and progress toward the following goals:    · Plan of Care Expires:  05/30/19    Subjective     Chief Complaint: No pain complain and is ready to go home today.  Patient/Family Comments/goals: "To go home and to handle the stairs with no problem".  Pain/Comfort:  · Pain Rating 1: 0/10  · Pain Rating Post-Intervention 1: 0/10      Objective:     Communicated with patient and daughter prior to session.  Patient found up in chair with telemetry, peripheral IV upon PT entry to room.     General Precautions: Standard, fall   Orthopedic Precautions:N/A   Braces: N/A     Functional Mobility:  · Transfers:     · Sit to Stand:  modified independence with rolling walker  · Bed to Chair: modified independence with  rolling walker  using  Step " Transfer  · Gait: RW Ambulation x 300 feet with Modified Independent. Reciprocal gait with increased Right Foot/Floor clearance and increased estefanía.  · Balance: Stand Dynamic with Good grade using RW for support.  · Stairs:  Pt ascended/descended 20 steps stair(s) with No Assistive Device with left handrail with Supervision or Set-up Assistance.       AM-PAC 6 CLICK MOBILITY  Turning over in bed (including adjusting bedclothes, sheets and blankets)?: 4  Sitting down on and standing up from a chair with arms (e.g., wheelchair, bedside commode, etc.): 4  Moving from lying on back to sitting on the side of the bed?: 4  Moving to and from a bed to a chair (including a wheelchair)?: 4  Need to walk in hospital room?: 3  Climbing 3-5 steps with a railing?: 3  Basic Mobility Total Score: 22       Therapeutic Activities and Exercises:   Reviewed and performed Home Exercise Program for  Bilateral LE Strengthening and Coordination ex    Such as Bilateral Ankle Pumps, LAQ Alternately, Marching at Sitting, Side Steps alternately at sitting, and Sit<>Stand ex. Worked and reviewed Ascending/Descending 20 stair steps using 1 handrail with Supervision.  PT discussed importance of patient's performance of Home Exercise Program (HEP) with pt verbalizing understanding.   Patient left up in chair with all lines intact, call button in reach, Nurse notified and daughter present..    GOALS:   Multidisciplinary Problems     Physical Therapy Goals     Not on file          Multidisciplinary Problems (Resolved)        Problem: Physical Therapy Goal    Goal Priority Disciplines Outcome Goal Variances Interventions   Physical Therapy Goal   (Resolved)     PT, PT/OT Outcome(s) achieved     Description:  Goals to be met by: 7     Patient will increase functional independence with mobility by performin. Increase dynamic standing balance to Good grade.  2. Sit to Stand transfer to mod independent with RW  3. Bed to chair transfer with  Modified Independentwith or without rolling walker using Step Transfer TECHNIQUE  4. Gait  x 300  feet with Modified Independent with or without rolling walker  5. Lower extremity exercise program x10 reps per handout, with assistance as needed                    Time Tracking:     PT Received On: 05/26/19  PT Start Time: 1215     PT Stop Time: 1245  PT Total Time (min): 30 min     Billable Minutes: Gait Training 10 and Therapeutic Activity 15    Treatment Type: Treatment  PT/PTA: PT           Keven Goldstein, PT  05/26/2019

## 2019-05-27 ENCOUNTER — OFFICE VISIT (OUTPATIENT)
Dept: FAMILY MEDICINE | Facility: CLINIC | Age: 84
End: 2019-05-27
Payer: MEDICARE

## 2019-05-27 ENCOUNTER — OFFICE VISIT (OUTPATIENT)
Dept: NEUROLOGY | Facility: CLINIC | Age: 84
End: 2019-05-27
Payer: MEDICARE

## 2019-05-27 VITALS
WEIGHT: 197.06 LBS | DIASTOLIC BLOOD PRESSURE: 66 MMHG | SYSTOLIC BLOOD PRESSURE: 142 MMHG | BODY MASS INDEX: 30.93 KG/M2 | HEART RATE: 88 BPM | RESPIRATION RATE: 14 BRPM | HEIGHT: 67 IN

## 2019-05-27 VITALS
SYSTOLIC BLOOD PRESSURE: 132 MMHG | HEIGHT: 67 IN | DIASTOLIC BLOOD PRESSURE: 66 MMHG | BODY MASS INDEX: 30.92 KG/M2 | HEART RATE: 84 BPM | RESPIRATION RATE: 20 BRPM | WEIGHT: 197 LBS

## 2019-05-27 DIAGNOSIS — L98.8 ALLERGIC DERMATOSIS: ICD-10-CM

## 2019-05-27 DIAGNOSIS — I69.90 LATE EFFECTS OF CVA (CEREBROVASCULAR ACCIDENT): ICD-10-CM

## 2019-05-27 DIAGNOSIS — I10 ESSENTIAL HYPERTENSION: ICD-10-CM

## 2019-05-27 DIAGNOSIS — I48.0 PAROXYSMAL ATRIAL FIBRILLATION: ICD-10-CM

## 2019-05-27 DIAGNOSIS — I67.1 CEREBRAL ANEURYSM: ICD-10-CM

## 2019-05-27 DIAGNOSIS — L89.151 PRESSURE INJURY OF SACRAL REGION, STAGE 1: Primary | ICD-10-CM

## 2019-05-27 DIAGNOSIS — I65.23 BILATERAL CAROTID ARTERY STENOSIS: ICD-10-CM
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
## 2019-05-27 DIAGNOSIS — I48.20 CHRONIC ATRIAL FIBRILLATION: ICD-10-CM

## 2019-05-27 DIAGNOSIS — I25.10 ASCVD (ARTERIOSCLEROTIC CARDIOVASCULAR DISEASE): ICD-10-CM

## 2019-05-27 DIAGNOSIS — R21 RASH: ICD-10-CM

## 2019-05-27 DIAGNOSIS — I76 SEPTIC EMBOLISM: Primary | ICD-10-CM

## 2019-05-27 DIAGNOSIS — I25.10 CORONARY ARTERY DISEASE, ANGINA PRESENCE UNSPECIFIED, UNSPECIFIED VESSEL OR LESION TYPE, UNSPECIFIED WHETHER NATIVE OR TRANSPLANTED HEART: ICD-10-CM

## 2019-05-27 LAB — BACTERIA CATH TIP CULT: NO GROWTH

## 2019-05-27 PROCEDURE — 3077F SYST BP >= 140 MM HG: CPT | Mod: CPTII,S$GLB,, | Performed by: PSYCHIATRY & NEUROLOGY

## 2019-05-27 PROCEDURE — 3075F SYST BP GE 130 - 139MM HG: CPT | Mod: CPTII,S$GLB,, | Performed by: FAMILY MEDICINE

## 2019-05-27 PROCEDURE — 3078F DIAST BP <80 MM HG: CPT | Mod: CPTII,S$GLB,, | Performed by: FAMILY MEDICINE

## 2019-05-27 PROCEDURE — 3078F PR MOST RECENT DIASTOLIC BLOOD PRESSURE < 80 MM HG: ICD-10-PCS | Mod: CPTII,S$GLB,, | Performed by: FAMILY MEDICINE

## 2019-05-27 PROCEDURE — 99999 PR PBB SHADOW E&M-EST. PATIENT-LVL III: ICD-10-PCS | Mod: PBBFAC,,, | Performed by: PSYCHIATRY & NEUROLOGY

## 2019-05-27 PROCEDURE — 99999 PR PBB SHADOW E&M-EST. PATIENT-LVL III: CPT | Mod: PBBFAC,,, | Performed by: PSYCHIATRY & NEUROLOGY

## 2019-05-27 PROCEDURE — 96372 THER/PROPH/DIAG INJ SC/IM: CPT | Mod: S$GLB,,, | Performed by: FAMILY MEDICINE

## 2019-05-27 PROCEDURE — 99215 PR OFFICE/OUTPT VISIT, EST, LEVL V, 40-54 MIN: ICD-10-PCS | Mod: S$GLB,,, | Performed by: PSYCHIATRY & NEUROLOGY

## 2019-05-27 PROCEDURE — 3077F PR MOST RECENT SYSTOLIC BLOOD PRESSURE >= 140 MM HG: ICD-10-PCS | Mod: CPTII,S$GLB,, | Performed by: PSYCHIATRY & NEUROLOGY

## 2019-05-27 PROCEDURE — 99213 OFFICE O/P EST LOW 20 MIN: CPT | Mod: 25,S$GLB,, | Performed by: FAMILY MEDICINE

## 2019-05-27 PROCEDURE — 96372 PR INJECTION,THERAP/PROPH/DIAG2ST, IM OR SUBCUT: ICD-10-PCS | Mod: S$GLB,,, | Performed by: FAMILY MEDICINE

## 2019-05-27 PROCEDURE — 99215 OFFICE O/P EST HI 40 MIN: CPT | Mod: S$GLB,,, | Performed by: PSYCHIATRY & NEUROLOGY

## 2019-05-27 PROCEDURE — 1101F PR PT FALLS ASSESS DOC 0-1 FALLS W/OUT INJ PAST YR: ICD-10-PCS | Mod: CPTII,S$GLB,, | Performed by: PSYCHIATRY & NEUROLOGY

## 2019-05-27 PROCEDURE — 1101F PT FALLS ASSESS-DOCD LE1/YR: CPT | Mod: CPTII,S$GLB,, | Performed by: PSYCHIATRY & NEUROLOGY

## 2019-05-27 PROCEDURE — 3078F PR MOST RECENT DIASTOLIC BLOOD PRESSURE < 80 MM HG: ICD-10-PCS | Mod: CPTII,S$GLB,, | Performed by: PSYCHIATRY & NEUROLOGY

## 2019-05-27 PROCEDURE — 99213 PR OFFICE/OUTPT VISIT, EST, LEVL III, 20-29 MIN: ICD-10-PCS | Mod: 25,S$GLB,, | Performed by: FAMILY MEDICINE

## 2019-05-27 PROCEDURE — 99999 PR PBB SHADOW E&M-EST. PATIENT-LVL III: ICD-10-PCS | Mod: PBBFAC,,, | Performed by: FAMILY MEDICINE

## 2019-05-27 PROCEDURE — 99999 PR PBB SHADOW E&M-EST. PATIENT-LVL III: CPT | Mod: PBBFAC,,, | Performed by: FAMILY MEDICINE

## 2019-05-27 PROCEDURE — 1101F PR PT FALLS ASSESS DOC 0-1 FALLS W/OUT INJ PAST YR: ICD-10-PCS | Mod: CPTII,S$GLB,, | Performed by: FAMILY MEDICINE

## 2019-05-27 PROCEDURE — 3078F DIAST BP <80 MM HG: CPT | Mod: CPTII,S$GLB,, | Performed by: PSYCHIATRY & NEUROLOGY

## 2019-05-27 PROCEDURE — 3075F PR MOST RECENT SYSTOLIC BLOOD PRESS GE 130-139MM HG: ICD-10-PCS | Mod: CPTII,S$GLB,, | Performed by: FAMILY MEDICINE

## 2019-05-27 PROCEDURE — 1101F PT FALLS ASSESS-DOCD LE1/YR: CPT | Mod: CPTII,S$GLB,, | Performed by: FAMILY MEDICINE

## 2019-05-27 RX ORDER — METHYLPREDNISOLONE ACETATE 40 MG/ML
60 INJECTION, SUSPENSION INTRA-ARTICULAR; INTRALESIONAL; INTRAMUSCULAR; SOFT TISSUE
Status: COMPLETED | OUTPATIENT
Start: 2019-05-27 | End: 2019-05-27

## 2019-05-27 RX ORDER — HYDROXYZINE HYDROCHLORIDE 10 MG/1
10 TABLET, FILM COATED ORAL 3 TIMES DAILY PRN
Qty: 40 TABLET | Refills: 2 | Status: SHIPPED | OUTPATIENT
Start: 2019-05-27

## 2019-05-27 RX ORDER — CLOBETASOL PROPIONATE 0.5 MG/G
CREAM TOPICAL 2 TIMES DAILY
Qty: 60 G | Refills: 2 | Status: SHIPPED | OUTPATIENT
Start: 2019-05-27 | End: 2019-06-06

## 2019-05-27 RX ADMIN — METHYLPREDNISOLONE ACETATE 60 MG: 40 INJECTION, SUSPENSION INTRA-ARTICULAR; INTRALESIONAL; INTRAMUSCULAR; SOFT TISSUE at 05:05

## 2019-05-27 NOTE — PROGRESS NOTES
HPI:[facility] Abdiel Farooq is a 84 y.o. 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 He did get readmitted since I last saw him with fever. Blood Cultures   Repeated blood cultutres as well as pulled picc line and cultured the tip per primary team. He finished vanc and zosyn and does not need any further antibiotics.   The patient has not been back to see his cardiologist yet. He is pending cardiology appointment for edema. He is a DNR and does not want to be re-admitted to the hospital. He seems to be more drained with BP meds and daughter plans to hold his lisinopril until seeing cardiology but she will monitor his BP. He is not falling. He is on eliquis. He has redness/rash near his picc line that daughter is monitoring    He has not had any further seizures. He will continue his Keppra and Eliquis. He continues with some right sided weakness. Confusion was worse with fever. He is less confused post d/c. He is resting better at home and his spinal pain is improved. He and his wife are living with his daughter, Mitzy.      Review of Systems   Constitutional: Negative for fever.   HENT: Positive for hearing loss. Negative for nosebleeds.         Chronic hearing loss   Eyes: Negative for double vision.   Respiratory: Negative for hemoptysis.    Cardiovascular: Positive for leg swelling.        Chronic leg swelling   Gastrointestinal: Negative for blood in stool.   Genitourinary: Negative for hematuria.   Musculoskeletal: Negative for falls.   Skin: Negative for rash.   Neurological: Negative for seizures.   Psychiatric/Behavioral: The patient does not have insomnia.      Exam:  Gen Appearance, well developed/nourished in no apparent distress  CV: 2+ distal pulses with no edema or swelling  Neuro:  MS: Awake, alert, Sustains attention. But not oriented to exact day of week.  Recent details seem mildly impaired /remote memory is  "intact.Language is full to spontaneous speech/comprehension. Fund of Knowledge is full.   He displays good insight and judgement and states "I could die" when asked about what could happen if he refuses care.   CN: Optic discs are flat with normal vasculature, PERRL, Extraoccular movements and visual fields are full. Left eye lid mildly drooping (he states known cosmetic) and right lower facial droop is noted, mild.  Normal facial strength,Tongue and Palate are midline and  strong. Shoulder Shrug symmetric and strong.  Motor: Normal bulk, tone, no abnormal movements.  5/5 bilateral UE and LE strength  Except 4-/5 right triceps and 1+ reflexes and + pronator drift  Sensory: Romberg negative and intact to vibration and temp   Cerebellar: Finger-nose, heal to nose, Rapid alternating movements intact  Gait: Normal stance, no ataxia  Rash on the right elbow region, some continuous areas  On the right arm, and spotty rash on the left arm    Imaging: MRI brain 5/2016: acute CVA punctate in the left cerebellum  MRA showed Suspected 3.2 mm aneurysm arising from the right M1 segment and questionable small aneurysms measuring 1-2 mm from the left paraclinoid carotid artery.      2016: Carotid US less than 60% stenosis, TTE normal EF, Holter: no afib    6/2016 INES: The aortic root is normal in size.  Grade 2 atheroma disease was detected consistent with extensive intimal thickening.  Maximal plaque thickness was 3 mm.      Labs:   CMP 4/19 normal        10/2016 CTA legs: Significant calcified atheromatous disease involving the aorta and its major branch vessels as well as the bilateral lower extremity vessels.  There is diminution of flow in the bilateral anterior and posterior tibial arteries and peroneal arteries, left greater than right, with occlusion of the left posterior tibial artery in the lower calf with reconstitution in the foot.    Bilateral lower extremity subcutaneous edema.    Small bilateral pleural " effusions.    Left renal cyst.    MRI brain 1/2018:  Age-appropriate generalized volume loss with scattered foci of T2/FLAIR signal abnormality within the supratentorial white matter and darren while nonspecific suggestive for moderate degree of  chronic microvascular ischemic change.    No evidence for acute infarction or enhancing lesion.    1/2018 CTA head: Age-appropriate generalized cerebral volume loss with moderate chronic microvascular ischemic change.  No evidence for acute intracranial hemorrhage or new parenchymal hypoattenuation to suggest an acute infarction.  No abnormal enhancement.    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    Suspected infundibulum measuring 2 mm involving the left paraclinoid ICA rather than an aneurysm.    Calcifications of the intracranial and excretory vasculature with resulting in luminal narrowing of approximately 60-70 % of the right proximal internal carotid artery.    3/19/19 Echo: EF was 55%      Assessment: bon Farooq is a 84 y.o. male known to me for cerebellar CVA in 2016, history of afib.  He was seen by me earlier this month due to confusion and right sided weakness which was found to be from acute cardioembolic CVA- thought to be due to septic emboli after recent TAVR and due to non-compliance with NOAC at home.  He was improved, but has suffered increased confusion and brief increase in his right sided weakness after increased temperature on 4/29/19 (has happened once prior since most recent CVA). Repeat MRI shows continued and new emobolic CVAs        I recommend:    1. The patient has a rash on the right arm and daughter was instructed to monitor this for further progression as I am concerned over him developing cellulitis(she is a nurse)  2. The patient has ongoing neurological and metabolic symptoms, but is refusing further aggressive care. He is a DNR. The patient has the capacity to make this decision. Daughter will utilize hospice if he  worsens further  3. The patient has not had any further spells of breakthrough neurological symptoms since starting Keppra- continue current dose. EEG not available here at this time. Treating clinically.   4. Continue NOAC given his afib and the thrombus (benefits of this are greater than risk of cerebral bleeding. Patient wishes for no further aggressive measures and does not want repeat INES  5. Continue off driving  6. Daughter was instructed to monitor rash for any worsening to suggest cellulitis. Daughter is monitoring BP- current BP is ideal on less meds. He does have follow up with Dr Begum pending  7. Note: MRi brain negative for CVA for 1/2018 dizziness. CTA head and neck prior showed up to 70% right carotid disease (improved and also monitored by Dr Begum) and stable right M1 segment and ? Left paraclinoid  aneurysm. He does not desire further monitoring at this time.   8. PAD noted by CTA legs and treated conservatatively. Patient has a history of cervical radicular pain which is currently treated with  epidural steroid injections.   9. The patient is known to me from 2010 for confusion associated with hypotension, prostatitis (for which he is still being treated) and acute renal failure thought to represent delirium which resolved. He currently has mild post CVA memory loss      RTC 12 weeks

## 2019-05-27 NOTE — PLAN OF CARE
05/27/19 1439   Medicare Message   Important Message from Medicare regarding Discharge Appeal Rights Given to patient/caregiver;Explained to patient/caregiver;Signed/date by patient/caregiver   Date IMM was signed 05/25/19   Time IMM was signed 1200         Signed for discharge. Copy given to daughter.

## 2019-05-27 NOTE — PROGRESS NOTES
Subjective:       Patient ID: [facility] Farooq is a 84 y.o. male.    Chief Complaint: Rash (x1 day (itches))    Pt is a 84 y.o. male who presents for check up for Pressure injury of sacral region, stage 1  (primary encounter diagnosis)  Coronary artery disease, angina presence unspecified, unspecified vessel or lesion type, unspecified whether native or transplanted heart  Chronic atrial fibrillation  Fall, sequela  Allergic dermatosis  Ascvd (arteriosclerotic cardiovascular disease). Doing well on current meds. Denies any side effects. Prevention is up to date.    Review of Systems   Constitutional: Negative for appetite change.   HENT: Negative for congestion, ear pain, sneezing and sore throat.    Eyes: Negative for redness and visual disturbance.   Respiratory: Negative for cough, chest tightness and stridor.    Cardiovascular: Negative for chest pain.   Gastrointestinal: Negative for abdominal pain, blood in stool, diarrhea, nausea and vomiting.   Genitourinary: Negative for difficulty urinating, dysuria and hematuria.   Musculoskeletal: Negative for arthralgias, back pain, joint swelling, myalgias and neck pain.   Skin: Positive for rash.        Generalized red lesions   Neurological: Negative for dizziness.   Psychiatric/Behavioral: Negative for agitation. The patient is not nervous/anxious.        Objective:      Physical Exam   Skin: Rash noted.            Assessment:       1. Pressure injury of sacral region, stage 1    2. Coronary artery disease, angina presence unspecified, unspecified vessel or lesion type, unspecified whether native or transplanted heart    3. Chronic atrial fibrillation    4. Fall, sequela    5. Allergic dermatosis    6. ASCVD (arteriosclerotic cardiovascular disease)        Plan:   [facility] was seen today for rash.    Diagnoses and all orders for this visit:    Pressure injury of sacral region, stage 1    Coronary artery disease, angina presence unspecified, unspecified vessel  or lesion type, unspecified whether native or transplanted heart    Chronic atrial fibrillation    Fall, sequela    Allergic dermatosis  -     methylPREDNISolone acetate injection 60 mg    ASCVD (arteriosclerotic cardiovascular disease)

## 2019-05-29 ENCOUNTER — PATIENT MESSAGE (OUTPATIENT)
Dept: FAMILY MEDICINE | Facility: CLINIC | Age: 84
End: 2019-05-29

## 2019-05-29 DIAGNOSIS — I63.9 CEREBROVASCULAR ACCIDENT (CVA), UNSPECIFIED MECHANISM: Primary | ICD-10-CM

## 2019-05-29 LAB
BACTERIA BLD CULT: NORMAL
BACTERIA BLD CULT: NORMAL

## 2019-05-30 ENCOUNTER — TELEPHONE (OUTPATIENT)
Dept: FAMILY MEDICINE | Facility: CLINIC | Age: 84
End: 2019-05-30

## 2019-05-30 NOTE — TELEPHONE ENCOUNTER
----- Message from Brianna Aceves sent at 2019  1:57 PM CDT -----  Contact: Laura with [facility]   Orion Abdiel Farooq  MRN: [id number]  : 1935  PCP: Dylan Adam  Home Phone      [phone]  Work Phone      Not on file.  Mobile          [phone]      MESSAGE:  Laura with [facility] is calling stating pt is requesting for  to stay as his attending physician while he is on hospice. Please advise, thanks.  Phone: [phone]

## 2023-01-17 NOTE — ASSESSMENT & PLAN NOTE
Check U.A , PSA, bladder scan; bladder scan 89ml   Still with incontinence issues   Gets HCTz 12.5 and lasix 20mg po daily in am   trial of flomax     Stop fluid pill, creat better with hydration.    5/10 Diuretic resumed per cards on 5/8;   Urine symptoms have been better with flomax but this am fell backwards while urinating; will check orthostatic vitals     5/13 Cont lasix and increase dose per cards today. Give in am   DC instructions